# Patient Record
Sex: FEMALE | Race: WHITE | Employment: UNEMPLOYED | URBAN - METROPOLITAN AREA
[De-identification: names, ages, dates, MRNs, and addresses within clinical notes are randomized per-mention and may not be internally consistent; named-entity substitution may affect disease eponyms.]

---

## 2017-03-15 ENCOUNTER — HOSPITAL ENCOUNTER (OUTPATIENT)
Dept: SURGERY | Age: 52
Discharge: HOME | End: 2017-03-15
Attending: SPECIALIST | Admitting: SPECIALIST

## 2017-03-15 VITALS
OXYGEN SATURATION: 96 % | HEART RATE: 66 BPM | WEIGHT: 165 LBS | DIASTOLIC BLOOD PRESSURE: 87 MMHG | HEIGHT: 62 IN | SYSTOLIC BLOOD PRESSURE: 119 MMHG | RESPIRATION RATE: 16 BRPM | TEMPERATURE: 97.9 F | BODY MASS INDEX: 30.36 KG/M2

## 2017-03-15 RX ORDER — SCOLOPAMINE TRANSDERMAL SYSTEM 1 MG/1
1 PATCH, EXTENDED RELEASE TRANSDERMAL
Status: DISCONTINUED | OUTPATIENT
Start: 2017-03-15 | End: 2017-03-16 | Stop reason: HOSPADM

## 2017-03-15 RX ORDER — HYDROCORTISONE ACETATE 25 MG/1
25 SUPPOSITORY RECTAL EVERY 12 HOURS
Qty: 12 SUPPOSITORY | Refills: 0 | Status: SHIPPED | OUTPATIENT
Start: 2017-03-15 | End: 2021-02-19

## 2017-03-15 RX ORDER — SODIUM CHLORIDE, SODIUM LACTATE, POTASSIUM CHLORIDE, CALCIUM CHLORIDE 600; 310; 30; 20 MG/100ML; MG/100ML; MG/100ML; MG/100ML
INJECTION, SOLUTION INTRAVENOUS CONTINUOUS
Status: DISCONTINUED | OUTPATIENT
Start: 2017-03-15 | End: 2017-03-16 | Stop reason: HOSPADM

## 2017-03-15 RX ADMIN — SODIUM CHLORIDE, SODIUM LACTATE, POTASSIUM CHLORIDE, CALCIUM CHLORIDE: 600; 310; 30; 20 INJECTION, SOLUTION INTRAVENOUS at 10:44

## 2017-03-15 ASSESSMENT — PAIN - FUNCTIONAL ASSESSMENT: PAIN_FUNCTIONAL_ASSESSMENT: 0-10

## 2017-03-15 ASSESSMENT — PAIN SCALES - GENERAL
PAINLEVEL_OUTOF10: 0
PAINLEVEL_OUTOF10: 0

## 2017-07-12 ENCOUNTER — OFFICE VISIT (OUTPATIENT)
Dept: FAMILY MEDICINE CLINIC | Age: 52
End: 2017-07-12

## 2017-07-12 VITALS
BODY MASS INDEX: 30.84 KG/M2 | HEART RATE: 64 BPM | OXYGEN SATURATION: 93 % | WEIGHT: 167.6 LBS | SYSTOLIC BLOOD PRESSURE: 120 MMHG | RESPIRATION RATE: 17 BRPM | DIASTOLIC BLOOD PRESSURE: 76 MMHG | HEIGHT: 62 IN

## 2017-07-12 DIAGNOSIS — Z00.00 VISIT FOR PREVENTIVE HEALTH EXAMINATION: ICD-10-CM

## 2017-07-12 DIAGNOSIS — R10.13 EPIGASTRIC PAIN: ICD-10-CM

## 2017-07-12 DIAGNOSIS — R19.7 DIARRHEA, UNSPECIFIED TYPE: Primary | ICD-10-CM

## 2017-07-12 PROCEDURE — 99204 OFFICE O/P NEW MOD 45 MIN: CPT | Performed by: NURSE PRACTITIONER

## 2017-07-12 ASSESSMENT — ENCOUNTER SYMPTOMS
DIARRHEA: 1
SHORTNESS OF BREATH: 0
VOMITING: 0
BACK PAIN: 0
ABDOMINAL PAIN: 1
NAUSEA: 0

## 2018-10-10 DIAGNOSIS — Z12.39 SCREENING FOR BREAST CANCER: Primary | ICD-10-CM

## 2021-02-19 ENCOUNTER — OFFICE VISIT (OUTPATIENT)
Dept: ORTHOPEDIC SURGERY | Age: 56
End: 2021-02-19
Payer: COMMERCIAL

## 2021-02-19 ENCOUNTER — OFFICE VISIT (OUTPATIENT)
Dept: CARDIOLOGY CLINIC | Age: 56
End: 2021-02-19
Payer: COMMERCIAL

## 2021-02-19 VITALS
OXYGEN SATURATION: 98 % | RESPIRATION RATE: 15 BRPM | WEIGHT: 167 LBS | HEART RATE: 77 BPM | HEIGHT: 62 IN | BODY MASS INDEX: 30.73 KG/M2

## 2021-02-19 VITALS
HEIGHT: 62 IN | WEIGHT: 163 LBS | BODY MASS INDEX: 30 KG/M2 | HEART RATE: 88 BPM | SYSTOLIC BLOOD PRESSURE: 112 MMHG | DIASTOLIC BLOOD PRESSURE: 80 MMHG

## 2021-02-19 DIAGNOSIS — I21.4 NSTEMI (NON-ST ELEVATED MYOCARDIAL INFARCTION) (HCC): ICD-10-CM

## 2021-02-19 DIAGNOSIS — R00.2 PALPITATIONS: ICD-10-CM

## 2021-02-19 DIAGNOSIS — I25.110 CORONARY ARTERY DISEASE INVOLVING NATIVE CORONARY ARTERY OF NATIVE HEART WITH UNSTABLE ANGINA PECTORIS (HCC): Primary | ICD-10-CM

## 2021-02-19 DIAGNOSIS — I49.9 IRREGULAR HEART BEAT: ICD-10-CM

## 2021-02-19 DIAGNOSIS — R42 DIZZINESS: ICD-10-CM

## 2021-02-19 DIAGNOSIS — M87.052 AVASCULAR NECROSIS OF LEFT FEMUR (HCC): ICD-10-CM

## 2021-02-19 DIAGNOSIS — M79.605 PAIN IN BOTH LOWER EXTREMITIES: ICD-10-CM

## 2021-02-19 DIAGNOSIS — M16.12 PRIMARY OSTEOARTHRITIS OF LEFT HIP: Primary | ICD-10-CM

## 2021-02-19 DIAGNOSIS — M79.604 PAIN IN BOTH LOWER EXTREMITIES: ICD-10-CM

## 2021-02-19 PROCEDURE — 3017F COLORECTAL CA SCREEN DOC REV: CPT | Performed by: ORTHOPAEDIC SURGERY

## 2021-02-19 PROCEDURE — 3017F COLORECTAL CA SCREEN DOC REV: CPT | Performed by: INTERNAL MEDICINE

## 2021-02-19 PROCEDURE — G8428 CUR MEDS NOT DOCUMENT: HCPCS | Performed by: ORTHOPAEDIC SURGERY

## 2021-02-19 PROCEDURE — 93000 ELECTROCARDIOGRAM COMPLETE: CPT | Performed by: INTERNAL MEDICINE

## 2021-02-19 PROCEDURE — G8484 FLU IMMUNIZE NO ADMIN: HCPCS | Performed by: ORTHOPAEDIC SURGERY

## 2021-02-19 PROCEDURE — 4004F PT TOBACCO SCREEN RCVD TLK: CPT | Performed by: INTERNAL MEDICINE

## 2021-02-19 PROCEDURE — G8417 CALC BMI ABV UP PARAM F/U: HCPCS | Performed by: ORTHOPAEDIC SURGERY

## 2021-02-19 PROCEDURE — 99204 OFFICE O/P NEW MOD 45 MIN: CPT | Performed by: INTERNAL MEDICINE

## 2021-02-19 PROCEDURE — G8484 FLU IMMUNIZE NO ADMIN: HCPCS | Performed by: INTERNAL MEDICINE

## 2021-02-19 PROCEDURE — 99203 OFFICE O/P NEW LOW 30 MIN: CPT | Performed by: ORTHOPAEDIC SURGERY

## 2021-02-19 PROCEDURE — G8427 DOCREV CUR MEDS BY ELIG CLIN: HCPCS | Performed by: INTERNAL MEDICINE

## 2021-02-19 PROCEDURE — 4004F PT TOBACCO SCREEN RCVD TLK: CPT | Performed by: ORTHOPAEDIC SURGERY

## 2021-02-19 PROCEDURE — G8417 CALC BMI ABV UP PARAM F/U: HCPCS | Performed by: INTERNAL MEDICINE

## 2021-02-19 RX ORDER — BACLOFEN 10 MG/1
10 TABLET ORAL EVERY 8 HOURS PRN
COMMUNITY
Start: 2021-02-12 | End: 2021-03-18

## 2021-02-19 RX ORDER — ALBUTEROL SULFATE 2.5 MG/3ML
2.5 SOLUTION RESPIRATORY (INHALATION) EVERY 6 HOURS PRN
COMMUNITY
Start: 2021-02-12 | End: 2022-02-12

## 2021-02-19 RX ORDER — BACLOFEN 10 MG/1
TABLET ORAL
COMMUNITY
Start: 2021-02-12 | End: 2021-02-19

## 2021-02-19 RX ORDER — HYDROXYZINE HYDROCHLORIDE 25 MG/1
25 TABLET, FILM COATED ORAL EVERY 4 HOURS PRN
COMMUNITY
Start: 2021-02-12 | End: 2021-02-19

## 2021-02-19 RX ORDER — ALBUTEROL SULFATE 90 UG/1
2 AEROSOL, METERED RESPIRATORY (INHALATION) EVERY 6 HOURS PRN
Status: ON HOLD | COMMUNITY
End: 2021-03-11 | Stop reason: HOSPADM

## 2021-02-19 RX ORDER — VALACYCLOVIR HYDROCHLORIDE 500 MG/1
TABLET, FILM COATED ORAL
COMMUNITY
Start: 2020-10-29 | End: 2021-02-19

## 2021-02-19 RX ORDER — CHOLESTYRAMINE 4 G/9G
4 POWDER, FOR SUSPENSION ORAL
COMMUNITY
Start: 2021-02-12 | End: 2021-02-19

## 2021-02-19 RX ORDER — MULTIVITAMIN
1 CAPSULE ORAL DAILY
COMMUNITY
End: 2022-06-17

## 2021-02-19 RX ORDER — MAGNESIUM OXIDE 400 MG/1
400 TABLET ORAL DAILY
COMMUNITY
Start: 2021-02-12 | End: 2021-12-20

## 2021-02-19 RX ORDER — PANTOPRAZOLE SODIUM 40 MG/1
40 TABLET, DELAYED RELEASE ORAL DAILY
COMMUNITY
Start: 2020-09-03 | End: 2021-02-19

## 2021-02-19 RX ORDER — HYDROXYZINE HYDROCHLORIDE 25 MG/1
TABLET, FILM COATED ORAL DAILY PRN
COMMUNITY
Start: 2021-02-12 | End: 2022-06-17

## 2021-02-19 RX ORDER — IBUPROFEN 200 MG
800 TABLET ORAL NIGHTLY
Status: ON HOLD | COMMUNITY
End: 2021-03-11 | Stop reason: HOSPADM

## 2021-02-19 RX ORDER — NICOTINE 21 MG/24HR
1 PATCH, TRANSDERMAL 24 HOURS TRANSDERMAL
COMMUNITY
Start: 2021-02-12 | End: 2021-06-18

## 2021-02-19 RX ORDER — ROSUVASTATIN CALCIUM 5 MG/1
5 TABLET, COATED ORAL DAILY
COMMUNITY
Start: 2021-01-20 | End: 2021-02-19

## 2021-02-19 NOTE — LETTER
[address]  [address] [address]  Phone: [phone]  Fax: [phone]    José Manuel Floyd MD        February 19, 2021     Anastasia Ledesma MD  [facility] [address]    Patient: Tim Yo  MR Number: [id number]  YOB: 1965  Date of Visit: 2/19/2021    Dear Dr. Anastasia Ledesma: Thank you for the request for consultation for [facility] to me for the evaluation of Leg pains. Below are the relevant portions of my assessment and plan of care. If you have questions, please do not hesitate to call me. I look forward to following Renata along with you.     Sincerely,        José Manuel Floyd MD

## 2021-02-19 NOTE — PROGRESS NOTES
CARDIAC CONSULT NOTE       Renata  48 y.o.  female    Chief Complaint   Patient presents with   Francy Liao       Referring physician:  Manjit Woodruff MD     Primary care physician:  Manjit Woodruff MD    History of Present Illness:     Joshua Lopez is a 48 y.o. female referred for evaluation and management of LEG PAINS, PALPITATIONS & DIZZINESS. Patient has known H/O CAD S/P LAD stenting in the past last cath 12/2015, used to see . LEG PAIN: Abdominal CT suggests B/L Iliac disease. Patient has been having pains for about an year. PALPITATIONS: when she was anxious. Better since she is on anxiolytics. DIZZINESS:Feels sick, starts sweating but no syncope.     has a past medical history of Anal cancer ([facility] [address] Asthma, Broken ankle, CAD (coronary artery disease), H/O cardiovascular stress test, NSTEMI (non-ST elevated myocardial infarction) ([facility] [address] PONV (postoperative nausea and vomiting), and Skin cancer. has a past surgical history that includes Percutaneous Transluminal Coronary Angio (2009); Colon surgery (2009); Ankle surgery (Left, 10+ yrs ago); Tubal ligation (25 yrs ago); Tunneled venous port placement (2009); Tonsillectomy; other surgical history (04-); fracture surgery; Colonoscopy (3/4/15); and Colonoscopy (03/15/2017). reports that she has been smoking cigarettes. She has a 17.50 pack-year smoking history. She has never used smokeless tobacco. She reports that she does not drink alcohol or use drugs. family history includes Cancer in her brother, father, and mother; High Cholesterol in her father; No Known Problems in her daughter; Stroke in her father and sister. Review of Systems:   1. Cardiovascular: No chest pain, dyspnea on exertion, +palpitations no loss of consciousness  2. Respiratory: No cough or wheezing    3. Musculoskeletal:  + gait disturbance, weakness, muscle cramps, aches & pains or joint complaints  4.  Neurological: No TIA or stroke symptoms  5. Psychiatric: No anxiety or depression  6. Hematologic/Lymphatic: No bleeding problems, blood clots or swollen lymph nodes    Physical Examination:    /80   Pulse 88   Ht 5' 2\" (1.575 m)   Wt 163 lb (73.9 kg)   BMI 29.81 kg/m²    Patient not Orthostatic    Wt Readings from Last 3 Encounters:   02/19/21 163 lb (73.9 kg)   02/19/21 167 lb (75.8 kg)   10/09/17 167 lb (75.8 kg)     Body mass index is 29.81 kg/m². General Appearance:  Non-obese/Well Nourished  1. Skin: It is warm & dry. No rashes noted. 2. Eyes: No conjunctival Pallor seen. No jaundice noted. 3. Neck: is supple there is no elevation of JVD. No thyromegaly  4. Respiratory:  · Resp Assessment: No abnormal findings. · Resp Auscultation: Vesicular breath sounds without rales or wheezing. 5. Cardiovascular:  · Auscultation: Normal S1 & S2, No prominent murmurs  · Carotid Arteries: No bruits present  · Abdominal Aorta: Non-palpable  · Pedal Pulses:1+ and equal   6. Abdomen:  · No masses or tenderness  · Liver/Spleen: No Abnormalities Noted, no organomegaly. 7. Musculoskeletal: No joint deformities. No muscle wasting  8. Extremities:  ·  No Cyanosis or Clubbing. No significant edema   9. Rectal / genital:  ·  Deferred  10.  Neurological/Psychiatric:  · Oriented to time, place, and person  · Non-anxious    Lab Results   Component Value Date    TROPONINT 0.197 12/09/2015    TROPONINT 0.248 12/09/2015     BNP:  No results found for: BNP, PROBNP  PT/INR:  No results found for: PTINR  No results found for: LABA1C  No results found for: CHOL, TRIG, HDL, LDLCALC, LDLDIRECT, CHOLHDLRATIO  Lab Results   Component Value Date    ALT 15 12/09/2015    ALT 17 04/10/2012    AST 15 12/09/2015    AST 17 04/10/2012     BMP:    Lab Results   Component Value Date     12/09/2015     04/10/2012    K 4.0 12/09/2015    K 3.9 04/10/2012     12/09/2015     04/10/2012    CO2 23 12/09/2015    CO2 23 04/10/2012    BUN 9

## 2021-02-19 NOTE — PATIENT INSTRUCTIONS
Continue weight-bearing as tolerated. Continue range of motion exercises as instructed. Ice and elevate as needed. Tylenol or Motrin for pain. Follow up as needed (if you would like an injection)  Patient Education        Hip Arthritis: Exercises  Introduction  Here are some examples of exercises for you to try. The exercises may be suggested for a condition or for rehabilitation. Start each exercise slowly. Ease off the exercises if you start to have pain. You will be told when to start these exercises and which ones will work best for you. How to do the exercises  Straight-leg raises to the outside   1. Lie on your side, with your affected hip on top. 2. Tighten the front thigh muscles of your top leg to keep your knee straight. 3. Keep your hip and your leg straight in line with the rest of your body, and keep your knee pointing forward. Do not drop your hip back. 4. Lift your top leg straight up toward the ceiling, about 12 inches off the floor. Hold for about 6 seconds, then slowly lower your leg. 5. Repeat 8 to 12 times. 6. Switch legs and repeat steps 1 through 5, even if only one hip is sore. Straight-leg raises to the inside   1. Lie on your side with your affected hip on the floor. 2. You can either prop up your other leg on a chair, or you can bend that knee and put that foot in front of your other knee. Do not drop your hip back. 3. Tighten the muscles on the front thigh of your bottom leg to straighten that knee. 4. Keep your kneecap pointing forward and your leg straight, and lift your bottom leg up toward the ceiling about 6 inches. Hold for about 6 seconds, then lower slowly. 5. Repeat 8 to 12 times. 6. Switch legs and repeat steps 1 through 5, even if only one hip is sore. Hip hike   1. Stand sideways on the bottom step of a staircase, and hold on to the banister or wall. 2. Keeping both knees straight, lift your good leg off the step and let it hang down. Then hike your good hip up to the same level as your affected hip or a little higher. 3. Repeat 8 to 12 times. 4. Switch legs and repeat steps 1 through 3, even if only one hip is sore. Bridging   1. Lie on your back with both knees bent. Your knees should be bent about 90 degrees. 2. Then push your feet into the floor, squeeze your buttocks, and lift your hips off the floor until your shoulders, hips, and knees are all in a straight line. 3. Hold for about 6 seconds as you continue to breathe normally, and then slowly lower your hips back down to the floor and rest for up to 10 seconds. 4. Repeat 8 to 12 times. Hamstring stretch (lying down)   1. Lie flat on your back with your legs straight. If you feel discomfort in your back, place a small towel roll under your lower back. 2. Holding the back of your affected leg, lift your leg straight up and toward your body until you feel a stretch at the back of your thigh. 3. Hold the stretch for at least 30 seconds. 4. Repeat 2 to 4 times. 5. Switch legs and repeat steps 1 through 4, even if only one hip is sore. Standing quadriceps stretch   1. If you are not steady on your feet, hold on to a chair, counter, or wall. You can also lie on your stomach or your side to do this exercise. 2. Bend the knee of the leg you want to stretch, and reach behind you to grab the front of your foot or ankle with the hand on the same side. For example, if you are stretching your right leg, use your right hand. 3. Keeping your knees next to each other, pull your foot toward your buttock until you feel a gentle stretch across the front of your hip and down the front of your thigh. Your knee should be pointed directly to the ground, and not out to the side. 4. Hold the stretch for at least 15 to 30 seconds. 5. Repeat 2 to 4 times. 6. Switch legs and repeat steps 1 through 5, even if only one hip is sore. Hip rotator stretch   1. Lie on your back with both knees bent and your feet flat on the floor. 2. Put the ankle of your affected leg on your opposite thigh near your knee. 3. Use your hand to gently push your knee away from your body until you feel a gentle stretch around your hip. 4. Hold the stretch for 15 to 30 seconds. 5. Repeat 2 to 4 times. 6. Repeat steps 1 through 5, but this time use your hand to gently pull your knee toward your opposite shoulder. 7. Switch legs and repeat steps 1 through 6, even if only one hip is sore. Knee-to-chest   1. Lie on your back with your knees bent and your feet flat on the floor. 2. Bring your affected leg to your chest, keeping the other foot flat on the floor (or keeping the other leg straight, whichever feels better on your lower back). 3. Keep your lower back pressed to the floor. Hold for at least 15 to 30 seconds. 4. Relax, and lower the knee to the starting position. 5. Repeat 2 to 4 times. 6. Switch legs and repeat steps 1 through 5, even if only one hip is sore. 7. To get more stretch, put your other leg flat on the floor while pulling your knee to your chest.    Clamshell   1. Lie on your side, with your affected hip on top. Keep your feet and knees together and your knees bent. 2. Raise your top knee, but keep your feet together. Do not let your hips roll back. Your legs should open up like a clamshell. 3. Hold for 6 seconds. 4. Slowly lower your knee back down. Rest for 10 seconds. 5. Repeat 8 to 12 times. 6. Switch legs and repeat steps 1 through 5, even if only one hip is sore. Follow-up care is a key part of your treatment and safety. Be sure to make and go to all appointments, and call your doctor if you are having problems. It's also a good idea to know your test results and keep a list of the medicines you take. Where can you learn more? Go to [email or web address] and sign in to your [employer] account. Enter L559 in the [facility] box to learn more about \"Hip Arthritis: Exercises. \"     If you do not have an account, please click on the \"Sign Up Now\" link. Current as of: March 2, 2020               Content Version: 12.6  © 7896-1522 [employer]. Care instructions adapted under license by [facility] ([facility]). If you have questions about a medical condition or this instruction, always ask your healthcare professional. [address] any warranty or liability for your use of this information.

## 2021-02-19 NOTE — LETTER
Yesy Hickman  1965  [id number]    Have you had any Chest Pain that is not new? - No      ? DO EKG IF: Patient has a Heart Rate above 100 or below 40     CAD (Coronary Artery Disease) patient should have one on file every 6 months        Have you had any Shortness of Breath - No      Have you had any dizziness - No    Have you had any palpitations that are not new? - Yes  If Yes DO EKG - Do you feel your heart racing  How long does it last - .3  minutes     Is the patient on any of the following medications -     Do you have any edema - swelling in No    If Yes - CHECK TO SEE IF THE EDEMA IS PITTING  How long have they been having edema - Years  If Yes - Have they worn compression stockings No    Vein \"LEG PROBLEM Questionnaire\"  1. Do you have prominent leg veins? No   2. Do you have any skin discoloration? No  3. Do you have any healed or active sores? No  4. Do you have swelling of the legs? No  5. Do you have a family history of varicose veins? No  6. Does your profession involve pro-longed        standing or heavy lifting? Yes  7. Have you been fighting overweight problems? No  8. Do you have restless legs? No  9. Do you have any night time cramps? No  10.  Do you have any of the following in your legs:        Weakness     Do you have a surgery or procedure scheduled in the near future - No per pt

## 2021-02-19 NOTE — PATIENT INSTRUCTIONS
1. PAD: Will check arterial US of both Legs & if needed Angiography. 2. CAD: Clinically stable  3. Dizziness: Vaso Vagal abnormality. Office Visit for test results. Please be informed that if you contact our office outside of normal business hours the physician on call cannot help with any scheduling or rescheduling issues, procedure instruction questions or any type of medication issue. We advise you for any urgent/emergency that you go to the nearest emergency room!     PLEASE CALL OUR OFFICE DURING NORMAL BUSINESS HOURS    Monday - Friday   8 am to 5 pm    [facility] [address]: [phone]    [place]:  [phone]

## 2021-02-19 NOTE — PROGRESS NOTES
Patient presents to the office today for her first evaluation of the left hip. Pt states pain has been going on for about a year now. Pt states pain is in her groin area. Pt states that she is having constant pain in her groin area. She is having a difficult time raising her left leg or crossing her left leg over her right leg. Pt states that she does have pain in her right hip also. L>R pt states that pain in her right hip is along the lateral aspect of the hip.

## 2021-02-19 NOTE — LETTER
Patient Name: Radha Vivas  : 1965  MRN# [id number]    REASON FOR VISIT: Consult LEG PAIN    Current Outpatient Medications   Medication Sig Dispense Refill    baclofen (LIORESAL) 10 MG tablet       NICOTINE STEP 1 21 MG/24HR       nicotine (NICODERM CQ) 21 MG/24HR Place 1 patch onto the skin      Multiple Vitamin (MULTIVITAMIN) capsule Take 1 capsule by mouth daily      magnesium oxide (MAG-OX) 400 MG tablet Take 400 mg by mouth daily      ibuprofen (ADVIL;MOTRIN) 200 MG tablet Take 800 mg by mouth nightly      hydrOXYzine (ATARAX) 25 MG tablet       hydrOXYzine (ATARAX) 25 MG tablet Take 25 mg by mouth every 4 hours as needed      Cyanocobalamin 2500 MCG TABS Take 1 tablet by mouth daily      cyanocobalamin 500 MCG tablet Take 5,000 mcg by mouth daily      cholestyramine (QUESTRAN) 4 g packet Take 4 g by mouth      baclofen (LIORESAL) 10 MG tablet Take 10 mg by mouth every 8 hours as needed      albuterol (PROVENTIL) (2.5 MG/3ML) 0.083% nebulizer solution Inhale 2.5 mg into the lungs every 6 hours as needed      albuterol sulfate  (90 Base) MCG/ACT inhaler Inhale 2 puffs into the lungs every 6 hours as needed      pantoprazole (PROTONIX) 40 MG tablet Take 40 mg by mouth daily      rosuvastatin (CRESTOR) 5 MG tablet Take 5 mg by mouth daily      valACYclovir (VALTREX) 500 MG tablet Take two tablets twice daily for 10 days and then one tablet daily      dicyclomine (BENTYL) 10 MG capsule Take 1 capsule by mouth 3 times daily (before meals) 90 capsule 3    albuterol-ipratropium (COMBIVENT RESPIMAT)  MCG/ACT AERS inhaler Inhale into the lungs      hydrocortisone (ANUSOL-HC) 25 MG suppository Place 1 suppository rectally every 12 hours 12 suppository 0    metoprolol tartrate (LOPRESSOR) 25 MG tablet Take 1 tablet by mouth 2 times daily (Patient taking differently: Take 25 mg by mouth daily ) 60 tablet 6    aspirin 81 MG chewable tablet Take 1 tablet by mouth daily 30 tablet 6  albuterol-ipratropium (COMBIVENT)  MCG/ACT inhaler Inhale 1 puff into the lungs 4 times daily 1 Inhaler 1     No current facility-administered medications for this visit.             STRESS TEST:  1/16/2016    ECHO: 12/9/2015    CAROTID: NONE    MUGA: NONE    LAST PACER CHECK: NONE    CARDIAC CATH: 12/11/2015

## 2021-02-21 PROBLEM — M16.12 PRIMARY OSTEOARTHRITIS OF LEFT HIP: Status: ACTIVE | Noted: 2021-02-21

## 2021-02-21 PROBLEM — M87.052 AVASCULAR NECROSIS OF LEFT FEMUR (HCC): Status: ACTIVE | Noted: 2021-02-21

## 2021-02-21 ASSESSMENT — ENCOUNTER SYMPTOMS
WHEEZING: 0
SHORTNESS OF BREATH: 0
VOMITING: 0
EYE REDNESS: 0
CHEST TIGHTNESS: 0
COLOR CHANGE: 0
EYE PAIN: 0

## 2021-02-21 NOTE — PROGRESS NOTES
2/19/2021   Chief Complaint   Patient presents with    Hip Pain     left hip pain         History of Present Illness:                             Aleena Hickman is a 48 y.o. female presents today for evaluation of her left hip pain. She has had worsening pain and stiffness in the left hip and groin region over the last year. Symptoms have become more constant and severe over the last few months. She is having difficulty with movement and rotation of the hip. She has deep aching pain in the groin worse with weightbearing activities and at the extremes of motion during flexion and rotational movements of the hip. She has difficulty with getting on her shoes and socks and getting up from a seated position. 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 She has some mild issues with her right hip but no significant limitations in range of motion we will suggest tightness and pain with repetitive activities. Patient presents to the office today for her first evaluation of the left hip. Pt states pain has been going on for about a year now. Pt states pain is in her groin area. Pt states that she is having constant pain in her groin area. She is having a difficult time raising her left leg or crossing her left leg over her right leg. Pt states that she does have pain in her right hip also. L>R pt states that pain in her right hip is along the lateral aspect of the hip. Medical History  Patient's medications, allergies, past medical, surgical, social and family histories were reviewed and updated as appropriate.     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 ?    Cancer Father         prostate cancer- lung mets- bone mets    Stroke Father     High Cholesterol Father     Stroke Sister     Cancer Brother         neck cancer    No Known Problems Daughter      Social History     Socioeconomic History    Marital status:      Spouse name: None    Number of children: None    Years of education: 6    Highest education level: None   Occupational History    Occupation: unemployed   Social Needs    Financial resource strain: None    Food insecurity     Worry: None     Inability: None    Transportation needs     Medical: None     Non-medical: None   Tobacco Use    Smoking status: Current Every Day Smoker     Packs/day: 0.50     Years: 35.00     Pack years: 17.50     Types: Cigarettes    Smokeless tobacco: Never Used   Substance and Sexual Activity    Alcohol use: No     Comment: \"quit 3/2015\"use to drink a couple of times per week before\"    Drug use: No     Comment: 1-2 cups of coffee daily     Sexual activity: Yes   Lifestyle    Physical activity     Days per week: None     Minutes per session: None    Stress: None   Relationships    Social connections     Talks on phone: None     Gets together: None     Attends Mu-ism service: None     Active member of club or organization: None     Attends meetings of clubs or organizations: None     Relationship status: None    Intimate partner violence     Fear of current or ex partner: None     Emotionally abused: None     Physically abused: None     Forced sexual activity: None   Other Topics Concern    None   Social History Narrative    None     Current Outpatient Medications   Medication Sig Dispense Refill    nicotine (NICODERM CQ) 21 MG/24HR Place 1 patch onto the skin      magnesium oxide (MAG-OX) 400 MG tablet Take 400 mg by mouth daily      baclofen (LIORESAL) 10 MG tablet Take 10 mg by mouth every 8 hours as needed      albuterol (PROVENTIL) (2.5 MG/3ML) 0.083% nebulizer solution Inhale 2.5 mg into the lungs every 6 hours as needed      Multiple Vitamin (MULTIVITAMIN) capsule Take 1 capsule by mouth daily      ibuprofen (ADVIL;MOTRIN) 200 MG tablet Take 800 mg by mouth nightly      hydrOXYzine (ATARAX) 25 MG tablet       albuterol sulfate  (90 Base) MCG/ACT inhaler Inhale 2 puffs into the lungs every 6 hours as needed      albuterol-ipratropium (COMBIVENT RESPIMAT)  MCG/ACT AERS inhaler Inhale into the lungs      aspirin 81 MG chewable tablet Take 1 tablet by mouth daily 30 tablet 6    albuterol-ipratropium (COMBIVENT)  MCG/ACT inhaler Inhale 1 puff into the lungs 4 times daily 1 Inhaler 1     No current facility-administered medications for this visit. Allergies   Allergen Reactions    Tylenol With Codeine #3 [Acetaminophen-Codeine] Nausea And Vomiting       Review of Systems:   Review of Systems   Constitutional: Negative for chills and fever. HENT: Negative for congestion and sneezing. Eyes: Negative for pain and redness. Respiratory: Negative for chest tightness, shortness of breath and wheezing. Cardiovascular: Negative for chest pain and palpitations. Gastrointestinal: Negative for vomiting. Musculoskeletal: Positive for arthralgias and gait problem. Skin: Negative for color change and rash. Neurological: Negative for weakness and numbness. Psychiatric/Behavioral: Negative for agitation. The patient is not nervous/anxious. Examination:  General Exam:  Vitals: Pulse 77   Resp 15   Ht 5' 2\" (1.575 m)   Wt 167 lb (75.8 kg)   SpO2 98%   BMI 30.54 kg/m²    Physical Exam  Vitals signs and nursing note reviewed.    Constitutional:       Appearance: Normal appearance. HENT:      Head: Normocephalic and atraumatic. Eyes:      Conjunctiva/sclera: Conjunctivae normal.      Pupils: Pupils are equal, round, and reactive to light. Neck:      Musculoskeletal: Normal range of motion. Pulmonary:      Effort: Pulmonary effort is normal.   Musculoskeletal:      Right hip: She exhibits normal range of motion, normal strength, no tenderness, no bony tenderness, no swelling, no crepitus and no deformity. Right knee: Normal.      Left knee: She exhibits normal range of motion, no swelling, no effusion, no deformity, no erythema, normal alignment, no LCL laxity and no MCL laxity. No medial joint line and no lateral joint line tenderness noted. Lumbar back: She exhibits normal range of motion, no tenderness, no swelling and no deformity. Comments: Left Lower Extremity:  There is moderate tenderness to palpation diffusely throughout the hip both anteriorly and posteriorly. Range of motion is significantly limited and painful at the extremes of motion. Hip flexion present to 90 degrees, abduction 20 degrees, extension 5 degrees, internal rotation 10 degrees, external rotation 10 degrees. Strength is 5 out of 5 with hip flexion, extension, and abduction however this is somewhat limited due to pain with resistance testing. BRITNEY and FADIR test reproduces pain to the groin and hip joint. Sensation is intact to light touch in the left lower extremity. Pulses are intact. Skin is intact without erythema. No lower extremity edema. Skin:     General: Skin is warm and dry. Neurological:      Mental Status: She is alert and oriented to person, place, and time. Psychiatric:         Mood and Affect: Mood normal.         Behavior: Behavior normal.            Diagnostic testing:  X-ray images were reviewed by myself and discussed with the patient:      Office Procedures:  No orders of the defined types were placed in this encounter.       Assessment and Plan  1. Left hip primary osteoarthritis    2. 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 We discussed maximizing conservative treatments for the left hip. I explained to her that given the severity of her x-ray changes and symptoms that hip replacement surgery may be helpful for her in the future. She would like to hold off for as long as possible for surgery. I recommended weight loss and home exercise program.  I given her some stretches and strengthening exercises to perform for the left hip. I offered her a steroid injection. She is opted to defer. She feels that her symptoms were not severe enough to require an injection today. She will follow-up as needed in future if she would like to have a steroid injection performed. I recommended over-the-counter anti-inflammatory medications.   She will follow-up as needed    Electronically signed by Rebeca Cain MD on 2/21/2021 at 3:51 PM

## 2021-03-08 ENCOUNTER — TELEPHONE (OUTPATIENT)
Dept: CARDIOLOGY CLINIC | Age: 56
End: 2021-03-08

## 2021-03-08 ENCOUNTER — HOSPITAL ENCOUNTER (INPATIENT)
Age: 56
LOS: 1 days | Discharge: HOME | DRG: 247 | End: 2021-03-11
Attending: INTERNAL MEDICINE | Admitting: INTERNAL MEDICINE
Payer: COMMERCIAL

## 2021-03-08 ENCOUNTER — APPOINTMENT (OUTPATIENT)
Dept: GENERAL RADIOLOGY | Age: 56
DRG: 247 | End: 2021-03-08
Payer: COMMERCIAL

## 2021-03-08 DIAGNOSIS — R07.9 CHEST PAIN, UNSPECIFIED TYPE: Primary | ICD-10-CM

## 2021-03-08 LAB
ALBUMIN SERPL-MCNC: 3.8 GM/DL (ref 3.4–5)
ALP BLD-CCNC: 123 IU/L (ref 40–129)
ALT SERPL-CCNC: 30 U/L (ref 10–40)
AMPHETAMINES: NEGATIVE
ANION GAP SERPL CALCULATED.3IONS-SCNC: 5 MMOL/L (ref 4–16)
AST SERPL-CCNC: 24 IU/L (ref 15–37)
BARBITURATE SCREEN URINE: NEGATIVE
BASOPHILS ABSOLUTE: 0 K/CU MM
BASOPHILS RELATIVE PERCENT: 0.4 % (ref 0–1)
BENZODIAZEPINE SCREEN, URINE: NEGATIVE
BILIRUB SERPL-MCNC: 0.2 MG/DL (ref 0–1)
BUN BLDV-MCNC: 10 MG/DL (ref 6–23)
CALCIUM SERPL-MCNC: 8.9 MG/DL (ref 8.3–10.6)
CANNABINOID SCREEN URINE: NEGATIVE
CHLORIDE BLD-SCNC: 105 MMOL/L (ref 99–110)
CO2: 30 MMOL/L (ref 21–32)
COCAINE METABOLITE: NEGATIVE
CREAT SERPL-MCNC: 0.7 MG/DL (ref 0.6–1.1)
DIFFERENTIAL TYPE: ABNORMAL
EKG ATRIAL RATE: 84 BPM
EKG DIAGNOSIS: NORMAL
EKG P AXIS: 53 DEGREES
EKG P-R INTERVAL: 160 MS
EKG Q-T INTERVAL: 354 MS
EKG QRS DURATION: 86 MS
EKG QTC CALCULATION (BAZETT): 418 MS
EKG R AXIS: 35 DEGREES
EKG T AXIS: 61 DEGREES
EKG VENTRICULAR RATE: 84 BPM
EOSINOPHILS ABSOLUTE: 0.1 K/CU MM
EOSINOPHILS RELATIVE PERCENT: 1.4 % (ref 0–3)
GFR AFRICAN AMERICAN: >60 ML/MIN/1.73M2
GFR NON-AFRICAN AMERICAN: >60 ML/MIN/1.73M2
GLUCOSE BLD-MCNC: 123 MG/DL (ref 70–99)
HCT VFR BLD CALC: 39 % (ref 37–47)
HEMOGLOBIN: 12.5 GM/DL (ref 12.5–16)
IMMATURE NEUTROPHIL %: 0.3 % (ref 0–0.43)
LYMPHOCYTES ABSOLUTE: 2.6 K/CU MM
LYMPHOCYTES RELATIVE PERCENT: 35.7 % (ref 24–44)
MCH RBC QN AUTO: 29.8 PG (ref 27–31)
MCHC RBC AUTO-ENTMCNC: 32.1 % (ref 32–36)
MCV RBC AUTO: 93.1 FL (ref 78–100)
MONOCYTES ABSOLUTE: 0.5 K/CU MM
MONOCYTES RELATIVE PERCENT: 6.6 % (ref 0–4)
NUCLEATED RBC %: 0 %
OPIATES, URINE: NEGATIVE
OXYCODONE: NEGATIVE
PDW BLD-RTO: 15.4 % (ref 11.7–14.9)
PHENCYCLIDINE, URINE: NEGATIVE
PLATELET # BLD: 255 K/CU MM (ref 140–440)
PMV BLD AUTO: 9.8 FL (ref 7.5–11.1)
POTASSIUM SERPL-SCNC: 4.3 MMOL/L (ref 3.5–5.1)
RBC # BLD: 4.19 M/CU MM (ref 4.2–5.4)
SEGMENTED NEUTROPHILS ABSOLUTE COUNT: 4 K/CU MM
SEGMENTED NEUTROPHILS RELATIVE PERCENT: 55.6 % (ref 36–66)
SODIUM BLD-SCNC: 140 MMOL/L (ref 135–145)
TOTAL IMMATURE NEUTOROPHIL: 0.02 K/CU MM
TOTAL NUCLEATED RBC: 0 K/CU MM
TOTAL PROTEIN: 6.4 GM/DL (ref 6.4–8.2)
TROPONIN T: <0.01 NG/ML
WBC # BLD: 7.3 K/CU MM (ref 4–10.5)

## 2021-03-08 PROCEDURE — 84484 ASSAY OF TROPONIN QUANT: CPT

## 2021-03-08 PROCEDURE — 71046 X-RAY EXAM CHEST 2 VIEWS: CPT

## 2021-03-08 PROCEDURE — G0378 HOSPITAL OBSERVATION PER HR: HCPCS

## 2021-03-08 PROCEDURE — 80307 DRUG TEST PRSMV CHEM ANLYZR: CPT

## 2021-03-08 PROCEDURE — 6370000000 HC RX 637 (ALT 250 FOR IP): Performed by: NURSE PRACTITIONER

## 2021-03-08 PROCEDURE — 2500000003 HC RX 250 WO HCPCS: Performed by: NURSE PRACTITIONER

## 2021-03-08 PROCEDURE — 93010 ELECTROCARDIOGRAM REPORT: CPT | Performed by: INTERNAL MEDICINE

## 2021-03-08 PROCEDURE — 85025 COMPLETE CBC W/AUTO DIFF WBC: CPT

## 2021-03-08 PROCEDURE — 99285 EMERGENCY DEPT VISIT HI MDM: CPT

## 2021-03-08 PROCEDURE — 36415 COLL VENOUS BLD VENIPUNCTURE: CPT

## 2021-03-08 PROCEDURE — 96374 THER/PROPH/DIAG INJ IV PUSH: CPT

## 2021-03-08 PROCEDURE — 80053 COMPREHEN METABOLIC PANEL: CPT

## 2021-03-08 PROCEDURE — 2580000003 HC RX 258: Performed by: NURSE PRACTITIONER

## 2021-03-08 PROCEDURE — 93005 ELECTROCARDIOGRAM TRACING: CPT | Performed by: INTERNAL MEDICINE

## 2021-03-08 RX ORDER — LOPERAMIDE HYDROCHLORIDE 2 MG/1
2 CAPSULE ORAL PRN
COMMUNITY

## 2021-03-08 RX ORDER — ASPIRIN 81 MG/1
324 TABLET, CHEWABLE ORAL ONCE
Status: DISCONTINUED | OUTPATIENT
Start: 2021-03-08 | End: 2021-03-10 | Stop reason: SDUPTHER

## 2021-03-08 RX ORDER — SODIUM CHLORIDE 9 MG/ML
INJECTION, SOLUTION INTRAVENOUS CONTINUOUS
Status: DISCONTINUED | OUTPATIENT
Start: 2021-03-08 | End: 2021-03-10 | Stop reason: SDUPTHER

## 2021-03-08 RX ORDER — ACETAMINOPHEN 325 MG/1
650 TABLET ORAL EVERY 6 HOURS PRN
Status: DISCONTINUED | OUTPATIENT
Start: 2021-03-08 | End: 2021-03-10 | Stop reason: SDUPTHER

## 2021-03-08 RX ORDER — POLYETHYLENE GLYCOL 3350 17 G/17G
17 POWDER, FOR SOLUTION ORAL DAILY PRN
Status: DISCONTINUED | OUTPATIENT
Start: 2021-03-08 | End: 2021-03-11 | Stop reason: HOSPADM

## 2021-03-08 RX ORDER — M-VIT,TX,IRON,MINS/CALC/FOLIC 27MG-0.4MG
1 TABLET ORAL DAILY
Status: DISCONTINUED | OUTPATIENT
Start: 2021-03-09 | End: 2021-03-11 | Stop reason: HOSPADM

## 2021-03-08 RX ORDER — HYDROXYZINE HYDROCHLORIDE 25 MG/1
25 TABLET, FILM COATED ORAL 4 TIMES DAILY PRN
Status: DISCONTINUED | OUTPATIENT
Start: 2021-03-08 | End: 2021-03-11 | Stop reason: HOSPADM

## 2021-03-08 RX ORDER — ONDANSETRON 2 MG/ML
4 INJECTION INTRAMUSCULAR; INTRAVENOUS EVERY 6 HOURS PRN
Status: DISCONTINUED | OUTPATIENT
Start: 2021-03-08 | End: 2021-03-10 | Stop reason: SDUPTHER

## 2021-03-08 RX ORDER — HYDROCODONE BITARTRATE AND ACETAMINOPHEN 5; 325 MG/1; MG/1
1 TABLET ORAL EVERY 6 HOURS PRN
Status: DISCONTINUED | OUTPATIENT
Start: 2021-03-08 | End: 2021-03-11

## 2021-03-08 RX ORDER — NICOTINE 21 MG/24HR
1 PATCH, TRANSDERMAL 24 HOURS TRANSDERMAL DAILY
Status: DISCONTINUED | OUTPATIENT
Start: 2021-03-08 | End: 2021-03-11 | Stop reason: HOSPADM

## 2021-03-08 RX ORDER — SODIUM CHLORIDE 0.9 % (FLUSH) 0.9 %
10 SYRINGE (ML) INJECTION PRN
Status: DISCONTINUED | OUTPATIENT
Start: 2021-03-08 | End: 2021-03-11 | Stop reason: HOSPADM

## 2021-03-08 RX ORDER — ATORVASTATIN CALCIUM 20 MG/1
20 TABLET, FILM COATED ORAL NIGHTLY
Status: DISCONTINUED | OUTPATIENT
Start: 2021-03-08 | End: 2021-03-11 | Stop reason: HOSPADM

## 2021-03-08 RX ORDER — ACETAMINOPHEN 650 MG/1
650 SUPPOSITORY RECTAL EVERY 6 HOURS PRN
Status: DISCONTINUED | OUTPATIENT
Start: 2021-03-08 | End: 2021-03-11 | Stop reason: HOSPADM

## 2021-03-08 RX ORDER — PROMETHAZINE HYDROCHLORIDE 25 MG/1
12.5 TABLET ORAL EVERY 6 HOURS PRN
Status: DISCONTINUED | OUTPATIENT
Start: 2021-03-08 | End: 2021-03-11 | Stop reason: HOSPADM

## 2021-03-08 RX ORDER — SODIUM CHLORIDE 0.9 % (FLUSH) 0.9 %
10 SYRINGE (ML) INJECTION EVERY 12 HOURS SCHEDULED
Status: DISCONTINUED | OUTPATIENT
Start: 2021-03-08 | End: 2021-03-11 | Stop reason: HOSPADM

## 2021-03-08 RX ORDER — NITROGLYCERIN 0.4 MG/1
0.4 TABLET SUBLINGUAL EVERY 5 MIN PRN
Status: DISCONTINUED | OUTPATIENT
Start: 2021-03-08 | End: 2021-03-11 | Stop reason: HOSPADM

## 2021-03-08 RX ORDER — MAGNESIUM OXIDE 400 MG/1
400 TABLET ORAL DAILY
Status: DISCONTINUED | OUTPATIENT
Start: 2021-03-09 | End: 2021-03-11 | Stop reason: HOSPADM

## 2021-03-08 RX ORDER — ASPIRIN 81 MG/1
81 TABLET, CHEWABLE ORAL DAILY
Status: DISCONTINUED | OUTPATIENT
Start: 2021-03-09 | End: 2021-03-10 | Stop reason: SDUPTHER

## 2021-03-08 RX ADMIN — HYDROXYZINE HYDROCHLORIDE 25 MG: 25 TABLET, FILM COATED ORAL at 23:23

## 2021-03-08 RX ADMIN — SODIUM CHLORIDE, PRESERVATIVE FREE 10 ML: 5 INJECTION INTRAVENOUS at 23:29

## 2021-03-08 RX ADMIN — SODIUM CHLORIDE: 9 INJECTION, SOLUTION INTRAVENOUS at 23:24

## 2021-03-08 RX ADMIN — FAMOTIDINE 20 MG: 10 INJECTION, SOLUTION INTRAVENOUS at 23:24

## 2021-03-08 ASSESSMENT — PAIN DESCRIPTION - LOCATION: LOCATION: CHEST

## 2021-03-08 ASSESSMENT — PAIN SCALES - GENERAL
PAINLEVEL_OUTOF10: 0
PAINLEVEL_OUTOF10: 6

## 2021-03-08 ASSESSMENT — PAIN DESCRIPTION - FREQUENCY: FREQUENCY: CONTINUOUS

## 2021-03-08 ASSESSMENT — HEART SCORE: ECG: 0

## 2021-03-08 ASSESSMENT — PAIN DESCRIPTION - DIRECTION: RADIATING_TOWARDS: LEFT ARM

## 2021-03-08 NOTE — ED PROVIDER NOTES
EMERGENCY DEPARTMENT ENCOUNTER        PCP: Anastasia Ledesma MD    [facility]    Chief Complaint   Patient presents with    Chest Pain     radiating into left arm       This patient was not evaluated by the attending physician. I have independently evaluated this patient. My supervising physician was available for consultation. HPI      Tim Yo is a 55 y.o. female smoker with PMH of CAD, NSTEMI who presents with chest pain. Onset - 3 days ago  Location - mid sternal, left side of chest  Duration - intermittent, several minutes at a time  Character - pressure  Aggravating/Alleviating factors - Worse with exertion. Better with resting but been lasting longer today. Activity at onset - exertion  Associate symptoms - patient thinks maybe some shortness of breath  Radiation - left arm  Severity - 6/10    Patient reports cardiologist is Dr. Melo Portillo. Patient denies nausea, emesis, diaphoresis, palpitations. Patient denies lower extremity swelling, recent long travel, history of PE/DVT, exogenous hormone use, recent surgeries/hospitalizations, recent trauma, hemoptysis. REVIEW OF SYSTEMS    Constitutional:  Denies fever, chills. HENT:  Denies sore throat or ear pain   Cardiovascular:  +Chest Pain; Denies palpitations, syncope, extremity edema.   Respiratory:  + shortness of breath; Denies cough, hemoptysis    GI:  Denies abdominal pain, nausea, vomiting, or diarrhea  :  Denies any urinary symptoms  Musculoskeletal:  Denies back pain, no extremity swelling  Skin:  Denies rash  Neurologic:  Denies lightheadedness, dizziness, headache, focal weakness or sensory changes   Endocrine:  Denies polyuria or polydypsia   Lymphatic:  Denies swollen glands     All other review of systems are negative  See HPI and nursing notes for additional information         PAST MEDICAL & SURGICAL HISTORY    Past Medical History:   Diagnosis Date    Anal cancer ([facility] [address]     per old chart 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 and tx with chemo and radiation- followed with Dr Veronica Ruby Asthma     Broken ankle     CAD (coronary artery disease)     \"have 3 heart stents\" use to see Dr Maame Alonso H/O cardiovascular stress test 1/6/2016    treadmill    NSTEMI (non-ST elevated myocardial infarction) ([facility] [address]     PONV (postoperative nausea and vomiting)     hx motion sickness    Skin cancer      Past Surgical History:   Procedure Laterality Date    ANKLE SURGERY Left 10+ yrs ago    with hardware    COLON SURGERY  2009    \"after had chemo removed some part of the cancer from the rectal area, then 7 months later had bowel blockage had to do surgery  to remove part of the bowel\"    COLONOSCOPY  3/4/15    mild proctitis    COLONOSCOPY  03/15/2017    mild radia proc, hypertroph pailla    FRACTURE SURGERY      left ankle    OTHER SURGICAL HISTORY  04-    mediport removal    PTCA  2009 4/22/2009 had angioplasty with stent to LAD & another day in 2009 stent x 2 to RCA done    TONSILLECTOMY      as a kid age 7   [id number]  25 yrs ago    TUNNELED VENOUS PORT PLACEMENT  2009    port inserted        CURRENT MEDICATIONS    Current Outpatient Rx   Medication Sig Dispense Refill    nicotine (NICODERM CQ) 21 MG/24HR Place 1 patch onto the skin      Multiple Vitamin (MULTIVITAMIN) capsule Take 1 capsule by mouth daily      magnesium oxide (MAG-OX) 400 MG tablet Take 400 mg by mouth daily      ibuprofen (ADVIL;MOTRIN) 200 MG tablet Take 800 mg by mouth nightly      hydrOXYzine (ATARAX) 25 MG tablet       baclofen (LIORESAL) 10 MG tablet Take 10 mg by mouth every 8 hours as needed      albuterol (PROVENTIL) (2.5 MG/3ML) 0.083% nebulizer solution Inhale 2.5 mg into the lungs every 6 hours as needed      albuterol sulfate  (90 Base) MCG/ACT inhaler Inhale 2 puffs into the lungs every 6 hours as needed      albuterol-ipratropium (COMBIVENT RESPIMAT)  MCG/ACT AERS inhaler Inhale into the lungs      aspirin 81 MG chewable tablet Take 1 tablet by mouth daily 30 tablet 6    albuterol-ipratropium (COMBIVENT)  MCG/ACT inhaler Inhale 1 puff into the lungs 4 times daily 1 Inhaler 1       ALLERGIES    Allergies   Allergen Reactions    Tylenol With Codeine #3 [Acetaminophen-Codeine] Nausea And Vomiting       SOCIAL & FAMILY HISTORY    Social History     Socioeconomic History    Marital status:      Spouse name: Not on file    Number of children: Not on file    Years of education: 6    Highest education level: Not on file   Occupational History    Occupation: unemployed   Social Needs    Financial resource strain: Not on file    Food insecurity     Worry: Not on file     Inability: Not on file    Transportation needs     Medical: Not on file     Non-medical: Not on file   Tobacco Use    Smoking status: Current Every Day Smoker     Packs/day: 0.50     Years: 35.00     Pack years: 17.50     Types: Cigarettes    Smokeless tobacco: Never Used   Substance and Sexual Activity    Alcohol use: No     Comment: \"quit 3/2015\"use to drink a couple of times per week before\"    Drug use: No     Comment: 1-2 cups of coffee daily     Sexual activity: Yes   Lifestyle    Physical activity     Days per week: Not on file     Minutes per session: Not on file    Stress: Not on file   Relationships    Social connections     Talks on phone: Not on file     Gets together: Not on file     Attends Jehovah's witness service: Not on file     Active member of club or organization: Not on file     Attends meetings of clubs or organizations: Not on file     Relationship status: Not on file    Intimate partner violence     Fear of current or ex partner: Not on file     Emotionally abused: Not on file     Physically abused: Not on file     Forced sexual activity: Not on file   Other Topics Concern    Not on file   Social History Narrative    Not on file     Family History   Problem Relation Age of Onset    Cancer Mother         breast ca? ?    Cancer Father         prostate cancer- lung mets- bone mets    Stroke Father     High Cholesterol Father     Stroke Sister     Cancer Brother         neck cancer    No Known Problems Daughter        PHYSICAL EXAM    VITAL SIGNS: BP (!) 145/85   Pulse 88   Temp 98.4 °F (36.9 °C)   Resp 14   Ht 5' 2\" (1.575 m)   Wt 161 lb (73 kg)   SpO2 97%   BMI 29.45 kg/m²    Constitutional:  Well developed, well nourished, no acute distress   HENT:  Atraumatic, moist mucus membranes  Neck/Lymphatics: supple, no JVD, no swollen nodes  Respiratory:  Lungs Clear, no retractions, no wheezing, rhonchi, rales  Cardiovascular:  Rate regular, regular Rhythm,  no murmurs/rubs/gallops. No carotid bruits or murmurs heard in carotids. Vascular: Radial pulses and DP pulses 2+ equal bilaterally  GI:  Soft, nontender, normal bowel sounds  Musculoskeletal:  No edema, no deformities. No thigh/calf tenderness to palpation bilaterally. Compartments are soft in bilateral lower extremities.   Integument:  Skin warm and dry, no petechiae   Neurologic:  Alert & oriented, normal speech  Psych: Pleasant affect, no hallucinations        LABS:  Results for orders placed or performed during the hospital encounter of 03/08/21   CBC auto diff   Result Value Ref Range    WBC 7.3 4.0 - 10.5 K/CU MM    RBC 4.19 (L) 4.2 - 5.4 M/CU MM    Hemoglobin 12.5 12.5 - 16.0 GM/DL    Hematocrit 39.0 37 - 47 %    MCV 93.1 78 - 100 FL    MCH 29.8 27 - 31 PG    MCHC 32.1 32.0 - 36.0 %    RDW 15.4 (H) 11.7 - 14.9 %    Platelets 372 293 - 036 K/CU MM    MPV 9.8 7.5 - 11.1 FL    Differential Type AUTOMATED DIFFERENTIAL     Segs Relative 55.6 36 - 66 %    Lymphocytes % 35.7 24 - 44 %    Monocytes % 6.6 (H) 0 - 4 %    Eosinophils % 1.4 0 - 3 %    Basophils % 0.4 0 - 1 %    Segs Absolute 4.0 K/CU MM    Lymphocytes Absolute 2.6 K/CU MM    Monocytes Absolute 0.5 K/CU MM    Eosinophils Absolute 0.1 K/CU MM    Basophils Absolute 0.0 K/CU MM Nucleated RBC % 0.0 %    Total Nucleated RBC 0.0 K/CU MM    Total Immature Neutrophil 0.02 K/CU MM    Immature Neutrophil % 0.3 0 - 0.43 %   CMP   Result Value Ref Range    Sodium 140 135 - 145 MMOL/L    Potassium 4.3 3.5 - 5.1 MMOL/L    Chloride 105 99 - 110 mMol/L    CO2 30 21 - 32 MMOL/L    BUN 10 6 - 23 MG/DL    CREATININE 0.7 0.6 - 1.1 MG/DL    Glucose 123 (H) 70 - 99 MG/DL    Calcium 8.9 8.3 - 10.6 MG/DL    Albumin 3.8 3.4 - 5.0 GM/DL    Total Protein 6.4 6.4 - 8.2 GM/DL    Total Bilirubin 0.2 0.0 - 1.0 MG/DL    ALT 30 10 - 40 U/L    AST 24 15 - 37 IU/L    Alkaline Phosphatase 123 40 - 129 IU/L    GFR Non-African American >60 >60 mL/min/1.73m2    GFR African American >60 >60 mL/min/1.73m2    Anion Gap 5 4 - 16   Troponin   Result Value Ref Range    Troponin T <0.010 <0.01 NG/ML   EKG 12 Lead   Result Value Ref Range    Ventricular Rate 84 BPM    Atrial Rate 84 BPM    P-R Interval 160 ms    QRS Duration 86 ms    Q-T Interval 354 ms    QTc Calculation (Bazett) 418 ms    P Axis 53 degrees    R Axis 35 degrees    T Axis 61 degrees    Diagnosis       Normal sinus rhythm  Normal ECG  No previous ECGs available  Confirmed by Yaniv Orosco MD, Nia Neri ([id number]) on 3/8/2021 3:22:11 PM           EKG: EKG Interpretation  Please see ED physician's note for EKG interpretation- Dr. Lucia Hazard    RADIOLOGY/PROCEDURES    XR CHEST (2 VW)   Final Result   No radiographic evidence of acute pulmonary abnormality seen. ED COURSE & MEDICAL DECISION MAKING       Vital signs and nursing notes reviewed during ED course. I have independently evaluated this patient. Supervising physician present in the Emergency Department, available for consultation, throughout entirety of patient care. Patient presents as above. Patient placed on telemetry monitoring as well as continuous pulse oximetry monitoring. While in the ED today, labs, imaging, and EKG were obtained. For EKG interpretation- please see ED physician's note. Labs without clinically significant derangements. Troponin is negative. Chest xray obtained today and reveals no acute cardiopulmonary process. No pneumothorax, no consolidation concerning for pneumonia, no pleural effusion. Pulses are equal in all extremities, no ripping or tearing pain, no widened mediastinum-low clinical suspicion for AAA/thoracic dissection. No pleuritic chest pain, tachycardia, tachypnea, hypoxia-low clinical suspicion for PE. HEART score is 4- therefore I consulted with hospitalist and we discussed the patient's case. Hospitalist, Thomas Blue NP, agrees to admit the patient at this time for further evaluation and care for ACS rule out. All pertinent Lab data and radiographic results reviewed with patient at bedside. The patient and/or the family were informed of the results of any tests/labs/imaging, the treatment plan, and time was allotted to answer questions. Diagnosis, disposition, and treatment plan reviewed in detail with patient who understands and agrees to admission at this time. See chart for details of medications given during the ED stay. Clinical  IMPRESSION    1. Chest pain, unspecified type        PLAN:  Admission to the hospital    Comment: Please note this report has been produced using speech recognition software and may contain errors related to that system including errors in grammar, punctuation, and spelling, as well as words and phrases that may be inappropriate. If there are any questions or concerns please feel free to contact the dictating provider for clarification.         Zita Gitelman, PA-C  03/08/21 2007

## 2021-03-08 NOTE — TELEPHONE ENCOUNTER
Patient called state that she has been   Waking up her hand are numb, having  A feeling in her chest that she could not   Describe, she stated she had a heart attack a  Few years ago and she felt the same way

## 2021-03-09 ENCOUNTER — APPOINTMENT (OUTPATIENT)
Dept: NUCLEAR MEDICINE | Age: 56
DRG: 247 | End: 2021-03-09
Payer: COMMERCIAL

## 2021-03-09 ENCOUNTER — APPOINTMENT (OUTPATIENT)
Dept: ULTRASOUND IMAGING | Age: 56
DRG: 247 | End: 2021-03-09
Payer: COMMERCIAL

## 2021-03-09 LAB
ALBUMIN SERPL-MCNC: 3.5 GM/DL (ref 3.4–5)
ALP BLD-CCNC: 107 IU/L (ref 40–129)
ALT SERPL-CCNC: 26 U/L (ref 10–40)
ANION GAP SERPL CALCULATED.3IONS-SCNC: 5 MMOL/L (ref 4–16)
AST SERPL-CCNC: 21 IU/L (ref 15–37)
BILIRUB SERPL-MCNC: 0.2 MG/DL (ref 0–1)
BUN BLDV-MCNC: 13 MG/DL (ref 6–23)
CALCIUM SERPL-MCNC: 8.4 MG/DL (ref 8.3–10.6)
CHLORIDE BLD-SCNC: 106 MMOL/L (ref 99–110)
CHOLESTEROL: 182 MG/DL
CO2: 29 MMOL/L (ref 21–32)
CREAT SERPL-MCNC: 0.8 MG/DL (ref 0.6–1.1)
EKG ATRIAL RATE: 75 BPM
EKG DIAGNOSIS: NORMAL
EKG P AXIS: 56 DEGREES
EKG P-R INTERVAL: 168 MS
EKG Q-T INTERVAL: 368 MS
EKG QRS DURATION: 86 MS
EKG QTC CALCULATION (BAZETT): 410 MS
EKG R AXIS: 61 DEGREES
EKG T AXIS: 50 DEGREES
EKG VENTRICULAR RATE: 75 BPM
GFR AFRICAN AMERICAN: >60 ML/MIN/1.73M2
GFR NON-AFRICAN AMERICAN: >60 ML/MIN/1.73M2
GLUCOSE BLD-MCNC: 93 MG/DL (ref 70–99)
HCT VFR BLD CALC: 34.7 % (ref 37–47)
HDLC SERPL-MCNC: 68 MG/DL
HEMOGLOBIN: 11.3 GM/DL (ref 12.5–16)
LDL CHOLESTEROL DIRECT: 105 MG/DL
LV EF: 58 %
LV EF: 75 %
LVEF MODALITY: NORMAL
LVEF MODALITY: NORMAL
MCH RBC QN AUTO: 30 PG (ref 27–31)
MCHC RBC AUTO-ENTMCNC: 32.6 % (ref 32–36)
MCV RBC AUTO: 92 FL (ref 78–100)
PDW BLD-RTO: 15.4 % (ref 11.7–14.9)
PLATELET # BLD: 231 K/CU MM (ref 140–440)
PMV BLD AUTO: 9.6 FL (ref 7.5–11.1)
POTASSIUM SERPL-SCNC: 4.1 MMOL/L (ref 3.5–5.1)
RBC # BLD: 3.77 M/CU MM (ref 4.2–5.4)
SODIUM BLD-SCNC: 140 MMOL/L (ref 135–145)
TOTAL PROTEIN: 5.2 GM/DL (ref 6.4–8.2)
TRIGL SERPL-MCNC: 104 MG/DL
TROPONIN T: <0.01 NG/ML
TROPONIN T: <0.01 NG/ML
WBC # BLD: 6.8 K/CU MM (ref 4–10.5)

## 2021-03-09 PROCEDURE — G0378 HOSPITAL OBSERVATION PER HR: HCPCS

## 2021-03-09 PROCEDURE — 93017 CV STRESS TEST TRACING ONLY: CPT

## 2021-03-09 PROCEDURE — 2500000003 HC RX 250 WO HCPCS: Performed by: NURSE PRACTITIONER

## 2021-03-09 PROCEDURE — 6360000002 HC RX W HCPCS: Performed by: INTERNAL MEDICINE

## 2021-03-09 PROCEDURE — 80061 LIPID PANEL: CPT

## 2021-03-09 PROCEDURE — A9500 TC99M SESTAMIBI: HCPCS | Performed by: INTERNAL MEDICINE

## 2021-03-09 PROCEDURE — 80053 COMPREHEN METABOLIC PANEL: CPT

## 2021-03-09 PROCEDURE — 2580000003 HC RX 258: Performed by: NURSE PRACTITIONER

## 2021-03-09 PROCEDURE — 83721 ASSAY OF BLOOD LIPOPROTEIN: CPT

## 2021-03-09 PROCEDURE — 3430000000 HC RX DIAGNOSTIC RADIOPHARMACEUTICAL: Performed by: INTERNAL MEDICINE

## 2021-03-09 PROCEDURE — 6370000000 HC RX 637 (ALT 250 FOR IP): Performed by: NURSE PRACTITIONER

## 2021-03-09 PROCEDURE — 85027 COMPLETE CBC AUTOMATED: CPT

## 2021-03-09 PROCEDURE — 96376 TX/PRO/DX INJ SAME DRUG ADON: CPT

## 2021-03-09 PROCEDURE — 93010 ELECTROCARDIOGRAM REPORT: CPT | Performed by: INTERNAL MEDICINE

## 2021-03-09 PROCEDURE — 94761 N-INVAS EAR/PLS OXIMETRY MLT: CPT

## 2021-03-09 PROCEDURE — 93925 LOWER EXTREMITY STUDY: CPT

## 2021-03-09 PROCEDURE — 78452 HT MUSCLE IMAGE SPECT MULT: CPT

## 2021-03-09 PROCEDURE — 93005 ELECTROCARDIOGRAM TRACING: CPT | Performed by: NURSE PRACTITIONER

## 2021-03-09 PROCEDURE — 99222 1ST HOSP IP/OBS MODERATE 55: CPT | Performed by: INTERNAL MEDICINE

## 2021-03-09 PROCEDURE — 36415 COLL VENOUS BLD VENIPUNCTURE: CPT

## 2021-03-09 PROCEDURE — 93306 TTE W/DOPPLER COMPLETE: CPT

## 2021-03-09 PROCEDURE — 84484 ASSAY OF TROPONIN QUANT: CPT

## 2021-03-09 RX ADMIN — Medication 10 MILLICURIE: at 08:30

## 2021-03-09 RX ADMIN — Medication 30 MILLICURIE: at 10:55

## 2021-03-09 RX ADMIN — HYDROXYZINE HYDROCHLORIDE 25 MG: 25 TABLET, FILM COATED ORAL at 12:16

## 2021-03-09 RX ADMIN — SODIUM CHLORIDE, PRESERVATIVE FREE 10 ML: 5 INJECTION INTRAVENOUS at 20:34

## 2021-03-09 RX ADMIN — ASPIRIN 81 MG: 81 TABLET, CHEWABLE ORAL at 12:16

## 2021-03-09 RX ADMIN — REGADENOSON 0.4 MG: 0.08 INJECTION, SOLUTION INTRAVENOUS at 10:53

## 2021-03-09 RX ADMIN — MAGNESIUM OXIDE 400 MG (241.3 MG MAGNESIUM) TABLET 400 MG: TABLET at 12:16

## 2021-03-09 RX ADMIN — SODIUM CHLORIDE, PRESERVATIVE FREE 10 ML: 5 INJECTION INTRAVENOUS at 12:15

## 2021-03-09 RX ADMIN — FAMOTIDINE 20 MG: 10 INJECTION, SOLUTION INTRAVENOUS at 12:15

## 2021-03-09 RX ADMIN — MULTIPLE VITAMINS W/ MINERALS TAB 1 TABLET: TAB at 12:16

## 2021-03-09 RX ADMIN — HYDROXYZINE HYDROCHLORIDE 25 MG: 25 TABLET, FILM COATED ORAL at 20:34

## 2021-03-09 ASSESSMENT — PAIN SCALES - GENERAL
PAINLEVEL_OUTOF10: 0
PAINLEVEL_OUTOF10: 0

## 2021-03-09 NOTE — ED NOTES
Pt expressing frustration with waiting and being unable to eat all day. Explained to pt the available foods. Pt refused available options. Pt sending  to purchase her food and refusing to go to her room until her  returns with her food.       Gaetano Kanner, RN  03/08/21 [id number]

## 2021-03-09 NOTE — ED NOTES
Nurse to nurse report called to Shilpa Cook RN. All questions answered. Pt to be transported once room cleaned.      Gaetano Kanner, RN  03/08/21 [id number]

## 2021-03-09 NOTE — H&P
History and Physical      Name:  Óscar Kasper /Age/Sex: 1965  (55 y.o. female)   MRN & CSN:  [id number] & [id number] Admission Date/Time: 3/8/2021  7:11 PM   Location:  ED25/ED-25 PCP: Patricia Figueroa MD       Hospital Day: 1        Admitting Physician: Dr. Mckinley Rodriguez. [facility]      Assessment and Plan:   Óscar Kasper is a 55 y.o. female who presents with  Chest Pain (radiating into left arm)     Chest pain r/o ACS. Concern for anginal source given history. Initial troponin <0.010. EKG shows NSR. [facility]    -Admit to Med/Surg under OBSERVATION status.    -Telemetry monitoring    -Serial troponin level and repeat EKG in AM   -Cardiology consult. Follows with Dr. Airam Layton   -Pending labs for further risk stratification    -Antiplatelet/BB/Statin/sl Nitro on medication regimen.  PAD   -Arterial doppler pending     Tobacco Use   -Smoking cessation protocol   -Nicotine supplementation      Anxiety   -Hydroxyzine ordered    Patient case discussed with ED provider    Diet Cardiac   DVT Prophylaxis [x] Lovenox, []  Heparin, [] SCDs, [] Ambulation  [] Long term AC   GI Prophylaxis [] PPI,  [x] H2 Blocker,  [] Carafate,  [] Diet/Tube Feeds   Code Status Full   Disposition Admit to Med/surg. Patient plans to return home upon discharge. MDM [] Low, [x] Moderate,[]  High     -Patient assessment and plan discussed and reviewed with admitting physician: Charles Aceves MD.       History of Present Illness:     Chief Complaint: Chest Pain (radiating into left arm)    Óscar Kasper is a 55 y.o. female who presents with chest pain. Onset ~ 2 days ago. Describes pressure that radiates to left arm and sometime right side. Symptoms worse with exertion. Associated SOB      Stenting x 2 to RCA by Dr Walt Ireland (2008). Ten point ROS: reviewed negative, unless as noted in above HPI.     Objective:   No intake or output data in the 24 hours ending 21     Vitals:   Vitals:    21 1511 21 1922 21 1931 BP: (!) 145/85 (!) 140/83 138/89   Pulse: 88 85 83   Resp: 14 12 11   Temp: 98.4 °F (36.9 °C)     SpO2: 97% 96% 96%   Weight: 161 lb (73 kg) 161 lb (73 kg)    Height: 5' 2\" (1.575 m) 5' 2\" (1.575 m)        Physical Exam: 03/08/21     GEN -Awake non-toxic appearing female, sitting upright in bed , NAD. Normal body habitus. Appears given age. EYES -PERRLA. No scleral erythema, discharge, or conjunctivitis. HENT -MM are moist. Oral pharynx without exudates, no evidence of thrush. NECK -Supple, no apparent thyromegaly or masses. RESP -CTA, no wheezes, rales or rhonchi. Symmetric chest movement while on RA.   C/V -S1/S2 auscultated. RRR without appreciable M/R/G. No JVD or carotid bruits. Peripheral pulses equal bilaterally and palpable. Cap refill <3 sec. No peripheral edema. GI -Abdomen is soft non distended and without significant TTP. + BS. No masses or guarding. Rectal exam deferred. No HSM   -No CVA/ flank tenderness. Albarran catheter is not present. LYMPH-No palpable cervical lymphadenopathy and no hepatosplenomegaly. No petechiae or ecchymoses. MS -No gross joint deformities. SKIN -Normal coloration, warm, dry. NEURO-Cranial nerves appear grossly intact, normal speech, no lateralizing weakness. PSYC-Awake, alert, oriented x 4- person, place, time, situation,  Appropriate affect.     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 and tx with chemo and radiation- followed with Dr Jose Dalton Asthma     Broken ankle     CAD (coronary artery disease)     \"have 3 heart stents\" use to see Dr Sanjuana Cherry H/O cardiovascular stress test 1/6/2016    treadmill    NSTEMI (non-ST elevated myocardial infarction) ([facility] [address]     PONV (postoperative nausea and vomiting)     hx motion sickness    Skin cancer        Past Surgical  History:    has a past surgical history that includes Percutaneous Transluminal Coronary Angio (2009); Colon surgery (2009); Ankle surgery (Left, 10+ yrs ago); Tubal ligation (25 yrs ago); Tunneled venous port placement (2009); Tonsillectomy; other surgical history (04-); fracture surgery; Colonoscopy (3/4/15); and Colonoscopy (03/15/2017). Social History:    FAM HX: Reviewed  family history includes Cancer in her brother, father, and mother; High Cholesterol in her father; No Known Problems in her daughter; Stroke in her father and sister.     Soc HX:   Social History     Socioeconomic History    Marital status:      Spouse name: Not on file    Number of children: Not on file    Years of education: 6    Highest education level: Not on file   Occupational History    Occupation: unemployed   Social Needs    Financial resource strain: Not on file    Food insecurity     Worry: Not on file     Inability: Not on file   [employer] needs     Medical: Not on file     Non-medical: Not on file   Tobacco Use    Smoking status: Current Every Day Smoker     Packs/day: 0.50     Years: 35.00     Pack years: 17.50     Types: Cigarettes    Smokeless tobacco: Never Used   Substance and Sexual Activity    Alcohol use: No     Comment: \"quit 3/2015\"use to drink a couple of times per week before\"    Drug use: No     Comment: 1-2 cups of coffee daily     Sexual activity: Yes   Lifestyle    Physical activity     Days per week: Not on file     Minutes per session: Not on file    Stress: Not on file   Relationships    Social connections     Talks on phone: Not on file     Gets together: Not on file     Attends [employer] service: Not on file     Active member of club or organization: Not on file     Attends meetings of clubs or organizations: Not on file     Relationship status: Not on file    Intimate partner violence     Fear of current or ex partner: Not on file     Emotionally abused: Not on file     Physically abused: Not on file     Forced sexual activity: Not on Reviewed  Recent Labs     03/08/21  1650   WBC 7.3   HGB 12.5   HCT 39.0         Recent Labs     03/08/21  1650      K 4.3      CO2 30   BUN 10   CREATININE 0.7     Recent Labs     03/08/21  1650   AST 24   ALT 30   BILITOT 0.2   ALKPHOS 123     No results for input(s): INR in the last 72 hours. Radiology this visit:  Reviewed. Xr Chest (2 Vw)    Result Date: 3/8/2021  EXAMINATION: TWO XRAY VIEWS OF THE CHEST 3/8/2021 3:29 pm COMPARISON: 12/09/2015. HISTORY: ORDERING SYSTEM PROVIDED HISTORY: Chest pain TECHNOLOGIST PROVIDED HISTORY: Reason for exam:->Chest pain Reason for Exam: chest pain FINDINGS: The cardiac silhouette appears within normal limits. The aorta is mildly uncoiled and atherosclerotic. No confluent airspace opacity, pleural effusion or pneumothorax is seen. No radiographic evidence of acute pulmonary abnormality seen. Xr Hip 1 Vw W Pelvis Left    Result Date: 2/19/2021  AP pelvis and frog-leg lateral view of the left hip show evidence of bilateral avascular necrosis present at the femoral heads with evidence of mild degenerative changes of the right hip joint and moderate to severe degenerative changes at the left hip joint. The left hip has advanced joint space narrowing and areas of cortical irregularity and mild spurring. No evidence of fracture or acute abnormality. Normal alignment.  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

## 2021-03-09 NOTE — CONSULTS
CARDIOLOGY CONSULT NOTE    Reason for consultation: Chest pain     Referring physician: Sesar Glaser MD      Primary care physician: Carlene Chavis MD        Dear Sesar Glaser MD   Thanks for the consult.     History of present illness:Renata is a 55 y. o.year old who  presents with  chest pain for last few days, happening daily, intermittent for 15 to 20 mins and aggravated with activity substernal also,reproducible with palpation, radiated to shoulder, 6/10, tender to touch,associated with shortness of breath, + sweating, nausea, did not get NTG in ED. No fever, no chills, no nausea no vomiting. Blood pressure, cholesterol, blood glucose and weight are well controlled.     Chief Complaint   Patient presents with    Chest Pain     radiating into left arm       Past medical history:    has a past medical history of Anal cancer ([facility] [address] Asthma, Broken ankle, CAD (coronary artery disease), H/O cardiovascular stress test, NSTEMI (non-ST elevated myocardial infarction) ([facility] [address] PONV (postoperative nausea and vomiting), and Skin cancer. Past surgical history:   has a past surgical history that includes Percutaneous Transluminal Coronary Angio (2009); Colon surgery (2009); Ankle surgery (Left, 10+ yrs ago); Tubal ligation (25 yrs ago); Tunneled venous port placement (2009); Tonsillectomy; other surgical history (04-); fracture surgery; Colonoscopy (3/4/15); and Colonoscopy (03/15/2017). Social History:   reports that she has been smoking cigarettes. She has a 17.50 pack-year smoking history. She has never used smokeless tobacco. She reports that she does not drink alcohol or use drugs.   Family history:   no family history of CAD, STROKE of DM    aspirin chewable tablet 324 mg, Once  nicotine (NICODERM CQ) 21 MG/24HR 1 patch, Daily  therapeutic multivitamin-minerals 1 tablet, Daily  magnesium oxide (MAG-OX) tablet 400 mg, Daily  aspirin chewable tablet 81 mg, Daily  0.9 % sodium chloride infusion,  acetaminophen (TYLENOL) tablet 650 mg  650 mg Oral Q6H PRN Taylor Louin, APRN - CNP        Or    acetaminophen (TYLENOL) suppository 650 mg  650 mg Rectal Q6H PRN Taylor Louin, APRN - CNP        polyethylene glycol (GLYCOLAX) packet 17 g  17 g Oral Daily PRN Taylor Louin, APRN - CNP        enoxaparin (LOVENOX) injection 40 mg  40 mg Subcutaneous Daily Taylor Louin, APRN - CNP        atorvastatin (LIPITOR) tablet 20 mg  20 mg Oral Nightly Taylor Louin, APRN - CNP        nitroGLYCERIN (NITROSTAT) SL tablet 0.4 mg  0.4 mg Sublingual Q5 Min PRN Taylor Louin, APRN - CNP        hydrOXYzine (ATARAX) tablet 25 mg  25 mg Oral 4x Daily PRN Taylor Louin, APRN - CNP   25 mg at 03/08/21 2323    HYDROcodone-acetaminophen (NORCO) 5-325 MG per tablet 1 tablet  1 tablet Oral Q6H PRN Taylor Louin, APRN - CNP              Review of Systems:    · Constitutional: No Fever or Weight Loss    · Eyes: No Decreased Vision  · ENT: No Headaches, Hearing Loss or Vertigo  · Cardiovascular: + chest pain, dyspnea on exertion, palpitations or loss of consciousness  · Respiratory: No cough or wheezing    · Gastrointestinal: No abdominal pain, appetite loss, blood in stools, constipation, diarrhea or heartburn  · Genitourinary: No dysuria, trouble voiding, or hematuria  · Musculoskeletal: No gait disturbance, weakness or joint complaints  · Integumentary: No rash or pruritis  · Neurological: No TIA or stroke symptoms  · Psychiatric: No anxiety or depression  · Endocrine: No malaise, fatigue or temperature intolerance  · Hematologic/Lymphatic: No bleeding problems, blood clots or swollen lymph nodes  · Allergic/Immunologic: No nasal congestion or hives  All systems negative except as marked.      ·    ·    ·      Physical Examination:    Vitals:    03/09/21 0440   BP: 135/85   Pulse: 80   Resp: 18   Temp: 98.1 °F (36.7 °C)   SpO2:       Wt Readings from Last 3 Encounters:   03/09/21 161 lb (73 kg)   02/19/21 163 lb (73.9 kg)   02/19/21 167 lb (75.8 kg)     Body mass index is 29.45 kg/m².        General Appearance: No distress, conversant     Constitutional: Well developed, Well nourished, No acute distress, Non-toxic appearance.    HENT:  Normocephalic, Atraumatic, Bilateral external ears normal, Oropharynx moist, No oral exudates, Nose normal. Neck- Normal range of motion, No tenderness, Supple, No stridor,no apical-carotid delay, no carotid bruit  Eyes: PERRL, EOMI, Conjunctiva normal, No discharge.    Respiratory:  Normal breath sounds, No respiratory distress, + wheezing, No chest tenderness. ,no use of accessory muscles, diaphragm movement is normal  Cardiovascular: (PMI) apex non displaced,no lifts no thrills, no s3,no s4, Normal heart rate, Normal rhythm, No murmurs, No rubs, No gallops. Carotid arteries pulse and amplitude are normal no bruit, no abdominal bruit noted ( normal abdominal aorta ausculation), femoral arteries pulse and amplitude are normal no bruit, pedal pulses are normal.  GI: Bowel sounds normal, Soft, No tenderness, No masses, No pulsatile masses, no hepatosplenomegally, no bruits  : External genitalia appear normal, No masses or lesions. No discharge. No CVA tenderness.    Musculoskeletal: Intact distal pulses, No edema, No tenderness, No cyanosis, No clubbing. Good range of motion in all major joints. No tenderness to palpation or major deformities noted. Back- No tenderness. Integument: Warm, Dry, No erythema, No rash.    Skin: no rash, no ulcers  Lymphatic: No lymphadenopathy noted.    Neurologic: Alert & oriented x 3, Normal motor function, Normal sensory function, No focal deficits noted.    Psychiatric: Affect normal, Judgment normal, Mood normal.   Lab Review        Recent Labs       09/28/16  0010    WBC  12.3*    HGB  14.4    HCT  43.8    PLT  316            Recent Labs       09/28/16  0010    NA  136    K  3.9    CL  95*    CO2  23    BUN  10    CREATININE  0.8           Recent Labs       09/28/16  0010    AST  12*  ALT  10    BILITOT  0.4    ALKPHOS  124       No results for input(s): TROPONINI in the last 72 hours. No results found for: BNP  No results found for: INR, PROTIME        EKG:nsr     Chest Xray:nad     ECHO:pending  Labs, echo, meds reviewed  Assessment:      Recommendations:     1. Chest pain:  stress test and echo  2. CAD: H/O RCA and LAD stent, continue aspirin and statins  3. Dyslipidemia: continue statins  4. HTN: controlled  5. COPD: wheezing, recommend to quit smoking  6. H/o anal cancer and sx, had chemo and radiation in past  7.  Health maintenance: exerise and diet  All labs, medications and tests reviewed, continue all other medications of all above medical condition listed as is.          Rasheed Martinez MD,   Rasheed Martinez MD, 3/9/2021 7:30 AM

## 2021-03-09 NOTE — PROGRESS NOTES
[address]    Hospitalist Progress Note      Name:  Wayne Lesch /Age/Sex: 1965  (55 y.o. female)   MRN & CSN:  [id number] & [id number] Admission Date/Time: 3/8/2021  7:11 PM   Location:  [address] [place] [address] PCP: Grace Trevizo MD         Hospital Day: 2    Assessment and Plan:   Wayne Lesch is a 55 y.o.  female  who presents with chest pain     Abnormal stress test, unstable angina    Presented with chest pain  Troponin were negative, EKG with no appreciable ischemic changes  Stress test was reported as abnormal  Cardiology following  Continue with ASA, Nitro as needed, Lipitor    COPD with no exacerbation -continue with inhalers    Hyperlipidemia -, on Lipitor    Diet DIET GENERAL; No Caffeine   DVT Prophylaxis [] Lovenox, []  Heparin, [] SCDs, []No VTE prophylaxis, patient ambulating   GI Prophylaxis [] PPI, [] H2 Blocker, [] No GI prophylaxis, patient is receiving diet/Tube Feeds   Code Status Full Code   Disposition Patient requires continued admission due to abnormal stress test/angina   MDM [] Low, [x] Moderate,[]  High     History of Present Illness: Subjective     Patient Seen & Examined at the bedside      Patient is resting at the edge of her bed, just came out of her shower -denies any shortness of breath or chest pain at this time  Ten point ROS reviewed negative, unless as noted above    Objective: Intake/Output Summary (Last 24 hours) at 3/9/2021 1531  Last data filed at 3/9/2021 0700  Gross per 24 hour   Intake 690 ml   Output 800 ml   Net -110 ml      Vitals:   Vitals:    21 1432   BP: 131/88   Pulse: 85   Resp: 21   Temp: 98.3 °F (36.8 °C)   SpO2: 95%     Physical Exam:    GEN Awake female, resting in bed in no apparent distress. Appears given age. HENT Mucous membranes are moist.   RESP Clear to auscultation, no wheezes, rales or rhonchi. CARDIO/VASC -S1/S2 auscultated. Regular rate without appreciable murmurs, rubs, or gallops.  Peripheral pulses equal bilaterally and palpable. No peripheral edema. GI Abdomen is soft without significant tenderness, masses, or guarding. Bowel sounds are normoactive. Rectal exam deferred.  Albarran catheter is not present.     Medications:   Medications:    aspirin  324 mg Oral Once    nicotine  1 patch Transdermal Daily    therapeutic multivitamin-minerals  1 tablet Oral Daily    magnesium oxide  400 mg Oral Daily    aspirin  81 mg Oral Daily    sodium chloride flush  10 mL Intravenous 2 times per day    famotidine (PEPCID) injection  20 mg Intravenous BID    enoxaparin  40 mg Subcutaneous Daily    atorvastatin  20 mg Oral Nightly      Infusions:    sodium chloride 75 mL/hr at 03/08/21 2324     PRN Meds: sodium chloride flush, 10 mL, PRN  promethazine, 12.5 mg, Q6H PRN    Or  ondansetron, 4 mg, Q6H PRN  acetaminophen, 650 mg, Q6H PRN    Or  acetaminophen, 650 mg, Q6H PRN  polyethylene glycol, 17 g, Daily PRN  nitroGLYCERIN, 0.4 mg, Q5 Min PRN  hydrOXYzine, 25 mg, 4x Daily PRN  HYDROcodone 5 mg - acetaminophen, 1 tablet, Q6H PRN          Electronically signed by Gisselle Dunbar MD on 3/9/2021 at 3:31 PM

## 2021-03-09 NOTE — H&P
History and Physical      Name:  Sheldon Rowe /Age/Sex: 1965  (55 y.o. female)   MRN & CSN:  [id number] & [id number] Admission Date/Time: 3/8/2021  7:11 PM   Location:  ED25/ED-25 PCP: Juanito Stark MD       Hospital Day: 1        Admitting Physician: Dr. Finley Dakins. [facility]      Assessment and Plan:   Sheldon Rowe is a 55 y.o. female who presents with  Chest Pain (radiating into left arm)     Chest pain r/o ACS. Concern for anginal source given history. Initial troponin <0.010. EKG shows NSR. [address] [facility]    -Admit to Med/Surg under OBSERVATION status.    -Telemetry monitoring    -Serial troponin level and repeat EKG in AM   -Cardiology consult. Follows with Dr. Vini Martinez   -Pending labs for further risk stratification    -Antiplatelet/BB/Statin/sl Nitro on medication regimen.  PAD   -Arterial doppler pending     Tobacco Use   -Smoking cessation protocol   -Nicotine supplementation      Anxiety   -Hydroxyzine ordered    Patient case discussed with ED provider    Diet Cardiac   DVT Prophylaxis [x] Lovenox, []  Heparin, [] SCDs, [] Ambulation  [] Long term AC   GI Prophylaxis [] PPI,  [x] H2 Blocker,  [] Carafate,  [] Diet/Tube Feeds   Code Status Full   Disposition Admit to Med/surg. Patient plans to return home upon discharge. MDM [] Low, [x] Moderate,[]  High     -Patient assessment and plan discussed and reviewed with admitting physician: Ankit Hercules MD.       History of Present Illness:     Chief Complaint: Chest Pain (radiating into left arm)    Sheldon Rowe is a 55 y.o. female who presents with chest pain. Onset ~ 2 days ago. Describes pressure that radiates to left arm and sometime right side. Symptoms worse with exertion. Associated SOB reports recent outpatient evaluation by cardiology with pending arterial Dopplers to evaluate for PAD. Medical record reviewed     Stenting x 2 to RCA by Dr Riya Montero (). Ten point ROS: reviewed negative, unless as noted in above HPI.     Objective: No intake or output data in the 24 hours ending 03/08/21 2129     Vitals:   Vitals:    03/08/21 1922 03/08/21 1931 03/08/21 2031 03/08/21 2101   BP: (!) 140/83 138/89 (!) 153/86 (!) 145/92   Pulse: 85 83 81 79   Resp: 12 11 19 13   Temp:       SpO2: 96% 96% 97% 97%   Weight: 161 lb (73 kg)      Height: 5' 2\" (1.575 m)          Physical Exam: 03/08/21     GEN -Awake non-toxic appearing female, sitting upright in bed , NAD. Normal body habitus. Appears given age. EYES -PERRLA. No scleral erythema, discharge, or conjunctivitis. HENT -MM are moist. Oral pharynx without exudates, no evidence of thrush. NECK -Supple, no apparent thyromegaly or masses. RESP -CTA, no wheezes, rales or rhonchi. Symmetric chest movement while on RA.   C/V -S1/S2 auscultated. RRR without appreciable M/R/G. No JVD or carotid bruits. Peripheral pulses equal bilaterally and palpable. Cap refill <3 sec. No peripheral edema. GI -Abdomen is soft non distended and without significant TTP. + BS. No masses or guarding. Rectal exam deferred. No HSM   -No CVA/ flank tenderness. Albarran catheter is not present. LYMPH-No palpable cervical lymphadenopathy and no hepatosplenomegaly. No petechiae or ecchymoses. MS -No gross joint deformities. SKIN -Normal coloration, warm, dry. NEURO-Cranial nerves appear grossly intact, normal speech, no lateralizing weakness. PSYC-Awake, alert, oriented x 4- person, place, time, situation,  Appropriate affect.     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 and tx with chemo and radiation- followed with Dr Shaw Carter Asthma     Broken ankle     CAD (coronary artery disease)     \"have 3 heart stents\" use to see Dr Donna Ridley H/O cardiovascular stress test 1/6/2016    treadmill    NSTEMI (non-ST elevated myocardial infarction) ([facility] [address]     PONV (postoperative nausea and vomiting)     hx motion sickness    Skin cancer        Past Surgical  History:    has a past surgical history that includes Percutaneous Transluminal Coronary Angio (2009); Colon surgery (2009); Ankle surgery (Left, 10+ yrs ago); Tubal ligation (25 yrs ago); Tunneled venous port placement (2009); Tonsillectomy; other surgical history (04-); fracture surgery; Colonoscopy (3/4/15); and Colonoscopy (03/15/2017). Social History:    FAM HX: Reviewed  family history includes Cancer in her brother, father, and mother; High Cholesterol in her father; No Known Problems in her daughter; Stroke in her father and sister.     Soc HX:   Social History     Socioeconomic History    Marital status:      Spouse name: Not on file    Number of children: Not on file    Years of education: 6    Highest education level: Not on file   Occupational History    Occupation: unemployed   Social Needs    Financial resource strain: Not on file    Food insecurity     Worry: Not on file     Inability: Not on file    Transportation needs     Medical: Not on file     Non-medical: Not on file   Tobacco Use    Smoking status: Current Every Day Smoker     Packs/day: 0.50     Years: 35.00     Pack years: 17.50     Types: Cigarettes    Smokeless tobacco: Never Used   Substance and Sexual Activity    Alcohol use: No     Comment: \"quit 3/2015\"use to drink a couple of times per week before\"    Drug use: No     Comment: 1-2 cups of coffee daily     Sexual activity: Yes   Lifestyle    Physical activity     Days per week: Not on file     Minutes per session: Not on file    Stress: Not on file   Relationships    Social connections     Talks on phone: Not on file     Gets together: Not on file     Attends [employer] service: Not on file     Active member of club or organization: Not on file     Attends meetings of clubs or organizations: Not on file     Relationship status: Not on file    Intimate partner violence     Fear of current or ex partner: Not on file     Emotionally abused: Not on file     Physically abused: Not on file     Forced sexual activity: Not on file   Other Topics Concern    Not on file   Social History Narrative    Not on file     TOBACCO:   reports that she has been smoking cigarettes. She has a 17.50 pack-year smoking history. She has never used smokeless tobacco.  ETOH:   reports no history of alcohol use. Drugs:  reports no history of drug use. Allergies: Allergies   Allergen Reactions    Tylenol With Codeine #3 [Acetaminophen-Codeine] Nausea And Vomiting       Home Medications:     Prior to Admission medications    Medication Sig Start Date End Date Taking?  Authorizing Provider   nicotine (NICODERM CQ) 21 MG/24HR Place 1 patch onto the skin 2/12/21 2/12/22  Historical Provider, MD   Multiple Vitamin (MULTIVITAMIN) capsule Take 1 capsule by mouth daily    Historical Provider, MD   magnesium oxide (MAG-OX) 400 MG tablet Take 400 mg by mouth daily 2/12/21 2/12/22  Historical Provider, MD   ibuprofen (ADVIL;MOTRIN) 200 MG tablet Take 800 mg by mouth nightly    Historical Provider, MD   hydrOXYzine (ATARAX) 25 MG tablet  2/12/21   Historical Provider, MD   baclofen (LIORESAL) 10 MG tablet Take 10 mg by mouth every 8 hours as needed 2/12/21 4/13/21  Historical Provider, MD   albuterol (PROVENTIL) (2.5 MG/3ML) 0.083% nebulizer solution Inhale 2.5 mg into the lungs every 6 hours as needed 2/12/21 2/12/22  Historical Provider, MD   albuterol sulfate  (90 Base) MCG/ACT inhaler Inhale 2 puffs into the lungs every 6 hours as needed    Historical Provider, MD   albuterol-ipratropium (COMBIVENT RESPIMAT)  MCG/ACT AERS inhaler Inhale into the lungs 5/15/17   Historical Provider, MD   aspirin 81 MG chewable tablet Take 1 tablet by mouth daily 7/7/16   Rosita Schlatter, MD   albuterol-ipratropium (COMBIVENT)  MCG/ACT inhaler Inhale 1 puff into the lungs 4 times daily 12/11/15 3/15/17  Arlene Newton MD Medications:   Medications:    aspirin  324 mg Oral Once      Infusions:   PRN Meds:      Data:     Laboratory this visit:  Reviewed  Recent Labs     03/08/21  1650   WBC 7.3   HGB 12.5   HCT 39.0         Recent Labs     03/08/21  1650      K 4.3      CO2 30   BUN 10   CREATININE 0.7     Recent Labs     03/08/21  1650   AST 24   ALT 30   BILITOT 0.2   ALKPHOS 123     No results for input(s): INR in the last 72 hours. Radiology this visit:  Reviewed. Xr Chest (2 Vw)    Result Date: 3/8/2021  EXAMINATION: TWO XRAY VIEWS OF THE CHEST 3/8/2021 3:29 pm COMPARISON: 12/09/2015. HISTORY: ORDERING SYSTEM PROVIDED HISTORY: Chest pain TECHNOLOGIST PROVIDED HISTORY: Reason for exam:->Chest pain Reason for Exam: chest pain FINDINGS: The cardiac silhouette appears within normal limits. The aorta is mildly uncoiled and atherosclerotic. No confluent airspace opacity, pleural effusion or pneumothorax is seen. No radiographic evidence of acute pulmonary abnormality seen. Xr Hip 1 Vw W Pelvis Left    Result Date: 2/19/2021  AP pelvis and frog-leg lateral view of the left hip show evidence of bilateral avascular necrosis present at the femoral heads with evidence of mild degenerative changes of the right hip joint and moderate to severe degenerative changes at the left hip joint. The left hip has advanced joint space narrowing and areas of cortical irregularity and mild spurring. No evidence of fracture or acute abnormality. Normal alignment.  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

## 2021-03-10 PROBLEM — I20.0 UNSTABLE ANGINA (HCC): Status: ACTIVE | Noted: 2021-03-10

## 2021-03-10 LAB
ACTIVATED CLOTTING TIME, LOW RANGE: >400 SEC
SARS-COV-2, NAAT: NOT DETECTED

## 2021-03-10 PROCEDURE — C1874 STENT, COATED/COV W/DEL SYS: HCPCS

## 2021-03-10 PROCEDURE — 6370000000 HC RX 637 (ALT 250 FOR IP)

## 2021-03-10 PROCEDURE — 2500000003 HC RX 250 WO HCPCS: Performed by: INTERNAL MEDICINE

## 2021-03-10 PROCEDURE — C1769 GUIDE WIRE: HCPCS

## 2021-03-10 PROCEDURE — 93458 L HRT ARTERY/VENTRICLE ANGIO: CPT

## 2021-03-10 PROCEDURE — 027034Z DILATION OF CORONARY ARTERY, ONE ARTERY WITH DRUG-ELUTING INTRALUMINAL DEVICE, PERCUTANEOUS APPROACH: ICD-10-PCS | Performed by: INTERNAL MEDICINE

## 2021-03-10 PROCEDURE — 87635 SARS-COV-2 COVID-19 AMP PRB: CPT

## 2021-03-10 PROCEDURE — 6370000000 HC RX 637 (ALT 250 FOR IP): Performed by: INTERNAL MEDICINE

## 2021-03-10 PROCEDURE — G0378 HOSPITAL OBSERVATION PER HR: HCPCS

## 2021-03-10 PROCEDURE — 94761 N-INVAS EAR/PLS OXIMETRY MLT: CPT

## 2021-03-10 PROCEDURE — 99233 SBSQ HOSP IP/OBS HIGH 50: CPT | Performed by: INTERNAL MEDICINE

## 2021-03-10 PROCEDURE — B2111ZZ FLUOROSCOPY OF MULTIPLE CORONARY ARTERIES USING LOW OSMOLAR CONTRAST: ICD-10-PCS | Performed by: INTERNAL MEDICINE

## 2021-03-10 PROCEDURE — 6360000004 HC RX CONTRAST MEDICATION

## 2021-03-10 PROCEDURE — 2140000000 HC CCU INTERMEDIATE R&B

## 2021-03-10 PROCEDURE — C1894 INTRO/SHEATH, NON-LASER: HCPCS

## 2021-03-10 PROCEDURE — 6360000002 HC RX W HCPCS

## 2021-03-10 PROCEDURE — 2580000003 HC RX 258: Performed by: INTERNAL MEDICINE

## 2021-03-10 PROCEDURE — 93458 L HRT ARTERY/VENTRICLE ANGIO: CPT | Performed by: INTERNAL MEDICINE

## 2021-03-10 PROCEDURE — 1200000000 HC SEMI PRIVATE

## 2021-03-10 PROCEDURE — 4A023N7 MEASUREMENT OF CARDIAC SAMPLING AND PRESSURE, LEFT HEART, PERCUTANEOUS APPROACH: ICD-10-PCS | Performed by: INTERNAL MEDICINE

## 2021-03-10 PROCEDURE — 92928 PRQ TCAT PLMT NTRAC ST 1 LES: CPT | Performed by: INTERNAL MEDICINE

## 2021-03-10 PROCEDURE — 92928 PRQ TCAT PLMT NTRAC ST 1 LES: CPT

## 2021-03-10 PROCEDURE — C1887 CATHETER, GUIDING: HCPCS

## 2021-03-10 PROCEDURE — 2500000003 HC RX 250 WO HCPCS

## 2021-03-10 PROCEDURE — 85347 COAGULATION TIME ACTIVATED: CPT

## 2021-03-10 PROCEDURE — 2709999900 HC NON-CHARGEABLE SUPPLY

## 2021-03-10 RX ORDER — PRASUGREL 10 MG/1
10 TABLET, FILM COATED ORAL DAILY
Status: DISCONTINUED | OUTPATIENT
Start: 2021-03-11 | End: 2021-03-10 | Stop reason: SDUPTHER

## 2021-03-10 RX ORDER — SODIUM CHLORIDE 0.9 % (FLUSH) 0.9 %
10 SYRINGE (ML) INJECTION PRN
Status: DISCONTINUED | OUTPATIENT
Start: 2021-03-10 | End: 2021-03-11 | Stop reason: HOSPADM

## 2021-03-10 RX ORDER — ONDANSETRON 2 MG/ML
4 INJECTION INTRAMUSCULAR; INTRAVENOUS EVERY 6 HOURS PRN
Status: DISCONTINUED | OUTPATIENT
Start: 2021-03-10 | End: 2021-03-11 | Stop reason: HOSPADM

## 2021-03-10 RX ORDER — PRASUGREL 10 MG/1
10 TABLET, FILM COATED ORAL DAILY
Status: DISCONTINUED | OUTPATIENT
Start: 2021-03-11 | End: 2021-03-11 | Stop reason: HOSPADM

## 2021-03-10 RX ORDER — ASPIRIN 81 MG/1
81 TABLET, CHEWABLE ORAL DAILY
Status: DISCONTINUED | OUTPATIENT
Start: 2021-03-11 | End: 2021-03-11 | Stop reason: HOSPADM

## 2021-03-10 RX ORDER — SODIUM CHLORIDE 9 MG/ML
INJECTION, SOLUTION INTRAVENOUS CONTINUOUS
Status: DISCONTINUED | OUTPATIENT
Start: 2021-03-10 | End: 2021-03-11

## 2021-03-10 RX ORDER — SODIUM CHLORIDE 0.9 % (FLUSH) 0.9 %
10 SYRINGE (ML) INJECTION EVERY 12 HOURS SCHEDULED
Status: DISCONTINUED | OUTPATIENT
Start: 2021-03-10 | End: 2021-03-11 | Stop reason: HOSPADM

## 2021-03-10 RX ORDER — LOPERAMIDE HYDROCHLORIDE 2 MG/1
2 CAPSULE ORAL PRN
Status: DISCONTINUED | OUTPATIENT
Start: 2021-03-10 | End: 2021-03-11 | Stop reason: HOSPADM

## 2021-03-10 RX ORDER — ACETAMINOPHEN 325 MG/1
650 TABLET ORAL EVERY 4 HOURS PRN
Status: DISCONTINUED | OUTPATIENT
Start: 2021-03-10 | End: 2021-03-11 | Stop reason: HOSPADM

## 2021-03-10 RX ADMIN — HYDROXYZINE HYDROCHLORIDE 25 MG: 25 TABLET, FILM COATED ORAL at 14:55

## 2021-03-10 RX ADMIN — MAGNESIUM OXIDE 400 MG (241.3 MG MAGNESIUM) TABLET 400 MG: TABLET at 14:54

## 2021-03-10 RX ADMIN — FAMOTIDINE 20 MG: 10 INJECTION, SOLUTION INTRAVENOUS at 21:38

## 2021-03-10 RX ADMIN — SODIUM CHLORIDE, PRESERVATIVE FREE 10 ML: 5 INJECTION INTRAVENOUS at 21:40

## 2021-03-10 RX ADMIN — HYDROXYZINE HYDROCHLORIDE 25 MG: 25 TABLET, FILM COATED ORAL at 21:42

## 2021-03-10 RX ADMIN — MULTIPLE VITAMINS W/ MINERALS TAB 1 TABLET: TAB at 14:54

## 2021-03-10 RX ADMIN — SODIUM CHLORIDE: 9 INJECTION, SOLUTION INTRAVENOUS at 14:21

## 2021-03-10 ASSESSMENT — PAIN SCALES - GENERAL
PAINLEVEL_OUTOF10: 0
PAINLEVEL_OUTOF10: 0

## 2021-03-10 NOTE — PROGRESS NOTES
[address]    Hospitalist Progress Note      Name:  Yahir Johnson /Age/Sex: 1965  (55 y.o. female)   MRN & CSN:  [id number] & [id number] Admission Date/Time: 3/8/2021  7:11 PM   Location:  [address] [place] [address] PCP: Manjit Woodruff MD         Hospital Day: 3    Assessment and Plan:   Yahir Johnson is a 55 y.o.  female  who presents with chest pain     Abnormal stress test, unstable angina    Presented with chest pain - currently chest pain free   Troponin were negative, EKG with no appreciable ischemic changes  Stress test was reported as abnormal  Cardiology following and plan for [facility] this afternoon   Continue with ASA, Nitro as needed, Lipitor    COPD with no exacerbation -continue with inhalers    Hyperlipidemia -, on Lipitor    Diet DIET GENERAL; No Caffeine   DVT Prophylaxis [] Lovenox, []  Heparin, [] SCDs, []No VTE prophylaxis, patient ambulating   GI Prophylaxis [] PPI, [] H2 Blocker, [] No GI prophylaxis, patient is receiving diet/Tube Feeds   Code Status Full Code   Disposition Patient requires continued admission due to abnormal stress test/angina   MDM [] Low, [x] Moderate,[]  High     History of Present Illness: Subjective     Patient Seen & Examined at the bedside      Patient is resting at her chair with her  at her side   Currently chest pain free and no reported SOB    Ten point ROS reviewed negative, unless as noted above    Objective: Intake/Output Summary (Last 24 hours) at 3/10/2021 1009  Last data filed at 3/9/2021 2034  Gross per 24 hour   Intake 10 ml   Output --   Net 10 ml      Vitals:   Vitals:    03/10/21 0830   BP: 120/89   Pulse: 74   Resp: 13   Temp: 98.7 °F (37.1 °C)   SpO2: 97%     Physical Exam:    GEN Awake female, resting in bed in no apparent distress. Appears given age. HENT Mucous membranes are moist.   RESP Clear to auscultation, no wheezes, rales or rhonchi. CARDIO/VASC -S1/S2 auscultated. Regular rate without appreciable murmurs, rubs, or gallops. Peripheral pulses equal bilaterally and palpable. No peripheral edema. GI Abdomen is soft without significant tenderness, masses, or guarding. Bowel sounds are normoactive. Rectal exam deferred.  Albarran catheter is not present.     Medications:   Medications:    aspirin  324 mg Oral Once    nicotine  1 patch Transdermal Daily    therapeutic multivitamin-minerals  1 tablet Oral Daily    magnesium oxide  400 mg Oral Daily    aspirin  81 mg Oral Daily    sodium chloride flush  10 mL Intravenous 2 times per day    famotidine (PEPCID) injection  20 mg Intravenous BID    enoxaparin  40 mg Subcutaneous Daily    atorvastatin  20 mg Oral Nightly      Infusions:    sodium chloride Stopped (03/09/21 2151)     PRN Meds: sodium chloride flush, 10 mL, PRN  promethazine, 12.5 mg, Q6H PRN    Or  ondansetron, 4 mg, Q6H PRN  acetaminophen, 650 mg, Q6H PRN    Or  acetaminophen, 650 mg, Q6H PRN  polyethylene glycol, 17 g, Daily PRN  nitroGLYCERIN, 0.4 mg, Q5 Min PRN  hydrOXYzine, 25 mg, 4x Daily PRN  HYDROcodone 5 mg - acetaminophen, 1 tablet, Q6H PRN          Electronically signed by Siva Wells MD on 3/10/2021 at 10:09 AM

## 2021-03-10 NOTE — PROGRESS NOTES
Plan cath today for anterior wall ischemia   Alternates and risk of the procedure were dicussed in detail  Patient is in agreement to proceed  Mallampati is 2  ASA is  3

## 2021-03-10 NOTE — PROGRESS NOTES
Non-toxic appearance. HENT:  Normocephalic, Atraumatic, Bilateral external ears normal, Oropharynx moist, No oral exudates, Nose normal. Neck- Normal range of motion, No tenderness, Supple, No stridor. Eyes:  PERRL, EOMI, Conjunctiva normal, No discharge. Respiratory:  Normal breath sounds, No respiratory distress, No wheezing, No chest tenderness. ,no use of accessory muscles, diaphragm movement is normal  Cardiovascular: (PMI) apex non displaced,no lifts no thrills, no s3,no s4, Normal heart rate, Normal rhythm, No murmurs, No rubs, No gallops. Carotid arteries pulse and amplitude are normal no bruit, no abdominal bruit noted ( normal abdominal aorta ausculation), femoral arteries pulse and amplitude are normal no bruit, pedal pulses are normal  GI:  Bowel sounds normal, Soft, No tenderness, No masses, No pulsatile masses. : External genitalia appear normal, No masses or lesions. No discharge. No CVA tenderness. Musculoskeletal:  Intact distal pulses, No edema, No tenderness, No cyanosis, No clubbing. Good range of motion in all major joints. No tenderness to palpation or major deformities noted. Back- No tenderness. Integument:  Warm, Dry, No erythema, No rash. Lymphatic:  No lymphadenopathy noted. Neurologic:  Alert & oriented x 3, Normal motor function, Normal sensory function, No focal deficits noted.    Psychiatric:  Affect normal, Judgment normal, Mood normal.     Medications:    aspirin  324 mg Oral Once    nicotine  1 patch Transdermal Daily    therapeutic multivitamin-minerals  1 tablet Oral Daily    magnesium oxide  400 mg Oral Daily    aspirin  81 mg Oral Daily    sodium chloride flush  10 mL Intravenous 2 times per day    famotidine (PEPCID) injection  20 mg Intravenous BID    enoxaparin  40 mg Subcutaneous Daily    atorvastatin  20 mg Oral Nightly      sodium chloride Stopped (03/09/21 1215)     sodium chloride flush, promethazine **OR** ondansetron, acetaminophen **OR** acetaminophen, polyethylene glycol, nitroGLYCERIN, hydrOXYzine, HYDROcodone 5 mg - acetaminophen    Lab Data:  CBC:   Recent Labs     03/08/21  1650 03/09/21  0343   WBC 7.3 6.8   HGB 12.5 11.3*   HCT 39.0 34.7*   MCV 93.1 92.0    231     BMP:   Recent Labs     03/08/21  1650 03/09/21  0343    140   K 4.3 4.1    106   CO2 30 29   BUN 10 13   CREATININE 0.7 0.8     LIVER PROFILE:   Recent Labs     03/08/21  1650 03/09/21  0343   AST 24 21   ALT 30 26   BILITOT 0.2 0.2   ALKPHOS 123 107     PT/INR: No results for input(s): PROTIME, INR in the last 72 hours. APTT: No results for input(s): APTT in the last 72 hours. BNP:  No results for input(s): BNP in the last 72 hours. TROPONIN: @TROPONINI:3@      Assessment:  55 y. o.year old who is admitted for          Plan:  1. Chest pain:  abnormal stress test, will proceed with C  2. CAD: H/O RCA and LAD stent, continue aspirin and statins  3. Dyslipidemia: continue statins  4. HTN: controlled  5. COPD: wheezing, recommend to quit smoking  6. H/o anal cancer and sx, had chemo and radiation in past  7. Health maintenance: exerise and diet  All labs, medications and tests reviewed, continue all other medications of all above medical condition listed as is.       Xavier De Oliveira MD 3/10/2021 7:58 AM

## 2021-03-11 VITALS
WEIGHT: 161 LBS | BODY MASS INDEX: 29.63 KG/M2 | HEIGHT: 62 IN | HEART RATE: 69 BPM | SYSTOLIC BLOOD PRESSURE: 118 MMHG | TEMPERATURE: 98.3 F | DIASTOLIC BLOOD PRESSURE: 78 MMHG | OXYGEN SATURATION: 92 % | RESPIRATION RATE: 14 BRPM

## 2021-03-11 LAB
HCT VFR BLD CALC: 38.9 % (ref 37–47)
HEMOGLOBIN: 12.6 GM/DL (ref 12.5–16)
MCH RBC QN AUTO: 30.2 PG (ref 27–31)
MCHC RBC AUTO-ENTMCNC: 32.4 % (ref 32–36)
MCV RBC AUTO: 93.3 FL (ref 78–100)
PDW BLD-RTO: 15.2 % (ref 11.7–14.9)
PLATELET # BLD: 259 K/CU MM (ref 140–440)
PMV BLD AUTO: 9.8 FL (ref 7.5–11.1)
RBC # BLD: 4.17 M/CU MM (ref 4.2–5.4)
WBC # BLD: 6.8 K/CU MM (ref 4–10.5)

## 2021-03-11 PROCEDURE — 85027 COMPLETE CBC AUTOMATED: CPT

## 2021-03-11 PROCEDURE — 6370000000 HC RX 637 (ALT 250 FOR IP): Performed by: NURSE PRACTITIONER

## 2021-03-11 PROCEDURE — APPSS60 APP SPLIT SHARED TIME 46-60 MINUTES: Performed by: NURSE PRACTITIONER

## 2021-03-11 PROCEDURE — 6370000000 HC RX 637 (ALT 250 FOR IP): Performed by: INTERNAL MEDICINE

## 2021-03-11 PROCEDURE — 36415 COLL VENOUS BLD VENIPUNCTURE: CPT

## 2021-03-11 PROCEDURE — 99232 SBSQ HOSP IP/OBS MODERATE 35: CPT | Performed by: INTERNAL MEDICINE

## 2021-03-11 RX ORDER — PRASUGREL 10 MG/1
10 TABLET, FILM COATED ORAL DAILY
Qty: 30 TABLET | Refills: 1 | Status: SHIPPED | OUTPATIENT
Start: 2021-03-11 | End: 2021-12-20

## 2021-03-11 RX ORDER — NITROGLYCERIN 0.4 MG/1
TABLET SUBLINGUAL
Qty: 25 TABLET | Refills: 3 | Status: SHIPPED | OUTPATIENT
Start: 2021-03-11 | End: 2022-06-17 | Stop reason: SDUPTHER

## 2021-03-11 RX ORDER — ATORVASTATIN CALCIUM 20 MG/1
20 TABLET, FILM COATED ORAL NIGHTLY
Qty: 30 TABLET | Refills: 3 | Status: SHIPPED | OUTPATIENT
Start: 2021-03-11 | End: 2021-03-18

## 2021-03-11 RX ORDER — IBUPROFEN 400 MG/1
400 TABLET ORAL EVERY 6 HOURS PRN
Status: DISCONTINUED | OUTPATIENT
Start: 2021-03-11 | End: 2021-03-11 | Stop reason: HOSPADM

## 2021-03-11 RX ADMIN — MAGNESIUM OXIDE 400 MG (241.3 MG MAGNESIUM) TABLET 400 MG: TABLET at 09:23

## 2021-03-11 RX ADMIN — IBUPROFEN 400 MG: 400 TABLET, FILM COATED ORAL at 06:18

## 2021-03-11 RX ADMIN — ASPIRIN 81 MG CHEWABLE TABLET 81 MG: 81 TABLET CHEWABLE at 09:25

## 2021-03-11 RX ADMIN — MULTIPLE VITAMINS W/ MINERALS TAB 1 TABLET: TAB at 09:23

## 2021-03-11 RX ADMIN — HYDROXYZINE HYDROCHLORIDE 25 MG: 25 TABLET, FILM COATED ORAL at 09:32

## 2021-03-11 RX ADMIN — PRASUGREL 10 MG: 10 TABLET, FILM COATED ORAL at 09:24

## 2021-03-11 ASSESSMENT — PAIN DESCRIPTION - ONSET: ONSET: ON-GOING

## 2021-03-11 ASSESSMENT — PAIN SCALES - GENERAL: PAINLEVEL_OUTOF10: 7

## 2021-03-11 ASSESSMENT — PAIN DESCRIPTION - ORIENTATION: ORIENTATION: LOWER

## 2021-03-11 ASSESSMENT — PAIN DESCRIPTION - DESCRIPTORS: DESCRIPTORS: ACHING

## 2021-03-11 ASSESSMENT — PAIN DESCRIPTION - PAIN TYPE: TYPE: CHRONIC PAIN

## 2021-03-11 ASSESSMENT — PAIN - FUNCTIONAL ASSESSMENT: PAIN_FUNCTIONAL_ASSESSMENT: ACTIVITIES ARE NOT PREVENTED

## 2021-03-11 ASSESSMENT — PAIN DESCRIPTION - PROGRESSION: CLINICAL_PROGRESSION: NOT CHANGED

## 2021-03-11 NOTE — PLAN OF CARE
Problem: Falls - Risk of:  Goal: Will remain free from falls  Description: Will remain free from falls  3/11/2021 1041 by Carmen Rizzo RN  Outcome: Completed  3/11/2021 0306 by Celina Otero  Outcome: Ongoing  Goal: Absence of physical injury  Description: Absence of physical injury  3/11/2021 1041 by Carmen Rizzo RN  Outcome: Completed  3/11/2021 0306 by Celina Otero  Outcome: Ongoing

## 2021-03-11 NOTE — PROGRESS NOTES
Pt refused evening dose of atorvastatin. She stated that a family member had an adverse reaction. This RN and Lona Apley educated patient about having a discussion with her physician about possible alternatives.

## 2021-03-11 NOTE — PROGRESS NOTES
Pt c/o back pain and wanted to have ibuprofen instead of the ordered tylenol or norco. Physician notified.  Awaiting response    Ibuprofen ordered

## 2021-03-11 NOTE — PROGRESS NOTES
Removed TR band. No bleeding or hematoma at site. Transparent dressing applied.   Harsh Tong, LILIANA

## 2021-03-11 NOTE — DISCHARGE SUMMARY
[address] Hospitalist     Discharge Summary    Name:  Sheldon Rowe /Age/Sex: 1965  (55 y.o. female)   MRN & CSN:  [id number] & [id number] Admission Date/Time: 3/8/2021  7:11 PM   Attending:  Aidan Asher MD Discharging Physician: Aidan Asher MD     HPI:     Please, see admission HPI in [address] and patient's hospital course below    Hospital Course:   Sheldon Rowe is a 55 y.o.  female  who presents with  Chest pain and the following assessments below, reflect the patient's hospital course     InStent Stenosis of LAD S/p [facility] to LAD 03/10  Unstable Angina, Abnormal stress test Hx of CAD    Presented with chest pain - currently chest pain free   Troponin were negative, EKG with no appreciable ischemic changes  Stress test was reported as abnormal  Cardiology following and plan for [facility] this afternoon   Continue with ASA, Nitro as needed, Lipitor, prasugrel     COPD with no exacerbation -continue with inhalers     Hyperlipidemia -, on Lipitor      Patient is hemodynamically stable for DC to home    The patient/family expressed appropriate understanding of and agreement with the discharge recommendations, medications, and plan. Consults this admission:  IP CONSULT TO HOSPITALIST  IP CONSULT TO CARDIOLOGY  IP CONSULT TO CARDIAC REHAB    Discharge Instruction:   Follow up appointments: Cardiology   Primary care physician:  within 1 to 2 weeks    Diet:  cardiac diet   Activity: activity as tolerated  Disposition: Discharged to:   [x]Home, [][facility], []SNF, []Acute Rehab, []Hospice   Condition on discharge: Stable    Discharge Medications:       Doni Millan   Home Medication Instructions HEX:[id number]    Printed on:21 1880   Medication Information                      albuterol (PROVENTIL) (2.5 MG/3ML) 0.083% nebulizer solution  Inhale 2.5 mg into the lungs every 6 hours as needed             albuterol-ipratropium (COMBIVENT RESPIMAT)  MCG/ACT AERS inhaler  Inhale into the lungs aspirin 81 MG chewable tablet  Take 1 tablet by mouth daily             atorvastatin (LIPITOR) 20 MG tablet  Take 1 tablet by mouth nightly             baclofen (LIORESAL) 10 MG tablet  Take 10 mg by mouth every 8 hours as needed             hydrOXYzine (ATARAX) 25 MG tablet  daily as needed              loperamide (IMODIUM) 2 MG capsule  Take 2 mg by mouth as needed for Diarrhea             magnesium oxide (MAG-OX) 400 MG tablet  Take 400 mg by mouth daily             Multiple Vitamin (MULTIVITAMIN) capsule  Take 1 capsule by mouth daily             nicotine (NICODERM CQ) 21 MG/24HR  Place 1 patch onto the skin             nitroGLYCERIN (NITROSTAT) 0.4 MG SL tablet  up to max of 3 total doses. If no relief after 1 dose, call 911. prasugrel (EFFIENT) 10 MG TABS  Take 1 tablet by mouth daily                 Subjective _resting in her recliner with no distress and no chest pain-discussed about the need to be compliant with aspirin, prasugrel and Lipitor but patient was initially very resistant to take aspirin and any type of statin    Objective Findings at Discharge:   /78   Pulse 69   Temp 98.3 °F (36.8 °C) (Oral)   Resp 14   Ht 5' 2\" (1.575 m)   Wt 161 lb (73 kg)   SpO2 92%   BMI 29.45 kg/m²            PHYSICAL EXAM   GEN Awake female, resting in bed in no apparent distress. Appears given age. RESP Clear to auscultation, no wheezes, rales or rhonchi. CARDIO/VAS - S1/S2 auscultated. Regular rate without appreciable murmurs, rubs, or gallops. Peripheral pulses equal bilaterally and palpable. No peripheral edema. GI Abdomen is soft without significant tenderness, masses, or guarding. Bowel sounds are normoactive  MSK No gross joint deformities. Spontaneous movement of all extremities  SKIN Normal coloration, warm, dry. NEURO Cranial nerves appear grossly intact, normal speech, no lateralizing weakness.     BMP/CBC  Recent Labs     03/08/21  1650 03/09/21  0343 03/11/21  0604   NA 140 140  --    K 4.3 4.1  --     106  --    CO2 30 29  --    BUN 10 13  --    CREATININE 0.7 0.8  --    WBC 7.3 6.8 6.8   HCT 39.0 34.7* 38.9    231 259         Discharge Time of 34 minutes    Electronically signed by Wilner Rajan MD on 3/11/2021 at 11:06 AM

## 2021-03-11 NOTE — PROGRESS NOTES
Seen by cardiac rehab status post PTCA. Patient  awake, alert and sitting up in bed. I introduced myself as the cardiac rehab nurse as well as introducing her to the [address]   I explained to her that this program is a customized out patient program of exercise and education. I explained to the patient that Cardiac Rehab is designed to help improve your hearts future. Cardiac rehab is a medically supervised program designed to improve your cardiovascular health through educating about nutrition, exercise, and stress release. Teaching done on A&P of coronary arteries, location of lesions, formation of CAD, symptoms of heart disease, and on procedure performed. Stressed to her the importance of compliance with antiplatelet therapy. Discussed risk factor identification and modification. Teaching done on cardiac diet. Explained the benefits of regular exercise program and stressed the need for a long term commitment to heart healthy practices in controlling this chronic disease. Patient refusing to watch the [employer] program overview video at this time. She states that she is wanting to leave the facility now. Nurse notified of patients request.  Patient states that she is interested in the program but concerned with exercise due to needing hip surgery.

## 2021-03-11 NOTE — PROGRESS NOTES
Today's plan:  [facility] x 1 stent to LAD, patient is okay for discharge. Add lopressor 12.5mg bid        Admit Date:  3/8/2021    Subjective:ok      Chief complaints on admission  Chief Complaint   Patient presents with    Chest Pain     radiating into left arm         History of present illness:Renata is a 55 y. o.year old who  presents with had concerns including Chest Pain (radiating into left arm). Past medical history:    has a past medical history of Anal cancer ([facility] [address] Asthma, Broken ankle, CAD (coronary artery disease), H/O cardiovascular stress test, NSTEMI (non-ST elevated myocardial infarction) ([address] PONV (postoperative nausea and vomiting), and Skin cancer. Past surgical history:   has a past surgical history that includes Percutaneous Transluminal Coronary Angio (2009); Colon surgery (2009); Ankle surgery (Left, 10+ yrs ago); Tubal ligation (25 yrs ago); Tunneled venous port placement (2009); Tonsillectomy; other surgical history (04-); fracture surgery; Colonoscopy (3/4/15); and Colonoscopy (03/15/2017). Social History:   reports that she has been smoking cigarettes. She has a 17.50 pack-year smoking history. She has never used smokeless tobacco. She reports that she does not drink alcohol or use drugs. Family history:  family history includes Cancer in her brother, father, and mother; High Cholesterol in her father; No Known Problems in her daughter; Stroke in her father and sister. Allergies   Allergen Reactions    Tylenol With Codeine #3 [Acetaminophen-Codeine] Nausea And Vomiting         Objective:   /78   Pulse 69   Temp 98.3 °F (36.8 °C) (Oral)   Resp 14   Ht 5' 2\" (1.575 m)   Wt 161 lb (73 kg)   SpO2 92%   BMI 29.45 kg/m²     No intake or output data in the 24 hours ending 03/11/21 1312    TELEMETRY: Sinus     Physical Exam:  Constitutional:  Well developed, Well nourished, No acute distress, Non-toxic appearance.    HENT:  Normocephalic, Atraumatic, Bilateral 12.6   HCT 39.0 34.7* 38.9   MCV 93.1 92.0 93.3    231 259     BMP:   Recent Labs     03/08/21  1650 03/09/21  0343    140   K 4.3 4.1    106   CO2 30 29   BUN 10 13   CREATININE 0.7 0.8     LIVER PROFILE:   Recent Labs     03/08/21  1650 03/09/21  0343   AST 24 21   ALT 30 26   BILITOT 0.2 0.2   ALKPHOS 123 107     PT/INR: No results for input(s): PROTIME, INR in the last 72 hours. APTT: No results for input(s): APTT in the last 72 hours. BNP:  No results for input(s): BNP in the last 72 hours. TROPONIN: @TROPONINI:3@      Assessment:  55 y. o.year old who is admitted for          Plan:  1. Chest pain:  abnormal stress test, LHC=- x 1 stent LAD, start Effient, add lopressor 12.5mg bid  2. CAD: H/O RCA and LAD stent, continue aspirin and statins  3. Dyslipidemia: continue statins  4. HTN: controlled  5. COPD: wheezing, recommend to quit smoking  6. H/o anal cancer and sx, had chemo and radiation in past  7. Health maintenance: exerise and diet  All labs, medications and tests reviewed, continue all other medications of all above medical condition listed as is. Ariel R. Dierdre Severin, MD 3/11/2021 1:12 PM

## 2021-03-18 ENCOUNTER — OFFICE VISIT (OUTPATIENT)
Dept: CARDIOLOGY CLINIC | Age: 56
End: 2021-03-18
Payer: COMMERCIAL

## 2021-03-18 VITALS
HEART RATE: 93 BPM | SYSTOLIC BLOOD PRESSURE: 136 MMHG | DIASTOLIC BLOOD PRESSURE: 80 MMHG | WEIGHT: 146 LBS | BODY MASS INDEX: 26.87 KG/M2 | HEIGHT: 62 IN

## 2021-03-18 DIAGNOSIS — M79.604 PAIN IN BOTH LOWER EXTREMITIES: ICD-10-CM

## 2021-03-18 DIAGNOSIS — I21.4 NSTEMI (NON-ST ELEVATED MYOCARDIAL INFARCTION) (HCC): ICD-10-CM

## 2021-03-18 DIAGNOSIS — I25.110 CORONARY ARTERY DISEASE INVOLVING NATIVE CORONARY ARTERY OF NATIVE HEART WITH UNSTABLE ANGINA PECTORIS (HCC): Primary | ICD-10-CM

## 2021-03-18 DIAGNOSIS — M79.605 PAIN IN BOTH LOWER EXTREMITIES: ICD-10-CM

## 2021-03-18 PROCEDURE — 99214 OFFICE O/P EST MOD 30 MIN: CPT | Performed by: INTERNAL MEDICINE

## 2021-03-18 PROCEDURE — 93000 ELECTROCARDIOGRAM COMPLETE: CPT | Performed by: INTERNAL MEDICINE

## 2021-03-18 PROCEDURE — G8417 CALC BMI ABV UP PARAM F/U: HCPCS | Performed by: INTERNAL MEDICINE

## 2021-03-18 PROCEDURE — 1111F DSCHRG MED/CURRENT MED MERGE: CPT | Performed by: INTERNAL MEDICINE

## 2021-03-18 PROCEDURE — 3017F COLORECTAL CA SCREEN DOC REV: CPT | Performed by: INTERNAL MEDICINE

## 2021-03-18 PROCEDURE — 4004F PT TOBACCO SCREEN RCVD TLK: CPT | Performed by: INTERNAL MEDICINE

## 2021-03-18 PROCEDURE — G8484 FLU IMMUNIZE NO ADMIN: HCPCS | Performed by: INTERNAL MEDICINE

## 2021-03-18 PROCEDURE — G8427 DOCREV CUR MEDS BY ELIG CLIN: HCPCS | Performed by: INTERNAL MEDICINE

## 2021-03-18 RX ORDER — CARVEDILOL 6.25 MG/1
6.25 TABLET ORAL 2 TIMES DAILY WITH MEALS
Qty: 60 TABLET | Refills: 3 | Status: SHIPPED | OUTPATIENT
Start: 2021-03-18 | End: 2021-10-14

## 2021-03-18 RX ORDER — ATORVASTATIN CALCIUM 40 MG/1
40 TABLET, FILM COATED ORAL NIGHTLY
Qty: 30 TABLET | Refills: 5 | Status: SHIPPED | OUTPATIENT
Start: 2021-03-18 | End: 2021-09-13

## 2021-03-18 NOTE — PATIENT INSTRUCTIONS
Please be informed that if you contact our office outside of normal business hours the physician on call cannot help with any scheduling or rescheduling issues, procedure instruction questions or any type of medication issue. We advise you for any urgent/emergency that you go to the nearest emergency room! PLEASE CALL OUR OFFICE DURING NORMAL BUSINESS HOURS    Monday - Friday   8 am to 5 pm    [address]: [phone]    [place]:  [phone]  **It is YOUR responsibilty to bring medication bottles and/or updated medication list to [address] [place] [address]. This will allow us to better serve you and all your healthcare needs**      CAD:Yes  clinically stable. Patient is on optimal medical regimen ( see medication list above )  Patient is currently  asymptomatic from CAD. -changes in  treatment:   yes,   -Testing ordered:  yes,   [place] classification: 1  [place] RISK SCORE:  JONN RISK SCORE:  HTN:Yes  well controlled on current medical regimen, see list above. - changes in  treatment:   yes,    CARDIOMYOPATHY:No  CONGESTIVE HEART FAILURE:No   VHD:no   No significant VHD noted  DYSLIPIDEMIA: yes,   Patient's profile is at / near [place],   Tolerating current medical regimen wellyes,   See most recent Lab values in Labs section above. PAD:yes,   claudication symptoms. .yes,   Up to date on non invasive testing .yes,   OTHER RELEVANT DIAGNOSIS:as noted in patient's active problem list:  TESTS ORDERED:    Treadmill Stress Test   All previously ordered tests reviewed. MEDICATIONS: Increase Lipitor to 40 mg daily & add Coreg 6.25 BID   Office f/u in 3 months.

## 2021-03-18 NOTE — LETTER
Patient Name: Monse Marin  : 1965  MRN# [id number]    REASON FOR VISIT:   NSTEMI (non-ST elevated myocardial infarction) [facility])  Coronary artery disease involving native coronary artery of native heart with unstable angina pectoris (HCC)  Unstable angina (HCC)     Current Outpatient Medications   Medication Sig Dispense Refill    nitroGLYCERIN (NITROSTAT) 0.4 MG SL tablet up to max of 3 total doses. If no relief after 1 dose, call 911. 25 tablet 3    atorvastatin (LIPITOR) 20 MG tablet Take 1 tablet by mouth nightly 30 tablet 3    prasugrel (EFFIENT) 10 MG TABS Take 1 tablet by mouth daily 30 tablet 1    loperamide (IMODIUM) 2 MG capsule Take 2 mg by mouth as needed for Diarrhea      nicotine (NICODERM CQ) 21 MG/24HR Place 1 patch onto the skin      Multiple Vitamin (MULTIVITAMIN) capsule Take 1 capsule by mouth daily      magnesium oxide (MAG-OX) 400 MG tablet Take 400 mg by mouth daily      hydrOXYzine (ATARAX) 25 MG tablet daily as needed       baclofen (LIORESAL) 10 MG tablet Take 10 mg by mouth every 8 hours as needed      albuterol (PROVENTIL) (2.5 MG/3ML) 0.083% nebulizer solution Inhale 2.5 mg into the lungs every 6 hours as needed      albuterol-ipratropium (COMBIVENT RESPIMAT)  MCG/ACT AERS inhaler Inhale into the lungs      aspirin 81 MG chewable tablet Take 1 tablet by mouth daily 30 tablet 6     No current facility-administered medications for this visit.       Last Visit:[facility] 2021  Complaints:edema, dizzy and chest pain  Changes: arteries dup    Last EKG:3/8/2021    VL DUP LOWER EXTREMITY ARTERIES BILATERAL:3/9/2021  Intermittent arrhythmia.  Monophasic waveforms in the left deep femoral   artery, proximal to mid left superficial femoral artery, and proximal left   popliteal artery.  Triphasic waveforms elsewhere with dampened diastolic   components in multiple locations.  Peak systolic velocities as below:         STRESS TEST:  3/9/2021  abnormal stress test, normal LVEF, NORMAL EDV, Myocardial perfusion scan    shows small size, moderate intensity, reversible perfusion defect in    anterior wall     ECHO: 3/9/2021  Left ventricular systolic function is normal.   Ejection fraction is visually estimated at 55-60%. No significant valvular disease noted. No evidence of any pericardial effusion. CAROTID: NONE    MUGA: NONE    LAST PACER CHECK: NONE    CARDIAC CATH: 3/10/2021  Access Radial , Indication Abnormal stress test showing anterior   wall ischemia with angina class III   1. PCI to LAD for 99% stenosis ( in stent ) reduced to 0% using   3.0 X 23 Stent inflated to 14 andrew   2. Circ has mid 50-60 % stenosis , large OM which is patent   3. RCA stent are widely patent    Amio Protocol:None    CHADS: [id number] Score for Atrial Fibrillation Stroke Risk   Risk   Factors  Component Value   C CHF No 0   H HTN No 0   A2 Age >= 76 No,  (55 y.o.) 0   D DM No 0   S2 Prior Stroke/TIA No 0   V Vascular Disease Yes 1   A Age 74-69 No,  (55 y.o.) 0   Sc Sex female 1    [id number]  Score  2   Score last updated 3/18/21 05:81 AM EDT    Click here for a link to the UpToDate guideline \"Atrial Fibrillation: Anticoagulation therapy to prevent embolization    Disclaimer: Risk Score calculation is dependent on accuracy of patient problem list and past encounter diagnosis.

## 2021-03-18 NOTE — LETTER
Evens Robert  1965  [id number]    Have you had any Chest Pain that is not new? - No       DO EKG IF: Patient has a Heart Rate above 100 or below 40     CAD (Coronary Artery Disease) patient should have one on file every 6 months        Have you had any Shortness of Breath - No      Have you had any dizziness - No       Sitting wait 5 minutes do supine (laying down) wait 5 minutes then do standing - log each in \"vitals\" area in Epic   Be sure to ask what symptoms they are having if they get dizzy while completing ortho stats such as: room spinning, nausea, etc.    Have you had any palpitations that are not new?  - No      Is the patient on any of the following medications -     Do you have any edema - swelling in No        Do you have a surgery or procedure scheduled in the near future - No per pt

## 2021-03-18 NOTE — PROGRESS NOTES
OFFICE PROGRESS NOTES      Renata is a 55 y.o. female who has    CHIEF COMPLAINT AS FOLLOWS:  CHEST PAIN: Patient denies any C/O chest pains at this time. SOB: No C/O SOB at this time. LEG EDEMA: No leg edema   PALPITATIONS: Denies any C/O Palpitations   DIZZINESS: No C/O Dizziness   SYNCOPE: None   OTHER:                                     HPI: Patient is here for F/U on her CAD, HTN & Dyslipidemia problems. CAD: Patient has known Hx of  CAD. Had angioplasty / CABG in the past.  HTN: Patient has known Hx of essential HTN. Has been treated with guideline recommended medical / physical/ diet therapy as stated below. Dyslipidemia: Patient has known Hx of mixed dyslipidemia. Has been treated with guideline recommended medical / physical/ diet therapy as stated below. She does not have any new complaints at this time. Current Outpatient Medications   Medication Sig Dispense Refill    atorvastatin (LIPITOR) 20 MG tablet Take 1 tablet by mouth nightly 30 tablet 3    prasugrel (EFFIENT) 10 MG TABS Take 1 tablet by mouth daily 30 tablet 1    nicotine (NICODERM CQ) 21 MG/24HR Place 1 patch onto the skin      Multiple Vitamin (MULTIVITAMIN) capsule Take 1 capsule by mouth daily      magnesium oxide (MAG-OX) 400 MG tablet Take 400 mg by mouth daily      hydrOXYzine (ATARAX) 25 MG tablet daily as needed       aspirin 81 MG chewable tablet Take 1 tablet by mouth daily 30 tablet 6    nitroGLYCERIN (NITROSTAT) 0.4 MG SL tablet up to max of 3 total doses. If no relief after 1 dose, call 911.  (Patient not taking: Reported on 3/18/2021) 25 tablet 3    loperamide (IMODIUM) 2 MG capsule Take 2 mg by mouth as needed for Diarrhea      albuterol (PROVENTIL) (2.5 MG/3ML) 0.083% nebulizer solution Inhale 2.5 mg into the lungs every 6 hours as needed      albuterol-ipratropium (COMBIVENT RESPIMAT)  MCG/ACT AERS inhaler Inhale into the lungs       No current facility-administered medications for this visit. Allergies: Tylenol with codeine #3 [acetaminophen-codeine]  Review of Systems:    Constitutional: Negative for diaphoresis and fatigue  Respiratory: Negative for shortness of breath  Cardiovascular: Negative for chest pain, dyspnea on exertion, claudication, edema, irregular heartbeat, murmur, palpitations or shortness of breath  Musculoskeletal: Negative for muscle pain, muscular weakness, negative for pain in arm and leg or swelling in foot and leg    Objective:  /80   Pulse 93   Ht 5' 2\" (1.575 m)   Wt 146 lb (66.2 kg)   BMI 26.70 kg/m²   Wt Readings from Last 3 Encounters:   03/18/21 146 lb (66.2 kg)   03/09/21 161 lb (73 kg)   02/19/21 163 lb (73.9 kg)     Body mass index is 26.7 kg/m². GENERAL - Alert, oriented, pleasant, in no apparent distress. EYES: No jaundice, no conjunctival pallor. Neck - Supple. No jugular venous distention noted. No carotid bruits. Cardiovascular  Normal S1 and S2 without obvious murmur or gallop. Extremities - No cyanosis, clubbing, or significant edema. Pulmonary  No respiratory distress. No wheezes or rales.       Lab Review   Lab Results   Component Value Date    TROPONINT <0.010 03/09/2021    TROPONINT <0.010 03/09/2021     No results found for: BNP, PROBNP  Lab Results   Component Value Date    INR 0.92 04/23/2015     No results found for: LABA1C  Lab Results   Component Value Date    WBC 6.8 03/11/2021    WBC 6.8 03/09/2021    HCT 38.9 03/11/2021    HCT 34.7 (L) 03/09/2021    MCV 93.3 03/11/2021    MCV 92.0 03/09/2021     03/11/2021     03/09/2021     Lab Results   Component Value Date    CHOL 182 03/09/2021    TRIG 104 03/09/2021    HDL 68 03/09/2021    LDLDIRECT 105 (H) 03/09/2021     Lab Results   Component Value Date    ALT 26 03/09/2021    ALT 30 03/08/2021    AST 21 03/09/2021    AST 24 03/08/2021     BMP:    Lab Results   Component Value Date     03/09/2021     03/08/2021    K 4.1 03/09/2021    K 4.3 03/08/2021     03/09/2021     03/08/2021    CO2 29 03/09/2021    CO2 30 03/08/2021    BUN 13 03/09/2021    BUN 10 03/08/2021    CREATININE 0.8 03/09/2021    CREATININE 0.7 03/08/2021     CMP:   Lab Results   Component Value Date     03/09/2021     03/08/2021    K 4.1 03/09/2021    K 4.3 03/08/2021     03/09/2021     03/08/2021    CO2 29 03/09/2021    CO2 30 03/08/2021    BUN 13 03/09/2021    BUN 10 03/08/2021    CREATININE 0.8 03/09/2021    CREATININE 0.7 03/08/2021    PROT 5.2 03/09/2021    PROT 6.4 03/08/2021    PROT 7.6 04/10/2012    PROT 6.6 12/08/2011     Lab Results   Component Value Date    [facility] 2.718 02/14/2011   CATH 3/8/2021   1. PCI to LAD for 99% stenosis ( in stent ) reduced to 0% using   3.0 X 23 Stent inflated to 14 andrew   2. Circ has mid 50-60 % stenosis , large OM which is patent   3. RCA stent are widely patent    ECHO 3/2021   Left ventricular systolic function is normal.   Ejection fraction is visually estimated at 55-60%. No significant valvular disease noted. No evidence of any pericardial effusion. Arterial US: 3/2021  1. Three-vessel runoff into the distal bilateral legs. 2. 50-74% stenosis between the mid and distal segments of the right peroneal   artery by peak systolic velocity criteria. 3. Monophasic waveforms in portions of the left lower extremity as above   potentially due to low-grade stenoses. QUALITY MEASURES REVIEWED:  1.CAD:Patient is taking anti platelet agent:Yes  2. DYSLIPIDEMIA: Patient is on cholesterol lowering medication:Yes   3. Beta-Blocker therapy for CAD, if prior Myocardial Infarction:No   4. Counselled regarding smoking cessation. Yes   Patient does not Smoke. 5.Anticoagulation therapy (for A.Fib) No   Does Not have A.Fib.  6.Discussed weight management strategies.       Assessment & Plan:    Primary / Secondary prevention is the goal by aggressive risk modification, healthy and therapeutic life style changes for cardiovascular risk reduction. Various goals are discussed and multiple questions answered. CAD:Yes  clinically stable. Patient is on optimal medical regimen ( see medication list above )  Patient is currently  asymptomatic from CAD. -changes in  treatment:   yes,   -Testing ordered:  yes,   [place] classification: 1  [place] RISK SCORE:  JONN RISK SCORE:  HTN:Yes  well controlled on current medical regimen, see list above. - changes in  treatment:   yes,    CARDIOMYOPATHY:No  CONGESTIVE HEART FAILURE:No   VHD:no   No significant VHD noted  DYSLIPIDEMIA: yes,   Patient's profile is at / near [place],   Tolerating current medical regimen wellyes,   See most recent Lab values in Labs section above. PAD:yes,   claudication symptoms. .yes,   Up to date on non invasive testing .yes,   OTHER RELEVANT DIAGNOSIS:as noted in patient's active problem list:  TESTS ORDERED:    Treadmill Stress Test   All previously ordered tests reviewed. MEDICATIONS: Increase Lipitor to 40 mg daily & add Coreg 6.25 BID   Office f/u in 3 months.

## 2021-03-18 NOTE — LETTER
[address]  [address] [address]  Phone: [phone]  Fax: [phone]    Leopoldo Ditch, MD        March 18, 2021     Cornell Cheng MD  [facility] [address]    Patient: Sheila Gibbs  MR Number: [id number]  YOB: 1965  Date of Visit: 3/18/2021    Dear Dr. Cornell Cheng: Thank you for the request for consultation for Kirstie Talbert to me for the evaluation of cad. Below are the relevant portions of my assessment and plan of care. If you have questions, please do not hesitate to call me. I look forward to following Renata along with you.     Sincerely,        Leopoldo Ditch, MD

## 2021-04-06 ENCOUNTER — OFFICE VISIT (OUTPATIENT)
Dept: ORTHOPEDIC SURGERY | Age: 56
End: 2021-04-06
Payer: COMMERCIAL

## 2021-04-06 VITALS
WEIGHT: 146 LBS | HEIGHT: 62 IN | OXYGEN SATURATION: 99 % | BODY MASS INDEX: 26.87 KG/M2 | RESPIRATION RATE: 16 BRPM | HEART RATE: 83 BPM

## 2021-04-06 DIAGNOSIS — M16.12 PRIMARY OSTEOARTHRITIS OF LEFT HIP: Primary | ICD-10-CM

## 2021-04-06 PROCEDURE — 20610 DRAIN/INJ JOINT/BURSA W/O US: CPT | Performed by: PHYSICIAN ASSISTANT

## 2021-04-06 PROCEDURE — 99213 OFFICE O/P EST LOW 20 MIN: CPT | Performed by: PHYSICIAN ASSISTANT

## 2021-04-06 PROCEDURE — 3017F COLORECTAL CA SCREEN DOC REV: CPT | Performed by: PHYSICIAN ASSISTANT

## 2021-04-06 PROCEDURE — G8417 CALC BMI ABV UP PARAM F/U: HCPCS | Performed by: PHYSICIAN ASSISTANT

## 2021-04-06 PROCEDURE — 4004F PT TOBACCO SCREEN RCVD TLK: CPT | Performed by: PHYSICIAN ASSISTANT

## 2021-04-06 PROCEDURE — 1111F DSCHRG MED/CURRENT MED MERGE: CPT | Performed by: PHYSICIAN ASSISTANT

## 2021-04-06 PROCEDURE — G8427 DOCREV CUR MEDS BY ELIG CLIN: HCPCS | Performed by: PHYSICIAN ASSISTANT

## 2021-04-06 RX ORDER — DULOXETIN HYDROCHLORIDE 20 MG/1
30 CAPSULE, DELAYED RELEASE ORAL DAILY
COMMUNITY
Start: 2021-04-01 | End: 2022-06-17

## 2021-04-06 RX ORDER — CLINDAMYCIN HYDROCHLORIDE 300 MG/1
300 CAPSULE ORAL 3 TIMES DAILY
COMMUNITY
End: 2021-12-20

## 2021-04-06 RX ORDER — TIZANIDINE 4 MG/1
4 TABLET ORAL EVERY 6 HOURS PRN
COMMUNITY

## 2021-04-06 RX ORDER — VARENICLINE TARTRATE 1 MG/1
TABLET, FILM COATED ORAL
COMMUNITY
Start: 2021-03-29 | End: 2021-12-20

## 2021-04-06 ASSESSMENT — ENCOUNTER SYMPTOMS
EYES NEGATIVE: 1
GASTROINTESTINAL NEGATIVE: 1
ALLERGIC/IMMUNOLOGIC NEGATIVE: 1
RESPIRATORY NEGATIVE: 1

## 2021-04-06 NOTE — PROGRESS NOTES
[address] [place] [address] and Sports Medicine    HPI:  Mary Gregory is a 55 y.o. female who complains of left hip pain. Patient states she has had pain in her left hip for the last year. Patient states she notes pain with flexion of the left hip and when she is getting her socks on her feet. She states her pain is 7/10 at this time. She states she has taken Tylenol for pain which does help some. She states she notes her pain on the anterior lateral aspect of her left hip and notes some radiation down her leg. Patient states she has not completed any home exercises at this time. She denies any new injury to her left hip. She states she discussed possible steroid injection previously with Dr. Sophy Silva and would like a steroid injection today.     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 and tx with chemo and radiation- followed with Dr Pan Lopez Asthma     Broken ankle     CAD (coronary artery disease)     \"have 3 heart stents\" use to see Dr Levin Fails H/O cardiovascular stress test 1/6/2016    treadmill    NSTEMI (non-ST elevated myocardial infarction) ([facility] [address]     PONV (postoperative nausea and vomiting)     hx motion sickness    Skin cancer      Past Surgical History:   Procedure Laterality Date    ANKLE SURGERY Left 10+ yrs ago    with hardware    COLON SURGERY  2009    \"after had chemo removed some part of the cancer from the rectal area, then 7 months later had bowel blockage had to do surgery  to remove part of the bowel\"    COLONOSCOPY  3/4/15    mild proctitis    COLONOSCOPY  03/15/2017    mild radia proc, hypertroph pailla    FRACTURE SURGERY      left ankle    OTHER SURGICAL HISTORY  04-    mediport removal    PTCA  2009 4/22/2009 had angioplasty with stent to LAD & another day in 2009 stent x 2 to RCA done    TONSILLECTOMY      as a kid age 7   [address]  25 yrs ago   [place] TUNNELED VENOUS PORT PLACEMENT  2009    port inserted       Family History   Problem Relation Age of Onset    Cancer Mother         breast ca? ?    Cancer Father         prostate cancer- lung mets- bone mets    Stroke Father     High Cholesterol Father     Stroke Sister     Cancer Brother         neck cancer    No Known Problems Daughter      Social History     Socioeconomic History    Marital status:      Spouse name: None    Number of children: None    Years of education: 6    Highest education level: None   Occupational History    Occupation: unemployed   Social Needs    Financial resource strain: None    Food insecurity     Worry: None     Inability: None    Transportation needs     Medical: None     Non-medical: None   Tobacco Use    Smoking status: Current Every Day Smoker     Packs/day: 0.25     Years: 35.00     Pack years: 8.75     Types: Cigarettes    Smokeless tobacco: Never Used    Tobacco comment: 6-7 cigarettes    Substance and Sexual Activity    Alcohol use: No     Comment: \"quit 3/2015\"use to drink a couple of times per week before\"    Drug use: No     Comment: 1-2 cups of coffee daily     Sexual activity: Yes   Lifestyle    Physical activity     Days per week: None     Minutes per session: None    Stress: None   Relationships    Social connections     Talks on phone: None     Gets together: None     Attends Judaism service: None     Active member of club or organization: None     Attends meetings of clubs or organizations: None     Relationship status: None    Intimate partner violence     Fear of current or ex partner: None     Emotionally abused: None     Physically abused: None     Forced sexual activity: None   Other Topics Concern    None   Social History Narrative    None     Current Outpatient Medications   Medication Sig Dispense Refill    tiZANidine (ZANAFLEX) 4 MG tablet Take 4 mg by mouth every 6 hours as needed      DULoxetine (CYMBALTA) 20 MG extended release capsule Take 20 mg by mouth daily      clindamycin (CLEOCIN) 300 MG capsule Take 300 mg by mouth 3 times daily TOOTH INFECTION      atorvastatin (LIPITOR) 40 MG tablet Take 1 tablet by mouth nightly 30 tablet 5    carvedilol (COREG) 6.25 MG tablet Take 1 tablet by mouth 2 times daily (with meals) 60 tablet 3    nitroGLYCERIN (NITROSTAT) 0.4 MG SL tablet up to max of 3 total doses. If no relief after 1 dose, call 911. 25 tablet 3    prasugrel (EFFIENT) 10 MG TABS Take 1 tablet by mouth daily 30 tablet 1    loperamide (IMODIUM) 2 MG capsule Take 2 mg by mouth as needed for Diarrhea      nicotine (NICODERM CQ) 21 MG/24HR Place 1 patch onto the skin      Multiple Vitamin (MULTIVITAMIN) capsule Take 1 capsule by mouth daily      magnesium oxide (MAG-OX) 400 MG tablet Take 400 mg by mouth daily      hydrOXYzine (ATARAX) 25 MG tablet daily as needed       albuterol (PROVENTIL) (2.5 MG/3ML) 0.083% nebulizer solution Inhale 2.5 mg into the lungs every 6 hours as needed      albuterol-ipratropium (COMBIVENT RESPIMAT)  MCG/ACT AERS inhaler Inhale into the lungs      aspirin 81 MG chewable tablet Take 1 tablet by mouth daily 30 tablet 6    CHANTIX 1 MG tablet        No current facility-administered medications for this visit. Allergies   Allergen Reactions    Tylenol With Codeine #3 [Acetaminophen-Codeine] Nausea And Vomiting       Review of Systems:   Review of Systems   Constitutional: Negative. HENT: Negative. Eyes: Negative. Respiratory: Negative. Cardiovascular: Negative. Gastrointestinal: Negative. Endocrine: Negative. Genitourinary: Negative. Musculoskeletal: Positive for arthralgias. Skin: Negative. Allergic/Immunologic: Negative. Neurological: Negative. Hematological: Negative. Psychiatric/Behavioral: Negative.         Physical Exam:   Pulse 83   Resp 16   Ht 5' 2\" (1.575 m)   Wt 146 lb (66.2 kg)   SpO2 99%   BMI 26.70 kg/m² Gait is Normal. The patient can bear weight on the injured extremity. Gen/Psych: Examination reveals a pleasant individual in no acute distress. The patient is oriented to time, place and person. The patient's mood and affect are appropriate. Patient appears well nourished. HEENT: Head is atraumatic normocephalic,  ears are symmetric, eyes show equal pupils bilaterally, extraocular muscles intact. Hearing is intact. Lymph: No obvious lymphedema in left lower extremity. Skin: Intact in left lower extremity with no ulcerations, lesions, rash, erythema. Vascular: There are no obvious varicosities in left lower extremity, sensation present to light touch over left lower extremity. Capillary refill less than 3. Musculoskeletal:  left Hip exam:  Inspection: No erythema or ecchymosis seen. Leg lengths are approximately equal.   Tenderness: Mild tenderness to palpation of the anterior and lateral hip region. Soft compartments  Range of Motion:  Flexion: 45 degrees  Extension: 30 degrees  Abduction: 30 degreee  Adduction: 10 degrees  Internal rotation: 10 degrees  External rotation: 10 degrees  Strength:   Flexion: 5/5  Capillary refill less than 3. Sensation present to light touch over the left leg. Imaging: No new imaging obtained today. Patient was noted to have x-ray of the left hip completed on 2/19/2021 which was reviewed. Impression:    1. Primary osteoarthritis of the left hip    Plan:    The patient was seen in clinic today for her left hip pain. Patient states she was previously seen by Dr. Sanam Cardenas and steroid injection was discussed at the last visit. Patient states she continues to have left hip pain and would like an injection today. X-ray imaging of the patient's left hip from 02/19/21 was reviewed and degenerative changes over the left hip joint were noted. On physical exam, patient was noted to have mild tenderness over the anterior lateral hip region.   Patient noted increased pain with internal and external rotation of the left hip. Patient was noted to have decreased range of motion of the left hip. Sensation intact to light touch. Treatment options regarding the patient's left hip pain were discussed and patient elected to proceed with steroid injection. Please see procedure note below. Patient was encouraged to work on exercises of her left hip and was given home exercises. The patient will follow up with Dr. De Aguilar in 6 weeks. Continue to weight bear as tolerated  Continue range of motion as tolerated  Ice and elevate as needed  Tylenol or Motrin for pain as needed  Steroid injection given. Please rest for 24-48 hours as needed. Follow-up in 6 weeks with Dr. Ruby Manuel exercises given    Left hip joint injection procedure note  Pre-procedure diagnosis:  Left hip OA  Post-procedure diagnosis:  same    Procedure: The planned procedure/risks/benefits/alternatives were discussed with the patient. Risks include, but are not limited to, increased pain, drug reaction, infection, bleeding, lack of improvement, neurovascular injury, and increased blood sugar levels. The patient understood and all of their questions were answered. The procedure was completed by Dr. De Aguilar. The lateral hip was prepped with alcohol and then anesthetized with Ethyl Chloride spray. A 22 gauge needle was advanced into the left hip joint without difficulty. The bursa was then injected with 3 ml 1% lidocaine  and 2 ml of kenalog 40 mg/ml. The injection site was cleansed with isopropyl alcohol and a band-aid was placed. Complications:  None, the patient tolerated the procedure well. Instructions: The patient was advised to rest the hip and decrease activity for the next 24 to 48 hours. May use prescription or OTC pain relievers as needed for any post-injection pain as well as ice. Follow blood sugars for the next few days.      Please note this report has been partially produced using speech recognition Dragon software and may contain errors related to that system including errors in grammar, punctuation, and spelling, as well as words and phrases that may be inappropriate. If there are any questions or concerns please feel free to contact the dictating provider for clarification.

## 2021-04-06 NOTE — PROGRESS NOTES
Patient is in the office with left hip pain. Patient states that her left leg has been hurting for several years. Patient states that she has babied the left hip and wants to discuss surgery to steroid.

## 2021-04-06 NOTE — PATIENT INSTRUCTIONS
Continue to weight bear as tolerated  Continue range of motion as tolerated  Ice and elevate as needed  Tylenol or Motrin for pain as needed  Steroid injection given. Please rest for 24-48 hours as needed. Follow-up in 6 weeks    Patient Education        Hip Arthritis: Exercises  Introduction  Here are some examples of exercises for you to try. The exercises may be suggested for a condition or for rehabilitation. Start each exercise slowly. Ease off the exercises if you start to have pain. You will be told when to start these exercises and which ones will work best for you. How to do the exercises  Straight-leg raises to the outside   1. Lie on your side, with your affected hip on top. 2. Tighten the front thigh muscles of your top leg to keep your knee straight. 3. Keep your hip and your leg straight in line with the rest of your body, and keep your knee pointing forward. Do not drop your hip back. 4. Lift your top leg straight up toward the ceiling, about 12 inches off the floor. Hold for about 6 seconds, then slowly lower your leg. 5. Repeat 8 to 12 times. 6. Switch legs and repeat steps 1 through 5, even if only one hip is sore. Straight-leg raises to the inside   1. Lie on your side with your affected hip on the floor. 2. You can either prop up your other leg on a chair, or you can bend that knee and put that foot in front of your other knee. Do not drop your hip back. 3. Tighten the muscles on the front thigh of your bottom leg to straighten that knee. 4. Keep your kneecap pointing forward and your leg straight, and lift your bottom leg up toward the ceiling about 6 inches. Hold for about 6 seconds, then lower slowly. 5. Repeat 8 to 12 times. 6. Switch legs and repeat steps 1 through 5, even if only one hip is sore. Hip hike   1. Stand sideways on the bottom step of a staircase, and hold on to the banister or wall.   2. Keeping both knees straight, lift your good leg off the step and let it hang down. Then hike your good hip up to the same level as your affected hip or a little higher. 3. Repeat 8 to 12 times. 4. Switch legs and repeat steps 1 through 3, even if only one hip is sore. Bridging   1. Lie on your back with both knees bent. Your knees should be bent about 90 degrees. 2. Then push your feet into the floor, squeeze your buttocks, and lift your hips off the floor until your shoulders, hips, and knees are all in a straight line. 3. Hold for about 6 seconds as you continue to breathe normally, and then slowly lower your hips back down to the floor and rest for up to 10 seconds. 4. Repeat 8 to 12 times. Hamstring stretch (lying down)   1. Lie flat on your back with your legs straight. If you feel discomfort in your back, place a small towel roll under your lower back. 2. Holding the back of your affected leg, lift your leg straight up and toward your body until you feel a stretch at the back of your thigh. 3. Hold the stretch for at least 30 seconds. 4. Repeat 2 to 4 times. 5. Switch legs and repeat steps 1 through 4, even if only one hip is sore. Standing quadriceps stretch   1. If you are not steady on your feet, hold on to a chair, counter, or wall. You can also lie on your stomach or your side to do this exercise. 2. Bend the knee of the leg you want to stretch, and reach behind you to grab the front of your foot or ankle with the hand on the same side. For example, if you are stretching your right leg, use your right hand. 3. Keeping your knees next to each other, pull your foot toward your buttock until you feel a gentle stretch across the front of your hip and down the front of your thigh. Your knee should be pointed directly to the ground, and not out to the side. 4. Hold the stretch for at least 15 to 30 seconds. 5. Repeat 2 to 4 times. 6. Switch legs and repeat steps 1 through 5, even if only one hip is sore. Hip rotator stretch   1.  Lie on your back with both knees bent and your feet flat on the floor. 2. Put the ankle of your affected leg on your opposite thigh near your knee. 3. Use your hand to gently push your knee away from your body until you feel a gentle stretch around your hip. 4. Hold the stretch for 15 to 30 seconds. 5. Repeat 2 to 4 times. 6. Repeat steps 1 through 5, but this time use your hand to gently pull your knee toward your opposite shoulder. 7. Switch legs and repeat steps 1 through 6, even if only one hip is sore. Knee-to-chest   1. Lie on your back with your knees bent and your feet flat on the floor. 2. Bring your affected leg to your chest, keeping the other foot flat on the floor (or keeping the other leg straight, whichever feels better on your lower back). 3. Keep your lower back pressed to the floor. Hold for at least 15 to 30 seconds. 4. Relax, and lower the knee to the starting position. 5. Repeat 2 to 4 times. 6. Switch legs and repeat steps 1 through 5, even if only one hip is sore. 7. To get more stretch, put your other leg flat on the floor while pulling your knee to your chest.    Clamshell   1. Lie on your side, with your affected hip on top. Keep your feet and knees together and your knees bent. 2. Raise your top knee, but keep your feet together. Do not let your hips roll back. Your legs should open up like a clamshell. 3. Hold for 6 seconds. 4. Slowly lower your knee back down. Rest for 10 seconds. 5. Repeat 8 to 12 times. 6. Switch legs and repeat steps 1 through 5, even if only one hip is sore. Follow-up care is a key part of your treatment and safety. Be sure to make and go to all appointments, and call your doctor if you are having problems. It's also a good idea to know your test results and keep a list of the medicines you take. Where can you learn more? Go to https://zuri[employer] org and sign in to your [employer] account.  Enter D045 in the [employer] box to learn more about \"Hip Arthritis: Exercises. \"     If you do not have an account, please click on the \"Sign Up Now\" link. Current as of: November 16, 2020               Content Version: 12.8  © 8692-9721 [employer]. Care instructions adapted under license by [facility] ([facility]). If you have questions about a medical condition or this instruction, always ask your healthcare professional. [address] any warranty or liability for your use of this information.

## 2021-04-09 ENCOUNTER — PROCEDURE VISIT (OUTPATIENT)
Dept: CARDIOLOGY CLINIC | Age: 56
End: 2021-04-09
Payer: COMMERCIAL

## 2021-04-09 VITALS — HEIGHT: 62 IN | RESPIRATION RATE: 16 BRPM | WEIGHT: 146 LBS | BODY MASS INDEX: 26.87 KG/M2

## 2021-04-09 DIAGNOSIS — M79.604 PAIN IN BOTH LOWER EXTREMITIES: ICD-10-CM

## 2021-04-09 DIAGNOSIS — M79.605 PAIN IN BOTH LOWER EXTREMITIES: ICD-10-CM

## 2021-04-09 DIAGNOSIS — Z95.9 S/P ARTERIAL STENT: ICD-10-CM

## 2021-04-09 DIAGNOSIS — I25.110 CORONARY ARTERY DISEASE INVOLVING NATIVE CORONARY ARTERY OF NATIVE HEART WITH UNSTABLE ANGINA PECTORIS (HCC): ICD-10-CM

## 2021-04-09 DIAGNOSIS — I21.4 NSTEMI (NON-ST ELEVATED MYOCARDIAL INFARCTION) (HCC): ICD-10-CM

## 2021-04-09 PROCEDURE — 93015 CV STRESS TEST SUPVJ I&R: CPT | Performed by: INTERNAL MEDICINE

## 2021-04-15 ENCOUNTER — TELEPHONE (OUTPATIENT)
Dept: CARDIOLOGY CLINIC | Age: 56
End: 2021-04-15

## 2021-04-15 NOTE — TELEPHONE ENCOUNTER
Received request for clearance for dental extractions from Dr Carmine Jackson. Patient will be cleared after 9/11 due to 3/11 PCI.  Dr Harris Said notified

## 2021-04-15 NOTE — LETTER
[facility]  [address]  [place] [address]  Phone: [phone]    Fax [phone]                  Arminda Kayser, MD, Truman Zaidi MD, Marigene Opitz, MD, Luvenia Nyhan, MD Frederic Stalling, MD Evangeline Corwin, MD Fransisca Lobo, APRN-CNP  Darryle Comas, APRN-CNP     Shaq Montana, APRN-CNP    Ishan Matt, APRN-[facility]  Cardiac Risk Assessment   4/15/2021        Surgery Date: Pending  Surgery: Dental Extraction  Anesthesia Type: Local  Fax Number: [phone]    To: Dr. Harris Cr  From : Daisy Llanes MD, [facility] [place]    Patient Name: Niyah Oneil     YOB: 1965  MRN: [id number]      Niyah Oneil was evaluated from a cardiac standpoint for her surgery or procedure and based on her history, diagnosis, recent cardiac testing, she is considered a medium risk candidate for any fidelia-operative cardiac complications after 1/89/2651. Patients with known CAD with moderate or high risk should have surgical procedures done where they have access to invasive cardiology services if emergently needed. Antiplatelet/anticoagulant therapy can be held prior to the surgery or procedure at the discretion of the surgeon after 9/11/2021 and to be resumed as soon as possible if held. Please call with any further questions. Respectfully,     Daisy Llanes  Am/bl  This cardiac clearance is good for 6 months from 4/15/2021 unless new cardiac symptoms arise.

## 2021-06-11 ENCOUNTER — OFFICE VISIT (OUTPATIENT)
Dept: ORTHOPEDIC SURGERY | Age: 56
End: 2021-06-11
Payer: COMMERCIAL

## 2021-06-11 VITALS
HEART RATE: 78 BPM | BODY MASS INDEX: 26.87 KG/M2 | RESPIRATION RATE: 16 BRPM | HEIGHT: 62 IN | OXYGEN SATURATION: 98 % | WEIGHT: 146 LBS

## 2021-06-11 DIAGNOSIS — Z09 FOLLOW UP: ICD-10-CM

## 2021-06-11 DIAGNOSIS — M16.12 PRIMARY OSTEOARTHRITIS OF LEFT HIP: Primary | ICD-10-CM

## 2021-06-11 PROCEDURE — 99213 OFFICE O/P EST LOW 20 MIN: CPT | Performed by: ORTHOPAEDIC SURGERY

## 2021-06-11 PROCEDURE — 3017F COLORECTAL CA SCREEN DOC REV: CPT | Performed by: ORTHOPAEDIC SURGERY

## 2021-06-11 PROCEDURE — G8427 DOCREV CUR MEDS BY ELIG CLIN: HCPCS | Performed by: ORTHOPAEDIC SURGERY

## 2021-06-11 PROCEDURE — 4004F PT TOBACCO SCREEN RCVD TLK: CPT | Performed by: ORTHOPAEDIC SURGERY

## 2021-06-11 PROCEDURE — G8417 CALC BMI ABV UP PARAM F/U: HCPCS | Performed by: ORTHOPAEDIC SURGERY

## 2021-06-11 ASSESSMENT — ENCOUNTER SYMPTOMS
CHEST TIGHTNESS: 0
COLOR CHANGE: 0
SHORTNESS OF BREATH: 0

## 2021-06-11 NOTE — PROGRESS NOTES
Patient returns to the office for a 6 week follow up on her left hip pain following a recent steroid injection that was administered on 4/6/21. No pain is reported in the office today, but patient does continue to have sharp pain if she moves the left leg incorrectly with pain felt along the groin region radiating down the left leg. She continues to take Ibuprofen and muscle relaxer as needed. Overall, she is having improvement with her previously reported symptoms and no reports of new or worsening symptoms.

## 2021-06-11 NOTE — PROGRESS NOTES
Effort: Pulmonary effort is normal.   Musculoskeletal:      Cervical back: Normal range of motion. Lumbar back: No swelling, deformity or tenderness. Normal range of motion. Right hip: No deformity, tenderness, bony tenderness or crepitus. Normal range of motion. Normal strength. Right knee: Normal.      Left knee: No swelling, deformity, effusion or erythema. Normal range of motion. No medial joint line or lateral joint line tenderness. No LCL laxity or MCL laxity. Normal alignment. Comments: Left Lower Extremity:  There is improved minimal tenderness to palpation diffusely throughout the hip both anteriorly and posteriorly. Range of motion is mildly limited and painful at the extremes of motion. Hip flexion present to 120 degrees, abduction 40 degrees, extension 15 degrees, internal rotation 20 degrees, external rotation 30 degrees. Strength is 5 out of 5 with hip flexion, extension, and abduction. BRITNEY and FADIR test reproduces pain to the groin and hip joint. Sensation is intact to light touch in the left lower extremity. Pulses are intact. Skin is intact without erythema. No lower extremity edema. Skin:     General: Skin is warm and dry. Neurological:      Mental Status: She is alert and oriented to person, place, and time. Psychiatric:         Mood and Affect: Mood normal.         Behavior: Behavior normal.              Diagnostic testing:  X-rays reviewed in office, I independently reviewed the films in the office today:   Narrative   AP pelvis and frog-leg lateral view of the left hip show evidence of    bilateral avascular necrosis present at the femoral heads with evidence of    mild degenerative changes of the right hip joint and moderate to severe    degenerative changes at the left hip joint.  The left hip has advanced    joint space narrowing and areas of cortical irregularity and mild    spurring.  No evidence of fracture or acute abnormality.  Normal    alignment.     Impression: Left hip degenerative joint disease with underlying avascular    necrosis           Office Procedures:  No orders of the defined types were placed in this encounter. Assessment and Plan  1. Left hip primary osteoarthritis     2.  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  I have encouraged her to continue conservative measures and we discussed the role for repeat steroid injections in the future as needed. She will continue with her medication therapy as directed by her primary care physician. Follow-up here as needed if she would like to have a repeat steroid injection.         Electronically signed by Radha Mayfield MD on 6/11/2021 at 10:55 AM

## 2021-06-11 NOTE — PATIENT INSTRUCTIONS
Continue to take muscle relaxer and Ibuprofen as needed  Work on ROM and strengthening exercises   Rest, ice, and elevate as needed  Weightbearing and activities as tolerated  Follow up as needed

## 2021-06-18 ENCOUNTER — OFFICE VISIT (OUTPATIENT)
Dept: CARDIOLOGY CLINIC | Age: 56
End: 2021-06-18
Payer: COMMERCIAL

## 2021-06-18 VITALS
WEIGHT: 172.2 LBS | HEIGHT: 62 IN | DIASTOLIC BLOOD PRESSURE: 78 MMHG | BODY MASS INDEX: 31.69 KG/M2 | OXYGEN SATURATION: 95 % | HEART RATE: 82 BPM | SYSTOLIC BLOOD PRESSURE: 122 MMHG

## 2021-06-18 DIAGNOSIS — I25.110 CORONARY ARTERY DISEASE INVOLVING NATIVE CORONARY ARTERY OF NATIVE HEART WITH UNSTABLE ANGINA PECTORIS (HCC): Primary | ICD-10-CM

## 2021-06-18 DIAGNOSIS — E78.5 DYSLIPIDEMIA: ICD-10-CM

## 2021-06-18 DIAGNOSIS — I21.4 NSTEMI (NON-ST ELEVATED MYOCARDIAL INFARCTION) (HCC): ICD-10-CM

## 2021-06-18 PROCEDURE — 4004F PT TOBACCO SCREEN RCVD TLK: CPT | Performed by: INTERNAL MEDICINE

## 2021-06-18 PROCEDURE — G8427 DOCREV CUR MEDS BY ELIG CLIN: HCPCS | Performed by: INTERNAL MEDICINE

## 2021-06-18 PROCEDURE — 3017F COLORECTAL CA SCREEN DOC REV: CPT | Performed by: INTERNAL MEDICINE

## 2021-06-18 PROCEDURE — 99213 OFFICE O/P EST LOW 20 MIN: CPT | Performed by: INTERNAL MEDICINE

## 2021-06-18 PROCEDURE — G8417 CALC BMI ABV UP PARAM F/U: HCPCS | Performed by: INTERNAL MEDICINE

## 2021-06-18 NOTE — PROGRESS NOTES
daily      magnesium oxide (MAG-OX) 400 MG tablet Take 400 mg by mouth daily      hydrOXYzine (ATARAX) 25 MG tablet daily as needed       albuterol (PROVENTIL) (2.5 MG/3ML) 0.083% nebulizer solution Inhale 2.5 mg into the lungs every 6 hours as needed      albuterol-ipratropium (COMBIVENT RESPIMAT)  MCG/ACT AERS inhaler Inhale into the lungs      aspirin 81 MG chewable tablet Take 1 tablet by mouth daily 30 tablet 6     No current facility-administered medications for this visit. Allergies: Tylenol with codeine #3 [acetaminophen-codeine]  Review of Systems:    Constitutional: Negative for diaphoresis and fatigue  Respiratory: Negative for shortness of breath  Cardiovascular: Negative for chest pain, dyspnea on exertion, claudication, edema, irregular heartbeat, murmur, palpitations or shortness of breath  Musculoskeletal: Negative for muscle pain, muscular weakness, negative for pain in arm and leg or swelling in foot and leg    Objective:  /78   Pulse 82   Ht 5' 2\" (1.575 m)   Wt 172 lb 3.2 oz (78.1 kg)   SpO2 95%   BMI 31.50 kg/m²   Wt Readings from Last 3 Encounters:   06/18/21 172 lb 3.2 oz (78.1 kg)   06/11/21 146 lb (66.2 kg)   04/09/21 146 lb (66.2 kg)     Body mass index is 31.5 kg/m². GENERAL - Alert, oriented, pleasant, in no apparent distress. EYES: No jaundice, no conjunctival pallor. Neck - Supple. No jugular venous distention noted. No carotid bruits. Cardiovascular  Normal S1 and S2 without obvious murmur or gallop. Extremities - No cyanosis, clubbing, or significant edema. Pulmonary  No respiratory distress. No wheezes or rales.       Lab Review   Lab Results   Component Value Date    TROPONINT <0.010 03/09/2021    TROPONINT <0.010 03/09/2021     No results found for: BNP, PROBNP  Lab Results   Component Value Date    INR 0.92 04/23/2015     No results found for: LABA1C  Lab Results   Component Value Date    WBC 6.8 03/11/2021    WBC 6.8 03/09/2021    HCT 38.9 03/11/2021    HCT 34.7 (L) 03/09/2021    MCV 93.3 03/11/2021    MCV 92.0 03/09/2021     03/11/2021     03/09/2021     Lab Results   Component Value Date    CHOL 182 03/09/2021    TRIG 104 03/09/2021    HDL 68 03/09/2021    LDLDIRECT 105 (H) 03/09/2021     Lab Results   Component Value Date    ALT 26 03/09/2021    ALT 30 03/08/2021    AST 21 03/09/2021    AST 24 03/08/2021     BMP:    Lab Results   Component Value Date     03/09/2021     03/08/2021    K 4.1 03/09/2021    K 4.3 03/08/2021     03/09/2021     03/08/2021    CO2 29 03/09/2021    CO2 30 03/08/2021    BUN 13 03/09/2021    BUN 10 03/08/2021    CREATININE 0.8 03/09/2021    CREATININE 0.7 03/08/2021     CMP:   Lab Results   Component Value Date     03/09/2021     03/08/2021    K 4.1 03/09/2021    K 4.3 03/08/2021     03/09/2021     03/08/2021    CO2 29 03/09/2021    CO2 30 03/08/2021    BUN 13 03/09/2021    BUN 10 03/08/2021    CREATININE 0.8 03/09/2021    CREATININE 0.7 03/08/2021    PROT 5.2 03/09/2021    PROT 6.4 03/08/2021    PROT 7.6 04/10/2012    PROT 6.6 12/08/2011     Lab Results   Component Value Date    TSHHS 2.718 02/14/2011     ECHO 3/2021   Left ventricular systolic function is normal.   Ejection fraction is visually estimated at 55-60%. No significant valvular disease noted. No evidence of any pericardial effusion. ETT 4/2021   Limited exercise capacity, nearly six METs work load. Physiological BP response to exercise. The exercise test was stopped due to Fatigue. did C/O chest pressure. ETT non diagnostic as THR is not achieved. QUALITY MEASURES REVIEWED:  1.CAD:Patient is taking anti platelet agent:Yes  2. DYSLIPIDEMIA: Patient is on cholesterol lowering medication:Yes   3. Beta-Blocker therapy for CAD, if prior Myocardial Infarction:Yes   4. Counselled regarding smoking cessation. No   Patient does not Smoke.   5.Anticoagulation therapy (for A.Fib) No   Does Not have A.Fib. 6.Discussed weight management strategies. Assessment & Plan:    Primary / Secondary prevention is the goal by aggressive risk modification, healthy and therapeutic life style changes for cardiovascular risk reduction. Various goals are discussed and multiple questions answered. CAD:Yes s/p stenting. clinically stable. Patient is on optimal medical regimen ( see medication list above )  Patient is currently  asymptomatic from CAD. -changes in  treatment:   yes,   -Testing ordered:  yes,   [place] classification: 1  [place] RISK SCORE:  JONN RISK SCORE:  HTN:Yes  well controlled on current medical regimen, see list above. - changes in  treatment:   yes,    CARDIOMYOPATHY:No  CONGESTIVE HEART FAILURE:No   VHD:no              No significant VHD noted  DYSLIPIDEMIA: yes,   Patient's profile is at / near [place],   Tolerating current medical regimen wellyes,   See most recent Lab values in Labs section above. PAD:yes,   claudication symptoms. .yes,   Up to date on non invasive testing .yes,   OTHER RELEVANT DIAGNOSIS:as noted in patient's active problem list:  TESTS ORDERED:    none this visit   All previously ordered tests reviewed. MEDICATIONS: CPM, Patient had to reduce Coreg dose to 3.125 mg BID. Patient did not attend Rehab? . Did not take [employer] vaccine   Office f/u in six months.

## 2021-06-18 NOTE — PATIENT INSTRUCTIONS
CAD:Yes  clinically stable. Patient is on optimal medical regimen ( see medication list above )  Patient is currently  asymptomatic from CAD. -changes in  treatment:   yes,   -Testing ordered:  yes,   [place] classification: 1  [place] RISK SCORE:  JONN RISK SCORE:  HTN:Yes  well controlled on current medical regimen, see list above. - changes in  treatment:   yes,    CARDIOMYOPATHY:No  CONGESTIVE HEART FAILURE:No   VHD:no              No significant VHD noted  DYSLIPIDEMIA: yes,   Patient's profile is at / near [place],   Tolerating current medical regimen wellyes,   See most recent Lab values in Labs section above. PAD:yes,   claudication symptoms. .yes,   Up to date on non invasive testing .yes,   OTHER RELEVANT DIAGNOSIS:as noted in patient's active problem list:  TESTS ORDERED:    none this visit   All previously ordered tests reviewed. MEDICATIONS: CPM, Patient had to reduce Coreg dose to 3.125 mg BID. Patient did not attend Rehab? . Did not take [employer] vaccine   Office f/u in six months.

## 2021-06-18 NOTE — LETTER
[facility] [address]  [address] [address]  Phone: [phone]  Fax: [phone]    Tolu Avitia MD    June 18, 2021     Sue Leigh MD  [facility]  [place] [facility] [address]    Patient: Royce Santiago   MR Number: [id number]   YOB: 1965   Date of Visit: 6/18/2021       Dear Sue Leigh: Thank you for referring Isael Ya to me for evaluation/treatment. Below are the relevant portions of my assessment and plan of care. If you have questions, please do not hesitate to call me. I look forward to following Renata along with you.     Sincerely,        Tolu Avitia MD

## 2021-07-29 ENCOUNTER — TELEPHONE (OUTPATIENT)
Dept: CARDIOLOGY CLINIC | Age: 56
End: 2021-07-29

## 2021-07-29 NOTE — TELEPHONE ENCOUNTER
Patient called stating that she bruises easily and got a nose bleed today, she wants to know if she should take less blood thinners. Please call her back at ph# [phone].

## 2021-07-30 NOTE — TELEPHONE ENCOUNTER
Left message for pt to call back. Patient to discontine ASA. Also patient will be cleared for extractions after 9/11/21.  Need info for clearance unknown

## 2021-09-13 RX ORDER — ATORVASTATIN CALCIUM 40 MG/1
TABLET, FILM COATED ORAL
Qty: 30 TABLET | Refills: 5 | Status: SHIPPED | OUTPATIENT
Start: 2021-09-13 | End: 2021-12-20

## 2021-10-14 RX ORDER — CARVEDILOL 6.25 MG/1
TABLET ORAL
Qty: 60 TABLET | Refills: 3 | Status: ON HOLD
Start: 2021-10-14 | End: 2022-02-26 | Stop reason: HOSPADM

## 2021-11-16 ENCOUNTER — OFFICE VISIT (OUTPATIENT)
Dept: ORTHOPEDIC SURGERY | Age: 56
End: 2021-11-16
Payer: COMMERCIAL

## 2021-11-16 VITALS
HEART RATE: 75 BPM | RESPIRATION RATE: 16 BRPM | HEIGHT: 62 IN | WEIGHT: 172 LBS | BODY MASS INDEX: 31.65 KG/M2 | OXYGEN SATURATION: 97 %

## 2021-11-16 DIAGNOSIS — M16.12 PRIMARY OSTEOARTHRITIS OF LEFT HIP: Primary | ICD-10-CM

## 2021-11-16 PROCEDURE — 3017F COLORECTAL CA SCREEN DOC REV: CPT | Performed by: ORTHOPAEDIC SURGERY

## 2021-11-16 PROCEDURE — G8427 DOCREV CUR MEDS BY ELIG CLIN: HCPCS | Performed by: ORTHOPAEDIC SURGERY

## 2021-11-16 PROCEDURE — 20610 DRAIN/INJ JOINT/BURSA W/O US: CPT | Performed by: ORTHOPAEDIC SURGERY

## 2021-11-16 PROCEDURE — G8484 FLU IMMUNIZE NO ADMIN: HCPCS | Performed by: ORTHOPAEDIC SURGERY

## 2021-11-16 PROCEDURE — 99213 OFFICE O/P EST LOW 20 MIN: CPT | Performed by: ORTHOPAEDIC SURGERY

## 2021-11-16 PROCEDURE — G8417 CALC BMI ABV UP PARAM F/U: HCPCS | Performed by: ORTHOPAEDIC SURGERY

## 2021-11-16 PROCEDURE — 4004F PT TOBACCO SCREEN RCVD TLK: CPT | Performed by: ORTHOPAEDIC SURGERY

## 2021-11-16 NOTE — PATIENT INSTRUCTIONS
Was given a steroid injection in left hip  Weightbearing and activities as tolerated  May take Ibuprofen or Motrin as needed  Rest, ice, and elevate as needed  Work on ROM and strengthening exercises as discussed  Follow up as needed

## 2021-11-16 NOTE — PROGRESS NOTES
Pt, Carl Lopez is a 55 y.o. female returning to the office complaining of pain in the left hip. Pt states she received a steroid injection in April and would like to discuss another injection. She also states she would like to discuss eventual surgery. Pt states pain is sharp and extends down her leg when moved in the wrong way. Pt states she had worsening pain for about 2 years. Pt declines any other previous injuries or surgeries.

## 2021-11-20 ASSESSMENT — ENCOUNTER SYMPTOMS
COLOR CHANGE: 0
SHORTNESS OF BREATH: 0
CHEST TIGHTNESS: 0

## 2021-11-20 NOTE — PROGRESS NOTES
11/16/2021   Chief Complaint   Patient presents with    Hip Pain     left        History of Present Illness:                             Devorah Shaikh is a 55 y.o. female who returns today for follow-up of her left hip. She responded well to the injection performed  7 months ago. She continues to have pain in the hip and groin region that radiates down her leg. Worse with weightbearing activities and rotational movements of the hip. Pt, Sonja Medellin is a 55 y.o. female returning to the office complaining of pain in the left hip. Pt states she received a steroid injection in April and would like to discuss another injection. She also states she would like to discuss eventual surgery. Pt states pain is sharp and extends down her leg when moved in the wrong way. Pt states she had worsening pain for about 2 years. Pt declines any other previous injuries or surgeries. Medical History  Patient's medications, allergies, past medical, surgical, social and family histories were reviewed and updated as appropriate. Review of Systems   Constitutional: Negative for activity change and fever. Respiratory: Negative for chest tightness and shortness of breath. Cardiovascular: Negative for chest pain. Skin: Negative for color change. Neurological: Negative for dizziness. Psychiatric/Behavioral: The patient is not nervous/anxious. Examination:  General Exam:  Vitals: Pulse 75   Resp 16   Ht 5' 2\" (1.575 m)   Wt 172 lb (78 kg)   SpO2 97%   BMI 31.46 kg/m²    Physical Exam   Left Lower Extremity:  There is improved minimal tenderness to palpation diffusely throughout the hip both anteriorly and posteriorly. Range of motion is mildly limited and painful at the extremes of motion. Hip flexion present to 120 degrees, abduction 40 degrees, extension 15 degrees, internal rotation 20 degrees, external rotation 30 degrees.   Strength is 5 out of 5 with hip flexion, extension, and abduction. BRITNEY and FADIR test reproduces pain to the groin and hip joint. Sensation is intact to light touch in the left lower extremity. Pulses are intact. Skin is intact without erythema. No lower extremity edema. Diagnostic testing:  X-rays reviewed in office, I independently reviewed the films in the office today:     Narrative   AP pelvis and frog-leg lateral view of the left hip show evidence of    bilateral avascular necrosis present at the femoral heads with evidence of    mild degenerative changes of the right hip joint and moderate to severe    degenerative changes at the left hip joint.  The left hip has advanced    joint space narrowing and areas of cortical irregularity and mild    spurring.  No evidence of fracture or acute abnormality.  Normal    alignment.       Impression: Left hip degenerative joint disease with underlying avascular    necrosis         Office Procedures:  Orders Placed This Encounter   Procedures    FL ARTHROCENTESIS ASPIR&/INJ MAJOR JT/BURSA W/O US       Assessment and Plan  1. Left hip primary osteoarthritis    2. 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 Procedure Note, Left Hip Injection:    The left lateral hip was prepped with alcohol and the posterior hip joint capsule was injected with 80 mg of Kenalog and 3 mL of 1% lidocaine through a 22-gauge needle. Sterile Band-Aid was applied. The patient tolerated it well without complications. She will continue with her stretching exercises at home program.  Continue with medications from her primary care physician. Follow-up as needed for repeat injections or discussion of surgical intervention in the future if needed.           Electronically signed by Francisco Escobar MD on 11/20/2021 at 9:52 AM

## 2021-12-20 ENCOUNTER — OFFICE VISIT (OUTPATIENT)
Dept: CARDIOLOGY CLINIC | Age: 56
End: 2021-12-20
Payer: COMMERCIAL

## 2021-12-20 VITALS
BODY MASS INDEX: 33.71 KG/M2 | WEIGHT: 183.2 LBS | SYSTOLIC BLOOD PRESSURE: 136 MMHG | HEIGHT: 62 IN | HEART RATE: 68 BPM | DIASTOLIC BLOOD PRESSURE: 80 MMHG

## 2021-12-20 DIAGNOSIS — I10 PRIMARY HYPERTENSION: ICD-10-CM

## 2021-12-20 DIAGNOSIS — I21.4 NSTEMI (NON-ST ELEVATED MYOCARDIAL INFARCTION) (HCC): ICD-10-CM

## 2021-12-20 DIAGNOSIS — R00.2 PALPITATIONS: ICD-10-CM

## 2021-12-20 DIAGNOSIS — I25.110 CORONARY ARTERY DISEASE INVOLVING NATIVE CORONARY ARTERY OF NATIVE HEART WITH UNSTABLE ANGINA PECTORIS (HCC): Primary | ICD-10-CM

## 2021-12-20 DIAGNOSIS — E78.5 DYSLIPIDEMIA: ICD-10-CM

## 2021-12-20 PROCEDURE — G8417 CALC BMI ABV UP PARAM F/U: HCPCS | Performed by: INTERNAL MEDICINE

## 2021-12-20 PROCEDURE — 1036F TOBACCO NON-USER: CPT | Performed by: INTERNAL MEDICINE

## 2021-12-20 PROCEDURE — 99213 OFFICE O/P EST LOW 20 MIN: CPT | Performed by: INTERNAL MEDICINE

## 2021-12-20 PROCEDURE — G8427 DOCREV CUR MEDS BY ELIG CLIN: HCPCS | Performed by: INTERNAL MEDICINE

## 2021-12-20 PROCEDURE — G8484 FLU IMMUNIZE NO ADMIN: HCPCS | Performed by: INTERNAL MEDICINE

## 2021-12-20 PROCEDURE — 3017F COLORECTAL CA SCREEN DOC REV: CPT | Performed by: INTERNAL MEDICINE

## 2021-12-20 RX ORDER — ATORVASTATIN CALCIUM 20 MG/1
20 TABLET, FILM COATED ORAL DAILY
Qty: 30 TABLET | Refills: 5 | Status: ON HOLD | OUTPATIENT
Start: 2021-12-20 | End: 2022-02-22 | Stop reason: ALTCHOICE

## 2021-12-20 NOTE — LETTER
Patient Name: Bebe Montalvo  : 1965  MRN# [id number]    REASON FOR VISIT:       Current Outpatient Medications   Medication Sig Dispense Refill    carvedilol (COREG) 6.25 MG tablet TAKE ONE TABLET BY MOUTH TWICE A DAY WITH A MEAL 60 tablet 3    atorvastatin (LIPITOR) 40 MG tablet TAKE ONE TABLET BY MOUTH ONCE NIGHTLY 30 tablet 5    Cyanocobalamin 2500 MCG TABS Take 2 tablets by mouth daily      tiZANidine (ZANAFLEX) 4 MG tablet Take 4 mg by mouth every 6 hours as needed      DULoxetine (CYMBALTA) 20 MG extended release capsule Take 3 mg by mouth daily       clindamycin (CLEOCIN) 300 MG capsule Take 300 mg by mouth 3 times daily TOOTH INFECTION (Patient not taking: Reported on 2021)      CHANTIX 1 MG tablet  (Patient not taking: Reported on 2021)      nitroGLYCERIN (NITROSTAT) 0.4 MG SL tablet up to max of 3 total doses. If no relief after 1 dose, call 911. 25 tablet 3    prasugrel (EFFIENT) 10 MG TABS Take 1 tablet by mouth daily 30 tablet 1    loperamide (IMODIUM) 2 MG capsule Take 2 mg by mouth as needed for Diarrhea      Multiple Vitamin (MULTIVITAMIN) capsule Take 1 capsule by mouth daily      magnesium oxide (MAG-OX) 400 MG tablet Take 400 mg by mouth daily (Patient not taking: Reported on 2021)      hydrOXYzine (ATARAX) 25 MG tablet daily as needed       albuterol (PROVENTIL) (2.5 MG/3ML) 0.083% nebulizer solution Inhale 2.5 mg into the lungs every 6 hours as needed      albuterol-ipratropium (COMBIVENT RESPIMAT)  MCG/ACT AERS inhaler Inhale into the lungs      aspirin 81 MG chewable tablet Take 1 tablet by mouth daily 30 tablet 6     No current facility-administered medications for this visit. Smoke: What:                           How much:    Alcohol:                                      How Much:     Caffeine: Pop:         Tea:            Coffee:                Chocolate:    Exercise:    Labs: Lipids:             CBC:       BMP/CMP:          TSH: A1C:    Last Visit:  Complaints:  Changes:    Last EK    VL DUP LOWER EXTREMITY ARTERIES BILATERAL:3/2021  1. Three-vessel runoff into the distal bilateral legs. 2. 50-74% stenosis between the mid and distal segments of the right peroneal   artery by peak systolic velocity criteria. 3. Monophasic waveforms in portions of the left lower extremity as above   potentially due to low-grade stenoses           STRESS TEST:  2021  Overall Impression:   Limited exercise capacity, nearly six METs work load. Physiological BP response to exercise. The exercise test was stopped due to Fatigue. did C/O chest pressure. ETT non diagnostic as THR is not achieved. Clinical co relation recommended    ECHO: 3/2021  Left ventricular systolic function is normal.   Ejection fraction is visually estimated at 55-60%. No significant valvular disease noted. No evidence of any pericardial effusion. CAROTID: NONE    MUGA: NONE    LAST PACER CHECK: NONE    CARDIAC CATH: 3/2021   Access Radial , Indication Abnormal stress test showing anterior   wall ischemia with angina class III   1. PCI to LAD for 99% stenosis ( in stent ) reduced to 0% using   3.0 X 23 Stent inflated to 14 andrew   2. Circ has mid 50-60 % stenosis , large OM which is patent   3. RCA stent are widely patent       [facility] Protocol:    CHADS: [id number] Score for Atrial Fibrillation Stroke Risk   Risk   Factors  Component Value   C CHF No 0   H HTN No 0   A2 Age >= 76 No,  (55 y.o.) 0   D DM No 0   S2 Prior Stroke/TIA No 0   V Vascular Disease Yes 1   A Age 74-69 No,  (55 y.o.) 0   Sc Sex female 1    [id number]  Score  2   Score last updated 21 9:32 AM EST    Click here for a link to the UpToDate guideline \"Atrial Fibrillation: Anticoagulation therapy to prevent embolization    Disclaimer: Risk Score calculation is dependent on accuracy of patient problem list and past encounter diagnosis.

## 2021-12-20 NOTE — LETTER
[facility] [address]  [address] [address]  Phone: [phone]  Fax: [phone]    Sanjiv Velasco MD    December 20, 2021     David España MD  [facility] [address]    Patient: Alice Rolon   MR Number: [id number]   YOB: 1965   Date of Visit: 12/20/2021       Dear David España: Thank you for referring Rosalinda Weir to me for evaluation/treatment. Below are the relevant portions of my assessment and plan of care. If you have questions, please do not hesitate to call me. I look forward to following Renata along with you.     Sincerely,      Sanjiv Velasco MD

## 2021-12-20 NOTE — PATIENT INSTRUCTIONS
CORONARY ARTERY DISEASE:Yes s/p stenting. clinically stable. Patient is on optimal medical regimen ( see medication list above )  Patient is currently  asymptomatic from CAD. -changes in  treatment: No, on ASA & Coreg.   -Testing ordered:  yes,   [facility] classification: 1  [place] RISK SCORE:  JONN RISK SCORE:  HTN:Yes  well controlled on current medical regimen, see list above. - changes in  treatment: No, on Carvidalol    CARDIOMYOPATHY:No  [place] FAILURE:No   VHD:no              No significant VHD noted  DYSLIPIDEMIA: yes,   Patient's profile is at / near [employer] current medical regimen wellyes, takes Lipitor  See most recent Lab values in Labs section above. PAD:yes,   claudication symptoms. .yes,   Up to date on non invasive testing .yes,     TESTS ORDERED: None this visit     PREVIOUSLY ORDERED TESTS REVIEWED & DISCUSSED WITH THE PATIENT:     I personally reviewed & interpreted, all previously ordered tests as copied above. Latest Labs are pulled in to the note with dates. Labs, specially in Reference to Lipid profile, Cardiac testing in the form of Echo, stress tests & other relevant cardiac testing reviewed with patient & recommendations made based on assessment of the results. MEDICATIONS: List of medications patient is currently taking is reviewed in detail with the patient . Discussed any side effects or problems taking the medication. Recommend Continue medications as listed except reduce Lipitor to 20 mg daily + START BACK EFIENT ASAP. AFFIRMATION: I spent at least 20 minutes of time reviewing patient's history, previous & current medical problems & all Labs + testing. This includes chart prep even prior to the vosit. Various goals are discussed and multiple questions answered. Relevant concelling performed. Office follow up in six months.

## 2021-12-20 NOTE — PROGRESS NOTES
OFFICE PROGRESS NOTES      Renata is a 55 y.o. female who has    CHIEF COMPLAINT AS FOLLOWS:  CHEST PAIN: Patient denies any C/O chest pains at this time.      SOB: No C/O SOB at this time.                LEG EDEMA: No leg edema   PALPITATIONS: Denies any C/O Palpitations   DIZZINESS:  C/O positional dizziness now & than.   SYNCOPE: None   OTHER: c/o muscle cramps. HPI: Patient is here for F/U on her CAD,  HTN & Dyslipidemia problems. CAD: Patient has known CAD. Had angioplasty in the past.  HTN: Patient has known essential HTN. Has been treated with guideline recommended medical / physical/ diet therapy as stated below. Dyslipidemia: Patient has known mixed dyslipidemia. Has been treated with guideline recommended medical / physical/ diet therapy as stated below. Current Outpatient Medications   Medication Sig Dispense Refill    carvedilol (COREG) 6.25 MG tablet TAKE ONE TABLET BY MOUTH TWICE A DAY WITH A MEAL 60 tablet 3    atorvastatin (LIPITOR) 40 MG tablet TAKE ONE TABLET BY MOUTH ONCE NIGHTLY 30 tablet 5    tiZANidine (ZANAFLEX) 4 MG tablet Take 4 mg by mouth every 6 hours as needed      DULoxetine (CYMBALTA) 20 MG extended release capsule Take 3 mg by mouth daily       nitroGLYCERIN (NITROSTAT) 0.4 MG SL tablet up to max of 3 total doses.  If no relief after 1 dose, call 911. 25 tablet 3    Multiple Vitamin (MULTIVITAMIN) capsule Take 1 capsule by mouth daily      hydrOXYzine (ATARAX) 25 MG tablet daily as needed       albuterol (PROVENTIL) (2.5 MG/3ML) 0.083% nebulizer solution Inhale 2.5 mg into the lungs every 6 hours as needed      albuterol-ipratropium (COMBIVENT RESPIMAT)  MCG/ACT AERS inhaler Inhale into the lungs      aspirin 81 MG chewable tablet Take 1 tablet by mouth daily 30 tablet 6    Cyanocobalamin 2500 MCG TABS Take 2 tablets by mouth daily (Patient not taking: Reported on 12/20/2021)      loperamide (IMODIUM) 2 MG capsule Take 2 mg by mouth as needed for Diarrhea (Patient not taking: Reported on 12/20/2021)       No current facility-administered medications for this visit. Allergies: Tylenol with codeine #3 [acetaminophen-codeine]  Review of Systems:    Constitutional: Negative for diaphoresis and fatigue  Respiratory: Negative for shortness of breath  Cardiovascular: Negative for chest pain, dyspnea on exertion, claudication, edema, irregular heartbeat, murmur, palpitations or shortness of breath  Musculoskeletal: Negative for muscle pain, muscular weakness, negative for pain in arm and leg or swelling in foot and leg    Objective:  /80   Pulse 68   Ht 5' 2\" (1.575 m)   Wt 183 lb 3.2 oz (83.1 kg)   BMI 33.51 kg/m²   Wt Readings from Last 3 Encounters:   12/20/21 183 lb 3.2 oz (83.1 kg)   11/16/21 172 lb (78 kg)   06/18/21 172 lb 3.2 oz (78.1 kg)     Body mass index is 33.51 kg/m². GENERAL - Alert, oriented, pleasant, in no apparent distress. EYES: No jaundice, no conjunctival pallor. Neck - Supple. No jugular venous distention noted. No carotid bruits. Cardiovascular  Normal S1 and S2 without obvious murmur or gallop. Extremities - No cyanosis, clubbing, or significant edema. Pulmonary  No respiratory distress. No wheezes or rales.       MEDICAL DECISION MAKING & DATA REVIEW:    Lab Review   Lab Results   Component Value Date    TROPONINT <0.010 03/09/2021    TROPONINT <0.010 03/09/2021     No results found for: BNP, PROBNP  Lab Results   Component Value Date    INR 0.92 04/23/2015     No results found for: LABA1C  Lab Results   Component Value Date    WBC 6.8 03/11/2021    WBC 6.8 03/09/2021    HCT 38.9 03/11/2021    HCT 34.7 (L) 03/09/2021    MCV 93.3 03/11/2021    MCV 92.0 03/09/2021     03/11/2021     03/09/2021     Lab Results   Component Value Date    CHOL 182 03/09/2021    TRIG 104 03/09/2021    HDL 68 03/09/2021    LDLDIRECT 105 (H) 03/09/2021     Lab Results   Component Value Date ALT 26 03/09/2021    ALT 30 03/08/2021    AST 21 03/09/2021    AST 24 03/08/2021     BMP:    Lab Results   Component Value Date     03/09/2021     03/08/2021    K 4.1 03/09/2021    K 4.3 03/08/2021     03/09/2021     03/08/2021    CO2 29 03/09/2021    CO2 30 03/08/2021    BUN 13 03/09/2021    BUN 10 03/08/2021    CREATININE 0.8 03/09/2021    CREATININE 0.7 03/08/2021     CMP:   Lab Results   Component Value Date     03/09/2021     03/08/2021    K 4.1 03/09/2021    K 4.3 03/08/2021     03/09/2021     03/08/2021    CO2 29 03/09/2021    CO2 30 03/08/2021    BUN 13 03/09/2021    BUN 10 03/08/2021    CREATININE 0.8 03/09/2021    CREATININE 0.7 03/08/2021    PROT 5.2 03/09/2021    PROT 6.4 03/08/2021    PROT 7.6 04/10/2012    PROT 6.6 12/08/2011     Lab Results   Component Value Date    TSHHS 2.718 02/14/2011     ECHO 3/2021   Left ventricular systolic function is normal.   Ejection fraction is visually estimated at 55-60%.   No significant valvular disease noted.   No evidence of any pericardial effusion.     ETT 4/2021   Limited exercise capacity, nearly six METs work load.   Physiological BP response to exercise.   The exercise test was stopped due to Fatigue. did C/O chest pressure.   ETT non diagnostic as THR is not achieved. CATH [facility] 3/2021   Access Radial , Indication Abnormal stress test showing anterior   wall ischemia with angina class III   1. PCI to LAD for 99% stenosis ( in stent ) reduced to 0% using   3.0 X 23 Stent inflated to 14 andrew   2. Circ has mid 50-60 % stenosis , large OM which is patent   3. RCA stent are widely patent    QUALITY MEASURES REVIEWED:  1.CAD:Patient is taking anti platelet agent:Yes  2. DYSLIPIDEMIA: Patient is on cholesterol lowering medication:Yes   3. Beta-Blocker therapy for CAD, if prior Myocardial Infarction:Yes   4. Counselled regarding smoking cessation. No   Patient does not Smoke.   5.Anticoagulation therapy (for A.Fib) No   Does Not have A.Fib.  6.Discussed weight management strategies. Assessment & Plan:  Primary / Secondary prevention is the goal by aggressive risk modification, healthy and therapeutic life style changes for cardiovascular risk reduction. CORONARY ARTERY DISEASE:Yes s/p stenting. clinically stable. Patient is on optimal medical regimen ( see medication list above )  Patient is currently  asymptomatic from CAD. -changes in  treatment: No, on ASA & Coreg.   -Testing ordered:  yes,   [facility] classification: 1  [place] RISK SCORE:  [place] RISK SCORE:  HTN:Yes  well controlled on current medical regimen, see list above. - changes in  treatment: No, on Carvidalol    CARDIOMYOPATHY:No  [facility] FAILURE:No   VHD:no              No significant VHD noted  DYSLIPIDEMIA: yes,   Patient's profile is at / near [employer] current medical regimen wellyes, takes Lipitor  See most recent Lab values in Labs section above. PAD:yes,   claudication symptoms. .yes,   Up to date on non invasive testing .yes,     TESTS ORDERED: None this visit     PREVIOUSLY ORDERED TESTS REVIEWED & DISCUSSED WITH THE PATIENT:     I personally reviewed & interpreted, all previously ordered tests as copied above. Latest Labs are pulled in to the note with dates. Labs, specially in Reference to Lipid profile, Cardiac testing in the form of Echo, stress tests & other relevant cardiac testing reviewed with patient & recommendations made based on assessment of the results. MEDICATIONS: List of medications patient is currently taking is reviewed in detail with the patient . Discussed any side effects or problems taking the medication. Recommend Continue medications as listed except reduce Lipitor to 20 mg daily + START BACK EFIENT ASAP. AFFIRMATION: I spent at least 20 minutes of time reviewing patient's history, previous & current medical problems & all Labs + testing.  This includes chart prep even prior to the vosit. Various goals are discussed and multiple questions answered. Relevant concelling performed. Office follow up in six months.

## 2022-02-22 ENCOUNTER — HOSPITAL ENCOUNTER (INPATIENT)
Age: 57
LOS: 4 days | Discharge: HOME | DRG: 247 | End: 2022-02-26
Attending: INTERNAL MEDICINE | Admitting: HOSPITALIST
Payer: COMMERCIAL

## 2022-02-22 DIAGNOSIS — Z79.01 ANTICOAGULATED: Primary | ICD-10-CM

## 2022-02-22 PROBLEM — I21.3 STEMI (ST ELEVATION MYOCARDIAL INFARCTION) (HCC): Status: ACTIVE | Noted: 2022-02-22

## 2022-02-22 LAB
ABO/RH: NORMAL
ACTIVATED CLOTTING TIME, LOW RANGE: 248 SEC
ACTIVATED CLOTTING TIME, LOW RANGE: 278 SEC
ANION GAP SERPL CALCULATED.3IONS-SCNC: 16 MMOL/L (ref 4–16)
ANTIBODY SCREEN: NEGATIVE
BASOPHILS ABSOLUTE: 0 K/CU MM
BASOPHILS RELATIVE PERCENT: 0.2 % (ref 0–1)
BUN BLDV-MCNC: 17 MG/DL (ref 6–23)
CALCIUM SERPL-MCNC: 10 MG/DL (ref 8.3–10.6)
CHLORIDE BLD-SCNC: 97 MMOL/L (ref 99–110)
CO2: 22 MMOL/L (ref 21–32)
CREAT SERPL-MCNC: 0.9 MG/DL (ref 0.6–1.1)
DIFFERENTIAL TYPE: ABNORMAL
EOSINOPHILS ABSOLUTE: 0.1 K/CU MM
EOSINOPHILS RELATIVE PERCENT: 0.4 % (ref 0–3)
GFR AFRICAN AMERICAN: >60 ML/MIN/1.73M2
GFR NON-AFRICAN AMERICAN: >60 ML/MIN/1.73M2
GLUCOSE BLD-MCNC: 167 MG/DL (ref 70–99)
HCT VFR BLD CALC: 29.7 % (ref 37–47)
HCT VFR BLD CALC: 33 % (ref 37–47)
HCT VFR BLD CALC: 34.7 % (ref 37–47)
HEMOGLOBIN: 10.2 GM/DL (ref 12.5–16)
HEMOGLOBIN: 10.8 GM/DL (ref 12.5–16)
HEMOGLOBIN: 9 GM/DL (ref 12.5–16)
IMMATURE NEUTROPHIL %: 0.5 % (ref 0–0.43)
LYMPHOCYTES ABSOLUTE: 1.8 K/CU MM
LYMPHOCYTES RELATIVE PERCENT: 14.1 % (ref 24–44)
MCH RBC QN AUTO: 27.5 PG (ref 27–31)
MCHC RBC AUTO-ENTMCNC: 31.1 % (ref 32–36)
MCV RBC AUTO: 88.3 FL (ref 78–100)
MONOCYTES ABSOLUTE: 0.8 K/CU MM
MONOCYTES RELATIVE PERCENT: 6 % (ref 0–4)
NUCLEATED RBC %: 0 %
PDW BLD-RTO: 14.2 % (ref 11.7–14.9)
PLATELET # BLD: 398 K/CU MM (ref 140–440)
PMV BLD AUTO: 10.4 FL (ref 7.5–11.1)
POTASSIUM SERPL-SCNC: 4.5 MMOL/L (ref 3.5–5.1)
RBC # BLD: 3.93 M/CU MM (ref 4.2–5.4)
SARS-COV-2, NAAT: NOT DETECTED
SEGMENTED NEUTROPHILS ABSOLUTE COUNT: 9.9 K/CU MM
SEGMENTED NEUTROPHILS RELATIVE PERCENT: 78.8 % (ref 36–66)
SODIUM BLD-SCNC: 135 MMOL/L (ref 135–145)
SOURCE: NORMAL
TOTAL IMMATURE NEUTOROPHIL: 0.06 K/CU MM
TOTAL NUCLEATED RBC: 0 K/CU MM
WBC # BLD: 12.6 K/CU MM (ref 4–10.5)

## 2022-02-22 PROCEDURE — 6360000002 HC RX W HCPCS

## 2022-02-22 PROCEDURE — 93458 L HRT ARTERY/VENTRICLE ANGIO: CPT

## 2022-02-22 PROCEDURE — 2000000000 HC ICU R&B

## 2022-02-22 PROCEDURE — 6360000002 HC RX W HCPCS: Performed by: INTERNAL MEDICINE

## 2022-02-22 PROCEDURE — 87635 SARS-COV-2 COVID-19 AMP PRB: CPT

## 2022-02-22 PROCEDURE — 6360000004 HC RX CONTRAST MEDICATION

## 2022-02-22 PROCEDURE — C9113 INJ PANTOPRAZOLE SODIUM, VIA: HCPCS | Performed by: INTERNAL MEDICINE

## 2022-02-22 PROCEDURE — 2580000003 HC RX 258: Performed by: INTERNAL MEDICINE

## 2022-02-22 PROCEDURE — 6370000000 HC RX 637 (ALT 250 FOR IP)

## 2022-02-22 PROCEDURE — C9606 PERC D-E COR REVASC W AMI S: HCPCS

## 2022-02-22 PROCEDURE — 86850 RBC ANTIBODY SCREEN: CPT

## 2022-02-22 PROCEDURE — C9113 INJ PANTOPRAZOLE SODIUM, VIA: HCPCS | Performed by: SPECIALIST

## 2022-02-22 PROCEDURE — C1725 CATH, TRANSLUMIN NON-LASER: HCPCS

## 2022-02-22 PROCEDURE — 4500000002 HC ER NO CHARGE

## 2022-02-22 PROCEDURE — B2151ZZ FLUOROSCOPY OF LEFT HEART USING LOW OSMOLAR CONTRAST: ICD-10-PCS | Performed by: INTERNAL MEDICINE

## 2022-02-22 PROCEDURE — C1874 STENT, COATED/COV W/DEL SYS: HCPCS

## 2022-02-22 PROCEDURE — 85347 COAGULATION TIME ACTIVATED: CPT

## 2022-02-22 PROCEDURE — 6370000000 HC RX 637 (ALT 250 FOR IP): Performed by: INTERNAL MEDICINE

## 2022-02-22 PROCEDURE — C1887 CATHETER, GUIDING: HCPCS

## 2022-02-22 PROCEDURE — 6360000002 HC RX W HCPCS: Performed by: HOSPITALIST

## 2022-02-22 PROCEDURE — 85014 HEMATOCRIT: CPT

## 2022-02-22 PROCEDURE — 2709999900 HC NON-CHARGEABLE SUPPLY

## 2022-02-22 PROCEDURE — 99291 CRITICAL CARE FIRST HOUR: CPT | Performed by: INTERNAL MEDICINE

## 2022-02-22 PROCEDURE — 4A023N7 MEASUREMENT OF CARDIAC SAMPLING AND PRESSURE, LEFT HEART, PERCUTANEOUS APPROACH: ICD-10-PCS | Performed by: INTERNAL MEDICINE

## 2022-02-22 PROCEDURE — 86901 BLOOD TYPING SEROLOGIC RH(D): CPT

## 2022-02-22 PROCEDURE — 2580000003 HC RX 258: Performed by: SPECIALIST

## 2022-02-22 PROCEDURE — C1894 INTRO/SHEATH, NON-LASER: HCPCS

## 2022-02-22 PROCEDURE — 027034Z DILATION OF CORONARY ARTERY, ONE ARTERY WITH DRUG-ELUTING INTRALUMINAL DEVICE, PERCUTANEOUS APPROACH: ICD-10-PCS | Performed by: INTERNAL MEDICINE

## 2022-02-22 PROCEDURE — 85018 HEMOGLOBIN: CPT

## 2022-02-22 PROCEDURE — 76937 US GUIDE VASCULAR ACCESS: CPT

## 2022-02-22 PROCEDURE — 6370000000 HC RX 637 (ALT 250 FOR IP): Performed by: HOSPITALIST

## 2022-02-22 PROCEDURE — 6360000002 HC RX W HCPCS: Performed by: SPECIALIST

## 2022-02-22 PROCEDURE — 86900 BLOOD TYPING SEROLOGIC ABO: CPT

## 2022-02-22 PROCEDURE — B2111ZZ FLUOROSCOPY OF MULTIPLE CORONARY ARTERIES USING LOW OSMOLAR CONTRAST: ICD-10-PCS | Performed by: INTERNAL MEDICINE

## 2022-02-22 PROCEDURE — 85025 COMPLETE CBC W/AUTO DIFF WBC: CPT

## 2022-02-22 PROCEDURE — 36410 VNPNXR 3YR/> PHY/QHP DX/THER: CPT

## 2022-02-22 PROCEDURE — 93308 TTE F-UP OR LMTD: CPT

## 2022-02-22 PROCEDURE — C1769 GUIDE WIRE: HCPCS

## 2022-02-22 PROCEDURE — 93454 CORONARY ARTERY ANGIO S&I: CPT | Performed by: INTERNAL MEDICINE

## 2022-02-22 PROCEDURE — 93005 ELECTROCARDIOGRAM TRACING: CPT | Performed by: INTERNAL MEDICINE

## 2022-02-22 PROCEDURE — 80048 BASIC METABOLIC PNL TOTAL CA: CPT

## 2022-02-22 PROCEDURE — 92941 PRQ TRLML REVSC TOT OCCL AMI: CPT | Performed by: INTERNAL MEDICINE

## 2022-02-22 RX ORDER — SODIUM CHLORIDE 0.9 % (FLUSH) 0.9 %
5-40 SYRINGE (ML) INJECTION PRN
Status: DISCONTINUED | OUTPATIENT
Start: 2022-02-22 | End: 2022-02-26 | Stop reason: HOSPADM

## 2022-02-22 RX ORDER — PANTOPRAZOLE SODIUM 40 MG/1
40 TABLET, DELAYED RELEASE ORAL DAILY
Status: ON HOLD | COMMUNITY
End: 2022-02-26 | Stop reason: SDUPTHER

## 2022-02-22 RX ORDER — KETOTIFEN FUMARATE 0.35 MG/ML
1 SOLUTION/ DROPS OPHTHALMIC 2 TIMES DAILY
Status: DISCONTINUED | OUTPATIENT
Start: 2022-02-22 | End: 2022-02-26 | Stop reason: HOSPADM

## 2022-02-22 RX ORDER — ONDANSETRON 2 MG/ML
4 INJECTION INTRAMUSCULAR; INTRAVENOUS ONCE
Status: COMPLETED | OUTPATIENT
Start: 2022-02-22 | End: 2022-02-22

## 2022-02-22 RX ORDER — CARVEDILOL 3.12 MG/1
3.12 TABLET ORAL 2 TIMES DAILY WITH MEALS
Status: DISCONTINUED | OUTPATIENT
Start: 2022-02-22 | End: 2022-02-26 | Stop reason: HOSPADM

## 2022-02-22 RX ORDER — OLOPATADINE HYDROCHLORIDE 1 MG/ML
1 SOLUTION/ DROPS OPHTHALMIC 2 TIMES DAILY
Status: DISCONTINUED | OUTPATIENT
Start: 2022-02-22 | End: 2022-02-22 | Stop reason: CLARIF

## 2022-02-22 RX ORDER — FUROSEMIDE 40 MG/1
40 TABLET ORAL DAILY
Status: DISCONTINUED | OUTPATIENT
Start: 2022-02-23 | End: 2022-02-23

## 2022-02-22 RX ORDER — ACETAMINOPHEN 325 MG/1
650 TABLET ORAL EVERY 4 HOURS PRN
Status: DISCONTINUED | OUTPATIENT
Start: 2022-02-22 | End: 2022-02-26 | Stop reason: HOSPADM

## 2022-02-22 RX ORDER — EPTIFIBATIDE 2 MG/ML
180 INJECTION, SOLUTION INTRAVENOUS ONCE
Status: DISCONTINUED | OUTPATIENT
Start: 2022-02-22 | End: 2022-02-22 | Stop reason: ALTCHOICE

## 2022-02-22 RX ORDER — SODIUM CHLORIDE 0.9 % (FLUSH) 0.9 %
5-40 SYRINGE (ML) INJECTION EVERY 12 HOURS SCHEDULED
Status: DISCONTINUED | OUTPATIENT
Start: 2022-02-22 | End: 2022-02-26 | Stop reason: HOSPADM

## 2022-02-22 RX ORDER — POTASSIUM CHLORIDE 1.5 G/1.77G
20 POWDER, FOR SOLUTION ORAL 2 TIMES DAILY
COMMUNITY

## 2022-02-22 RX ORDER — PANTOPRAZOLE SODIUM 40 MG/10ML
80 INJECTION, POWDER, LYOPHILIZED, FOR SOLUTION INTRAVENOUS ONCE
Status: DISCONTINUED | OUTPATIENT
Start: 2022-02-22 | End: 2022-02-22 | Stop reason: CLARIF

## 2022-02-22 RX ORDER — FUROSEMIDE 40 MG/1
40 TABLET ORAL 2 TIMES DAILY
Status: ON HOLD | COMMUNITY
End: 2022-02-26 | Stop reason: HOSPADM

## 2022-02-22 RX ORDER — EPTIFIBATIDE 0.75 MG/ML
2 INJECTION, SOLUTION INTRAVENOUS CONTINUOUS
Status: ACTIVE | OUTPATIENT
Start: 2022-02-22 | End: 2022-02-22

## 2022-02-22 RX ORDER — POTASSIUM CHLORIDE 20 MEQ/1
20 TABLET, EXTENDED RELEASE ORAL DAILY
Status: DISCONTINUED | OUTPATIENT
Start: 2022-02-22 | End: 2022-02-26 | Stop reason: HOSPADM

## 2022-02-22 RX ORDER — ROSUVASTATIN CALCIUM 40 MG/1
40 TABLET, COATED ORAL NIGHTLY
Status: DISCONTINUED | OUTPATIENT
Start: 2022-02-22 | End: 2022-02-26 | Stop reason: HOSPADM

## 2022-02-22 RX ORDER — M-VIT,TX,IRON,MINS/CALC/FOLIC 27MG-0.4MG
1 TABLET ORAL DAILY
Status: DISCONTINUED | OUTPATIENT
Start: 2022-02-22 | End: 2022-02-26 | Stop reason: HOSPADM

## 2022-02-22 RX ORDER — NITROGLYCERIN 0.4 MG/1
TABLET SUBLINGUAL
Status: COMPLETED
Start: 2022-02-22 | End: 2022-02-22

## 2022-02-22 RX ORDER — OLOPATADINE HYDROCHLORIDE 1 MG/ML
1 SOLUTION/ DROPS OPHTHALMIC 2 TIMES DAILY
COMMUNITY

## 2022-02-22 RX ORDER — SODIUM CHLORIDE 9 MG/ML
25 INJECTION, SOLUTION INTRAVENOUS PRN
Status: DISCONTINUED | OUTPATIENT
Start: 2022-02-22 | End: 2022-02-26 | Stop reason: HOSPADM

## 2022-02-22 RX ORDER — ONDANSETRON 2 MG/ML
4 INJECTION INTRAMUSCULAR; INTRAVENOUS EVERY 6 HOURS PRN
Status: DISCONTINUED | OUTPATIENT
Start: 2022-02-22 | End: 2022-02-26 | Stop reason: HOSPADM

## 2022-02-22 RX ORDER — ASPIRIN 81 MG/1
81 TABLET ORAL DAILY
Status: DISCONTINUED | OUTPATIENT
Start: 2022-02-23 | End: 2022-02-26 | Stop reason: HOSPADM

## 2022-02-22 RX ORDER — EPTIFIBATIDE 2 MG/ML
13.3 INJECTION, SOLUTION INTRAVENOUS CONTINUOUS
Status: DISCONTINUED | OUTPATIENT
Start: 2022-02-22 | End: 2022-02-22

## 2022-02-22 RX ORDER — MORPHINE SULFATE 2 MG/ML
2 INJECTION, SOLUTION INTRAMUSCULAR; INTRAVENOUS EVERY 4 HOURS PRN
Status: DISCONTINUED | OUTPATIENT
Start: 2022-02-22 | End: 2022-02-23

## 2022-02-22 RX ORDER — NITROGLYCERIN 0.4 MG/1
0.4 TABLET SUBLINGUAL EVERY 5 MIN PRN
Status: DISCONTINUED | OUTPATIENT
Start: 2022-02-22 | End: 2022-02-26 | Stop reason: HOSPADM

## 2022-02-22 RX ORDER — ALBUTEROL SULFATE 90 UG/1
1 AEROSOL, METERED RESPIRATORY (INHALATION) EVERY 4 HOURS PRN
Status: DISCONTINUED | OUTPATIENT
Start: 2022-02-22 | End: 2022-02-26 | Stop reason: HOSPADM

## 2022-02-22 RX ORDER — FUROSEMIDE 40 MG/1
40 TABLET ORAL 2 TIMES DAILY
Status: DISCONTINUED | OUTPATIENT
Start: 2022-02-22 | End: 2022-02-22

## 2022-02-22 RX ADMIN — MORPHINE SULFATE 2 MG: 2 INJECTION, SOLUTION INTRAMUSCULAR; INTRAVENOUS at 16:20

## 2022-02-22 RX ADMIN — SODIUM CHLORIDE 8 MG/HR: 9 INJECTION, SOLUTION INTRAVENOUS at 12:25

## 2022-02-22 RX ADMIN — CARVEDILOL 3.12 MG: 3.12 TABLET, FILM COATED ORAL at 16:17

## 2022-02-22 RX ADMIN — EPTIFIBATIDE 2 MCG/KG/MIN: 0.75 INJECTION INTRAVENOUS at 13:48

## 2022-02-22 RX ADMIN — SODIUM CHLORIDE 80 MG: 9 INJECTION, SOLUTION INTRAVENOUS at 16:19

## 2022-02-22 RX ADMIN — TICAGRELOR 90 MG: 90 TABLET ORAL at 20:04

## 2022-02-22 RX ADMIN — ONDANSETRON 4 MG: 2 INJECTION INTRAMUSCULAR; INTRAVENOUS at 21:51

## 2022-02-22 RX ADMIN — NITROGLYCERIN 0.4 MG: 0.4 TABLET SUBLINGUAL at 10:55

## 2022-02-22 RX ADMIN — SODIUM CHLORIDE, PRESERVATIVE FREE 10 ML: 5 INJECTION INTRAVENOUS at 20:04

## 2022-02-22 RX ADMIN — ONDANSETRON 4 MG: 2 INJECTION INTRAMUSCULAR; INTRAVENOUS at 11:04

## 2022-02-22 RX ADMIN — SODIUM CHLORIDE 8 MG/HR: 9 INJECTION, SOLUTION INTRAVENOUS at 21:12

## 2022-02-22 RX ADMIN — ONDANSETRON 4 MG: 2 INJECTION INTRAMUSCULAR; INTRAVENOUS at 15:20

## 2022-02-22 ASSESSMENT — PAIN SCALES - GENERAL
PAINLEVEL_OUTOF10: 7
PAINLEVEL_OUTOF10: 6
PAINLEVEL_OUTOF10: 6
PAINLEVEL_OUTOF10: 0
PAINLEVEL_OUTOF10: 7

## 2022-02-22 ASSESSMENT — PAIN DESCRIPTION - ONSET
ONSET: ON-GOING
ONSET: ON-GOING

## 2022-02-22 ASSESSMENT — PAIN DESCRIPTION - PROGRESSION
CLINICAL_PROGRESSION: NOT CHANGED
CLINICAL_PROGRESSION: NOT CHANGED

## 2022-02-22 ASSESSMENT — PAIN DESCRIPTION - FREQUENCY
FREQUENCY: INTERMITTENT
FREQUENCY: INTERMITTENT

## 2022-02-22 ASSESSMENT — PAIN DESCRIPTION - DESCRIPTORS
DESCRIPTORS: ACHING;BURNING
DESCRIPTORS: ACHING

## 2022-02-22 ASSESSMENT — PAIN DESCRIPTION - LOCATION
LOCATION: ABDOMEN
LOCATION: NECK;ABDOMEN

## 2022-02-22 ASSESSMENT — PAIN DESCRIPTION - PAIN TYPE
TYPE: ACUTE PAIN
TYPE: ACUTE PAIN

## 2022-02-22 NOTE — ED NOTES
Bed: 02TR-02  Expected date:   Expected time:   Means of arrival:   Comments:  STEMI     Michelle Phillips RN  02/22/22 [id number]

## 2022-02-22 NOTE — CONSULTS
CARDIOLOGY CONSULT NOTE    Reason for consultation: Chest pain     Referring physician: Ajith Hopper MD     Primary care physician: Ajith Hopper MD        Dear Ajith Hopper MD   Thanks for the consult.     History of present illness:Renata is a 55 y. o.year old who  presents with  chest pain that started today at 4 am, she came 5 hrs after chest pain started, intermittent for 15 to 20 mins and aggravated with activity substernal also nonreproducible with palpation, radiated to shoulder, 6/10, non tender to touch,associated with shortness of breath, + sweating, nausea, did not get NTG in ED. No fever, no chills, no nausea no vomiting. Blood pressure, cholesterol, blood glucose and weight are well controlled. Patient had blood transfusion last week and does not know the source of bleeding, she had rectal bleeding 2 weeks ago, her aspirin and effient were stopped,    Past medical history:    has a past medical history of Anal cancer ([facility] [address] Asthma, Broken ankle, CAD (coronary artery disease), H/O cardiovascular stress test, NSTEMI (non-ST elevated myocardial infarction) ([facility] [address] PONV (postoperative nausea and vomiting), and Skin cancer. Past surgical history:   has a past surgical history that includes Percutaneous Transluminal Coronary Angio (2009); Colon surgery (2009); Ankle surgery (Left, 10+ yrs ago); Tubal ligation (25 yrs ago); Tunneled venous port placement (2009); Tonsillectomy; other surgical history (04-); fracture surgery; Colonoscopy (3/4/15); and Colonoscopy (03/15/2017). Social History:   reports that she has quit smoking. Her smoking use included cigarettes. She has a 8.75 pack-year smoking history. She has never used smokeless tobacco. She reports that she does not drink alcohol and does not use drugs. Family history:   no family history of CAD, STROKE of DM    No current facility-administered medications for this encounter.     No current facility-administered medications for this encounter. Current Outpatient Medications   Medication Sig Dispense Refill    atorvastatin (LIPITOR) 20 MG tablet Take 1 tablet by mouth daily 30 tablet 5    carvedilol (COREG) 6.25 MG tablet TAKE ONE TABLET BY MOUTH TWICE A DAY WITH A MEAL 60 tablet 3    Cyanocobalamin 2500 MCG TABS Take 2 tablets by mouth daily (Patient not taking: Reported on 12/20/2021)      tiZANidine (ZANAFLEX) 4 MG tablet Take 4 mg by mouth every 6 hours as needed      DULoxetine (CYMBALTA) 20 MG extended release capsule Take 3 mg by mouth daily       nitroGLYCERIN (NITROSTAT) 0.4 MG SL tablet up to max of 3 total doses.  If no relief after 1 dose, call 911. 25 tablet 3    loperamide (IMODIUM) 2 MG capsule Take 2 mg by mouth as needed for Diarrhea (Patient not taking: Reported on 12/20/2021)      Multiple Vitamin (MULTIVITAMIN) capsule Take 1 capsule by mouth daily      hydrOXYzine (ATARAX) 25 MG tablet daily as needed       albuterol-ipratropium (COMBIVENT RESPIMAT)  MCG/ACT AERS inhaler Inhale into the lungs      aspirin 81 MG chewable tablet Take 1 tablet by mouth daily 30 tablet 6          Review of Systems:    · Constitutional: No Fever or Weight Loss    · Eyes: No Decreased Vision  · ENT: No Headaches, Hearing Loss or Vertigo  · Cardiovascular: + chest pain, dyspnea on exertion, palpitations or loss of consciousness  · Respiratory: No cough or wheezing    · Gastrointestinal: + gi bleeding, No abdominal pain, appetite loss, blood in stools, constipation, diarrhea or heartburn  · Genitourinary: No dysuria, trouble voiding, or hematuria  · Musculoskeletal: No gait disturbance, weakness or joint complaints  · Integumentary: No rash or pruritis  · Neurological: No TIA or stroke symptoms  · Psychiatric: No anxiety or depression  · Endocrine: No malaise, fatigue or temperature intolerance  · Hematologic/Lymphatic: No bleeding problems, blood clots or swollen lymph nodes  · Allergic/Immunologic: No nasal congestion or hives  All systems negative except as marked.      ·    ·    ·      Physical Examination:    /92, PULSE 97, RR 15 AFEBRILE  Wt Readings from Last 3 Encounters:   12/20/21 183 lb 3.2 oz (83.1 kg)   11/16/21 172 lb (78 kg)   06/18/21 172 lb 3.2 oz (78.1 kg)     There is no height or weight on file to calculate BMI.        General Appearance: No distress, conversant     Constitutional: Well developed, Well nourished, No acute distress, Non-toxic appearance.    HENT:  Normocephalic, Atraumatic, Bilateral external ears normal, Oropharynx moist, No oral exudates, Nose normal. Neck- Normal range of motion, No tenderness, Supple, No stridor,no apical-carotid delay, no carotid bruit  Eyes: PERRL, EOMI, Conjunctiva normal, No discharge.    Respiratory:  Normal breath sounds, No respiratory distress, No wheezing, No chest tenderness. ,no use of accessory muscles, diaphragm movement is normal  Cardiovascular: (PMI) apex non displaced,no lifts no thrills, no s3,no s4, Normal heart rate, Normal rhythm, No murmurs, No rubs, No gallops. Carotid arteries pulse and amplitude are normal no bruit, no abdominal bruit noted ( normal abdominal aorta ausculation), femoral arteries pulse and amplitude are normal no bruit, pedal pulses are normal.  GI: Bowel sounds normal, Soft, No tenderness, No masses, No pulsatile masses, no hepatosplenomegally, no bruits  : External genitalia appear normal, No masses or lesions. No discharge. No CVA tenderness.    Musculoskeletal: Intact distal pulses, No edema, No tenderness, No cyanosis, No clubbing. Good range of motion in all major joints. No tenderness to palpation or major deformities noted. Back- No tenderness. Integument: Warm, Dry, No erythema, No rash.    Skin: no rash, no ulcers  Lymphatic: No lymphadenopathy noted.    Neurologic: Alert & oriented x 3, Normal motor function, Normal sensory function, No focal deficits noted.    Psychiatric: Affect normal, Judgment normal, Mood normal. Lab Review        Recent Labs       09/28/16  0010    WBC  12.3*    HGB  14.4    HCT  43.8    PLT  316            Recent Labs       09/28/16  0010    NA  136    K  3.9    CL  95*    CO2  23    BUN  10    CREATININE  0.8           Recent Labs       09/28/16  0010    AST  12*    ALT  10    BILITOT  0.4    ALKPHOS  124       No results for input(s): TROPONINI in the last 72 hours. No results found for: BNP  No results found for: INR, PROTIME        EKG:nsrm, st elevation inferior and lateral wall     Chest Xray:nad     ECHO:pending  Labs, echo, meds reviewed  Assessment:      Recommendations:     1. Chest pain, STEMI, CATH LAB ACTIVATED, patient is taken to cath lab emergently and found 100% OCCLUDED RCA with thrombus, she came 5 -6 hrs after her chest pain started, her RCA has long segment of stent in mid segment which has thombosed segment which required a lot of predilatation with baloon 2.5x 10 and there was urgent discussion with Dr. Arvil Simmonds who called GI Dr. Derick Manzanares, patient appears to be clotting the stent again within few mins of baloon angioplasty despite 7000 IA heparin ( ), therefore addition 3000 IA heparin was injected and decided to intervene with SOUMYA, loaded with aspirin, brilinta and integrelin drip due to avoid acute stent thormbosis, her LCX has moderate stenosis and LAD stent is patent, stat echo ordered, stat labs and type and screen and cross match ordered,she may need blood transfusion if she bleeds again  2. Admit to ICU, protonix drip started, she had anal cancer sx in past and had radiation therapy also, last time in [address] [place] [address] had colonoscopy and had diverticulosis which has stopped bleeding and her hemoglobin was 10.8/34.7 today, will check CBC q6 hrs and transfuse again in H/H drops below 8/24  3.  Critical care 45 mins  All labs, medications and tests reviewed, continue all other medications of all above medical condition listed as is.          Rebecca Cárdenas MD,   Scottie Salinas,

## 2022-02-22 NOTE — PROGRESS NOTES
Pt arrived from cath lab. R radial site checked with [id number] DEAN Llanos RN. Site is bruised but soft. Pt with arm board in place. Pt A&Ox4. All drips handoff completed with [id number] DEAN Llanos RN. See MAR. Pt  With no skin issues at this time. Skin assessment completed with CHANELLE Sabillon.

## 2022-02-22 NOTE — PROGRESS NOTES
Post STEMI education with this patient discussing the anatomy of the coronaries and procedure. Educated on Heart Failure Zones for home. Discussed new medications and the importance of taking them, especially the P2Y12 Inhibitors and not to stop taking them unless the cardiologist tells them to stop taking them. Discussed signs of a future heart attack and the importance of calling 911.  Patient verbalized understanding of all and had no further questions that were not answered   Put S/P EKG order in and made LILIANA Sinha aware of need

## 2022-02-22 NOTE — PROGRESS NOTES
Received from cath lab. Monitor and alarms on. Call Light in reach. Procedure site assessment completed per   EDER Lopez RN and  [facility] RN      No hematoma or bleeding noted.

## 2022-02-22 NOTE — PROGRESS NOTES
Attempted to void via bedpan; unable at this time.  Denies distress at this time; no distention noted

## 2022-02-22 NOTE — CONSULTS
Consult completed. Procedure/rationale explained to pt & consent obtained. #20ga Arrow Endurance Extended Dwell MidLine Catheter initiated to LUE Cephalic Vein using sterile, UltraSound-guided technique without difficulty/complications. Positioning verified via UltraSound visualization of catheter within vessel lumen; site returns blood briskly and flushes without resistance/abnormalities. Sterile dressing with SkinPrep, StatLock Securing Device, BioPatch, SwabCap, and Limb Precautions band applied. Pt tolerated well and RN notified.

## 2022-02-22 NOTE — ED NOTES
STEMI alert activated per EMS. Pt had taken 2 nitro prior to EMS arrival and 1 per EMS. EMS instructed to give 324mg asa per Dr. Nasir Yu. Pt was diaphoretic, hypertensive. Is scheduled for a stress test at [facility] on Monday. Pt does not have an IV established  Pt sees Dr. Brown Bosch.       Kamla Dominguez, RN  02/22/22 [id number]

## 2022-02-22 NOTE — H&P
Department of Internal Medicine  Hospitalist  Attending History and Physical      CHIEF COMPLAINT:  Chest pain    Reason for Admission:  STEMI    History Obtained From:  patient, electronic medical record    HISTORY OF PRESENT ILLNESS:      The patient is a 55 y.o. female with significant past medical history of anal cancer s/p radiation, CAD recently discharged from [facility] beds at the [facility] who presents with chest pain and underwent urgent cath for STEMI. She has hx of CAD s/p PCI and had cscope on 2/14 for anemia 6.9 and required PRBC and cscope showed no active bleed and only diverticulosis. Her effient was stopped. Today her LHC showed occluded RCA stent and uable to open with ballloon another PCI was placed and started on DAPT and consulted GI with her recent GI bleed.      Past Medical History:        Diagnosis Date    Anal cancer 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 and tx with chemo and radiation- followed with Dr Ryann Chaney ankle     CAD (coronary artery disease)     \"have 3 heart stents\" use to see Dr Jojo Awad H/O cardiovascular stress test 1/6/2016    treadmill    NSTEMI (non-ST elevated myocardial infarction) ([address]     PONV (postoperative nausea and vomiting)     hx motion sickness    Skin cancer      Past Surgical History:        Procedure Laterality Date    ANKLE SURGERY Left 10+ yrs ago    with hardware    COLON SURGERY  2009    \"after had chemo removed some part of the cancer from the rectal area, then 7 months later had bowel blockage had to do surgery  to remove part of the bowel\"    COLONOSCOPY  3/4/15    mild proctitis    COLONOSCOPY  03/15/2017    mild radia proc, hypertroph pailla    FRACTURE SURGERY      left ankle    OTHER SURGICAL HISTORY  04-    mediport removal    PTCA  2009 4/22/2009 had angioplasty with stent to LAD & another day in 2009 stent x 2 to RCA done    TONSILLECTOMY      as a kid age 7    TUBAL LIGATION  25 yrs ago    TUNNELED VENOUS PORT PLACEMENT  2009    port inserted      I  Medications Prior to Admission:    No current facility-administered medications on file prior to encounter. Current Outpatient Medications on File Prior to Encounter   Medication Sig Dispense Refill    pantoprazole (PROTONIX) 40 MG tablet Take 40 mg by mouth daily      olopatadine (PATANOL) 0.1 % ophthalmic solution Place 1 drop into both eyes 2 times daily      potassium chloride (KLOR-CON) 20 MEQ packet Take 20 mEq by mouth 2 times daily      furosemide (LASIX) 40 MG tablet Take 40 mg by mouth 2 times daily      carvedilol (COREG) 6.25 MG tablet TAKE ONE TABLET BY MOUTH TWICE A DAY WITH A MEAL (Patient taking differently: 3.125 mg 2 times daily ) 60 tablet 3    DULoxetine (CYMBALTA) 20 MG extended release capsule Take 3 mg by mouth daily       nitroGLYCERIN (NITROSTAT) 0.4 MG SL tablet up to max of 3 total doses.  If no relief after 1 dose, call 911. 25 tablet 3    Multiple Vitamin (MULTIVITAMIN) capsule Take 1 capsule by mouth daily      aspirin 81 MG chewable tablet Take 1 tablet by mouth daily 30 tablet 6    Cyanocobalamin 2500 MCG TABS Take 2 tablets by mouth daily (Patient not taking: Reported on 12/20/2021)      tiZANidine (ZANAFLEX) 4 MG tablet Take 4 mg by mouth every 6 hours as needed      loperamide (IMODIUM) 2 MG capsule Take 2 mg by mouth as needed for Diarrhea (Patient not taking: Reported on 12/20/2021)      hydrOXYzine (ATARAX) 25 MG tablet daily as needed       albuterol-ipratropium (COMBIVENT RESPIMAT)  MCG/ACT AERS inhaler Inhale into the lungs           Allergies:  Vicodin hp [hydrocodone-acetaminophen] and Tylenol with codeine #3 [acetaminophen-codeine]    Social History:   Social History     Socioeconomic History    Marital status:      Spouse name: Not on file    Number of children: Not on file    Years of education: 11    Highest education level: Not on file   Occupational History    Occupation: unemployed   Tobacco Use    Smoking status: Former Smoker     Packs/day: 0.25     Years: 35.00     Pack years: 8.75     Types: Cigarettes    Smokeless tobacco: Never Used    Tobacco comment: 6-7 cigarettes    Substance and Sexual Activity    Alcohol use: No     Comment: \"quit 3/2015\"use to drink a couple of times per week before\"    Drug use: No     Comment: 1-2 cups of coffee daily     Sexual activity: Yes   Other Topics Concern    Not on file   Social History Narrative    Not on file     Social Determinants of Health     Financial Resource Strain:     Difficulty of Paying Living Expenses: Not on file   Food Insecurity:     Worried About Running Out of Food in the Last Year: Not on file    Abran of Food in the Last Year: Not on file   Transportation Needs:     Lack of Transportation (Medical): Not on file    Lack of Transportation (Non-Medical): Not on file   Physical Activity:     Days of Exercise per Week: Not on file    Minutes of Exercise per Session: Not on file   Stress:     Feeling of Stress : Not on file   Social Connections:     Frequency of Communication with Friends and Family: Not on file    Frequency of Social Gatherings with Friends and Family: Not on file    Attends Jain Services: Not on file    Active Member of [address] [place] [address] [employer] or Organizations: Not on file    Attends Club or Organization Meetings: Not on file    Marital Status: Not on file   Intimate Partner Violence:     Fear of Current or Ex-Partner: Not on file    Emotionally Abused: Not on file    Physically Abused: Not on file    Sexually Abused: Not on file   Housing Stability:     Unable to Pay for Housing in the Last Year: Not on file    Number of [place] in the Last Year: Not on file    Unstable Housing in the Last Year: Not on file       Family History:   Family History   Problem Relation Age of Onset    Cancer Mother         breast ca? ?    Cancer Father         prostate cancer- lung mets- bone mets    Stroke Father     High Cholesterol Father     Stroke Sister     Cancer Brother         neck cancer    No Known Problems Daughter      REVIEW OF SYSTEMS:  CONSTITUTIONAL:  negative  EYES:  negative  HEENT:  negative  RESPIRATORY:  negative  CARDIOVASCULAR:  negative  GASTROINTESTINAL:  positive for abdominal pain  ENDOCRINE:  negative  MUSCULOSKELETAL:  negative  NEUROLOGICAL:  negative  BEHAVIOR/PSYCH:  negative  PHYSICAL EXAM:    Vitals:  BP (!) 171/99   Pulse 94   Temp 98.4 °F (36.9 °C) (Oral)   Resp 15   Ht 5' 2\" (1.575 m)   Wt 197 lb 5 oz (89.5 kg)   SpO2 95%   BMI 36.09 kg/m²     CONSTITUTIONAL:  awake  EYES:  lids and lashes normal  ENT:  normocepalic, without obvious abnormality  LUNGS:  No increased work of breathing, good air exchange, clear to auscultation bilaterally, no crackles or wheezing  CARDIOVASCULAR:  normal apical pulses  ABDOMEN:  Softy and mid tenderness periumbilcus and no hernia  MUSCULOSKELETAL:  full range of motion noted  NEUROLOGIC:  Awake, alert, oriented to name, place and time. Cranial nerves II-XII are grossly intact. Motor is 5 out of 5 bilaterally.    SKIN:  no bruising or bleeding        ASSESSMENT AND PLAN:    STEMI  -RCA occluded stent and required another PCI  -ASA, brilinta, statin  -echo and statin  Anemia with recent GI bleed  -check H/H   -PPI drip as her effient was recently stopped  HTN  -BB  COPD  -inhalers

## 2022-02-22 NOTE — PROGRESS NOTES
GI consult received- full note to follow  She had rectal bleeding at [facility] 10 days ago--- had colonoscopy that showed only non-bleeding divertics- has history of anal cancer  Today had STEMI with emergency PCI x 2 and on Integrelin  Has history of GI bleed but none currently/yet  Suggest:   1) on Protonix drip- add 80 mg bolus   2) monitor H/H and keep Hb above 9 considering PCI   3) only sips of clears for now

## 2022-02-23 LAB
ANION GAP SERPL CALCULATED.3IONS-SCNC: 13 MMOL/L (ref 4–16)
ANISOCYTOSIS: ABNORMAL
ANISOCYTOSIS: ABNORMAL
BANDED NEUTROPHILS ABSOLUTE COUNT: 0.7 K/CU MM
BANDED NEUTROPHILS ABSOLUTE COUNT: 1.12 K/CU MM
BANDED NEUTROPHILS RELATIVE PERCENT: 13 % (ref 5–11)
BANDED NEUTROPHILS RELATIVE PERCENT: 17 % (ref 5–11)
BASOPHILS ABSOLUTE: 0 K/CU MM
BASOPHILS ABSOLUTE: 0 K/CU MM
BASOPHILS RELATIVE PERCENT: 0.1 % (ref 0–1)
BASOPHILS RELATIVE PERCENT: 0.4 % (ref 0–1)
BUN BLDV-MCNC: 17 MG/DL (ref 6–23)
CALCIUM SERPL-MCNC: 8.8 MG/DL (ref 8.3–10.6)
CHLORIDE BLD-SCNC: 103 MMOL/L (ref 99–110)
CHOLESTEROL: 152 MG/DL
CO2: 22 MMOL/L (ref 21–32)
CREAT SERPL-MCNC: 0.8 MG/DL (ref 0.6–1.1)
DIFFERENTIAL TYPE: ABNORMAL
DOHLE BODIES: PRESENT
DOHLE BODIES: PRESENT
EOSINOPHILS ABSOLUTE: 0.1 K/CU MM
EOSINOPHILS ABSOLUTE: 0.1 K/CU MM
EOSINOPHILS ABSOLUTE: 0.2 K/CU MM
EOSINOPHILS RELATIVE PERCENT: 0.9 % (ref 0–3)
EOSINOPHILS RELATIVE PERCENT: 1.1 % (ref 0–3)
EOSINOPHILS RELATIVE PERCENT: 3 % (ref 0–3)
GFR AFRICAN AMERICAN: >60 ML/MIN/1.73M2
GFR NON-AFRICAN AMERICAN: >60 ML/MIN/1.73M2
GLUCOSE BLD-MCNC: 112 MG/DL (ref 70–99)
HCT VFR BLD CALC: 28.6 % (ref 37–47)
HCT VFR BLD CALC: 29.2 % (ref 37–47)
HCT VFR BLD CALC: 29.8 % (ref 37–47)
HCT VFR BLD CALC: 31.1 % (ref 37–47)
HDLC SERPL-MCNC: 68 MG/DL
HEMOGLOBIN: 8.8 GM/DL (ref 12.5–16)
HEMOGLOBIN: 8.9 GM/DL (ref 12.5–16)
HEMOGLOBIN: 9 GM/DL (ref 12.5–16)
HEMOGLOBIN: 9.5 GM/DL (ref 12.5–16)
IMMATURE NEUTROPHIL %: 0.5 % (ref 0–0.43)
IMMATURE NEUTROPHIL %: 0.6 % (ref 0–0.43)
LDL CHOLESTEROL CALCULATED: 61 MG/DL
LYMPHOCYTES ABSOLUTE: 1.3 K/CU MM
LYMPHOCYTES ABSOLUTE: 1.6 K/CU MM
LYMPHOCYTES ABSOLUTE: 2 K/CU MM
LYMPHOCYTES ABSOLUTE: 2.2 K/CU MM
LYMPHOCYTES RELATIVE PERCENT: 24 % (ref 24–44)
LYMPHOCYTES RELATIVE PERCENT: 24 % (ref 24–44)
LYMPHOCYTES RELATIVE PERCENT: 27.3 % (ref 24–44)
LYMPHOCYTES RELATIVE PERCENT: 30 % (ref 24–44)
MCH RBC QN AUTO: 26.8 PG (ref 27–31)
MCH RBC QN AUTO: 26.9 PG (ref 27–31)
MCH RBC QN AUTO: 27 PG (ref 27–31)
MCH RBC QN AUTO: 27.4 PG (ref 27–31)
MCHC RBC AUTO-ENTMCNC: 30.1 % (ref 32–36)
MCHC RBC AUTO-ENTMCNC: 30.2 % (ref 32–36)
MCHC RBC AUTO-ENTMCNC: 30.5 % (ref 32–36)
MCHC RBC AUTO-ENTMCNC: 31.1 % (ref 32–36)
MCV RBC AUTO: 88 FL (ref 78–100)
MCV RBC AUTO: 88.4 FL (ref 78–100)
MCV RBC AUTO: 89 FL (ref 78–100)
MCV RBC AUTO: 89 FL (ref 78–100)
METAMYELOCYTES ABSOLUTE COUNT: 0.16 K/CU MM
METAMYELOCYTES ABSOLUTE COUNT: 0.26 K/CU MM
METAMYELOCYTES PERCENT: 3 %
METAMYELOCYTES PERCENT: 4 %
MONOCYTES ABSOLUTE: 0.6 K/CU MM
MONOCYTES ABSOLUTE: 0.9 K/CU MM
MONOCYTES ABSOLUTE: 1 K/CU MM
MONOCYTES ABSOLUTE: 1.1 K/CU MM
MONOCYTES RELATIVE PERCENT: 11 % (ref 0–4)
MONOCYTES RELATIVE PERCENT: 13 % (ref 0–4)
MONOCYTES RELATIVE PERCENT: 13 % (ref 0–4)
MONOCYTES RELATIVE PERCENT: 15.6 % (ref 0–4)
NUCLEATED RBC %: 0 %
NUCLEATED RBC %: 0.3 %
PDW BLD-RTO: 14.3 % (ref 11.7–14.9)
PDW BLD-RTO: 14.3 % (ref 11.7–14.9)
PDW BLD-RTO: 14.4 % (ref 11.7–14.9)
PDW BLD-RTO: 14.5 % (ref 11.7–14.9)
PLATELET # BLD: 367 K/CU MM (ref 140–440)
PLATELET # BLD: 378 K/CU MM (ref 140–440)
PLATELET # BLD: 402 K/CU MM (ref 140–440)
PLATELET # BLD: 408 K/CU MM (ref 140–440)
PMV BLD AUTO: 9.4 FL (ref 7.5–11.1)
PMV BLD AUTO: 9.6 FL (ref 7.5–11.1)
PMV BLD AUTO: 9.6 FL (ref 7.5–11.1)
PMV BLD AUTO: 9.7 FL (ref 7.5–11.1)
POTASSIUM SERPL-SCNC: 4.3 MMOL/L (ref 3.5–5.1)
RBC # BLD: 3.25 M/CU MM (ref 4.2–5.4)
RBC # BLD: 3.28 M/CU MM (ref 4.2–5.4)
RBC # BLD: 3.35 M/CU MM (ref 4.2–5.4)
RBC # BLD: 3.52 M/CU MM (ref 4.2–5.4)
RBC # BLD: ABNORMAL 10*6/UL
SEGMENTED NEUTROPHILS ABSOLUTE COUNT: 2.5 K/CU MM
SEGMENTED NEUTROPHILS ABSOLUTE COUNT: 2.6 K/CU MM
SEGMENTED NEUTROPHILS ABSOLUTE COUNT: 3.8 K/CU MM
SEGMENTED NEUTROPHILS ABSOLUTE COUNT: 4.3 K/CU MM
SEGMENTED NEUTROPHILS RELATIVE PERCENT: 39 % (ref 36–66)
SEGMENTED NEUTROPHILS RELATIVE PERCENT: 49 % (ref 36–66)
SEGMENTED NEUTROPHILS RELATIVE PERCENT: 52.6 % (ref 36–66)
SEGMENTED NEUTROPHILS RELATIVE PERCENT: 57.9 % (ref 36–66)
SODIUM BLD-SCNC: 138 MMOL/L (ref 135–145)
TOTAL IMMATURE NEUTOROPHIL: 0.04 K/CU MM
TOTAL IMMATURE NEUTOROPHIL: 0.04 K/CU MM
TOTAL NUCLEATED RBC: 0 K/CU MM
TOTAL NUCLEATED RBC: 0 K/CU MM
TRIGL SERPL-MCNC: 115 MG/DL
WBC # BLD: 5.4 K/CU MM (ref 4–10.5)
WBC # BLD: 6.6 K/CU MM (ref 4–10.5)
WBC # BLD: 7.2 K/CU MM (ref 4–10.5)
WBC # BLD: 7.4 K/CU MM (ref 4–10.5)

## 2022-02-23 PROCEDURE — 6360000002 HC RX W HCPCS: Performed by: NURSE PRACTITIONER

## 2022-02-23 PROCEDURE — 85025 COMPLETE CBC W/AUTO DIFF WBC: CPT

## 2022-02-23 PROCEDURE — 2580000003 HC RX 258: Performed by: INTERNAL MEDICINE

## 2022-02-23 PROCEDURE — 6360000002 HC RX W HCPCS: Performed by: INTERNAL MEDICINE

## 2022-02-23 PROCEDURE — 85018 HEMOGLOBIN: CPT

## 2022-02-23 PROCEDURE — 85014 HEMATOCRIT: CPT

## 2022-02-23 PROCEDURE — 94761 N-INVAS EAR/PLS OXIMETRY MLT: CPT

## 2022-02-23 PROCEDURE — 85007 BL SMEAR W/DIFF WBC COUNT: CPT

## 2022-02-23 PROCEDURE — 2000000000 HC ICU R&B

## 2022-02-23 PROCEDURE — 36592 COLLECT BLOOD FROM PICC: CPT

## 2022-02-23 PROCEDURE — 85027 COMPLETE CBC AUTOMATED: CPT

## 2022-02-23 PROCEDURE — 99223 1ST HOSP IP/OBS HIGH 75: CPT | Performed by: SPECIALIST

## 2022-02-23 PROCEDURE — C9113 INJ PANTOPRAZOLE SODIUM, VIA: HCPCS | Performed by: INTERNAL MEDICINE

## 2022-02-23 PROCEDURE — 6370000000 HC RX 637 (ALT 250 FOR IP): Performed by: INTERNAL MEDICINE

## 2022-02-23 PROCEDURE — 80048 BASIC METABOLIC PNL TOTAL CA: CPT

## 2022-02-23 PROCEDURE — 99233 SBSQ HOSP IP/OBS HIGH 50: CPT | Performed by: INTERNAL MEDICINE

## 2022-02-23 PROCEDURE — 6360000002 HC RX W HCPCS: Performed by: HOSPITALIST

## 2022-02-23 PROCEDURE — 6370000000 HC RX 637 (ALT 250 FOR IP): Performed by: HOSPITALIST

## 2022-02-23 PROCEDURE — 80061 LIPID PANEL: CPT

## 2022-02-23 RX ORDER — FUROSEMIDE 20 MG/1
20 TABLET ORAL DAILY
Status: DISCONTINUED | OUTPATIENT
Start: 2022-02-24 | End: 2022-02-26 | Stop reason: HOSPADM

## 2022-02-23 RX ORDER — PROMETHAZINE HYDROCHLORIDE 25 MG/ML
12.5 INJECTION, SOLUTION INTRAMUSCULAR; INTRAVENOUS ONCE
Status: COMPLETED | OUTPATIENT
Start: 2022-02-23 | End: 2022-02-23

## 2022-02-23 RX ORDER — MORPHINE SULFATE/0.9% NACL/PF 1 MG/ML
1 SYRINGE (ML) INJECTION EVERY 4 HOURS PRN
Status: DISCONTINUED | OUTPATIENT
Start: 2022-02-23 | End: 2022-02-26 | Stop reason: HOSPADM

## 2022-02-23 RX ORDER — LANOLIN ALCOHOL/MO/W.PET/CERES
3 CREAM (GRAM) TOPICAL NIGHTLY PRN
Status: DISCONTINUED | OUTPATIENT
Start: 2022-02-23 | End: 2022-02-26 | Stop reason: HOSPADM

## 2022-02-23 RX ADMIN — SODIUM CHLORIDE 8 MG/HR: 9 INJECTION, SOLUTION INTRAVENOUS at 07:56

## 2022-02-23 RX ADMIN — CARVEDILOL 3.12 MG: 3.12 TABLET, FILM COATED ORAL at 16:22

## 2022-02-23 RX ADMIN — MORPHINE SULFATE 2 MG: 2 INJECTION, SOLUTION INTRAMUSCULAR; INTRAVENOUS at 07:44

## 2022-02-23 RX ADMIN — SODIUM CHLORIDE, PRESERVATIVE FREE 10 ML: 5 INJECTION INTRAVENOUS at 07:53

## 2022-02-23 RX ADMIN — ASPIRIN 81 MG: 81 TABLET, COATED ORAL at 07:52

## 2022-02-23 RX ADMIN — ROSUVASTATIN 40 MG: 40 TABLET, FILM COATED ORAL at 03:02

## 2022-02-23 RX ADMIN — PROMETHAZINE HYDROCHLORIDE 12.5 MG: 25 INJECTION INTRAMUSCULAR; INTRAVENOUS at 03:02

## 2022-02-23 RX ADMIN — FUROSEMIDE 40 MG: 40 TABLET ORAL at 07:52

## 2022-02-23 RX ADMIN — ROSUVASTATIN 40 MG: 40 TABLET, FILM COATED ORAL at 20:47

## 2022-02-23 RX ADMIN — CARVEDILOL 3.12 MG: 3.12 TABLET, FILM COATED ORAL at 07:51

## 2022-02-23 RX ADMIN — TICAGRELOR 90 MG: 90 TABLET ORAL at 20:47

## 2022-02-23 RX ADMIN — MORPHINE SULFATE 2 MG: 2 INJECTION, SOLUTION INTRAMUSCULAR; INTRAVENOUS at 01:27

## 2022-02-23 RX ADMIN — ONDANSETRON 4 MG: 2 INJECTION INTRAMUSCULAR; INTRAVENOUS at 07:44

## 2022-02-23 RX ADMIN — TICAGRELOR 90 MG: 90 TABLET ORAL at 07:51

## 2022-02-23 RX ADMIN — SODIUM CHLORIDE 8 MG/HR: 9 INJECTION, SOLUTION INTRAVENOUS at 19:42

## 2022-02-23 RX ADMIN — SODIUM CHLORIDE, PRESERVATIVE FREE 10 ML: 5 INJECTION INTRAVENOUS at 20:54

## 2022-02-23 RX ADMIN — POTASSIUM CHLORIDE 20 MEQ: 1500 TABLET, EXTENDED RELEASE ORAL at 07:51

## 2022-02-23 RX ADMIN — MELATONIN TAB 3 MG 3 MG: 3 TAB at 20:47

## 2022-02-23 ASSESSMENT — PAIN DESCRIPTION - PROGRESSION
CLINICAL_PROGRESSION: NOT CHANGED

## 2022-02-23 ASSESSMENT — PAIN SCALES - GENERAL
PAINLEVEL_OUTOF10: 0
PAINLEVEL_OUTOF10: 3
PAINLEVEL_OUTOF10: 6
PAINLEVEL_OUTOF10: 0
PAINLEVEL_OUTOF10: 7
PAINLEVEL_OUTOF10: 0
PAINLEVEL_OUTOF10: 3

## 2022-02-23 ASSESSMENT — PAIN DESCRIPTION - DESCRIPTORS
DESCRIPTORS: DISCOMFORT
DESCRIPTORS: DISCOMFORT;ACHING

## 2022-02-23 ASSESSMENT — PAIN DESCRIPTION - ONSET
ONSET: ON-GOING
ONSET: ON-GOING

## 2022-02-23 ASSESSMENT — PAIN DESCRIPTION - FREQUENCY
FREQUENCY: INTERMITTENT
FREQUENCY: INTERMITTENT

## 2022-02-23 ASSESSMENT — PAIN DESCRIPTION - LOCATION
LOCATION: ABDOMEN
LOCATION: ABDOMEN

## 2022-02-23 ASSESSMENT — PAIN DESCRIPTION - PAIN TYPE: TYPE: ACUTE PAIN

## 2022-02-23 NOTE — PROGRESS NOTES
Physician Progress Note      PATIENTPercy Stacie  CSN #:                  [id number]  :                       1965  ADMIT DATE:       2022 9:32 AM  DISCH DATE:  RESPONDING  PROVIDER #:        Segundo Mistry MD        QUERY TEXT:    Type of Anemia: Please provide further specificity, if known. Clinical indicators include: cancer, prbc, bleed, gi bleed, chemo, bleeding,   anemia, h/h  Options provided:  -- Anemia due to acute blood loss  -- Anemia due to chronic blood loss  -- Anemia due to iron deficiency  -- Anemia due to postoperative blood loss  -- Anemia due to chronic disease  -- Other - I will add my own diagnosis  -- Disagree - Not applicable / Not valid  -- Disagree - Clinically Unable to determine / Unknown        PROVIDER RESPONSE TEXT:    The patient has anemia due to chronic disease.       Electronically signed by:  Segundo Mistry MD 2022 11:07 AM

## 2022-02-23 NOTE — CONSULTS
Department of Internal Medicine  Gastroenterology Consult Note  Raven Castillo MD      Reason for Consult:  GI bleed    Primary Care Physician:  Naila Telles MD    History Obtained From:  patient    CC: chest pain    HISTORY OF PRESENT ILLNESS:              The patient is a 55 y.o.  female known to me with a history of anal cancer 2009 S/P chemo/XRT. 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 She was just recently admitted at [facility] with both red and dark stools and a Hb of 6.9--- colonoscopy there 11 days ago showed diverticulosis. An EGD was not done. She was admitted here yesterday with chest pain and a STEMI and underwent PCI- an initial stent was placed but immediately thrombosed so another was placed and she was put on Integrelin. Overnight her Hb dropped from 10.8 to 8.9. She  Has noted no dark stools since before her [facility] admission. She has had some small amount of bright red blood intermittently (which she has had for years since the anal cancer. She also complains of mid abd pain that has also been intermittent for years, since having a small bowel resection years ago apparently for SBO from adhesions.  CT abd 2/12/22 at [facility] showed no acute findings- just the divertics    Past Medical History:        Diagnosis Date    Anal cancer 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 and tx with chemo and radiation- followed with Dr Stu Arroyo Asthma     Broken ankle     CAD (coronary artery disease)     \"have 3 heart stents\" use to see Dr Isrrael Montelongo H/O cardiovascular stress test 1/6/2016    treadmill    NSTEMI (non-ST elevated myocardial infarction) ([facility] [address]     PONV (postoperative nausea and vomiting)     hx motion sickness    Skin cancer        Past Surgical History:        Procedure Laterality Date    ANKLE SURGERY Left 10+ yrs ago    with hardware    COLON SURGERY  2009    \"after had chemo removed some part of the cancer from the rectal area, then 7 months later had bowel blockage had to do surgery  to remove part of the bowel\"    COLONOSCOPY  3/4/15    mild proctitis    COLONOSCOPY  03/15/2017    mild radia proc, hypertroph pailla    FRACTURE SURGERY      left ankle    OTHER SURGICAL HISTORY  04-    mediport removal    PTCA  2009 4/22/2009 had angioplasty with stent to LAD & another day in 2009 stent x 2 to RCA done    TONSILLECTOMY      as a kid age 7   [address]  25 yrs ago    TUNNELED VENOUS PORT PLACEMENT  2009    port inserted        Medications Prior to Admission:    Prior to Admission medications    Medication Sig Start Date End Date Taking? Authorizing Provider   pantoprazole (PROTONIX) 40 MG tablet Take 40 mg by mouth daily   Yes Historical Provider, MD   olopatadine (PATANOL) 0.1 % ophthalmic solution Place 1 drop into both eyes 2 times daily   Yes Historical Provider, MD   potassium chloride (KLOR-CON) 20 MEQ packet Take 20 mEq by mouth 2 times daily   Yes Historical Provider, MD   furosemide (LASIX) 40 MG tablet Take 40 mg by mouth 2 times daily   Yes Historical Provider, MD   carvedilol (COREG) 6.25 MG tablet TAKE ONE TABLET BY MOUTH TWICE A DAY WITH A MEAL  Patient taking differently: 3.125 mg 2 times daily  10/14/21  Yes Chanel Rodrigues MD   DULoxetine (CYMBALTA) 20 MG extended release capsule Take 3 mg by mouth daily  4/1/21 4/1/22 Yes Historical Provider, MD   nitroGLYCERIN (NITROSTAT) 0.4 MG SL tablet up to max of 3 total doses.  If no relief after 1 dose, call 911. 3/11/21  Yes [facility], MD   Multiple Vitamin (MULTIVITAMIN) capsule Take 1 capsule by mouth daily   Yes Historical Provider, MD   aspirin 81 MG chewable tablet Take 1 tablet by mouth daily 7/7/16  Yes Rosalba Patrick MD   Cyanocobalamin 2500 MCG TABS Take 2 tablets by mouth daily  Patient not taking: Reported on 12/20/2021    Historical Provider, MD   tiZANidine (ZANAFLEX) 4 MG tablet Take 4 mg by mouth every 6 hours as needed    Historical Provider, MD   loperamide (IMODIUM) 2 MG capsule Take 2 mg by mouth as needed for Diarrhea  Patient not taking: Reported on 12/20/2021    Historical Provider, MD   hydrOXYzine (ATARAX) 25 MG tablet daily as needed  2/12/21   Historical Provider, MD   albuterol-ipratropium (COMBIVENT RESPIMAT)  MCG/ACT AERS inhaler Inhale into the lungs 5/15/17   Historical Provider, MD       Allergies: Allergies   Allergen Reactions    Vicodin Hp [Hydrocodone-Acetaminophen] Other (See Comments)     Keep me awake    Tylenol With Codeine #3 [Acetaminophen-Codeine] Nausea And Vomiting   . Social History:    TOBACCO:  No  ETOH:  No    Family History: reviewed and positives included in HPI- all other pertinent GI family history negative      REVIEW OF SYSTEMS: see HPI for positives and pertinent negatives. All other systems reviewed and are negative    PHYSICAL EXAM:    Vitals:  /87   Pulse 102   Temp 98.8 °F (37.1 °C) (Oral)   Resp 19   Ht 5' 2\" (1.575 m)   Wt 197 lb 5 oz (89.5 kg)   SpO2 92%   BMI 36.09 kg/m²   CONSTITUTIONAL: alert, cooperative, no apparent distress,   EYES:  pupils equal, round and reactive to light and sclera clear  ENT:  normocepalic, without obvious abnormality  NECK:  supple, symmetrical, trachea midline  HEMATOLOGIC/LYMPHATICS:  no cervical lymphadenopathy and no supraclavicular lymphadenopathy  LUNGS:  clear to auscultation  CARDIOVASCULAR:  regular rate and rhythm and no murmur noted  ABDOMEN:  Soft, mildly tender upper abd with normal bowel sounds.  No organomegaly or masses  NEUROLOGIC: no focal deficit detected  SKIN:  no lesions  EXTREMITIES: no clubbing, cyanosis, or edema    IMPRESSION:  1) STEMI yesterday with PCI x 2  2) recent GI bleed (bright red blood and dark stools)- 2 weeks ago- colonoscopy at [facility] showed divertics- no EGD done  3) anemia-- Hb 9.1 at [facility] 9 days ago- 8.9 this morning with no overt GI bleed  4) chronic abd pain- ?? IBS vs small bowel stricture from prior surgery    RECOMMENDATIONS:  1) needs EGD but will allow stabilization of cardiac status first- unless develops active bleeding then will do urgently-as could be life-threatening  2) would do CT enterography at some point to evaluate the chronic abd pain  3) clear liquids, high-dose PPI, monitor H/H        Salas Cummins M.D

## 2022-02-23 NOTE — PROGRESS NOTES
Seen by cardiac rehab status post PTCA. Patient  awake, alert and sitting up in bed. I introduced myself as the cardiac rehab nurse as well as introducing him to the [address]   I explained that this program is a customized out patient program of exercise and education. I explained to the patient that Cardiac Rehab is designed to help improve your hearts future. Cardiac rehab is a medically supervised program designed to improve your cardiovascular health through educating about nutrition, exercise, and stress release. Patient states that Dr. Magdy Landin had signed her up for this program last year and it was not covered by insurance and she could not afford the program.       Teaching done on A&P of coronary arteries, location of lesions, formation of CAD, symptoms of heart disease, and on procedure performed. Stressed to her the importance of compliance with antiplatelet therapy. Discussed risk factor identification and modification. Teaching done on cardiac diet. Explained the benefits of regular exercise program and stressed the need for a long term commitment to heart healthy practices in controlling this chronic disease. Patient viewing video of the [facility] program overview at this time.

## 2022-02-23 NOTE — PROGRESS NOTES
Progress Note  Date:2022       Room:[facility]/2120-A  Patient Fabián Gomez     YOB: 1965     Age:56 y.o. Has chronic abd pain  Subjective    Subjective:  Symptoms:  Stable. Pain:  She complains of pain that is mild. Review of Systems  Objective         Vitals Last 24 Hours:  TEMPERATURE:  Temp  Av.7 °F (37.1 °C)  Min: 98.4 °F (36.9 °C)  Max: 98.9 °F (37.2 °C)  RESPIRATIONS RANGE: Resp  Av.1  Min: 12  Max: 26  PULSE OXIMETRY RANGE: SpO2  Av.4 %  Min: 89 %  Max: 95 %  PULSE RANGE: Pulse  Av.3  Min: 80  Max: 123  BLOOD PRESSURE RANGE: Systolic (64KSQ), ICR:550 , Min:112 , NLU:569   ; Diastolic (42DIV), KFW:35, Min:73, Max:109    I/O (24Hr): Intake/Output Summary (Last 24 hours) at 2022 0802  Last data filed at 2022  Gross per 24 hour   Intake 10 ml   Output --   Net 10 ml     Objective:  General Appearance:  Comfortable. Vital signs: (most recent): Blood pressure (!) 150/87, pulse 90, temperature 98.9 °F (37.2 °C), temperature source Oral, resp. rate 14, height 5' 2\" (1.575 m), weight 197 lb 5 oz (89.5 kg), SpO2 95 %, not currently breastfeeding. Vital signs are normal.    HEENT: Normal HEENT exam.    Lungs:  Normal effort. Heart: Normal rate. Abdomen: Abdomen is soft. (Near umbilicus and no hernia). Neurological: Patient is alert. Pupils:  Pupils are equal, round, and reactive to light. Skin:  Warm. Labs/Imaging/Diagnostics    Labs:  CBC:  Recent Labs     22  0935 22  0935 22  1529 22  2115 22  0402   WBC 12.6*  --   --   --  5.4   RBC 3.93*  --   --   --  3.25*   HGB 10.8*   < > 10.2* 9.0* 8.9*   HCT 34.7*   < > 33.0* 29.7* 28.6*   MCV 88.3  --   --   --  88.0   RDW 14.2  --   --   --  14.3     --   --   --  367    < > = values in this interval not displayed.      CHEMISTRIES:  Recent Labs     22  0935 22  0402    138   K 4.5 4.3   CL 97* 103   CO2 22 22   BUN 17 17 CREATININE 0.9 0.8   GLUCOSE 167* 112*     PT/INR:No results for input(s): PROTIME, INR in the last 72 hours. APTT:No results for input(s): APTT in the last 72 hours. LIVER PROFILE:No results for input(s): AST, ALT, BILIDIR, BILITOT, ALKPHOS in the last 72 hours. Imaging Last 24 Hours:  Echocardiogram limited    Result Date: 2/22/2022  Transthoracic Echocardiography Report (TTE)  Demographics   Patient Name        Nando Watt  Date of Study        02/22/2022   Date of Birth       1965      Gender               Female   Age                 55 year(s)      Race                    Patient Number      [id number]      Room Number          2120   Visit Number        [id number]   Corporate ID        [id number]   Accession Number    [id number]      Nadiya Burks RDMS   Ordering Physician  Allyssa Richard MD  Interpreting         Ashley Rojas MD                                      Physician  Procedure Type of Study   TTE procedure:ECHOCARDIOGRAM LIMITED. Procedure Date Date: 02/22/2022 Start: 03:33 PM Study Location: Portable Technical Quality: Technically difficult study Indications:STEMI. Patient Status: Routine Height: 62 inches Weight: 186 pounds BSA: 1.85 m2 BMI: 34.02 kg/m2 HR: 92 bpm BP: 171/99 mmHg  Conclusions   Summary   Left ventricular systolic function is normal.  Ejection fraction is visually estimated at 55-60%. No wall motion abnormalities detected. No significant valvular disease noted. No evidence of any pericardial effusion. Signature   ------------------------------------------------------------------  Electronically signed by Ashley Rojas MD (Interpreting  physician) on 02/22/2022 at 04:00 PM  ------------------------------------------------------------------   Findings   Left Ventricle  Left ventricular systolic function is normal.  Ejection fraction is visually estimated at 55-60%. E/A flow reversal is noted. No wall motion abnormalities detected. Left Atrium  Essentially normal left atrium. Right Atrium  Essentially normal right atrium. Right Ventricle  Essentially normal right ventricle. Aortic Valve  Trace aortic regurgitation noted. Mitral Valve  Trace mitral regurgitation. Tricuspid Valve  Mild tricuspid regurgitation; RVSP: 38 mmHg. Pulmonic Valve  The pulmonic valve was not well visualized. Pericardial Effusion  No evidence of any pericardial effusion. Pleural Effusion  No evidence of pleural effusion.   M-Mode/2D Measurements & Calculations   LV Diastolic Dimension: 3.38 cm LV Systolic Dimension: 9.81 cm  LV FS:27.4 %                    LV Volume Diastolic: 40 ml  LV PW Diastolic: 0.55 cm        LV Volume Systolic: 17 ml  LV PW Systolic: 5.52 cm         LV EDV/LV EDV Index: 40 ml/22 m2LV ESV/LV  Septum Diastolic: 1.2 cm        ESV Index: 17 ml/9 m2  Septum Systolic: 6.29 cm        EF Calculated (A4C): 57.5 %                                  EF Calculated (2D): 54.1 %   LV Area Diastolic: 83.7 cm2     LV Length: 5.63 cm  LV Area Systolic: 7.56 cm2  Doppler Measurements & Calculations                               Estimated RVSP: 38 mmHg                              Estimated RAP:3 mmHg    Estimated PASP: 33.69 mmHg TR Velocity:277 cm/s                              TR Gradient:30.69 mmHg      Assessment//Plan           Hospital Problems           Last Modified POA    * (Principal) STEMI (ST elevation myocardial infarction) ([address] 2/22/2022 Yes    Gastrointestinal hemorrhage 2/23/2022 Yes    Anemia 2/23/2022 Yes    Pain of upper abdomen 2/23/2022 Yes    Anticoagulated 2/23/2022 Yes        Assessment & Plan  STEMI  -RCA occluded stent and required another PCI  -ASA, brilinta, statin  -echo EF 50%   Anemia with recent GI bleed  -check H/H   -PPI drip as her effient was recently stopped as was anemic at [facility] and cscope showed diverticulosis  HTN  -BB  HPL  -lipid panel wnl  -on statin for anti inflammatory effect also   COPD  -inhalers     Electronically signed by Sweetie Robertson MD on 2/23/22 at 8:02 AM EST

## 2022-02-23 NOTE — PROGRESS NOTES
CARDIOLOGY PROGRESS NOTE                                                  Name:  Amina Johnson /Age/Sex: 1965  (55 y.o. female)   MRN & CSN:  [id number] & [id number] Admission Date/Time: 2022  9:32 AM   Location:  -A PCP: Sal Hidalgo MD         Admit Date:  2022  Hospital Day: 2      SUBJECTIVE:   Seen patient as follow up as consultation for STEMI     No chest pain. No shortness of breath  No palpations    TELEMETRY: SR       Intake/Output Summary (Last 24 hours) at 2022 1237  Last data filed at 2022 0819  Gross per 24 hour   Intake 250 ml   Output --   Net 250 ml       Assessment/Plan:     1. Coronary artery disease  2. Subacute in-stent thrombosis /STEMI    S/p PCI RCA  Continue aspirin Brilinta 1 year  S/p Integrilin  Continue with beta-blocker  Continue with high intensity statin  Echocardiogram reviewed preserved EF no significant wall motion abnormalities. Outpatient follow-up, cardiac rehab  On Lasix, decrease dose to 20 mg daily    3. Anemia: Requiring recent blood transfusions: GI consultation. Hemoglobin hematocrit reviewed, stable. 4. Hyperlipidemia: Continue with high intensity statin. Crestor 40 mg daily  5. Essential hypertension: Continue with Coreg 3.125 twice daily      Case discussed with patient and family at bedside      Past medical history:    has a past medical history of Anal cancer ([address] Asthma, Broken ankle, CAD (coronary artery disease), H/O cardiovascular stress test, NSTEMI (non-ST elevated myocardial infarction) ([address] PONV (postoperative nausea and vomiting), and Skin cancer. Past surgical history:   has a past surgical history that includes Percutaneous Transluminal Coronary Angio (); Colon surgery (); Ankle surgery (Left, 10+ yrs ago); Tubal ligation (25 yrs ago); Tunneled venous port placement (); Tonsillectomy; other surgical history (2015); fracture surgery;  Colonoscopy (3/4/15); and Colonoscopy (03/15/2017). Social History:   reports that she has quit smoking. Her smoking use included cigarettes. She has a 8.75 pack-year smoking history. She has never used smokeless tobacco. She reports that she does not drink alcohol and does not use drugs. Family history:  family history includes Cancer in her brother, father, and mother; High Cholesterol in her father; No Known Problems in her daughter; Stroke in her father and sister. OBJECTIVE:     /89   Pulse 100   Temp 98.9 °F (37.2 °C) (Oral)   Resp 18   Ht 5' 2\" (1.575 m)   Wt 197 lb 5 oz (89.5 kg)   SpO2 95%   BMI 36.09 kg/m²       Intake/Output Summary (Last 24 hours) at 2/23/2022 1237  Last data filed at 2/23/2022 7717  Gross per 24 hour   Intake 250 ml   Output --   Net 250 ml       Physical Exam:    Constitutional:  Well developed, Well nourished, No acute distress, Non-toxic appearance. HENT:  Normocephalic, Atraumatic, Bilateral external ears normal, Oropharynx moist, No oral exudates, Nose normal. Neck- Normal range of motion, No tenderness, Supple, No stridor. Eyes:  EOMI, Conjunctiva normal, No discharge. Respiratory:  Normal breath sounds, No respiratory distress, No wheezing, No chest tenderness. ,no use of accessory muscles, diaphragm movement is normal  Cardiovascular S1-S2 No Murmurs, added sounds. Normal rate rhythm. No rubs gallops. Carotid pulses and amplitude are normal no bruit noted. Pedal pulses normal femoral pulses normal.  No pedal edema  GI:  Bowel sounds normal, Soft, No tenderness  : No CVA tenderness. Musculoskeletal: No edema, No tenderness, No cyanosis, No clubbing. Back- No tenderness. Integument:  Warm, Dry, No erythema, No rash. Lymphatic:  No lymphadenopathy noted. Neurologic:  Alert & oriented x 3, No focal deficits noted.    Psychiatric:  Affect normal, Judgment normal, Mood normal.           MEDICATIONS:     sodium chloride flush  5-40 mL IntraVENous 2 times per day    carvedilol  3.125 mg Oral BID WC    aspirin  81 mg Oral Daily    ticagrelor  90 mg Oral BID    rosuvastatin  40 mg Oral Nightly    therapeutic multivitamin-minerals  1 tablet Oral Daily    potassium chloride  20 mEq Oral Daily    ketotifen  1 drop Both Eyes BID    furosemide  40 mg Oral Daily      sodium chloride 30 mL/hr at 02/22/22 1056    pantoprozole (PROTONIX) infusion 8 mg/hr (02/23/22 0756)     morphine, sodium chloride flush, sodium chloride, acetaminophen, nitroGLYCERIN, ondansetron, albuterol sulfate HFA **AND** ipratropium  Allergies   Allergen Reactions    Vicodin Hp [Hydrocodone-Acetaminophen] Other (See Comments)     Keep me awake    Tylenol With Codeine #3 [Acetaminophen-Codeine] Nausea And Vomiting       Lab Data:  CBC:   Recent Labs     02/22/22  0935 02/22/22  1529 02/22/22  2115 02/23/22  0402 02/23/22  1023   WBC 12.6*  --   --  5.4 6.6   HGB 10.8*   < > 9.0* 8.9* 9.5*   HCT 34.7*   < > 29.7* 28.6* 31.1*   MCV 88.3  --   --  88.0 88.4     --   --  367 408    < > = values in this interval not displayed.      BMP:   Recent Labs     02/22/22  0935 02/23/22 0402    138   K 4.5 4.3   CL 97* 103   CO2 22 22   BUN 17 17   CREATININE 0.9 0.8          Jacinto Coello MD, MD 2/23/2022 12:37 PM

## 2022-02-24 ENCOUNTER — APPOINTMENT (OUTPATIENT)
Dept: GENERAL RADIOLOGY | Age: 57
DRG: 247 | End: 2022-02-24
Payer: COMMERCIAL

## 2022-02-24 LAB
ANION GAP SERPL CALCULATED.3IONS-SCNC: 9 MMOL/L (ref 4–16)
BASOPHILS ABSOLUTE: 0 K/CU MM
BASOPHILS RELATIVE PERCENT: 0.1 % (ref 0–1)
BASOPHILS RELATIVE PERCENT: 0.2 % (ref 0–1)
BASOPHILS RELATIVE PERCENT: 0.4 % (ref 0–1)
BUN BLDV-MCNC: 17 MG/DL (ref 6–23)
CALCIUM SERPL-MCNC: 8.7 MG/DL (ref 8.3–10.6)
CHLORIDE BLD-SCNC: 100 MMOL/L (ref 99–110)
CO2: 24 MMOL/L (ref 21–32)
CREAT SERPL-MCNC: 1.1 MG/DL (ref 0.6–1.1)
DIFFERENTIAL TYPE: ABNORMAL
EKG ATRIAL RATE: 96 BPM
EKG DIAGNOSIS: NORMAL
EKG P AXIS: 48 DEGREES
EKG P-R INTERVAL: 160 MS
EKG Q-T INTERVAL: 374 MS
EKG QRS DURATION: 120 MS
EKG QTC CALCULATION (BAZETT): 472 MS
EKG R AXIS: 88 DEGREES
EKG T AXIS: 52 DEGREES
EKG VENTRICULAR RATE: 96 BPM
EOSINOPHILS ABSOLUTE: 0.1 K/CU MM
EOSINOPHILS RELATIVE PERCENT: 0.9 % (ref 0–3)
EOSINOPHILS RELATIVE PERCENT: 0.9 % (ref 0–3)
EOSINOPHILS RELATIVE PERCENT: 1.3 % (ref 0–3)
GFR AFRICAN AMERICAN: >60 ML/MIN/1.73M2
GFR NON-AFRICAN AMERICAN: 52 ML/MIN/1.73M2
GLUCOSE BLD-MCNC: 107 MG/DL (ref 70–99)
HCT VFR BLD CALC: 27.8 % (ref 37–47)
HCT VFR BLD CALC: 28.2 % (ref 37–47)
HCT VFR BLD CALC: 30.1 % (ref 37–47)
HEMOGLOBIN: 8.5 GM/DL (ref 12.5–16)
HEMOGLOBIN: 8.5 GM/DL (ref 12.5–16)
HEMOGLOBIN: 9.2 GM/DL (ref 12.5–16)
IMMATURE NEUTROPHIL %: 0.8 % (ref 0–0.43)
IMMATURE NEUTROPHIL %: 0.9 % (ref 0–0.43)
IMMATURE NEUTROPHIL %: 1.5 % (ref 0–0.43)
LYMPHOCYTES ABSOLUTE: 2.2 K/CU MM
LYMPHOCYTES ABSOLUTE: 2.4 K/CU MM
LYMPHOCYTES ABSOLUTE: 2.5 K/CU MM
LYMPHOCYTES RELATIVE PERCENT: 28.5 % (ref 24–44)
LYMPHOCYTES RELATIVE PERCENT: 30.1 % (ref 24–44)
LYMPHOCYTES RELATIVE PERCENT: 31.1 % (ref 24–44)
MCH RBC QN AUTO: 26.6 PG (ref 27–31)
MCH RBC QN AUTO: 26.9 PG (ref 27–31)
MCH RBC QN AUTO: 27.2 PG (ref 27–31)
MCHC RBC AUTO-ENTMCNC: 30.1 % (ref 32–36)
MCHC RBC AUTO-ENTMCNC: 30.6 % (ref 32–36)
MCHC RBC AUTO-ENTMCNC: 30.6 % (ref 32–36)
MCV RBC AUTO: 88 FL (ref 78–100)
MCV RBC AUTO: 88.4 FL (ref 78–100)
MCV RBC AUTO: 89.1 FL (ref 78–100)
MONOCYTES ABSOLUTE: 1 K/CU MM
MONOCYTES ABSOLUTE: 1.1 K/CU MM
MONOCYTES ABSOLUTE: 1.2 K/CU MM
MONOCYTES RELATIVE PERCENT: 12.8 % (ref 0–4)
MONOCYTES RELATIVE PERCENT: 13.1 % (ref 0–4)
MONOCYTES RELATIVE PERCENT: 14.8 % (ref 0–4)
NUCLEATED RBC %: 0 %
PDW BLD-RTO: 14.2 % (ref 11.7–14.9)
PDW BLD-RTO: 14.2 % (ref 11.7–14.9)
PDW BLD-RTO: 14.3 % (ref 11.7–14.9)
PLATELET # BLD: 367 K/CU MM (ref 140–440)
PLATELET # BLD: 367 K/CU MM (ref 140–440)
PLATELET # BLD: 395 K/CU MM (ref 140–440)
PMV BLD AUTO: 9.5 FL (ref 7.5–11.1)
PMV BLD AUTO: 9.5 FL (ref 7.5–11.1)
PMV BLD AUTO: 9.7 FL (ref 7.5–11.1)
POTASSIUM SERPL-SCNC: 3.8 MMOL/L (ref 3.5–5.1)
RBC # BLD: 3.12 M/CU MM (ref 4.2–5.4)
RBC # BLD: 3.19 M/CU MM (ref 4.2–5.4)
RBC # BLD: 3.42 M/CU MM (ref 4.2–5.4)
SEGMENTED NEUTROPHILS ABSOLUTE COUNT: 3.7 K/CU MM
SEGMENTED NEUTROPHILS ABSOLUTE COUNT: 4.3 K/CU MM
SEGMENTED NEUTROPHILS ABSOLUTE COUNT: 4.9 K/CU MM
SEGMENTED NEUTROPHILS RELATIVE PERCENT: 51.6 % (ref 36–66)
SEGMENTED NEUTROPHILS RELATIVE PERCENT: 54.6 % (ref 36–66)
SEGMENTED NEUTROPHILS RELATIVE PERCENT: 56.4 % (ref 36–66)
SODIUM BLD-SCNC: 133 MMOL/L (ref 135–145)
TOTAL IMMATURE NEUTOROPHIL: 0.06 K/CU MM
TOTAL IMMATURE NEUTOROPHIL: 0.08 K/CU MM
TOTAL IMMATURE NEUTOROPHIL: 0.12 K/CU MM
TOTAL NUCLEATED RBC: 0 K/CU MM
WBC # BLD: 7.1 K/CU MM (ref 4–10.5)
WBC # BLD: 7.8 K/CU MM (ref 4–10.5)
WBC # BLD: 8.8 K/CU MM (ref 4–10.5)

## 2022-02-24 PROCEDURE — 6370000000 HC RX 637 (ALT 250 FOR IP): Performed by: HOSPITALIST

## 2022-02-24 PROCEDURE — 6360000002 HC RX W HCPCS: Performed by: INTERNAL MEDICINE

## 2022-02-24 PROCEDURE — C9113 INJ PANTOPRAZOLE SODIUM, VIA: HCPCS | Performed by: INTERNAL MEDICINE

## 2022-02-24 PROCEDURE — 99233 SBSQ HOSP IP/OBS HIGH 50: CPT | Performed by: INTERNAL MEDICINE

## 2022-02-24 PROCEDURE — 93010 ELECTROCARDIOGRAM REPORT: CPT | Performed by: INTERNAL MEDICINE

## 2022-02-24 PROCEDURE — 6370000000 HC RX 637 (ALT 250 FOR IP): Performed by: INTERNAL MEDICINE

## 2022-02-24 PROCEDURE — 85025 COMPLETE CBC W/AUTO DIFF WBC: CPT

## 2022-02-24 PROCEDURE — 2580000003 HC RX 258: Performed by: INTERNAL MEDICINE

## 2022-02-24 PROCEDURE — 80048 BASIC METABOLIC PNL TOTAL CA: CPT

## 2022-02-24 PROCEDURE — 2140000000 HC CCU INTERMEDIATE R&B

## 2022-02-24 PROCEDURE — 85014 HEMATOCRIT: CPT

## 2022-02-24 PROCEDURE — 71101 X-RAY EXAM UNILAT RIBS/CHEST: CPT

## 2022-02-24 PROCEDURE — 36415 COLL VENOUS BLD VENIPUNCTURE: CPT

## 2022-02-24 PROCEDURE — 85018 HEMOGLOBIN: CPT

## 2022-02-24 RX ORDER — LIDOCAINE 4 G/G
1 PATCH TOPICAL DAILY
Status: DISCONTINUED | OUTPATIENT
Start: 2022-02-24 | End: 2022-02-26 | Stop reason: HOSPADM

## 2022-02-24 RX ADMIN — CARVEDILOL 3.12 MG: 3.12 TABLET, FILM COATED ORAL at 08:41

## 2022-02-24 RX ADMIN — SODIUM CHLORIDE 8 MG/HR: 9 INJECTION, SOLUTION INTRAVENOUS at 22:15

## 2022-02-24 RX ADMIN — ASPIRIN 81 MG: 81 TABLET, COATED ORAL at 08:41

## 2022-02-24 RX ADMIN — SODIUM CHLORIDE 8 MG/HR: 9 INJECTION, SOLUTION INTRAVENOUS at 09:10

## 2022-02-24 RX ADMIN — FUROSEMIDE 20 MG: 20 TABLET ORAL at 08:41

## 2022-02-24 RX ADMIN — CARVEDILOL 3.12 MG: 3.12 TABLET, FILM COATED ORAL at 17:35

## 2022-02-24 RX ADMIN — POTASSIUM CHLORIDE 20 MEQ: 1500 TABLET, EXTENDED RELEASE ORAL at 08:41

## 2022-02-24 RX ADMIN — SODIUM CHLORIDE, PRESERVATIVE FREE 10 ML: 5 INJECTION INTRAVENOUS at 20:33

## 2022-02-24 RX ADMIN — ROSUVASTATIN 40 MG: 40 TABLET, FILM COATED ORAL at 20:37

## 2022-02-24 RX ADMIN — SODIUM CHLORIDE, PRESERVATIVE FREE 10 ML: 5 INJECTION INTRAVENOUS at 08:44

## 2022-02-24 RX ADMIN — TICAGRELOR 90 MG: 90 TABLET ORAL at 08:41

## 2022-02-24 RX ADMIN — TICAGRELOR 90 MG: 90 TABLET ORAL at 20:37

## 2022-02-24 ASSESSMENT — PAIN DESCRIPTION - DESCRIPTORS
DESCRIPTORS: SHARP;RADIATING
DESCRIPTORS: CONSTANT;ACHING

## 2022-02-24 ASSESSMENT — PAIN SCALES - GENERAL
PAINLEVEL_OUTOF10: 0
PAINLEVEL_OUTOF10: 10
PAINLEVEL_OUTOF10: 0
PAINLEVEL_OUTOF10: 8

## 2022-02-24 ASSESSMENT — PAIN DESCRIPTION - PROGRESSION: CLINICAL_PROGRESSION: NOT CHANGED

## 2022-02-24 ASSESSMENT — PAIN DESCRIPTION - ORIENTATION
ORIENTATION: RIGHT
ORIENTATION: OTHER (COMMENT)

## 2022-02-24 ASSESSMENT — PAIN DESCRIPTION - FREQUENCY
FREQUENCY: CONTINUOUS
FREQUENCY: CONTINUOUS

## 2022-02-24 ASSESSMENT — PAIN DESCRIPTION - ONSET
ONSET: ON-GOING
ONSET: ON-GOING

## 2022-02-24 ASSESSMENT — PAIN DESCRIPTION - LOCATION
LOCATION: ABDOMEN
LOCATION: BACK;CHEST;ABDOMEN

## 2022-02-24 ASSESSMENT — PAIN DESCRIPTION - PAIN TYPE
TYPE: ACUTE PAIN
TYPE: ACUTE PAIN

## 2022-02-24 ASSESSMENT — PAIN - FUNCTIONAL ASSESSMENT: PAIN_FUNCTIONAL_ASSESSMENT: ACTIVITIES ARE NOT PREVENTED

## 2022-02-24 NOTE — PROGRESS NOTES
CARDIOLOGY PROGRESS NOTE                                                  Name:  Pina Messina /Age/Sex: 1965  (55 y.o. female)   MRN & CSN:  [id number] & [id number] Admission Date/Time: 2022  9:32 AM   Location:  - PCP: Nelida Leventhal, MD         Admit Date:  2022  Hospital Day: 3      SUBJECTIVE:   Seen patient as follow up as consultation for STEMI     No chest pain. Mild shortness of breath  No palpations    TELEMETRY: SR       Intake/Output Summary (Last 24 hours) at 2022 1058  Last data filed at 2022 1703  Gross per 24 hour   Intake 240 ml   Output --   Net 240 ml       Assessment/Plan:     1. Coronary artery disease  2. Subacute in-stent thrombosis /STEMI    S/p PCI RCA  Continue aspirin Brilinta 1 year  S/p Integrilin  Continue with beta-blocker, Coreg 3.125 twice daily  Continue with high intensity statin. Crestor 40 mg daily  Echocardiogram reviewed preserved EF no significant wall motion abnormalities. Outpatient follow-up, cardiac rehab  On Lasix, decrease dose to 20 mg daily    3. Anemia: Requiring recent blood transfusions: GI consultation. Plans for EGD noted. Hemoglobin hematocrit reviewed, stable. 4. Hyperlipidemia: Continue with high intensity statin. Crestor 40 mg daily  5. Essential hypertension: Continue with Coreg 3.125 twice daily      Case discussed with patient and family at bedside      Past medical history:    has a past medical history of Anal cancer ([facility] [address] Asthma, Broken ankle, CAD (coronary artery disease), H/O cardiovascular stress test, NSTEMI (non-ST elevated myocardial infarction) ([address] PONV (postoperative nausea and vomiting), and Skin cancer. Past surgical history:   has a past surgical history that includes Percutaneous Transluminal Coronary Angio (); Colon surgery (); Ankle surgery (Left, 10+ yrs ago); Tubal ligation (25 yrs ago); Tunneled venous port placement ();  Tonsillectomy; other surgical history (04-); fracture surgery; Colonoscopy (3/4/15); and Colonoscopy (03/15/2017). Social History:   reports that she has quit smoking. Her smoking use included cigarettes. She has a 8.75 pack-year smoking history. She has never used smokeless tobacco. She reports that she does not drink alcohol and does not use drugs. Family history:  family history includes Cancer in her brother, father, and mother; High Cholesterol in her father; No Known Problems in her daughter; Stroke in her father and sister. OBJECTIVE:     /86   Pulse 103   Temp 98.6 °F (37 °C) (Oral)   Resp 19   Ht 5' 2\" (1.575 m)   Wt 197 lb 5 oz (89.5 kg)   SpO2 92%   BMI 36.09 kg/m²       Intake/Output Summary (Last 24 hours) at 2/24/2022 1058  Last data filed at 2/23/2022 1703  Gross per 24 hour   Intake 240 ml   Output --   Net 240 ml       Physical Exam:    Constitutional:  Well developed, Well nourished, No acute distress, Non-toxic appearance. HENT:  Normocephalic, Atraumatic, Bilateral external ears normal, Oropharynx moist, No oral exudates, Nose normal. Neck- Normal range of motion, No tenderness, Supple, No stridor. Eyes:  EOMI, Conjunctiva normal, No discharge. Respiratory:  Normal breath sounds, No respiratory distress, No wheezing, No chest tenderness. ,no use of accessory muscles, diaphragm movement is normal  Cardiovascular S1-S2 No Murmurs, added sounds. Normal rate rhythm. No rubs gallops. Carotid pulses and amplitude are normal no bruit noted. Pedal pulses normal femoral pulses normal.  No pedal edema  GI:  Bowel sounds normal, Soft, No tenderness  : No CVA tenderness. Musculoskeletal: No edema, No tenderness, No cyanosis, No clubbing. Back- No tenderness. Integument:  Warm, Dry, No erythema, No rash. Lymphatic:  No lymphadenopathy noted. Neurologic:  Alert & oriented x 3, No focal deficits noted.    Psychiatric:  Affect normal, Judgment normal, Mood normal.           MEDICATIONS:     lidocaine  1 patch TransDERmal Daily    furosemide  20 mg Oral Daily    sodium chloride flush  5-40 mL IntraVENous 2 times per day    carvedilol  3.125 mg Oral BID WC    aspirin  81 mg Oral Daily    ticagrelor  90 mg Oral BID    rosuvastatin  40 mg Oral Nightly    therapeutic multivitamin-minerals  1 tablet Oral Daily    potassium chloride  20 mEq Oral Daily    ketotifen  1 drop Both Eyes BID      sodium chloride 30 mL/hr at 02/22/22 1056    pantoprozole (PROTONIX) infusion 8 mg/hr (02/24/22 0910)     morphine, melatonin, sodium chloride flush, sodium chloride, acetaminophen, nitroGLYCERIN, ondansetron, albuterol sulfate HFA **AND** ipratropium  Allergies   Allergen Reactions    Vicodin Hp [Hydrocodone-Acetaminophen] Other (See Comments)     Keep me awake    Tylenol With Codeine #3 [Acetaminophen-Codeine] Nausea And Vomiting       Lab Data:  CBC:   Recent Labs     02/23/22  1622 02/23/22  2135 02/24/22  0450   WBC 7.4 7.2 7.1   HGB 9.0* 8.8* 8.5*   HCT 29.8* 29.2* 28.2*   MCV 89.0 89.0 88.4    378 367     BMP:   Recent Labs     02/22/22  0935 02/23/22  0402 02/24/22  0450    138 133*   K 4.5 4.3 3.8   CL 97* 103 100   CO2 22 22 24   BUN 17 17 17   CREATININE 0.9 0.8 1.1          Prasanna Banks MD, MD 2/24/2022 10:58 AM

## 2022-02-24 NOTE — PROGRESS NOTES
Progress Note  Date:2022       Room:[facility]-A  Patient Jayden Tristan     YOB: 1965     Age:56 y.o. Has rib on right from her fall  Subjective    Subjective:  Symptoms:  Stable. Pain:  She complains of pain that is mild. Review of Systems  Objective         Vitals Last 24 Hours:  TEMPERATURE:  Temp  Av.6 °F (37 °C)  Min: 98.3 °F (36.8 °C)  Max: 98.8 °F (37.1 °C)  RESPIRATIONS RANGE: Resp  Av.8  Min: 11  Max: 28  PULSE OXIMETRY RANGE: SpO2  Av.7 %  Min: 89 %  Max: 96 %  PULSE RANGE: Pulse  Av.7  Min: 84  Max: 120  BLOOD PRESSURE RANGE: Systolic (87ACK), DSR:934 , Min:94 , WQF:291   ; Diastolic (54NKK), DOY:90, Min:60, Max:125    I/O (24Hr): Intake/Output Summary (Last 24 hours) at 2022 0733  Last data filed at 2022 1703  Gross per 24 hour   Intake 480 ml   Output --   Net 480 ml     Objective:  General Appearance:  Comfortable. Vital signs: (most recent): Blood pressure 113/73, pulse 89, temperature 98.6 °F (37 °C), temperature source Oral, resp. rate 14, height 5' 2\" (1.575 m), weight 197 lb 5 oz (89.5 kg), SpO2 92 %, not currently breastfeeding. Vital signs are normal.    HEENT: Normal HEENT exam.    Lungs:  Normal effort. Heart: Normal rate. Abdomen: Abdomen is soft. (Near umbilicus and no hernia). Neurological: Patient is alert. Pupils:  Pupils are equal, round, and reactive to light. Skin:  Warm.       Labs/Imaging/Diagnostics    Labs:  CBC:  Recent Labs     22  1622 22  2135 22  0450   WBC 7.4 7.2 7.1   RBC 3.35* 3.28* 3.19*   HGB 9.0* 8.8* 8.5*   HCT 29.8* 29.2* 28.2*   MCV 89.0 89.0 88.4   RDW 14.4 14.3 14.3    378 367     CHEMISTRIES:  Recent Labs     22  0935 22  0402 22  0450    138 133*   K 4.5 4.3 3.8   CL 97* 103 100   CO2    BUN 17 17 17   CREATININE 0.9 0.8 1.1   GLUCOSE 167* 112* 107*     PT/INR:No results for input(s): PROTIME, INR in the last 72 hours.  APTT:No results for input(s): APTT in the last 72 hours. LIVER PROFILE:No results for input(s): AST, ALT, BILIDIR, BILITOT, ALKPHOS in the last 72 hours. Imaging Last 24 Hours:  Echocardiogram limited    Result Date: 2/22/2022  Transthoracic Echocardiography Report (TTE)  Demographics   Patient Name        Hoda Thao  Date of Study        02/22/2022   Date of Birth       1965      Gender               Female   Age                 55 year(s)      Race                    Patient Number      [id number]      Room Number          2120   Visit Number        [id number]   Corporate ID        [id number]   Accession Number    [id number]      Marcio Vasquez RDMS   Ordering Physician  Kelli Stewart MD  Interpreting         Tamar Ryder MD                                      Physician  Procedure Type of Study   TTE procedure:ECHOCARDIOGRAM LIMITED. Procedure Date Date: 02/22/2022 Start: 03:33 PM Study Location: Portable Technical Quality: Technically difficult study Indications:STEMI. Patient Status: Routine Height: 62 inches Weight: 186 pounds BSA: 1.85 m2 BMI: 34.02 kg/m2 HR: 92 bpm BP: 171/99 mmHg  Conclusions   Summary   Left ventricular systolic function is normal.  Ejection fraction is visually estimated at 55-60%. No wall motion abnormalities detected. No significant valvular disease noted. No evidence of any pericardial effusion. Signature   ------------------------------------------------------------------  Electronically signed by Tamar Ryder MD (Interpreting  physician) on 02/22/2022 at 04:00 PM  ------------------------------------------------------------------   Findings   Left Ventricle  Left ventricular systolic function is normal.  Ejection fraction is visually estimated at 55-60%. E/A flow reversal is noted. No wall motion abnormalities detected.    Left Atrium  Essentially normal left atrium. Right Atrium  Essentially normal right atrium. Right Ventricle  Essentially normal right ventricle. Aortic Valve  Trace aortic regurgitation noted. Mitral Valve  Trace mitral regurgitation. Tricuspid Valve  Mild tricuspid regurgitation; RVSP: 38 mmHg. Pulmonic Valve  The pulmonic valve was not well visualized. Pericardial Effusion  No evidence of any pericardial effusion. Pleural Effusion  No evidence of pleural effusion.   M-Mode/2D Measurements & Calculations   LV Diastolic Dimension: 1.44 cm LV Systolic Dimension: 4.36 cm  LV FS:27.4 %                    LV Volume Diastolic: 40 ml  LV PW Diastolic: 5.75 cm        LV Volume Systolic: 17 ml  LV PW Systolic: 8.87 cm         LV EDV/LV EDV Index: 40 ml/22 m2LV ESV/LV  Septum Diastolic: 1.2 cm        ESV Index: 17 ml/9 m2  Septum Systolic: 0.58 cm        EF Calculated (A4C): 57.5 %                                  EF Calculated (2D): 54.1 %   LV Area Diastolic: 96.6 cm2     LV Length: 5.63 cm  LV Area Systolic: 4.83 cm2  Doppler Measurements & Calculations                               Estimated RVSP: 38 mmHg                              Estimated RAP:3 mmHg    Estimated PASP: 33.69 mmHg TR Velocity:277 cm/s                              TR Gradient:30.69 mmHg      Assessment//Plan           Hospital Problems           Last Modified POA    * (Principal) STEMI (ST elevation myocardial infarction) ([address] 2/22/2022 Yes    Gastrointestinal hemorrhage 2/23/2022 Yes    Anemia 2/23/2022 Yes    Pain of upper abdomen 2/23/2022 Yes    Anticoagulated 2/23/2022 Yes        Assessment & Plan  STEMI  -RCA occluded stent and required another PCI  -ASA, brilinta, statin  -echo EF 50%   Anemia with recent GI bleed  -check H/H   -PPI drip as her effient was recently stopped as was anemic at soin and cscope showed diverticulosis  HTN  -BB  Right rib pain  -lidoderm patch  -CXR right ribs  HPL  -lipid panel wnl  -on statin for anti inflammatory effect also COPD  -inhalers     Transfer out of ICU  Electronically signed by Rizwan Castrejon MD on 2/23/22 at 8:02 AM EST

## 2022-02-24 NOTE — PROGRESS NOTES
Jesica Tony BSN RN clinical  for [facility] SURGICAL [facility] RN students 07-19:00 this date.

## 2022-02-25 ENCOUNTER — ANESTHESIA EVENT (OUTPATIENT)
Dept: ENDOSCOPY | Age: 57
DRG: 247 | End: 2022-02-25
Payer: COMMERCIAL

## 2022-02-25 ENCOUNTER — APPOINTMENT (OUTPATIENT)
Dept: CT IMAGING | Age: 57
DRG: 247 | End: 2022-02-25
Payer: COMMERCIAL

## 2022-02-25 LAB
ANION GAP SERPL CALCULATED.3IONS-SCNC: 14 MMOL/L (ref 4–16)
BASOPHILS ABSOLUTE: 0 K/CU MM
BASOPHILS RELATIVE PERCENT: 0.3 % (ref 0–1)
BUN BLDV-MCNC: 17 MG/DL (ref 6–23)
CALCIUM SERPL-MCNC: 8.6 MG/DL (ref 8.3–10.6)
CHLORIDE BLD-SCNC: 102 MMOL/L (ref 99–110)
CO2: 21 MMOL/L (ref 21–32)
CREAT SERPL-MCNC: 1 MG/DL (ref 0.6–1.1)
DIFFERENTIAL TYPE: ABNORMAL
EOSINOPHILS ABSOLUTE: 0.1 K/CU MM
EOSINOPHILS RELATIVE PERCENT: 1.2 % (ref 0–3)
GFR AFRICAN AMERICAN: >60 ML/MIN/1.73M2
GFR NON-AFRICAN AMERICAN: 58 ML/MIN/1.73M2
GLUCOSE BLD-MCNC: 99 MG/DL (ref 70–99)
HCT VFR BLD CALC: 27 % (ref 37–47)
HCT VFR BLD CALC: 27.9 % (ref 37–47)
HEMOGLOBIN: 8.3 GM/DL (ref 12.5–16)
HEMOGLOBIN: 8.6 GM/DL (ref 12.5–16)
IMMATURE NEUTROPHIL %: 1.5 % (ref 0–0.43)
LYMPHOCYTES ABSOLUTE: 2.3 K/CU MM
LYMPHOCYTES RELATIVE PERCENT: 31.7 % (ref 24–44)
MCH RBC QN AUTO: 27.8 PG (ref 27–31)
MCHC RBC AUTO-ENTMCNC: 30.7 % (ref 32–36)
MCV RBC AUTO: 90.3 FL (ref 78–100)
MONOCYTES ABSOLUTE: 0.8 K/CU MM
MONOCYTES RELATIVE PERCENT: 10.6 % (ref 0–4)
NUCLEATED RBC %: 0 %
PDW BLD-RTO: 14.3 % (ref 11.7–14.9)
PLATELET # BLD: 344 K/CU MM (ref 140–440)
PMV BLD AUTO: 9.4 FL (ref 7.5–11.1)
POTASSIUM SERPL-SCNC: 3.8 MMOL/L (ref 3.5–5.1)
RBC # BLD: 2.99 M/CU MM (ref 4.2–5.4)
SEGMENTED NEUTROPHILS ABSOLUTE COUNT: 4 K/CU MM
SEGMENTED NEUTROPHILS RELATIVE PERCENT: 54.7 % (ref 36–66)
SODIUM BLD-SCNC: 137 MMOL/L (ref 135–145)
TOTAL IMMATURE NEUTOROPHIL: 0.11 K/CU MM
TOTAL NUCLEATED RBC: 0 K/CU MM
WBC # BLD: 7.3 K/CU MM (ref 4–10.5)

## 2022-02-25 PROCEDURE — 6360000004 HC RX CONTRAST MEDICATION: Performed by: SPECIALIST

## 2022-02-25 PROCEDURE — 99233 SBSQ HOSP IP/OBS HIGH 50: CPT | Performed by: INTERNAL MEDICINE

## 2022-02-25 PROCEDURE — 85025 COMPLETE CBC W/AUTO DIFF WBC: CPT

## 2022-02-25 PROCEDURE — APPSS60 APP SPLIT SHARED TIME 46-60 MINUTES: Performed by: NURSE PRACTITIONER

## 2022-02-25 PROCEDURE — 6370000000 HC RX 637 (ALT 250 FOR IP): Performed by: INTERNAL MEDICINE

## 2022-02-25 PROCEDURE — 6370000000 HC RX 637 (ALT 250 FOR IP): Performed by: SPECIALIST

## 2022-02-25 PROCEDURE — 80048 BASIC METABOLIC PNL TOTAL CA: CPT

## 2022-02-25 PROCEDURE — 85014 HEMATOCRIT: CPT

## 2022-02-25 PROCEDURE — 6360000002 HC RX W HCPCS: Performed by: NURSE PRACTITIONER

## 2022-02-25 PROCEDURE — 6370000000 HC RX 637 (ALT 250 FOR IP): Performed by: HOSPITALIST

## 2022-02-25 PROCEDURE — 2140000000 HC CCU INTERMEDIATE R&B

## 2022-02-25 PROCEDURE — 99232 SBSQ HOSP IP/OBS MODERATE 35: CPT | Performed by: SPECIALIST

## 2022-02-25 PROCEDURE — 94761 N-INVAS EAR/PLS OXIMETRY MLT: CPT

## 2022-02-25 PROCEDURE — 85018 HEMOGLOBIN: CPT

## 2022-02-25 PROCEDURE — 36415 COLL VENOUS BLD VENIPUNCTURE: CPT

## 2022-02-25 PROCEDURE — 74177 CT ABD & PELVIS W/CONTRAST: CPT

## 2022-02-25 PROCEDURE — 2580000003 HC RX 258: Performed by: INTERNAL MEDICINE

## 2022-02-25 RX ORDER — ONDANSETRON 2 MG/ML
4 INJECTION INTRAMUSCULAR; INTRAVENOUS ONCE
Status: COMPLETED | OUTPATIENT
Start: 2022-02-25 | End: 2022-02-25

## 2022-02-25 RX ORDER — PANTOPRAZOLE SODIUM 40 MG/1
40 TABLET, DELAYED RELEASE ORAL
Status: DISCONTINUED | OUTPATIENT
Start: 2022-02-25 | End: 2022-02-26

## 2022-02-25 RX ORDER — MORPHINE SULFATE 2 MG/ML
1 INJECTION, SOLUTION INTRAMUSCULAR; INTRAVENOUS ONCE
Status: COMPLETED | OUTPATIENT
Start: 2022-02-25 | End: 2022-02-25

## 2022-02-25 RX ORDER — DULOXETIN HYDROCHLORIDE 30 MG/1
30 CAPSULE, DELAYED RELEASE ORAL DAILY
Status: DISCONTINUED | OUTPATIENT
Start: 2022-02-25 | End: 2022-02-26 | Stop reason: HOSPADM

## 2022-02-25 RX ADMIN — CARVEDILOL 3.12 MG: 3.12 TABLET, FILM COATED ORAL at 16:46

## 2022-02-25 RX ADMIN — POTASSIUM CHLORIDE 20 MEQ: 1500 TABLET, EXTENDED RELEASE ORAL at 11:29

## 2022-02-25 RX ADMIN — ONDANSETRON 4 MG: 2 INJECTION INTRAMUSCULAR; INTRAVENOUS at 20:40

## 2022-02-25 RX ADMIN — TICAGRELOR 90 MG: 90 TABLET ORAL at 11:28

## 2022-02-25 RX ADMIN — PANTOPRAZOLE SODIUM 40 MG: 40 TABLET, DELAYED RELEASE ORAL at 16:46

## 2022-02-25 RX ADMIN — DULOXETINE HYDROCHLORIDE 30 MG: 30 CAPSULE, DELAYED RELEASE ORAL at 11:28

## 2022-02-25 RX ADMIN — ASPIRIN 81 MG: 81 TABLET, COATED ORAL at 11:29

## 2022-02-25 RX ADMIN — ROSUVASTATIN 40 MG: 40 TABLET, FILM COATED ORAL at 21:43

## 2022-02-25 RX ADMIN — IOPAMIDOL 80 ML: 755 INJECTION, SOLUTION INTRAVENOUS at 10:14

## 2022-02-25 RX ADMIN — MORPHINE SULFATE 1 MG: 2 INJECTION, SOLUTION INTRAMUSCULAR; INTRAVENOUS at 20:40

## 2022-02-25 RX ADMIN — SODIUM CHLORIDE, PRESERVATIVE FREE 10 ML: 5 INJECTION INTRAVENOUS at 20:40

## 2022-02-25 RX ADMIN — FUROSEMIDE 20 MG: 20 TABLET ORAL at 11:28

## 2022-02-25 RX ADMIN — TICAGRELOR 90 MG: 90 TABLET ORAL at 20:40

## 2022-02-25 ASSESSMENT — PAIN DESCRIPTION - PROGRESSION
CLINICAL_PROGRESSION: GRADUALLY WORSENING
CLINICAL_PROGRESSION: NOT CHANGED
CLINICAL_PROGRESSION: NOT CHANGED
CLINICAL_PROGRESSION: GRADUALLY WORSENING
CLINICAL_PROGRESSION: GRADUALLY WORSENING

## 2022-02-25 ASSESSMENT — PAIN DESCRIPTION - PAIN TYPE
TYPE: ACUTE PAIN
TYPE: CHRONIC PAIN

## 2022-02-25 ASSESSMENT — PAIN SCALES - GENERAL
PAINLEVEL_OUTOF10: 0
PAINLEVEL_OUTOF10: 8
PAINLEVEL_OUTOF10: 3

## 2022-02-25 ASSESSMENT — PAIN DESCRIPTION - FREQUENCY: FREQUENCY: INTERMITTENT

## 2022-02-25 ASSESSMENT — PAIN DESCRIPTION - LOCATION
LOCATION: BACK;ABDOMEN
LOCATION: BUTTOCKS

## 2022-02-25 ASSESSMENT — PAIN - FUNCTIONAL ASSESSMENT: PAIN_FUNCTIONAL_ASSESSMENT: ACTIVITIES ARE NOT PREVENTED

## 2022-02-25 ASSESSMENT — PAIN DESCRIPTION - DESCRIPTORS: DESCRIPTORS: ACHING;BURNING

## 2022-02-25 ASSESSMENT — PAIN DESCRIPTION - ONSET: ONSET: GRADUAL

## 2022-02-25 NOTE — ANESTHESIA PRE PROCEDURE
Department of Anesthesiology  Preprocedure Note       Name:  Dennis Pozo   Age:  55 y.o.  :  1965                                          MRN:  [id number]         Date:  2022      Surgeon: Jaden Sullivan):  Amaya Gandara MD    Procedure: Procedure(s):  EGD ESOPHAGOGASTRODUODENOSCOPY    Medications prior to admission:   Prior to Admission medications    Medication Sig Start Date End Date Taking? Authorizing Provider   pantoprazole (PROTONIX) 40 MG tablet Take 40 mg by mouth daily   Yes Historical Provider, MD   olopatadine (PATANOL) 0.1 % ophthalmic solution Place 1 drop into both eyes 2 times daily   Yes Historical Provider, MD   potassium chloride (KLOR-CON) 20 MEQ packet Take 20 mEq by mouth 2 times daily   Yes Historical Provider, MD   furosemide (LASIX) 40 MG tablet Take 40 mg by mouth 2 times daily   Yes Historical Provider, MD   carvedilol (COREG) 6.25 MG tablet TAKE ONE TABLET BY MOUTH TWICE A DAY WITH A MEAL  Patient taking differently: 3.125 mg 2 times daily  10/14/21  Yes Leon Pulido MD   DULoxetine (CYMBALTA) 20 MG extended release capsule Take 3 mg by mouth daily  21 Yes Historical Provider, MD   nitroGLYCERIN (NITROSTAT) 0.4 MG SL tablet up to max of 3 total doses.  If no relief after 1 dose, call 911. 3/11/21  Yes Jeremi Luque MD   Multiple Vitamin (MULTIVITAMIN) capsule Take 1 capsule by mouth daily   Yes Historical Provider, MD   aspirin 81 MG chewable tablet Take 1 tablet by mouth daily 16  Yes Tia Shukla MD   Cyanocobalamin 2500 MCG TABS Take 2 tablets by mouth daily  Patient not taking: Reported on 2021    Historical Provider, MD   tiZANidine (ZANAFLEX) 4 MG tablet Take 4 mg by mouth every 6 hours as needed    Historical Provider, MD   loperamide (IMODIUM) 2 MG capsule Take 2 mg by mouth as needed for Diarrhea  Patient not taking: Reported on 2021    Historical Provider, MD   hydrOXYzine (ATARAX) 25 MG tablet daily as needed  21   Historical Provider, MD   albuterol-ipratropium (COMBIVENT RESPIMAT)  MCG/ACT AERS inhaler Inhale into the lungs 5/15/17   Historical Provider, MD       Current medications:    Current Facility-Administered Medications   Medication Dose Route Frequency Provider Last Rate Last Admin    DULoxetine (CYMBALTA) extended release capsule 30 mg  30 mg Oral Daily Shree Maria Dolores Gracía MD   30 mg at 02/25/22 1128    pantoprazole (PROTONIX) tablet 40 mg  40 mg Oral BID DIMPLE Boswell MD        lidocaine 4 % external patch 1 patch  1 patch TransDERmal Daily Drake Carpenter MD   1 patch at 02/25/22 0835    morphine injection 1 mg  1 mg IntraVENous Q4H PRN Drake Carpenter MD        furosemide (LASIX) tablet 20 mg  20 mg Oral Daily Enid Nails MD   20 mg at 02/25/22 1128    melatonin tablet 3 mg  3 mg Oral Nightly PRN Drake Carpenter MD   3 mg at 02/23/22 2047    sodium chloride flush 0.9 % injection 5-40 mL  5-40 mL IntraVENous 2 times per day Mirtha Vasquez MD   10 mL at 02/24/22 2033    sodium chloride flush 0.9 % injection 5-40 mL  5-40 mL IntraVENous PRN Mirtha Vasquez MD        0.9 % sodium chloride infusion  25 mL IntraVENous PRN Mirtha Vasquez MD 30 mL/hr at 02/22/22 1056 Rate Verify at 02/22/22 1056    acetaminophen (TYLENOL) tablet 650 mg  650 mg Oral Q4H PRN Mirtha Vasquez MD        carvedilol (COREG) tablet 3.125 mg  3.125 mg Oral BID [facility] Maria Dolores García MD   3.125 mg at 02/24/22 1735    aspirin EC tablet 81 mg  81 mg Oral Daily Mirtha Vasquez MD   81 mg at 02/25/22 1129    ticagrelor (BRILINTA) tablet 90 mg  90 mg Oral BID Mirtha Vasquez MD   90 mg at 02/25/22 1128    rosuvastatin (CRESTOR) tablet 40 mg  40 mg Oral Nightly Mirtha Vasquez MD   40 mg at 02/24/22 2037    nitroGLYCERIN (NITROSTAT) SL tablet 0.4 mg  0.4 mg SubLINGual Q5 Min PRN Mirtha Vasquez MD   0.4 mg at 02/22/22 1055    ondansetron (ZOFRAN) injection 4 mg  4 mg IntraVENous Q6H PRN Drake Carpenter MD 4 mg at 02/23/22 0744    therapeutic multivitamin-minerals 1 tablet  1 tablet Oral Daily Sweetie Robertson MD        potassium chloride (KLOR-CON M) extended release tablet 20 mEq  20 mEq Oral Daily Sweetie Robertson MD   20 mEq at 02/25/22 1129    ketotifen (ZADITOR) 0.025 % ophthalmic solution 1 drop  1 drop Both Eyes BID Sweetie Robertson MD        albuterol sulfate  (90 Base) MCG/ACT inhaler 1 puff  1 puff Inhalation Q4H PRN Sweetie Robertson MD        And    ipratropium (ATROVENT HFA) 17 MCG/ACT inhaler 1 puff  1 puff Inhalation Q4H PRN Sweetie Robertson MD           Allergies:     Allergies   Allergen Reactions    Vicodin Hp [Hydrocodone-Acetaminophen] Other (See Comments)     Keep me awake    Tylenol With Codeine #3 [Acetaminophen-Codeine] Nausea And Vomiting       Problem List:    Patient Active Problem List   Diagnosis Code    Chest pain R07.9    NSTEMI (non-ST elevated myocardial infarction) ([facility] [address] I21.4    Coronary artery disease involving native coronary artery of native heart with unstable angina pectoris ([facility] [address] I25.110    Mild intermittent asthma without complication D28.20    Pain in both lower extremities M79.604, M79.605    Dizziness R42    Palpitations R00.2    Primary osteoarthritis of left hip M16.12    Avascular necrosis of left femur ([facility] [address] M87.052    Unstable angina (HCC) I20.0    Dyslipidemia E78.5    Primary hypertension I10    STEMI (ST elevation myocardial infarction) ([facility] [address] I21.3    Gastrointestinal hemorrhage K92.2    Anemia D64.9    Pain of upper abdomen R10.10    Anticoagulated Z79.01       Past Medical History:        Diagnosis Date    Anal cancer ([facility] [address]     per old chart 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 and tx with chemo and radiation- followed with Dr Marla Paredes Asthma     Broken ankle     CAD (coronary artery disease)     \"have 3 heart stents\" use to see Dr Estrella Patient H/O cardiovascular stress test 1/6/2016    treadmill    NSTEMI (non-ST elevated myocardial infarction) [facility] [address]     PONV (postoperative nausea and vomiting)     hx motion sickness    Skin cancer        Past Surgical History:        Procedure Laterality Date    ANKLE SURGERY Left 10+ yrs ago    with hardware    COLON SURGERY  2009    \"after had chemo removed some part of the cancer from the rectal area, then 7 months later had bowel blockage had to do surgery  to remove part of the bowel\"    COLONOSCOPY  3/4/15    mild proctitis    COLONOSCOPY  03/15/2017    mild radia proc, hypertroph pailla    FRACTURE SURGERY      left ankle    OTHER SURGICAL HISTORY  04-    mediport removal    PTCA  2009 4/22/2009 had angioplasty with stent to LAD & another day in 2009 stent x 2 to RCA done    TONSILLECTOMY      as a kid age 7   [address]  25 yrs ago    TUNNELED VENOUS PORT PLACEMENT  2009    port inserted        Social History:    Social History     Tobacco Use    Smoking status: Former Smoker     Packs/day: 0.25     Years: 35.00     Pack years: 8.75     Types: Cigarettes    Smokeless tobacco: Never Used    Tobacco comment: 6-7 cigarettes    Substance Use Topics    Alcohol use: No     Comment: \"quit 3/2015\"use to drink a couple of times per week before\"                                Counseling given: Not Answered  Comment: 6-7 cigarettes       Vital Signs (Current):   Vitals:    02/25/22 0335 02/25/22 1115 02/25/22 1124 02/25/22 1243   BP: 85/65 115/78  126/75   Pulse: 87 100 78 106   Resp: 17 15  19   Temp: 36.9 °C (98.4 °F) 37 °C (98.6 °F)  36.7 °C (98 °F)   TempSrc: Oral Oral  Oral   SpO2: 93% 96%     Weight:       Height:                                                  BP Readings from Last 3 Encounters:   02/25/22 126/75   12/20/21 136/80   06/18/21 122/78       NPO Status:                                                                                 BMI:   Wt Readings from Last 3 Encounters:   02/22/22 197 lb 5 oz (89.5 kg)   12/20/21 183 lb 3.2 oz (83.1 kg) 11/16/21 172 lb (78 kg)     Body mass index is 36.09 kg/m². CBC:   Lab Results   Component Value Date    WBC 7.3 02/25/2022    RBC 2.99 02/25/2022    HGB 8.3 02/25/2022    HCT 27.0 02/25/2022    MCV 90.3 02/25/2022    RDW 14.3 02/25/2022     02/25/2022       CMP:   Lab Results   Component Value Date     02/25/2022    K 3.8 02/25/2022     02/25/2022    CO2 21 02/25/2022    BUN 17 02/25/2022    CREATININE 1.0 02/25/2022    GFRAA >60 02/25/2022    AGRATIO 1.5 02/11/2022    AGRATIO 1.5 02/11/2022    LABGLOM 58 02/25/2022    GLUCOSE 99 02/25/2022    PROT 6.6 02/11/2022    PROT 6.6 02/11/2022    PROT 7.6 04/10/2012    CALCIUM 8.6 02/25/2022    BILITOT 0.3 02/11/2022    BILITOT 0.3 02/11/2022    ALKPHOS 176 02/11/2022    ALKPHOS 176 02/11/2022    AST 32 02/11/2022    AST 32 02/11/2022    ALT 23 02/11/2022    ALT 23 02/11/2022       POC Tests: No results for input(s): POCGLU, POCNA, POCK, POCCL, POCBUN, POCHEMO, POCHCT in the last 72 hours. Coags:   Lab Results   Component Value Date    PROTIME 10.5 04/23/2015    INR 0.92 04/23/2015    APTT 27.5 04/23/2015       HCG (If Applicable):   Lab Results   Component Value Date    PREGTESTUR NEGATIVE 10/03/2015        ABGs: No results found for: PHART, PO2ART, IEQ4ZIO, KME1SAA, BEART, S1XEGHUH     Type & Screen (If Applicable):  No results found for: LABABO, LABRH    Drug/Infectious Status (If Applicable):  No results found for: HIV, HEPCAB    COVID-19 Screening (If Applicable):   Lab Results   Component Value Date    COVID19 NOT DETECTED 02/22/2022           Anesthesia Evaluation  Patient summary reviewed   history of anesthetic complications: PONV.   Airway:         Dental:          Pulmonary:   (+) asthma:                           ROS comment: Former Smoker - 8.75 pack years   Cardiovascular:    (+) hypertension:, past MI: < 1 month, CAD:, CABG/stent:, hyperlipidemia               ROS comment: Echo 2/22/202:  Left ventricular systolic function is normal.   Ejection fraction is visually estimated at 55-60%. No wall motion abnormalities detected. No significant valvular disease noted. No evidence of any pericardial effusion. Echo 2/22/2022:  Diagnostic procedure:Coronary Angiogram without LHC, Right   Coronary Angiography, Left Coronary Angiography      PCI procedure:DE Stent, RCA: DE Stent Plcmt Initl Vsl, Balloon   Angioplasty, RCA: Balloon Angioplasty Initl Vsl, Pt is Referred   to Cardiac Rehab Phase 2 as OP      Conclusions      Procedure Summary   STEMI presenting with 100% occlusion of RCA stent with thrombus,   s/p PTCA and then PCI with SOUMYA with excellent results      Recommendations   load brilinta, aspirin, integrillin, echo     Neuro/Psych:   Negative Neuro/Psych ROS              GI/Hepatic/Renal:   (+) GERD:, morbid obesity         ROS comment: GIB    per old chart 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 Endo/Other:    (+) blood dyscrasia: anemia:., malignancy/cancer. Abdominal:             Vascular: negative vascular ROS. Other Findings:             Anesthesia Plan      MAC     ASA 4       Induction: intravenous. FLAVIO Catherine - CRNA   2/25/2022         Pre Anesthesia Assessment complete.  Chart reviewed on 2/25/2022

## 2022-02-25 NOTE — CONSULTS
Nutrition Education    heart healthy diet education, pt does not have access to internet would like different recipes    · Verbally reviewed information with Patient  · Educated on Heart Healthy Nutrition Therapy, Heart Healthy Label Reading Tips (Nutrition Care Manual) and heart healthy recipes (AHA)  · Written educational materials provided. · Contact name and number provided. · Refer to Patient Education activity for more details.     Electronically signed by Flavio Cheema RD, JED on 2/25/22 at 2:26 PM EST    Contact: [phone]

## 2022-02-25 NOTE — PROGRESS NOTES
[facility] HOSPITALIST PROGRESS NOTE      PCP: Sarah Patel MD    Date of Admission: 2/22/2022    Subjective: wants to walk and go home tomorrow     [facility] is a 59-year-old female with history of anal cancer status post radiation, coronary artery disease who was admitted with chest pain, STEMI alert called patient underwent heart catheterization at [address] [place] [address] recently. Was anemic at the time. Presented to our hospital with chest pain.   Underwent emergent heart catheterization which showed occluded RCA stent and was unable to balloon, another PCI placed, aspirin and Brilinta restarted, statin continued recent GI bleed, GI consulted  PPI infusion,    Vitals signs:  Afebrile, heart rate 80s to 101 range, blood pressure 85/65, on room air    Medications:  *Aspirin, Coreg, Lasix, potassium chloride, Crestor, ticagrelor, pantoprazole infusion    Antibiotics: None    Fluid status: Not documented    Labs:   Sodium 137, potassium 3.8, chloride 102, bicarb 21, creatinine 1  WBC 7.3, hemoglobin 8.3, platelets 433    Imaging:   Chest x-ray did not show any acute fractures    Assessment/Plan:     NSTEMI: Admitted with chest pain, history of coronary artery disease, subacute in-stent thrombosis  Underwent emergent heart catheterization  Status post Integrilin  Continue aspirin and Brilinta, statin per cardiology  Echocardiogram showed no wall motion abnormalities  Outpatient cardiac rehab Lasix decreased to 20 mg        Acute blood loss anemia: recent GI bleed, Protonix infusion started, GI consulted  EGD planned for today  Continue DAPT since recent stent placement  Monitor H&H every 12 hours, hemoglobin remained stable    Hyperlipidemia: Statin    Essential hypertension: currently hypotensive, hold Coreg, as needed hydralazine    Chronic COPD: As needed duo nebs    Mood disorder resume Cymbalta:    DVT prophlaxis:   SCDs    Last BM:   None since admission    Ambulation:   As tolerated    Disposition:   Home    Diet: Currently n.p.o. Physical Exam Performed:       BP 85/65   Pulse 87   Temp 98.4 °F (36.9 °C) (Oral)   Resp 17   Ht 5' 2\" (1.575 m)   Wt 197 lb 5 oz (89.5 kg)   SpO2 93%   BMI 36.09 kg/m²     Physical Exam  Constitutional:       General: She is not in acute distress. Appearance: Normal appearance. HENT:      Head: Normocephalic and atraumatic. Right Ear: External ear normal.      Left Ear: External ear normal.   Eyes:      Extraocular Movements: Extraocular movements intact. Pupils: Pupils are equal, round, and reactive to light. Cardiovascular:      Rate and Rhythm: Normal rate and regular rhythm. Heart sounds: No murmur heard. Pulmonary:      Effort: Pulmonary effort is normal. No respiratory distress. Breath sounds: Normal breath sounds. No wheezing. Abdominal:      General: Bowel sounds are normal. There is no distension. Palpations: Abdomen is soft. Tenderness: There is no abdominal tenderness. Musculoskeletal:         General: No swelling. Cervical back: Normal range of motion. Comments: RUE bruising    Skin:     General: Skin is warm. Neurological:      General: No focal deficit present. Mental Status: She is alert and oriented to person, place, and time. Cranial Nerves: No cranial nerve deficit. Psychiatric:         Mood and Affect: Mood normal.         Labs:   Recent Labs     02/24/22  1220 02/24/22  2150 02/25/22  0225   WBC 8.8 7.8 7.3   HGB 9.2* 8.5* 8.3*   HCT 30.1* 27.8* 27.0*    367 344     Recent Labs     02/23/22  0402 02/24/22  0450 02/25/22  0225    133* 137   K 4.3 3.8 3.8    100 102   CO2 22 24 21   BUN 17 17 17   CREATININE 0.8 1.1 1.0   CALCIUM 8.8 8.7 8.6     No results for input(s): AST, ALT, BILIDIR, BILITOT, ALKPHOS in the last 72 hours. No results for input(s): INR in the last 72 hours.   No results for input(s): Altman Valentin in the last 72 hours.    Urinalysis:      Lab Results   Component Value Date    NITRU NEGATIVE 10/03/2015    WBCUA 2 10/03/2015    BACTERIA NEGATIVE 10/03/2015    RBCUA 1 10/03/2015    BLOODU NEGATIVE 10/03/2015    SPECGRAV 1.028 10/03/2015       Radiology:  XR RIBS RIGHT INCLUDE CHEST (MIN 3 VIEWS)   Final Result   No acute abnormality detected. Specifically, no rib fractures are found.          CT ABDOMEN PELVIS WO CONTRAST Additional Contrast? None    (Results Pending)   CT ENTEROGRAPHY W CONTRAST    (Results Pending)           Quinn Maldonado MD  2/25/2022 7:33 AM

## 2022-02-25 NOTE — PROGRESS NOTES
Still complaining of central abd pain  EXAM:  Vital signs: BP 85/65   Pulse 87   Temp 98.4 °F (36.9 °C) (Oral)   Resp 17   Ht 5' 2\" (1.575 m)   Wt 197 lb 5 oz (89.5 kg)   SpO2 93%   BMI 36.09 kg/m²   Alert, NAD  Lungs clear to auscultation  Cor: regular rhythm w/o murmer  Abd: soft, non-tender, non distended  Extrem: no edema  Hb stable    IMPRESSION:  1) STEMI  with PCI x 2  2) recent GI bleed (bright red blood and dark stools)- 2 weeks ago- colonoscopy at [facility] showed divertics- no EGD done  3) anemia-- Hb 9.1 at [facility] 9 days ago- stable at 8.3 this morning with no overt GI bleed  4) chronic abd pain- ?? IBS vs small bowel stricture from prior surgery     RECOMMENDATIONS:  1) CT enterography today   2) EGD in am

## 2022-02-25 NOTE — PROGRESS NOTES
CARDIOLOGY PROGRESS NOTE                                                  Name:  Rajwinder Rivero /Age/Sex: 1965  (55 y.o. female)   MRN & CSN:  [id number] & [id number] Admission Date/Time: 2022  9:32 AM   Location:  3125/3125 PCP: Ryan Pradhan MD         Admit Date:  2022  Hospital Day: 4      SUBJECTIVE:   Seen patient as follow up as consultation for STEMI     No chest pain. Mild shortness of breath  No palpations    TELEMETRY: SR       Intake/Output Summary (Last 24 hours) at 2022 1130  Last data filed at 2022 0546  Gross per 24 hour   Intake 368 ml   Output --   Net 368 ml             Past medical history:    has a past medical history of Anal cancer ([facility] [address] Asthma, Broken ankle, CAD (coronary artery disease), H/O cardiovascular stress test, NSTEMI (non-ST elevated myocardial infarction) ([facility] [address] PONV (postoperative nausea and vomiting), and Skin cancer. Past surgical history:   has a past surgical history that includes Percutaneous Transluminal Coronary Angio (); Colon surgery (); Ankle surgery (Left, 10+ yrs ago); Tubal ligation (25 yrs ago); Tunneled venous port placement (); Tonsillectomy; other surgical history (2015); fracture surgery; Colonoscopy (3/4/15); and Colonoscopy (03/15/2017). Social History:   reports that she has quit smoking. Her smoking use included cigarettes. She has a 8.75 pack-year smoking history. She has never used smokeless tobacco. She reports that she does not drink alcohol and does not use drugs. Family history:  family history includes Cancer in her brother, father, and mother; High Cholesterol in her father; No Known Problems in her daughter; Stroke in her father and sister.     OBJECTIVE:     /78   Pulse 78   Temp 98.6 °F (37 °C) (Oral)   Resp 15   Ht 5' 2\" (1.575 m)   Wt 197 lb 5 oz (89.5 kg)   SpO2 96%   BMI 36.09 kg/m²       Intake/Output Summary (Last 24 hours) at 2022 1130  Last data filed at 2/25/2022 0546  Gross per 24 hour   Intake 368 ml   Output --   Net 368 ml       Physical Exam:    Constitutional:  Well developed, Well nourished, No acute distress, Non-toxic appearance. HENT:  Normocephalic, Atraumatic, Bilateral external ears normal, Oropharynx moist, No oral exudates, Nose normal. Neck- Normal range of motion, No tenderness, Supple, No stridor. Eyes:  EOMI, Conjunctiva normal, No discharge. Respiratory:  Normal breath sounds, No respiratory distress, No wheezing, No chest tenderness. ,no use of accessory muscles, diaphragm movement is normal  Cardiovascular S1-S2 No Murmurs, added sounds. Normal rate rhythm. No rubs gallops. Carotid pulses and amplitude are normal no bruit noted. Pedal pulses normal femoral pulses normal.  No pedal edema  GI:  Bowel sounds normal, Soft, No tenderness  : No CVA tenderness. Musculoskeletal: No edema, No tenderness, No cyanosis, No clubbing. Back- No tenderness. Integument:  Warm, Dry, No erythema, No rash. Lymphatic:  No lymphadenopathy noted. Neurologic:  Alert & oriented x 3, No focal deficits noted.    Psychiatric:  Affect normal, Judgment normal, Mood normal.           MEDICATIONS:     DULoxetine  30 mg Oral Daily    pantoprazole  40 mg Oral BID AC    lidocaine  1 patch TransDERmal Daily    furosemide  20 mg Oral Daily    sodium chloride flush  5-40 mL IntraVENous 2 times per day    carvedilol  3.125 mg Oral BID WC    aspirin  81 mg Oral Daily    ticagrelor  90 mg Oral BID    rosuvastatin  40 mg Oral Nightly    therapeutic multivitamin-minerals  1 tablet Oral Daily    potassium chloride  20 mEq Oral Daily    ketotifen  1 drop Both Eyes BID      sodium chloride 30 mL/hr at 02/22/22 1056     morphine, melatonin, sodium chloride flush, sodium chloride, acetaminophen, nitroGLYCERIN, ondansetron, albuterol sulfate HFA **AND** ipratropium  Allergies   Allergen Reactions    Vicodin Hp [Hydrocodone-Acetaminophen] Other (See Comments) Keep me awake    Tylenol With Codeine #3 [Acetaminophen-Codeine] Nausea And Vomiting       Lab Data:  CBC:   Recent Labs     02/24/22  1220 02/24/22  2150 02/25/22  0225   WBC 8.8 7.8 7.3   HGB 9.2* 8.5* 8.3*   HCT 30.1* 27.8* 27.0*   MCV 88.0 89.1 90.3    367 344     BMP:   Recent Labs     02/23/22  0402 02/24/22  0450 02/25/22  0225    133* 137   K 4.3 3.8 3.8    100 102   CO2 22 24 21   BUN 17 17 17   CREATININE 0.8 1.1 1.0          FLAVIO Tate - CNP, MD 2/25/2022 11:30 AM           CARDIOLOGY ATTENDING ADDENDUM    I have seen, spoken to and examined this patient personally, independent of the NP/PAC. I have reviewed the hospital care given to date and reviewed all pertinent labs and imaging. I have spoken with patient, nursing staff and provided written and verbal instructions . The above note has been reviewed. I have spent substantive amount of time in formulating patient care. Physical Exam:    General:   Awake, alert  Head:normal  Eye:normal  Chest:   Clear to auscultation  0 Basilar crackles   Cardiovascular:  S1S2   Abdomen: soft   Extremities:   0 edema  Pulses; palpable      MEDICAL DECISION MAKING :          1. Coronary artery disease  2. Subacute in-stent thrombosis /STEMI    S/p PCI RCA per Dr. Orin Xiong (patient requesting F/U with Dr. Orin Xiong, as outpatient)  Continue aspirin Brilinta 1 year - DO NOT attempt to stop without discussing with cardiology as patient is at high risk for in-stent thrombosis    S/p Integrilin  Continue with beta-blocker, Coreg 3.125 twice daily  Continue with high intensity statin. Crestor 40 mg daily  Echocardiogram reviewed preserved EF no significant wall motion abnormalities. Outpatient follow-up, cardiac rehab  Tolerating PO Lasix, patient appear euvolemic at this time. 3. Anemia: Requiring recent blood transfusions: GI consultation. EGD tomorrow. Hemoglobin hematocrit reviewed, stable.     4. Hyperlipidemia: Continue with high intensity statin. Crestor 40 mg daily    5. Essential hypertension: stable, continue with Coreg 3.125 twice daily      Patient doing fairly well from cardiovascular standpoint.   Please call us with any further questions    Dr. Solomon Pereira MD

## 2022-02-26 ENCOUNTER — ANESTHESIA (OUTPATIENT)
Dept: ENDOSCOPY | Age: 57
DRG: 247 | End: 2022-02-26
Payer: COMMERCIAL

## 2022-02-26 VITALS — SYSTOLIC BLOOD PRESSURE: 94 MMHG | DIASTOLIC BLOOD PRESSURE: 62 MMHG | OXYGEN SATURATION: 96 %

## 2022-02-26 VITALS
TEMPERATURE: 98.2 F | BODY MASS INDEX: 36.31 KG/M2 | HEART RATE: 76 BPM | RESPIRATION RATE: 18 BRPM | HEIGHT: 62 IN | DIASTOLIC BLOOD PRESSURE: 75 MMHG | OXYGEN SATURATION: 96 % | SYSTOLIC BLOOD PRESSURE: 126 MMHG | WEIGHT: 197.31 LBS

## 2022-02-26 LAB
ANION GAP SERPL CALCULATED.3IONS-SCNC: 14 MMOL/L (ref 4–16)
BUN BLDV-MCNC: 11 MG/DL (ref 6–23)
CALCIUM SERPL-MCNC: 8.8 MG/DL (ref 8.3–10.6)
CHLORIDE BLD-SCNC: 100 MMOL/L (ref 99–110)
CO2: 23 MMOL/L (ref 21–32)
CREAT SERPL-MCNC: 0.9 MG/DL (ref 0.6–1.1)
GFR AFRICAN AMERICAN: >60 ML/MIN/1.73M2
GFR NON-AFRICAN AMERICAN: >60 ML/MIN/1.73M2
GLUCOSE BLD-MCNC: 95 MG/DL (ref 70–99)
HCT VFR BLD CALC: 25.9 % (ref 37–47)
HCT VFR BLD CALC: 29 % (ref 37–47)
HEMOGLOBIN: 7.8 GM/DL (ref 12.5–16)
HEMOGLOBIN: 9 GM/DL (ref 12.5–16)
MCH RBC QN AUTO: 26.4 PG (ref 27–31)
MCH RBC QN AUTO: 27.2 PG (ref 27–31)
MCHC RBC AUTO-ENTMCNC: 30.1 % (ref 32–36)
MCHC RBC AUTO-ENTMCNC: 31 % (ref 32–36)
MCV RBC AUTO: 87.5 FL (ref 78–100)
MCV RBC AUTO: 87.6 FL (ref 78–100)
PDW BLD-RTO: 14 % (ref 11.7–14.9)
PDW BLD-RTO: 14.1 % (ref 11.7–14.9)
PLATELET # BLD: 370 K/CU MM (ref 140–440)
PLATELET # BLD: 419 K/CU MM (ref 140–440)
PMV BLD AUTO: 9.3 FL (ref 7.5–11.1)
PMV BLD AUTO: 9.6 FL (ref 7.5–11.1)
POTASSIUM SERPL-SCNC: 3.7 MMOL/L (ref 3.5–5.1)
RBC # BLD: 2.96 M/CU MM (ref 4.2–5.4)
RBC # BLD: 3.31 M/CU MM (ref 4.2–5.4)
SODIUM BLD-SCNC: 137 MMOL/L (ref 135–145)
WBC # BLD: 7 K/CU MM (ref 4–10.5)
WBC # BLD: 7.1 K/CU MM (ref 4–10.5)

## 2022-02-26 PROCEDURE — 2500000003 HC RX 250 WO HCPCS: Performed by: NURSE ANESTHETIST, CERTIFIED REGISTERED

## 2022-02-26 PROCEDURE — 80048 BASIC METABOLIC PNL TOTAL CA: CPT

## 2022-02-26 PROCEDURE — 94761 N-INVAS EAR/PLS OXIMETRY MLT: CPT

## 2022-02-26 PROCEDURE — 6370000000 HC RX 637 (ALT 250 FOR IP): Performed by: SPECIALIST

## 2022-02-26 PROCEDURE — 6370000000 HC RX 637 (ALT 250 FOR IP): Performed by: HOSPITALIST

## 2022-02-26 PROCEDURE — 3609017100 HC EGD: Performed by: SPECIALIST

## 2022-02-26 PROCEDURE — 2580000003 HC RX 258: Performed by: NURSE ANESTHETIST, CERTIFIED REGISTERED

## 2022-02-26 PROCEDURE — 36415 COLL VENOUS BLD VENIPUNCTURE: CPT

## 2022-02-26 PROCEDURE — 3700000001 HC ADD 15 MINUTES (ANESTHESIA): Performed by: SPECIALIST

## 2022-02-26 PROCEDURE — 6360000002 HC RX W HCPCS: Performed by: NURSE ANESTHETIST, CERTIFIED REGISTERED

## 2022-02-26 PROCEDURE — 43235 EGD DIAGNOSTIC BRUSH WASH: CPT | Performed by: SPECIALIST

## 2022-02-26 PROCEDURE — 2580000003 HC RX 258: Performed by: SPECIALIST

## 2022-02-26 PROCEDURE — 2709999900 HC NON-CHARGEABLE SUPPLY: Performed by: SPECIALIST

## 2022-02-26 PROCEDURE — 3700000000 HC ANESTHESIA ATTENDED CARE: Performed by: SPECIALIST

## 2022-02-26 PROCEDURE — 85027 COMPLETE CBC AUTOMATED: CPT

## 2022-02-26 RX ORDER — LIDOCAINE HYDROCHLORIDE 20 MG/ML
INJECTION, SOLUTION EPIDURAL; INFILTRATION; INTRACAUDAL; PERINEURAL PRN
Status: DISCONTINUED | OUTPATIENT
Start: 2022-02-26 | End: 2022-02-26 | Stop reason: SDUPTHER

## 2022-02-26 RX ORDER — CARVEDILOL 3.12 MG/1
3.12 TABLET ORAL 2 TIMES DAILY WITH MEALS
Qty: 60 TABLET | Refills: 3 | Status: SHIPPED | OUTPATIENT
Start: 2022-02-26 | End: 2022-06-17

## 2022-02-26 RX ORDER — FUROSEMIDE 20 MG/1
20 TABLET ORAL DAILY
Qty: 60 TABLET | Refills: 3 | Status: SHIPPED | OUTPATIENT
Start: 2022-02-27

## 2022-02-26 RX ORDER — KETAMINE HYDROCHLORIDE 10 MG/ML
INJECTION, SOLUTION INTRAMUSCULAR; INTRAVENOUS PRN
Status: DISCONTINUED | OUTPATIENT
Start: 2022-02-26 | End: 2022-02-26 | Stop reason: SDUPTHER

## 2022-02-26 RX ORDER — PANTOPRAZOLE SODIUM 40 MG/1
40 TABLET, DELAYED RELEASE ORAL
Status: DISCONTINUED | OUTPATIENT
Start: 2022-02-27 | End: 2022-02-26 | Stop reason: HOSPADM

## 2022-02-26 RX ORDER — PANTOPRAZOLE SODIUM 40 MG/1
40 TABLET, DELAYED RELEASE ORAL DAILY
Qty: 30 TABLET | Refills: 3 | Status: ON HOLD | OUTPATIENT
Start: 2022-02-26 | End: 2022-03-05 | Stop reason: SDUPTHER

## 2022-02-26 RX ORDER — ROSUVASTATIN CALCIUM 40 MG/1
40 TABLET, COATED ORAL NIGHTLY
Qty: 30 TABLET | Refills: 3 | Status: SHIPPED | OUTPATIENT
Start: 2022-02-26 | End: 2022-03-18

## 2022-02-26 RX ORDER — PROPOFOL 10 MG/ML
INJECTION, EMULSION INTRAVENOUS PRN
Status: DISCONTINUED | OUTPATIENT
Start: 2022-02-26 | End: 2022-02-26 | Stop reason: SDUPTHER

## 2022-02-26 RX ORDER — SODIUM CHLORIDE, SODIUM LACTATE, POTASSIUM CHLORIDE, CALCIUM CHLORIDE 600; 310; 30; 20 MG/100ML; MG/100ML; MG/100ML; MG/100ML
INJECTION, SOLUTION INTRAVENOUS CONTINUOUS PRN
Status: DISCONTINUED | OUTPATIENT
Start: 2022-02-26 | End: 2022-02-26 | Stop reason: SDUPTHER

## 2022-02-26 RX ADMIN — LIDOCAINE HYDROCHLORIDE 200 MG: 20 INJECTION, SOLUTION EPIDURAL; INFILTRATION; INTRACAUDAL; PERINEURAL at 07:38

## 2022-02-26 RX ADMIN — CARVEDILOL 3.12 MG: 3.12 TABLET, FILM COATED ORAL at 08:32

## 2022-02-26 RX ADMIN — SODIUM CHLORIDE, POTASSIUM CHLORIDE, SODIUM LACTATE AND CALCIUM CHLORIDE: 600; 310; 30; 20 INJECTION, SOLUTION INTRAVENOUS at 07:28

## 2022-02-26 RX ADMIN — MELATONIN TAB 3 MG 3 MG: 3 TAB at 00:02

## 2022-02-26 RX ADMIN — DULOXETINE HYDROCHLORIDE 30 MG: 30 CAPSULE, DELAYED RELEASE ORAL at 08:32

## 2022-02-26 RX ADMIN — TICAGRELOR 90 MG: 90 TABLET ORAL at 08:32

## 2022-02-26 RX ADMIN — FUROSEMIDE 20 MG: 20 TABLET ORAL at 08:32

## 2022-02-26 RX ADMIN — PROPOFOL 30 MG: 10 INJECTION, EMULSION INTRAVENOUS at 07:41

## 2022-02-26 RX ADMIN — PROPOFOL 30 MG: 10 INJECTION, EMULSION INTRAVENOUS at 07:38

## 2022-02-26 RX ADMIN — SODIUM CHLORIDE, PRESERVATIVE FREE 10 ML: 5 INJECTION INTRAVENOUS at 08:32

## 2022-02-26 RX ADMIN — ASPIRIN 81 MG: 81 TABLET, COATED ORAL at 08:32

## 2022-02-26 RX ADMIN — POTASSIUM CHLORIDE 20 MEQ: 1500 TABLET, EXTENDED RELEASE ORAL at 08:32

## 2022-02-26 RX ADMIN — KETAMINE HYDROCHLORIDE 20 MG: 10 INJECTION INTRAMUSCULAR; INTRAVENOUS at 07:38

## 2022-02-26 ASSESSMENT — PAIN DESCRIPTION - PROGRESSION
CLINICAL_PROGRESSION: GRADUALLY WORSENING

## 2022-02-26 ASSESSMENT — PAIN SCALES - GENERAL
PAINLEVEL_OUTOF10: 0
PAINLEVEL_OUTOF10: 0

## 2022-02-26 NOTE — DISCHARGE SUMMARY
[facility]    Patient Demographics    Patient. [facility]  Date of Birth. 1965  MRN. [id number]     Primary care provider. Nelida Leventhal, MD  (Tel: [phone])    Admit date: 2/22/2022    Discharge date (blank if same as Note Date): Note Date: 2/26/2022     Reason for Hospitalization. No chief complaint on file. Diagnosis. Principal Problem:    STEMI (ST elevation myocardial infarction) ([address]  Active Problems:    Gastrointestinal hemorrhage    Anemia    Pain of upper abdomen    Anticoagulated  Resolved Problems:    * No resolved hospital problems. [facility] T[facility] Course:  patient is a 61-year-old female with history of anal cancer status post radiation, coronary artery disease who was admitted with chest pain, STEMI alert called patient underwent heart catheterization at [address] [place] [address]. Was anemic at the time. Presented to our hospital with chest pain. Underwent emergent heart catheterization which showed occluded RCA stent and was unable to balloon, another PCI placed, aspirin and Brilinta restarted, statin continued recent GI bleed, GI consulted  EGD showed normal   PPI daily  Was discharged home  CBC in 1 week     Invasive procedures and treatments. 1. None     Consults. IP CONSULT TO IV TEAM  IP CONSULT TO HOSPITALIST  IP CONSULT TO GI  IP CONSULT TO DIETITIAN    Physical examination on discharge day. /75   Pulse 76   Temp 98.2 °F (36.8 °C) (Oral)   Resp 18   Ht 5' 2\" (1.575 m)   Wt 197 lb 5 oz (89.5 kg)   SpO2 96%   BMI 36.09 kg/m²   General appearance. Alert. Looks comfortable. HEENT. Sclera clear. Moist mucus membranes. Cardiovascular. Regular rate and rhythm, normal S1, S2. No murmur. Respiratory. Not using accessory muscles. Clear to auscultation bilaterally, no wheeze.   Gastrointestinal. Abdomen soft, non-tender, not distended, normal bowel sounds  Neurology. Facial symmetry. No speech deficits. Moving all extremities equally. Extremities. No edema in lower extremities. Skin. Warm, dry, normal turgor    Condition at time of discharge stable    Medication instructions provided to patient at discharge. Medication List      START taking these medications    rosuvastatin 40 MG tablet  Commonly known as: CRESTOR  Take 1 tablet by mouth nightly     ticagrelor 90 MG Tabs tablet  Commonly known as: BRILINTA  Take 1 tablet by mouth 2 times daily        CHANGE how you take these medications    carvedilol 3.125 MG tablet  Commonly known as: COREG  Take 1 tablet by mouth 2 times daily (with meals)  What changed:   · medication strength  · See the new instructions. furosemide 20 MG tablet  Commonly known as: LASIX  Take 1 tablet by mouth daily  Start taking on: February 27, 2022  What changed:   · medication strength  · how much to take  · when to take this        CONTINUE taking these medications    albuterol-ipratropium  MCG/ACT Aers inhaler  Commonly known as: COMBIVENT RESPIMAT     aspirin 81 MG chewable tablet  Take 1 tablet by mouth daily     Cyanocobalamin 2500 MCG Tabs     DULoxetine 20 MG extended release capsule  Commonly known as: CYMBALTA     hydrOXYzine 25 MG tablet  Commonly known as: ATARAX     loperamide 2 MG capsule  Commonly known as: IMODIUM     multivitamin capsule     nitroGLYCERIN 0.4 MG SL tablet  Commonly known as: NITROSTAT  up to max of 3 total doses. If no relief after 1 dose, call 911.      olopatadine 0.1 % ophthalmic solution  Commonly known as: PATANOL     pantoprazole 40 MG tablet  Commonly known as: PROTONIX  Take 1 tablet by mouth daily     potassium chloride 20 MEQ packet  Commonly known as: KLOR-CON     tiZANidine 4 MG tablet  Commonly known as: Kermit Mu        STOP taking these medications    atorvastatin 20 MG tablet  Commonly known as: LIPITOR           Where to Get Your Medications      These medications were sent to [facility] [address], [address] [facility] [phone] - [phone]  [facility] [place] [address]    Phone: [phone]   · carvedilol 3.125 MG tablet  · furosemide 20 MG tablet  · pantoprazole 40 MG tablet  · rosuvastatin 40 MG tablet  · ticagrelor 90 MG Tabs tablet         Discharge recommendations given to patient. Follow Up. PCP in 1 week   Disposition. home  Activity. As tolerated  Diet: ADULT DIET; Regular; Low Fat/Low Chol/High Fiber/RAHAT      Spent 24 minutes in discharge process.     Signed:  Pelon Bocanegra MD     2/26/2022 10:41 AM

## 2022-02-26 NOTE — ANESTHESIA PRE PROCEDURE
Department of Anesthesiology  Preprocedure Note       Name:  Jose Newman   Age:  55 y.o.  :  1965                                          MRN:  [id number]         Date:  2022      Surgeon: Aristeo Agustin):  Angelica Gao MD    Procedure: Procedure(s):  EGD ESOPHAGOGASTRODUODENOSCOPY    Medications prior to admission:   Prior to Admission medications    Medication Sig Start Date End Date Taking? Authorizing Provider   pantoprazole (PROTONIX) 40 MG tablet Take 40 mg by mouth daily   Yes Historical Provider, MD   olopatadine (PATANOL) 0.1 % ophthalmic solution Place 1 drop into both eyes 2 times daily   Yes Historical Provider, MD   potassium chloride (KLOR-CON) 20 MEQ packet Take 20 mEq by mouth 2 times daily   Yes Historical Provider, MD   furosemide (LASIX) 40 MG tablet Take 40 mg by mouth 2 times daily   Yes Historical Provider, MD   carvedilol (COREG) 6.25 MG tablet TAKE ONE TABLET BY MOUTH TWICE A DAY WITH A MEAL  Patient taking differently: 3.125 mg 2 times daily  10/14/21  Yes Sarmad Voss MD   DULoxetine (CYMBALTA) 20 MG extended release capsule Take 3 mg by mouth daily  21 Yes Historical Provider, MD   nitroGLYCERIN (NITROSTAT) 0.4 MG SL tablet up to max of 3 total doses.  If no relief after 1 dose, call 911. 3/11/21  Yes Adelaida Juarez MD   Multiple Vitamin (MULTIVITAMIN) capsule Take 1 capsule by mouth daily   Yes Historical Provider, MD   aspirin 81 MG chewable tablet Take 1 tablet by mouth daily 16  Yes Alysha Hernandez MD   Cyanocobalamin 2500 MCG TABS Take 2 tablets by mouth daily  Patient not taking: Reported on 2021    Historical Provider, MD   tiZANidine (ZANAFLEX) 4 MG tablet Take 4 mg by mouth every 6 hours as needed    Historical Provider, MD   loperamide (IMODIUM) 2 MG capsule Take 2 mg by mouth as needed for Diarrhea  Patient not taking: Reported on 2021    Historical Provider, MD   hydrOXYzine (ATARAX) 25 MG tablet daily as needed  21   Historical Provider, MD   albuterol-ipratropium (COMBIVENT RESPIMAT)  MCG/ACT AERS inhaler Inhale into the lungs 5/15/17   Historical Provider, MD       Current medications:    Current Facility-Administered Medications   Medication Dose Route Frequency Provider Last Rate Last Admin    DULoxetine (CYMBALTA) extended release capsule 30 mg  30 mg Oral Daily Shree Thurman MD   30 mg at 02/25/22 1128    pantoprazole (PROTONIX) tablet 40 mg  40 mg Oral BID  Felix Barillas MD   40 mg at 02/25/22 1646    lidocaine 4 % external patch 1 patch  1 patch TransDERmal Daily Jie Vega MD   1 patch at 02/25/22 0835    morphine injection 1 mg  1 mg IntraVENous Q4H PRN Jie Vega MD        furosemide (LASIX) tablet 20 mg  20 mg Oral Daily Mart Velasco MD   20 mg at 02/25/22 1128    melatonin tablet 3 mg  3 mg Oral Nightly PRN Jie Vega MD   3 mg at 02/26/22 0002    sodium chloride flush 0.9 % injection 5-40 mL  5-40 mL IntraVENous 2 times per day Riya Zamarripa MD   10 mL at 02/25/22 2040    sodium chloride flush 0.9 % injection 5-40 mL  5-40 mL IntraVENous PRN Riya Zamarripa MD        0.9 % sodium chloride infusion  25 mL IntraVENous PRN Riya Zamarripa MD 30 mL/hr at 02/22/22 1056 Rate Verify at 02/22/22 1056    acetaminophen (TYLENOL) tablet 650 mg  650 mg Oral Q4H PRN Riya Zamarripa MD        carvedilol (COREG) tablet 3.125 mg  3.125 mg Oral BID  Shree Thurman MD   3.125 mg at 02/25/22 1646    aspirin EC tablet 81 mg  81 mg Oral Daily Riya Zamarripa MD   81 mg at 02/25/22 1129    ticagrelor (BRILINTA) tablet 90 mg  90 mg Oral BID Riya Zamarripa MD   90 mg at 02/25/22 2040    rosuvastatin (CRESTOR) tablet 40 mg  40 mg Oral Nightly Riya Zamarripa MD   40 mg at 02/25/22 2143    nitroGLYCERIN (NITROSTAT) SL tablet 0.4 mg  0.4 mg SubLINGual Q5 Min PRN Riya Zamarripa MD   0.4 mg at 02/22/22 1055    ondansetron (ZOFRAN) injection 4 mg  4 mg IntraVENous Q6H PRN Fredy Kohli MD   4 mg at 02/23/22 0744    therapeutic multivitamin-minerals 1 tablet  1 tablet Oral Daily Fredy Kohli MD        potassium chloride (KLOR-CON M) extended release tablet 20 mEq  20 mEq Oral Daily Fredy Kohli MD   20 mEq at 02/25/22 1129    ketotifen (ZADITOR) 0.025 % ophthalmic solution 1 drop  1 drop Both Eyes BID Fredy Kohli MD        albuterol sulfate  (90 Base) MCG/ACT inhaler 1 puff  1 puff Inhalation Q4H PRN Fredy Kohli MD        And    ipratropium (ATROVENT HFA) 17 MCG/ACT inhaler 1 puff  1 puff Inhalation Q4H PRN Fredy Kohli MD           Allergies:     Allergies   Allergen Reactions    Vicodin Hp [Hydrocodone-Acetaminophen] Other (See Comments)     Keep me awake    Tylenol With Codeine #3 [Acetaminophen-Codeine] Nausea And Vomiting       Problem List:    Patient Active Problem List   Diagnosis Code    Chest pain R07.9    NSTEMI (non-ST elevated myocardial infarction) [address] I21.4    Coronary artery disease involving native coronary artery of native heart with unstable angina pectoris ([facility] I25.110    Mild intermittent asthma without complication J27.39    Pain in both lower extremities M79.604, M79.605    Dizziness R42    Palpitations R00.2    Primary osteoarthritis of left hip M16.12    Avascular necrosis of left femur ([facility] M87.052    Unstable angina (HCC) I20.0    Dyslipidemia E78.5    Primary hypertension I10    STEMI (ST elevation myocardial infarction) ([facility] I21.3    Gastrointestinal hemorrhage K92.2    Anemia D64.9    Pain of upper abdomen R10.10    Anticoagulated Z79.01       Past Medical History:        Diagnosis Date    Anal cancer ([facility]     per old chart 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 and tx with chemo and radiation- followed with Dr Michael Vargas Asthma     Broken ankle     CAD (coronary artery disease)     \"have 3 heart stents\" use to see Dr Daniel Ingram H/O cardiovascular stress test 1/6/2016    treadmill    NSTEMI (non-ST elevated myocardial infarction) ([facility]     PONV (postoperative nausea and vomiting)     hx motion sickness    Skin cancer        Past Surgical History:        Procedure Laterality Date    ANKLE SURGERY Left 10+ yrs ago    with hardware    COLON SURGERY  2009    \"after had chemo removed some part of the cancer from the rectal area, then 7 months later had bowel blockage had to do surgery  to remove part of the bowel\"    COLONOSCOPY  3/4/15    mild proctitis    COLONOSCOPY  03/15/2017    mild radia proc, hypertroph pailla    FRACTURE SURGERY      left ankle    OTHER SURGICAL HISTORY  04-    mediport removal    PTCA  2009 4/22/2009 had angioplasty with stent to LAD & another day in 2009 stent x 2 to RCA done    TONSILLECTOMY      as a kid age 7   [address]  25 yrs ago    TUNNELED VENOUS PORT PLACEMENT  2009    port inserted        Social History:    Social History     Tobacco Use    Smoking status: Former Smoker     Packs/day: 0.25     Years: 35.00     Pack years: 8.75     Types: Cigarettes    Smokeless tobacco: Never Used    Tobacco comment: 6-7 cigarettes    Substance Use Topics    Alcohol use: No     Comment: \"quit 3/2015\"use to drink a couple of times per week before\"                                Counseling given: Not Answered  Comment: 6-7 cigarettes       Vital Signs (Current):   Vitals:    02/25/22 1243 02/25/22 1646 02/25/22 2038 02/26/22 0628   BP: 126/75 116/73 114/74 120/73   Pulse: 106 100 93 85   Resp: 19 23 18   Temp: 36.7 °C (98 °F)  36.8 °C (98.2 °F) 36.8 °C (98.3 °F)   TempSrc: Oral  Oral Oral   SpO2:   99% 98%   Weight:       Height:                                                  BP Readings from Last 3 Encounters:   02/26/22 120/73   12/20/21 136/80   06/18/21 122/78       NPO Status:                                                                                 BMI:   Wt Readings from Last 3 Encounters:   02/22/22 197 lb 5 oz (89.5 kg)   12/20/21 183 lb 3.2 oz (83.1 kg)   11/16/21 172 lb (78 kg)     Body mass index is 36.09 kg/m². CBC:   Lab Results   Component Value Date    WBC 7.1 02/26/2022    RBC 2.96 02/26/2022    HGB 7.8 02/26/2022    HCT 25.9 02/26/2022    MCV 87.5 02/26/2022    RDW 14.1 02/26/2022     02/26/2022       CMP:   Lab Results   Component Value Date     02/26/2022    K 3.7 02/26/2022     02/26/2022    CO2 23 02/26/2022    BUN 11 02/26/2022    CREATININE 0.9 02/26/2022    GFRAA >60 02/26/2022    AGRATIO 1.5 02/11/2022    AGRATIO 1.5 02/11/2022    LABGLOM >60 02/26/2022    GLUCOSE 95 02/26/2022    PROT 6.6 02/11/2022    PROT 6.6 02/11/2022    PROT 7.6 04/10/2012    CALCIUM 8.8 02/26/2022    BILITOT 0.3 02/11/2022    BILITOT 0.3 02/11/2022    ALKPHOS 176 02/11/2022    ALKPHOS 176 02/11/2022    AST 32 02/11/2022    AST 32 02/11/2022    ALT 23 02/11/2022    ALT 23 02/11/2022       POC Tests: No results for input(s): POCGLU, POCNA, POCK, POCCL, POCBUN, POCHEMO, POCHCT in the last 72 hours. Coags:   Lab Results   Component Value Date    PROTIME 10.5 04/23/2015    INR 0.92 04/23/2015    APTT 27.5 04/23/2015       HCG (If Applicable):   Lab Results   Component Value Date    PREGTESTUR NEGATIVE 10/03/2015        ABGs: No results found for: PHART, PO2ART, RSN0RBI, FGI4NWD, BEART, F8OQGOJP     Type & Screen (If Applicable):  No results found for: LABABO, LABRH    Drug/Infectious Status (If Applicable):  No results found for: HIV, HEPCAB    COVID-19 Screening (If Applicable):   Lab Results   Component Value Date    COVID19 NOT DETECTED 02/22/2022           Anesthesia Evaluation  Patient summary reviewed   history of anesthetic complications: PONV.   Airway: Mallampati: II  TM distance: <3 FB   Neck ROM: full  Mouth opening: > = 3 FB Dental:          Pulmonary:   (+) asthma:                           ROS comment: Former Smoker - 8.75 pack years   Cardiovascular:    (+) hypertension:, past MI: < 1 month, CAD:, CABG/stent:, hyperlipidemia               ROS comment: Echo 2/22/202:  Left ventricular systolic function is normal.   Ejection fraction is visually estimated at 55-60%. No wall motion abnormalities detected. No significant valvular disease noted. No evidence of any pericardial effusion. Echo 2/22/2022:  Diagnostic procedure:Coronary Angiogram without LHC, Right   Coronary Angiography, Left Coronary Angiography      PCI procedure:DE Stent, RCA: DE Stent Plcmt Initl Vsl, Balloon   Angioplasty, RCA: Balloon Angioplasty Initl Vsl, Pt is Referred   to Cardiac Rehab Phase 2 as OP      Conclusions      Procedure Summary   STEMI presenting with 100% occlusion of RCA stent with thrombus,   s/p PTCA and then PCI with SOUMYA with excellent results      Recommendations   load brilinta, aspirin, integrillin, echo     Neuro/Psych:   Negative Neuro/Psych ROS              GI/Hepatic/Renal:   (+) GERD:, morbid obesity         ROS comment: GIB    per old chart 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 Endo/Other:    (+) blood dyscrasia: anemia:., malignancy/cancer. Abdominal:   (+) obese,           Vascular: negative vascular ROS. Other Findings:             Anesthesia Plan      MAC     ASA 4       Induction: intravenous. Anesthetic plan and risks discussed with patient. Plan discussed with CRNA. Attending anesthesiologist reviewed and agrees with Preprocedure content              FLAVIO Zaidi - CRNA   2/26/2022         Pre Anesthesia Assessment complete.  Chart reviewed on 2/26/2022

## 2022-02-26 NOTE — BRIEF OP NOTE
BRIEF EGD REPORT:     Photos and full EGD report available by going to \"chart review\" then \"procedures\" then  \"EGD\" then \"View Endoscopy Report\"     Impression:    1) normal exam           Suggest:   1) change PPI to once daily- use prn after discharge          '

## 2022-02-26 NOTE — PROGRESS NOTES
CT enterography reviewed--- there is thickening and adjacent inflammation in the small bowel just above prior small bowel anastamosis from resection for SBO year ago- this is likely the source of her intermittent RLQ abd pain--- and if nothing found at EGD could be the source of her GI blood loss

## 2022-02-26 NOTE — ANESTHESIA POSTPROCEDURE EVALUATION
Department of Anesthesiology  Postprocedure Note    Patient: Darolyn Merlin  MRN: [id number]  YOB: 1965  Date of evaluation: 2/26/2022  Time:  7:56 AM     Procedure Summary     Date: 02/26/22 Room / Location: [address]    Anesthesia Start: 1141 Anesthesia Stop: 4017    Procedure: EGD ESOPHAGOGASTRODUODENOSCOPY (N/A ) Diagnosis:       Anemia, unspecified type      Abdominal pain, unspecified abdominal location      (Anemia, unspecified type [D64.9] Abdominal pain, unspecified abdominal location [R10.9])    Surgeons: Neida Palencia MD Responsible Provider: Tavon Stanley MD    Anesthesia Type: MAC ASA Status: 4          Anesthesia Type: MAC    Dionisio Phase I:      Dionisio Phase II:      Last vitals: Reviewed and per EMR flowsheets.        Anesthesia Post Evaluation    Patient location during evaluation: bedside  Patient participation: complete - patient participated  Level of consciousness: awake and alert  Pain score: 0  Airway patency: patent  Nausea & Vomiting: no vomiting and no nausea  Complications: no  Cardiovascular status: blood pressure returned to baseline and hemodynamically stable  Respiratory status: nonlabored ventilation, spontaneous ventilation and room air  Hydration status: stable

## 2022-02-28 ENCOUNTER — TELEPHONE (OUTPATIENT)
Dept: CARDIOLOGY CLINIC | Age: 57
End: 2022-02-28

## 2022-03-02 ENCOUNTER — APPOINTMENT (OUTPATIENT)
Dept: GENERAL RADIOLOGY | Age: 57
DRG: 377 | End: 2022-03-02
Payer: COMMERCIAL

## 2022-03-02 ENCOUNTER — HOSPITAL ENCOUNTER (INPATIENT)
Age: 57
LOS: 2 days | Discharge: HOME | DRG: 377 | End: 2022-03-05
Attending: STUDENT IN AN ORGANIZED HEALTH CARE EDUCATION/TRAINING PROGRAM | Admitting: STUDENT IN AN ORGANIZED HEALTH CARE EDUCATION/TRAINING PROGRAM
Payer: COMMERCIAL

## 2022-03-02 DIAGNOSIS — K62.5 RECTAL BLEEDING: Primary | ICD-10-CM

## 2022-03-02 DIAGNOSIS — D50.0 ANEMIA, BLOOD LOSS: ICD-10-CM

## 2022-03-02 DIAGNOSIS — I25.2 HISTORY OF ST ELEVATION MYOCARDIAL INFARCTION (STEMI): ICD-10-CM

## 2022-03-02 DIAGNOSIS — R77.8 ELEVATED TROPONIN: ICD-10-CM

## 2022-03-02 LAB
APTT: 24 SECONDS (ref 25.1–37.1)
BASOPHILS ABSOLUTE: 0.1 K/CU MM
BASOPHILS RELATIVE PERCENT: 0.4 % (ref 0–1)
DIFFERENTIAL TYPE: ABNORMAL
EOSINOPHILS ABSOLUTE: 0.1 K/CU MM
EOSINOPHILS RELATIVE PERCENT: 1.2 % (ref 0–3)
HCT VFR BLD CALC: 25 % (ref 37–47)
HEMOGLOBIN: 7.6 GM/DL (ref 12.5–16)
IMMATURE NEUTROPHIL %: 2.4 % (ref 0–0.43)
INR BLD: 0.84 INDEX
LYMPHOCYTES ABSOLUTE: 3.4 K/CU MM
LYMPHOCYTES RELATIVE PERCENT: 29.8 % (ref 24–44)
MCH RBC QN AUTO: 26.6 PG (ref 27–31)
MCHC RBC AUTO-ENTMCNC: 30.4 % (ref 32–36)
MCV RBC AUTO: 87.4 FL (ref 78–100)
MONOCYTES ABSOLUTE: 0.8 K/CU MM
MONOCYTES RELATIVE PERCENT: 6.9 % (ref 0–4)
NUCLEATED RBC %: 0 %
PDW BLD-RTO: 13.7 % (ref 11.7–14.9)
PLATELET # BLD: 483 K/CU MM (ref 140–440)
PMV BLD AUTO: 9.5 FL (ref 7.5–11.1)
PROTHROMBIN TIME: 10.8 SECONDS (ref 11.7–14.5)
RBC # BLD: 2.86 M/CU MM (ref 4.2–5.4)
SEGMENTED NEUTROPHILS ABSOLUTE COUNT: 6.7 K/CU MM
SEGMENTED NEUTROPHILS RELATIVE PERCENT: 59.3 % (ref 36–66)
TOTAL IMMATURE NEUTOROPHIL: 0.27 K/CU MM
TOTAL NUCLEATED RBC: 0 K/CU MM
WBC # BLD: 11.2 K/CU MM (ref 4–10.5)

## 2022-03-02 PROCEDURE — 85730 THROMBOPLASTIN TIME PARTIAL: CPT

## 2022-03-02 PROCEDURE — 81001 URINALYSIS AUTO W/SCOPE: CPT

## 2022-03-02 PROCEDURE — 86900 BLOOD TYPING SEROLOGIC ABO: CPT

## 2022-03-02 PROCEDURE — 85610 PROTHROMBIN TIME: CPT

## 2022-03-02 PROCEDURE — 84484 ASSAY OF TROPONIN QUANT: CPT

## 2022-03-02 PROCEDURE — 85025 COMPLETE CBC W/AUTO DIFF WBC: CPT

## 2022-03-02 PROCEDURE — 86922 COMPATIBILITY TEST ANTIGLOB: CPT

## 2022-03-02 PROCEDURE — 83605 ASSAY OF LACTIC ACID: CPT

## 2022-03-02 PROCEDURE — 93005 ELECTROCARDIOGRAM TRACING: CPT | Performed by: STUDENT IN AN ORGANIZED HEALTH CARE EDUCATION/TRAINING PROGRAM

## 2022-03-02 PROCEDURE — 86901 BLOOD TYPING SEROLOGIC RH(D): CPT

## 2022-03-02 PROCEDURE — 86850 RBC ANTIBODY SCREEN: CPT

## 2022-03-02 PROCEDURE — 71045 X-RAY EXAM CHEST 1 VIEW: CPT

## 2022-03-02 PROCEDURE — 80053 COMPREHEN METABOLIC PANEL: CPT

## 2022-03-02 PROCEDURE — 99285 EMERGENCY DEPT VISIT HI MDM: CPT

## 2022-03-03 ENCOUNTER — ANESTHESIA (OUTPATIENT)
Dept: ENDOSCOPY | Age: 57
DRG: 377 | End: 2022-03-03
Payer: COMMERCIAL

## 2022-03-03 ENCOUNTER — ANESTHESIA EVENT (OUTPATIENT)
Dept: ENDOSCOPY | Age: 57
DRG: 377 | End: 2022-03-03
Payer: COMMERCIAL

## 2022-03-03 VITALS — OXYGEN SATURATION: 100 % | SYSTOLIC BLOOD PRESSURE: 130 MMHG | DIASTOLIC BLOOD PRESSURE: 87 MMHG

## 2022-03-03 PROBLEM — K92.2 UGIB (UPPER GASTROINTESTINAL BLEED): Status: ACTIVE | Noted: 2022-03-03

## 2022-03-03 LAB
ALBUMIN SERPL-MCNC: 3.6 GM/DL (ref 3.4–5)
ALP BLD-CCNC: 148 IU/L (ref 40–129)
ALT SERPL-CCNC: 19 U/L (ref 10–40)
ANION GAP SERPL CALCULATED.3IONS-SCNC: 15 MMOL/L (ref 4–16)
AST SERPL-CCNC: 32 IU/L (ref 15–37)
BACTERIA: ABNORMAL /HPF
BILIRUB SERPL-MCNC: 0.2 MG/DL (ref 0–1)
BILIRUBIN URINE: NEGATIVE MG/DL
BLOOD, URINE: NEGATIVE
BUN BLDV-MCNC: 24 MG/DL (ref 6–23)
CALCIUM SERPL-MCNC: 8.7 MG/DL (ref 8.3–10.6)
CHLORIDE BLD-SCNC: 97 MMOL/L (ref 99–110)
CLARITY: CLEAR
CO2: 25 MMOL/L (ref 21–32)
COLOR: YELLOW
CREAT SERPL-MCNC: 1.2 MG/DL (ref 0.6–1.1)
EKG ATRIAL RATE: 91 BPM
EKG DIAGNOSIS: NORMAL
EKG P AXIS: 42 DEGREES
EKG P-R INTERVAL: 154 MS
EKG Q-T INTERVAL: 372 MS
EKG QRS DURATION: 126 MS
EKG QTC CALCULATION (BAZETT): 457 MS
EKG R AXIS: -83 DEGREES
EKG T AXIS: 26 DEGREES
EKG VENTRICULAR RATE: 91 BPM
GFR AFRICAN AMERICAN: 56 ML/MIN/1.73M2
GFR NON-AFRICAN AMERICAN: 47 ML/MIN/1.73M2
GLUCOSE BLD-MCNC: 128 MG/DL (ref 70–99)
GLUCOSE, URINE: NEGATIVE MG/DL
HCT VFR BLD CALC: 21.4 % (ref 37–47)
HCT VFR BLD CALC: 23.4 % (ref 37–47)
HCT VFR BLD CALC: 26.3 % (ref 37–47)
HEMOGLOBIN: 6.6 GM/DL (ref 12.5–16)
HEMOGLOBIN: 7.5 GM/DL (ref 12.5–16)
HEMOGLOBIN: 7.9 GM/DL (ref 12.5–16)
KETONES, URINE: ABNORMAL MG/DL
LACTATE: 1.5 MMOL/L (ref 0.4–2)
LEUKOCYTE ESTERASE, URINE: ABNORMAL
MCH RBC QN AUTO: 27 PG (ref 27–31)
MCHC RBC AUTO-ENTMCNC: 30.8 % (ref 32–36)
MCV RBC AUTO: 87.7 FL (ref 78–100)
MUCUS: ABNORMAL HPF
NITRITE URINE, QUANTITATIVE: NEGATIVE
PDW BLD-RTO: 13.7 % (ref 11.7–14.9)
PH, URINE: 5.5 (ref 5–8)
PLATELET # BLD: 404 K/CU MM (ref 140–440)
PMV BLD AUTO: 9.6 FL (ref 7.5–11.1)
POTASSIUM SERPL-SCNC: 3.9 MMOL/L (ref 3.5–5.1)
PROTEIN UA: NEGATIVE MG/DL
RBC # BLD: 2.44 M/CU MM (ref 4.2–5.4)
RBC URINE: ABNORMAL /HPF (ref 0–6)
SODIUM BLD-SCNC: 137 MMOL/L (ref 135–145)
SPECIFIC GRAVITY UA: 1.02 (ref 1–1.03)
SQUAMOUS EPITHELIAL: 3 /HPF
TOTAL PROTEIN: 6.6 GM/DL (ref 6.4–8.2)
TROPONIN T: 0.06 NG/ML
TROPONIN T: 0.1 NG/ML
UNCLASSIFIED CAST: 1 /LPF
UROBILINOGEN, URINE: 0.2 MG/DL (ref 0.2–1)
WBC # BLD: 10.9 K/CU MM (ref 4–10.5)
WBC UA: 2 /HPF (ref 0–5)

## 2022-03-03 PROCEDURE — 99221 1ST HOSP IP/OBS SF/LOW 40: CPT | Performed by: INTERNAL MEDICINE

## 2022-03-03 PROCEDURE — 6360000002 HC RX W HCPCS: Performed by: SPECIALIST

## 2022-03-03 PROCEDURE — 94761 N-INVAS EAR/PLS OXIMETRY MLT: CPT

## 2022-03-03 PROCEDURE — P9016 RBC LEUKOCYTES REDUCED: HCPCS

## 2022-03-03 PROCEDURE — 86850 RBC ANTIBODY SCREEN: CPT

## 2022-03-03 PROCEDURE — C9113 INJ PANTOPRAZOLE SODIUM, VIA: HCPCS | Performed by: SPECIALIST

## 2022-03-03 PROCEDURE — 0W3P8ZZ CONTROL BLEEDING IN GASTROINTESTINAL TRACT, VIA NATURAL OR ARTIFICIAL OPENING ENDOSCOPIC: ICD-10-PCS | Performed by: SPECIALIST

## 2022-03-03 PROCEDURE — 3700000000 HC ANESTHESIA ATTENDED CARE: Performed by: SPECIALIST

## 2022-03-03 PROCEDURE — 2580000003 HC RX 258: Performed by: NURSE ANESTHETIST, CERTIFIED REGISTERED

## 2022-03-03 PROCEDURE — 36430 TRANSFUSION BLD/BLD COMPNT: CPT

## 2022-03-03 PROCEDURE — 1200000000 HC SEMI PRIVATE

## 2022-03-03 PROCEDURE — 2500000003 HC RX 250 WO HCPCS: Performed by: NURSE ANESTHETIST, CERTIFIED REGISTERED

## 2022-03-03 PROCEDURE — 6370000000 HC RX 637 (ALT 250 FOR IP): Performed by: SPECIALIST

## 2022-03-03 PROCEDURE — 0DJ08ZZ INSPECTION OF UPPER INTESTINAL TRACT, VIA NATURAL OR ARTIFICIAL OPENING ENDOSCOPIC: ICD-10-PCS | Performed by: SPECIALIST

## 2022-03-03 PROCEDURE — 44360 SMALL BOWEL ENDOSCOPY: CPT | Performed by: SPECIALIST

## 2022-03-03 PROCEDURE — 93010 ELECTROCARDIOGRAM REPORT: CPT | Performed by: INTERNAL MEDICINE

## 2022-03-03 PROCEDURE — 45388 COLONOSCOPY W/ABLATION: CPT | Performed by: SPECIALIST

## 2022-03-03 PROCEDURE — C9113 INJ PANTOPRAZOLE SODIUM, VIA: HCPCS | Performed by: STUDENT IN AN ORGANIZED HEALTH CARE EDUCATION/TRAINING PROGRAM

## 2022-03-03 PROCEDURE — 3700000001 HC ADD 15 MINUTES (ANESTHESIA): Performed by: SPECIALIST

## 2022-03-03 PROCEDURE — 6370000000 HC RX 637 (ALT 250 FOR IP): Performed by: STUDENT IN AN ORGANIZED HEALTH CARE EDUCATION/TRAINING PROGRAM

## 2022-03-03 PROCEDURE — 3609013900 HC ENTEROSCOPY: Performed by: SPECIALIST

## 2022-03-03 PROCEDURE — 2580000003 HC RX 258: Performed by: SPECIALIST

## 2022-03-03 PROCEDURE — 3609009900 HC COLONOSCOPY W/CONTROL BLEEDING ANY METHOD: Performed by: SPECIALIST

## 2022-03-03 PROCEDURE — 0DJD8ZZ INSPECTION OF LOWER INTESTINAL TRACT, VIA NATURAL OR ARTIFICIAL OPENING ENDOSCOPIC: ICD-10-PCS | Performed by: SPECIALIST

## 2022-03-03 PROCEDURE — 86901 BLOOD TYPING SEROLOGIC RH(D): CPT

## 2022-03-03 PROCEDURE — 2709999900 HC NON-CHARGEABLE SUPPLY: Performed by: SPECIALIST

## 2022-03-03 PROCEDURE — 2580000003 HC RX 258: Performed by: STUDENT IN AN ORGANIZED HEALTH CARE EDUCATION/TRAINING PROGRAM

## 2022-03-03 PROCEDURE — 86900 BLOOD TYPING SEROLOGIC ABO: CPT

## 2022-03-03 PROCEDURE — 85014 HEMATOCRIT: CPT

## 2022-03-03 PROCEDURE — 36415 COLL VENOUS BLD VENIPUNCTURE: CPT

## 2022-03-03 PROCEDURE — 84484 ASSAY OF TROPONIN QUANT: CPT

## 2022-03-03 PROCEDURE — 2720000010 HC SURG SUPPLY STERILE: Performed by: SPECIALIST

## 2022-03-03 PROCEDURE — 85018 HEMOGLOBIN: CPT

## 2022-03-03 PROCEDURE — 6360000002 HC RX W HCPCS: Performed by: NURSE ANESTHETIST, CERTIFIED REGISTERED

## 2022-03-03 PROCEDURE — 6360000002 HC RX W HCPCS: Performed by: STUDENT IN AN ORGANIZED HEALTH CARE EDUCATION/TRAINING PROGRAM

## 2022-03-03 PROCEDURE — 85027 COMPLETE CBC AUTOMATED: CPT

## 2022-03-03 RX ORDER — ACETAMINOPHEN 325 MG/1
650 TABLET ORAL EVERY 6 HOURS PRN
Status: DISCONTINUED | OUTPATIENT
Start: 2022-03-03 | End: 2022-03-05 | Stop reason: HOSPADM

## 2022-03-03 RX ORDER — SODIUM CHLORIDE, SODIUM LACTATE, POTASSIUM CHLORIDE, CALCIUM CHLORIDE 600; 310; 30; 20 MG/100ML; MG/100ML; MG/100ML; MG/100ML
INJECTION, SOLUTION INTRAVENOUS CONTINUOUS PRN
Status: DISCONTINUED | OUTPATIENT
Start: 2022-03-03 | End: 2022-03-03 | Stop reason: SDUPTHER

## 2022-03-03 RX ORDER — CARVEDILOL 3.12 MG/1
3.12 TABLET ORAL 2 TIMES DAILY WITH MEALS
Status: DISCONTINUED | OUTPATIENT
Start: 2022-03-03 | End: 2022-03-05 | Stop reason: HOSPADM

## 2022-03-03 RX ORDER — PANTOPRAZOLE SODIUM 40 MG/10ML
40 INJECTION, POWDER, LYOPHILIZED, FOR SOLUTION INTRAVENOUS EVERY 12 HOURS
Status: DISCONTINUED | OUTPATIENT
Start: 2022-03-03 | End: 2022-03-05 | Stop reason: HOSPADM

## 2022-03-03 RX ORDER — SODIUM CHLORIDE 9 MG/ML
INJECTION, SOLUTION INTRAVENOUS PRN
Status: DISCONTINUED | OUTPATIENT
Start: 2022-03-03 | End: 2022-03-05 | Stop reason: HOSPADM

## 2022-03-03 RX ORDER — ACETAMINOPHEN 650 MG/1
650 SUPPOSITORY RECTAL EVERY 6 HOURS PRN
Status: DISCONTINUED | OUTPATIENT
Start: 2022-03-03 | End: 2022-03-05 | Stop reason: HOSPADM

## 2022-03-03 RX ORDER — POTASSIUM CHLORIDE 20 MEQ/1
20 TABLET, EXTENDED RELEASE ORAL 2 TIMES DAILY
Status: DISCONTINUED | OUTPATIENT
Start: 2022-03-03 | End: 2022-03-05 | Stop reason: HOSPADM

## 2022-03-03 RX ORDER — SODIUM CHLORIDE 9 MG/ML
10 INJECTION INTRAVENOUS EVERY 12 HOURS
Status: DISCONTINUED | OUTPATIENT
Start: 2022-03-03 | End: 2022-03-05 | Stop reason: HOSPADM

## 2022-03-03 RX ORDER — LIDOCAINE HYDROCHLORIDE 20 MG/ML
INJECTION, SOLUTION EPIDURAL; INFILTRATION; INTRACAUDAL; PERINEURAL PRN
Status: DISCONTINUED | OUTPATIENT
Start: 2022-03-03 | End: 2022-03-03 | Stop reason: SDUPTHER

## 2022-03-03 RX ORDER — DULOXETIN HYDROCHLORIDE 30 MG/1
30 CAPSULE, DELAYED RELEASE ORAL DAILY
Status: DISCONTINUED | OUTPATIENT
Start: 2022-03-03 | End: 2022-03-05 | Stop reason: HOSPADM

## 2022-03-03 RX ORDER — SODIUM CHLORIDE 9 MG/ML
INJECTION, SOLUTION INTRAVENOUS CONTINUOUS
Status: ACTIVE | OUTPATIENT
Start: 2022-03-03 | End: 2022-03-03

## 2022-03-03 RX ORDER — 0.9 % SODIUM CHLORIDE 0.9 %
500 INTRAVENOUS SOLUTION INTRAVENOUS ONCE
Status: COMPLETED | OUTPATIENT
Start: 2022-03-03 | End: 2022-03-03

## 2022-03-03 RX ORDER — ALBUTEROL SULFATE 90 UG/1
1 AEROSOL, METERED RESPIRATORY (INHALATION) EVERY 6 HOURS PRN
Status: DISCONTINUED | OUTPATIENT
Start: 2022-03-03 | End: 2022-03-05 | Stop reason: HOSPADM

## 2022-03-03 RX ORDER — SODIUM CHLORIDE 0.9 % (FLUSH) 0.9 %
5-40 SYRINGE (ML) INJECTION PRN
Status: DISCONTINUED | OUTPATIENT
Start: 2022-03-03 | End: 2022-03-05 | Stop reason: HOSPADM

## 2022-03-03 RX ORDER — PROPOFOL 10 MG/ML
INJECTION, EMULSION INTRAVENOUS PRN
Status: DISCONTINUED | OUTPATIENT
Start: 2022-03-03 | End: 2022-03-03 | Stop reason: SDUPTHER

## 2022-03-03 RX ORDER — PROCHLORPERAZINE EDISYLATE 5 MG/ML
10 INJECTION INTRAMUSCULAR; INTRAVENOUS EVERY 6 HOURS PRN
Status: DISCONTINUED | OUTPATIENT
Start: 2022-03-03 | End: 2022-03-05 | Stop reason: HOSPADM

## 2022-03-03 RX ORDER — KETAMINE HYDROCHLORIDE 10 MG/ML
INJECTION, SOLUTION INTRAMUSCULAR; INTRAVENOUS PRN
Status: DISCONTINUED | OUTPATIENT
Start: 2022-03-03 | End: 2022-03-03 | Stop reason: SDUPTHER

## 2022-03-03 RX ORDER — ROSUVASTATIN CALCIUM 20 MG/1
40 TABLET, COATED ORAL NIGHTLY
Status: DISCONTINUED | OUTPATIENT
Start: 2022-03-03 | End: 2022-03-05 | Stop reason: HOSPADM

## 2022-03-03 RX ORDER — SODIUM CHLORIDE 9 MG/ML
25 INJECTION, SOLUTION INTRAVENOUS PRN
Status: DISCONTINUED | OUTPATIENT
Start: 2022-03-03 | End: 2022-03-05 | Stop reason: HOSPADM

## 2022-03-03 RX ORDER — FUROSEMIDE 20 MG/1
20 TABLET ORAL DAILY
Status: DISCONTINUED | OUTPATIENT
Start: 2022-03-03 | End: 2022-03-05 | Stop reason: HOSPADM

## 2022-03-03 RX ORDER — TIZANIDINE 4 MG/1
4 TABLET ORAL EVERY 6 HOURS PRN
Status: DISCONTINUED | OUTPATIENT
Start: 2022-03-03 | End: 2022-03-05 | Stop reason: HOSPADM

## 2022-03-03 RX ORDER — KETOTIFEN FUMARATE 0.35 MG/ML
1 SOLUTION/ DROPS OPHTHALMIC 2 TIMES DAILY
Status: DISCONTINUED | OUTPATIENT
Start: 2022-03-03 | End: 2022-03-05 | Stop reason: HOSPADM

## 2022-03-03 RX ORDER — ASPIRIN 81 MG/1
81 TABLET, CHEWABLE ORAL DAILY
Status: DISCONTINUED | OUTPATIENT
Start: 2022-03-03 | End: 2022-03-04

## 2022-03-03 RX ORDER — SODIUM CHLORIDE 0.9 % (FLUSH) 0.9 %
5-40 SYRINGE (ML) INJECTION EVERY 12 HOURS SCHEDULED
Status: DISCONTINUED | OUTPATIENT
Start: 2022-03-03 | End: 2022-03-05 | Stop reason: HOSPADM

## 2022-03-03 RX ADMIN — PROPOFOL 100 MG: 10 INJECTION, EMULSION INTRAVENOUS at 15:14

## 2022-03-03 RX ADMIN — SODIUM CHLORIDE: 9 INJECTION, SOLUTION INTRAVENOUS at 04:54

## 2022-03-03 RX ADMIN — PROPOFOL 150 MG: 10 INJECTION, EMULSION INTRAVENOUS at 15:04

## 2022-03-03 RX ADMIN — IRON SUCROSE 300 MG: 20 INJECTION, SOLUTION INTRAVENOUS at 17:16

## 2022-03-03 RX ADMIN — KETAMINE HYDROCHLORIDE 10 MG: 10 INJECTION INTRAMUSCULAR; INTRAVENOUS at 15:09

## 2022-03-03 RX ADMIN — POLYETHYLENE GLYCOL 3350, SODIUM SULFATE ANHYDROUS, SODIUM BICARBONATE, SODIUM CHLORIDE, POTASSIUM CHLORIDE 4000 ML: 236; 22.74; 6.74; 5.86; 2.97 POWDER, FOR SOLUTION ORAL at 08:10

## 2022-03-03 RX ADMIN — KETAMINE HYDROCHLORIDE 10 MG: 10 INJECTION INTRAMUSCULAR; INTRAVENOUS at 15:15

## 2022-03-03 RX ADMIN — LIDOCAINE HYDROCHLORIDE 40 MG: 20 INJECTION, SOLUTION EPIDURAL; INFILTRATION; INTRACAUDAL; PERINEURAL at 14:43

## 2022-03-03 RX ADMIN — POTASSIUM CHLORIDE 20 MEQ: 20 TABLET, EXTENDED RELEASE ORAL at 04:53

## 2022-03-03 RX ADMIN — SODIUM CHLORIDE 500 ML: 9 INJECTION, SOLUTION INTRAVENOUS at 00:55

## 2022-03-03 RX ADMIN — KETAMINE HYDROCHLORIDE 20 MG: 10 INJECTION INTRAMUSCULAR; INTRAVENOUS at 14:43

## 2022-03-03 RX ADMIN — PANTOPRAZOLE SODIUM 40 MG: 40 INJECTION, POWDER, FOR SOLUTION INTRAVENOUS at 04:59

## 2022-03-03 RX ADMIN — PROPOFOL 40 MG: 10 INJECTION, EMULSION INTRAVENOUS at 14:43

## 2022-03-03 RX ADMIN — PANTOPRAZOLE SODIUM 40 MG: 40 INJECTION, POWDER, FOR SOLUTION INTRAVENOUS at 17:19

## 2022-03-03 RX ADMIN — POTASSIUM CHLORIDE 20 MEQ: 20 TABLET, EXTENDED RELEASE ORAL at 21:34

## 2022-03-03 RX ADMIN — PROCHLORPERAZINE EDISYLATE 10 MG: 5 INJECTION INTRAMUSCULAR; INTRAVENOUS at 04:58

## 2022-03-03 RX ADMIN — CARVEDILOL 3.12 MG: 3.12 TABLET, FILM COATED ORAL at 16:17

## 2022-03-03 RX ADMIN — SODIUM CHLORIDE, PRESERVATIVE FREE 10 ML: 5 INJECTION INTRAVENOUS at 16:17

## 2022-03-03 RX ADMIN — SODIUM CHLORIDE, PRESERVATIVE FREE 10 ML: 5 INJECTION INTRAVENOUS at 22:14

## 2022-03-03 RX ADMIN — SODIUM CHLORIDE, POTASSIUM CHLORIDE, SODIUM LACTATE AND CALCIUM CHLORIDE: 600; 310; 30; 20 INJECTION, SOLUTION INTRAVENOUS at 14:33

## 2022-03-03 RX ADMIN — TICAGRELOR 90 MG: 90 TABLET ORAL at 21:34

## 2022-03-03 RX ADMIN — PROPOFOL 20 MG: 10 INJECTION, EMULSION INTRAVENOUS at 14:46

## 2022-03-03 RX ADMIN — PROPOFOL 140 MG: 10 INJECTION, EMULSION INTRAVENOUS at 14:48

## 2022-03-03 ASSESSMENT — PAIN SCALES - GENERAL
PAINLEVEL_OUTOF10: 0
PAINLEVEL_OUTOF10: 0

## 2022-03-03 ASSESSMENT — ENCOUNTER SYMPTOMS
BACK PAIN: 0
BLOOD IN STOOL: 1
ABDOMINAL PAIN: 0
COLOR CHANGE: 0
RHINORRHEA: 0
SORE THROAT: 0
SHORTNESS OF BREATH: 1
WHEEZING: 0
COUGH: 0
NAUSEA: 1
VOMITING: 0
CHEST TIGHTNESS: 0
DIARRHEA: 0

## 2022-03-03 ASSESSMENT — PAIN - FUNCTIONAL ASSESSMENT: PAIN_FUNCTIONAL_ASSESSMENT: 0-10

## 2022-03-03 NOTE — ED PROVIDER NOTES
Emergency Department Encounter    Patient: Tawanna Lennon  MRN: [id number]  : 1965  Date of Evaluation: 3/2/2022  ED Provider:  Camille Mei MD    Triage Chief Complaint:   Rectal Bleeding (Chronic, started again today) and Shortness of Breath (started yesterday)    [place]:  Tawanna Lennon is a 55 y.o. female with history seen below presenting with rectal bleeding. Patient was recently admitted and discharged today for STEMI from [facility]. Patient underwent catheterization which showed an occluded RCA stent was unable to balloon another PCI was placed. Aspirin and Brilinta restarted. Patient presenting today stating she has had rectal bleeding since discharge. Patient states she has had 3 episodes of maroon-colored stools. Patient states she has had rectal bleeding in the past has received blood transfusions. Does have a history of anal cancer in  which was treated with radiation chemotherapy and has had no recurrences. Patient recently did have a colonoscopy and upper scope which showed diverticulosis. Patient follows with Dr. Yobani Cordova. Denies any current chest pain or shortness of breath. States she has some mild shortness of breath with exertion. Denies fevers, cough, sputum production. Denies nausea vomiting. Denies recent falls or trauma. Denies neck or back pain. Denies abdominal pain. Denies headache, blurred vision, focal neuro deficits, motor or sensory changes.     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 in  and tx with chemo and radiation- followed with Dr Ana West Ast[facility]     Broken ankle     CAD (coronary artery disease)     \"have 3 heart stents\" use to see Dr Chetan Cordoba H/O cardiovascular stress test 2016    treadmill    NSTEMI (non-ST elevated myocardial infarction) ([facility] [address]     PONV (postoperative nausea and vomiting)     hx motion sickness    Skin cancer      Past Surgical History:   Procedure Laterality Date    ANKLE SURGERY Left 10+ yrs ago    with hardware    COLON SURGERY  2009    \"after had chemo removed some part of the cancer from the rectal area, then 7 months later had bowel blockage had to do surgery  to remove part of the bowel\"    COLONOSCOPY  3/4/15    mild proctitis    COLONOSCOPY  03/15/2017    mild radia proc, hypertroph pailla    FRACTURE SURGERY      left ankle    OTHER SURGICAL HISTORY  04-    mediport removal    PTCA  2009 4/22/2009 had angioplasty with stent to LAD & another day in 2009 stent x 2 to RCA done    TONSILLECTOMY      as a kid age 7   [address]  25 yrs ago    TUNNELED VENOUS PORT PLACEMENT  2009    port inserted     UPPER GASTROINTESTINAL ENDOSCOPY N/A 2/26/2022    EGD DIAGNOSTIC ONLY performed by Gigi Sapp MD at [facility] ENDOSCOPY     Family History   Problem Relation Age of Onset    Cancer Mother         breast ca? ?    Cancer Father         prostate cancer- lung mets- bone mets    Stroke Father     High Cholesterol Father     Stroke Sister     Cancer Brother         neck cancer    No Known Problems Daughter      Social History     Socioeconomic History    Marital status:      Spouse name: Not on file    Number of children: Not on file    Years of education: 6    Highest education level: Not on file   Occupational History    Occupation: unemployed   Tobacco Use    Smoking status: Former Smoker     Packs/day: 0.25     Years: 35.00     Pack years: 8.75     Types: Cigarettes    Smokeless tobacco: Never Used    Tobacco comment: 6-7 cigarettes    Substance and Sexual Activity    Alcohol use: No     Comment: \"quit 3/2015\"use to drink a couple of times per week before\"    Drug use: No     Comment: 1-2 cups of coffee daily     Sexual activity: Yes   Other Topics Concern    Not on file   Social History Narrative    Not on file     Social Determinants of Health     Financial Resource Strain:     Difficulty of Paying Living Expenses: Not on file   Food Insecurity:     Worried About Running Out of Food in the Last Year: Not on file    Abran of Food in the Last Year: Not on file   Transportation Needs:     Lack of Transportation (Medical): Not on file    Lack of Transportation (Non-Medical): Not on file   Physical Activity:     Days of Exercise per Week: Not on file    Minutes of Exercise per Session: Not on file   Stress:     Feeling of Stress : Not on file   Social Connections:     Frequency of Communication with Friends and Family: Not on file    Frequency of Social Gatherings with Friends and Family: Not on file    Attends Baptism Services: Not on file    Active Member of [address] [place] [address] [employer] or Organizations: Not on file    Attends Club or Organization Meetings: Not on file    Marital Status: Not on file   Intimate Partner Violence:     Fear of Current or Ex-Partner: Not on file    Emotionally Abused: Not on file    Physically Abused: Not on file    Sexually Abused: Not on file   Housing Stability:     Unable to Pay for Housing in the Last Year: Not on file    Number of [place] in the Last Year: Not on file    Unstable Housing in the Last Year: Not on file     No current facility-administered medications for this encounter.      Current Outpatient Medications   Medication Sig Dispense Refill    carvedilol (COREG) 3.125 MG tablet Take 1 tablet by mouth 2 times daily (with meals) 60 tablet 3    furosemide (LASIX) 20 MG tablet Take 1 tablet by mouth daily 60 tablet 3    rosuvastatin (CRESTOR) 40 MG tablet Take 1 tablet by mouth nightly 30 tablet 3    ticagrelor (BRILINTA) 90 MG TABS tablet Take 1 tablet by mouth 2 times daily 60 tablet 0    pantoprazole (PROTONIX) 40 MG tablet Take 1 tablet by mouth daily 30 tablet 3    olopatadine (PATANOL) 0.1 % ophthalmic solution Place 1 drop into both eyes 2 times daily      potassium chloride (KLOR-CON) 20 MEQ packet Take 20 mEq by mouth 2 times daily      Cyanocobalamin 2500 MCG TABS Take 2 tablets by mouth daily (Patient not taking: Reported on 12/20/2021)      tiZANidine (ZANAFLEX) 4 MG tablet Take 4 mg by mouth every 6 hours as needed      DULoxetine (CYMBALTA) 20 MG extended release capsule Take 3 mg by mouth daily       nitroGLYCERIN (NITROSTAT) 0.4 MG SL tablet up to max of 3 total doses. If no relief after 1 dose, call 911. 25 tablet 3    loperamide (IMODIUM) 2 MG capsule Take 2 mg by mouth as needed for Diarrhea (Patient not taking: Reported on 12/20/2021)      Multiple Vitamin (MULTIVITAMIN) capsule Take 1 capsule by mouth daily      hydrOXYzine (ATARAX) 25 MG tablet daily as needed       albuterol-ipratropium (COMBIVENT RESPIMAT)  MCG/ACT AERS inhaler Inhale into the lungs      aspirin 81 MG chewable tablet Take 1 tablet by mouth daily 30 tablet 6     Allergies   Allergen Reactions    Vicodin Hp [Hydrocodone-Acetaminophen] Other (See Comments)     Keep me awake    Tylenol With Codeine #3 [Acetaminophen-Codeine] Nausea And Vomiting       Nursing Notes Reviewed    Physical Exam:  Triage VS:    ED Triage Vitals   Enc Vitals Group      BP       Pulse       Resp       Temp       Temp src       SpO2       Weight       Height       Head Circumference       Peak Flow       Pain Score       Pain Loc       Pain Edu? Excl. in [address]? My pulse ox interpretation is - normal    General appearance:  No acute distress. Skin:  Warm. Dry. Eye:  Extraocular movements intact. Ears, nose, mouth and throat:  Oral mucosa moist   Neck:  Trachea midline. Extremity:  No swelling. Normal ROM     Heart:  Regular rate and rhythm, normal S1 & S2, no extra heart sounds. Perfusion:  intact  Respiratory:  Lungs clear to auscultation bilaterally. Respirations nonlabored. Abdominal:  Normal bowel sounds. Soft. Nontender. Non distended. Back:  No CVA tenderness to palpation     Neurological:  Alert and oriented times 3. No focal neuro deficits. Psychiatric:  Appropriate    I have reviewed and interpreted all of the currently available lab results from this visit (if applicable):  No results found for this visit on 03/02/22. Radiographs (if obtained):  Radiologist's Report Reviewed:  No results found. EKG (if obtained): (All EKG's are interpreted by myself in the absence of a cardiologist)  Normal sinus rhythm, ventricular rate 91, IL interval 154, QRS duration 126, QTc 457, right bundle branch block, T wave inversions in V1. T wave flattening/inversion in lead III and aVF, previous EKG revealed ST elevation in the inferior leads. There may be some very slight elevation but no significant elevation in his significant peripheral prior exam.    MDM:    52-year-old female presenting with rectal bleeding. History and be seen above. Vitals presentation reassuring and patient afebrile satting on room air. On physical exam lungs are clear to auscultation, cardiac exam is reassuring, abdomen soft and nontender. Overall patient is well-appearing. Patient was recently admitted for STEMI and stent placement will occur not currently on Brilinta and aspirin. Patient states bleeding started today and has had 3 episodes of maroon-colored stools. CBC, CMP, coags, type and screen, lactate as well as cardiac work-up obtained. CBC reveals mild leukocytosis of 11,000. Hemoglobin is 7.6 down from 9.0 on 2/26/2022. Chest x-ray is nonacute. EKG is improved from prior. Patient did recently have an inferior STEMI. ST segments very slight elevation significantly improved from prior exam.  Initial troponin is 0.103. Previous troponin on 2/14 was 0.4. Discussed with Dr. Tim Nolen states that repeat troponin 2-3 hours.   Due to downtrending hemoglobin and patient is on Brilinta and aspirin for recent stent placement will admit patient for cardiac monitoring as well as hemoglobin trending and likely to see cardiology and GI in the morning. Hospitalist called and patient admitted to their service. Clinical Impression:  1. Rectal bleeding    2. Anemia, blood loss    3. History of ST elevation myocardial infarction (STEMI)    4. Elevated troponin      Comment: Please note this report has been produced using speech recognition software and may contain errors related to that system including errors in grammar, punctuation, and spelling, as well as words and phrases that may be inappropriate. Efforts were made to edit the dictations.         Phil Milan MD  03/03/22 0682

## 2022-03-03 NOTE — PROGRESS NOTES
Bowel prep completed and bm are clear. Patient tolerated blood and prep.  Will evaluate and redraw blood hgb

## 2022-03-03 NOTE — ANESTHESIA PRE PROCEDURE
Department of Anesthesiology  Preprocedure Note       Name:  Laci Webb   Age:  55 y.o.  :  1965                                          MRN:  [id number]         Date:  3/3/2022      Surgeon: Mitchell Burgess):  Mary Kay Torres MD    Procedure: Procedure(s):  COLONOSCOPY DIAGNOSTIC  ENTEROSCOPY PUSH DIAGNOSTIC    Medications prior to admission:   Prior to Admission medications    Medication Sig Start Date End Date Taking? Authorizing Provider   carvedilol (COREG) 3.125 MG tablet Take 1 tablet by mouth 2 times daily (with meals) 22   Shree Perez MD   furosemide (LASIX) 20 MG tablet Take 1 tablet by mouth daily 22   Shree Perez MD   rosuvastatin (CRESTOR) 40 MG tablet Take 1 tablet by mouth nightly 22   Shree Perez MD   ticagrelor (BRILINTA) 90 MG TABS tablet Take 1 tablet by mouth 2 times daily 22   Shree Perez MD   pantoprazole (PROTONIX) 40 MG tablet Take 1 tablet by mouth daily 22   Shree Perez MD   olopatadine (PATANOL) 0.1 % ophthalmic solution Place 1 drop into both eyes 2 times daily    Historical Provider, MD   potassium chloride (KLOR-CON) 20 MEQ packet Take 20 mEq by mouth 2 times daily    Historical Provider, MD   Cyanocobalamin 2500 MCG TABS Take 2 tablets by mouth daily  Patient not taking: Reported on 2021    Historical Provider, MD   tiZANidine (ZANAFLEX) 4 MG tablet Take 4 mg by mouth every 6 hours as needed    Historical Provider, MD   DULoxetine (CYMBALTA) 20 MG extended release capsule Take 3 mg by mouth daily  21  Historical Provider, MD   nitroGLYCERIN (NITROSTAT) 0.4 MG SL tablet up to max of 3 total doses.  If no relief after 1 dose, call 911. 3/11/21   Mary Montiel MD   loperamide (IMODIUM) 2 MG capsule Take 2 mg by mouth as needed for Diarrhea  Patient not taking: Reported on 2021    Historical Provider, MD   Multiple Vitamin (MULTIVITAMIN) capsule Take 1 capsule by mouth daily    Historical Provider, MD hydrOXYzine (ATARAX) 25 MG tablet daily as needed  2/12/21   Historical Provider, MD   albuterol-ipratropium (COMBIVENT RESPIMAT)  MCG/ACT AERS inhaler Inhale into the lungs 5/15/17   Historical Provider, MD   aspirin 81 MG chewable tablet Take 1 tablet by mouth daily 7/7/16   Becky Kulkarni MD       Current medications:    No current facility-administered medications for this visit. No current outpatient medications on file.      Facility-Administered Medications Ordered in Other Visits   Medication Dose Route Frequency Provider Last Rate Last Admin    [Held by provider] aspirin chewable tablet 81 mg  81 mg Oral Daily [facility], DO        carvedilol (COREG) tablet 3.125 mg  3.125 mg Oral BID [facility], DO        DULoxetine (CYMBALTA) extended release capsule 30 mg  30 mg Oral Daily [facility], DO        furosemide (LASIX) tablet 20 mg  20 mg Oral Daily Isiah Snyder, DO        ketotifen (ZADITOR) 0.025 % ophthalmic solution 1 drop  1 drop Both Eyes BID [facility], DO        potassium chloride (KLOR-CON M) extended release tablet 20 mEq  20 mEq Oral BID [facility], DO   20 mEq at 03/03/22 0453    rosuvastatin (CRESTOR) tablet 40 mg  40 mg Oral Nightly [facility], DO        [Held by provider] ticagrelor (BRILINTA) tablet 90 mg  90 mg Oral BID [facility], DO        tiZANidine (ZANAFLEX) tablet 4 mg  4 mg Oral Q6H PRN [facility], DO        sodium chloride flush 0.9 % injection 5-40 mL  5-40 mL IntraVENous 2 times per day [facility], DO        sodium chloride flush 0.9 % injection 5-40 mL  5-40 mL IntraVENous PRN [facility], DO        0.9 % sodium chloride infusion  25 mL IntraVENous PRN [facility], DO        acetaminophen (TYLENOL) tablet 650 mg  650 mg Oral Q6H PRN [facility], DO        Or    acetaminophen (TYLENOL) suppository 650 mg  650 mg Rectal Q6H PRN [facility], DO        prochlorperazine (COMPAZINE) injection 10 mg  10 mg disease)     \"have 3 heart stents\" use to see Dr Daniel Ingram H/O cardiovascular stress test 1/6/2016    treadmill    NSTEMI (non-ST elevated myocardial infarction) ([address]     PONV (postoperative nausea and vomiting)     hx motion sickness    Skin cancer        Past Surgical History:        Procedure Laterality Date    ANKLE SURGERY Left 10+ yrs ago    with hardware    COLON SURGERY  2009    \"after had chemo removed some part of the cancer from the rectal area, then 7 months later had bowel blockage had to do surgery  to remove part of the bowel\"    COLONOSCOPY  3/4/15    mild proctitis    COLONOSCOPY  03/15/2017    mild radia proc, hypertroph pailla    FRACTURE SURGERY      left ankle    OTHER SURGICAL HISTORY  04-    mediport removal    PTCA  2009 4/22/2009 had angioplasty with stent to LAD & another day in 2009 stent x 2 to RCA done    TONSILLECTOMY      as a kid age 7   [address]  25 yrs ago    TUNNELED VENOUS PORT PLACEMENT  2009    port inserted     UPPER GASTROINTESTINAL ENDOSCOPY N/A 2/26/2022    EGD DIAGNOSTIC ONLY performed by Dheeraj Branch MD at [facility] ENDOSCOPY       Social History:    Social History     Tobacco Use    Smoking status: Former Smoker     Packs/day: 0.25     Years: 35.00     Pack years: 8.75     Types: Cigarettes    Smokeless tobacco: Never Used    Tobacco comment: 6-7 cigarettes    Substance Use Topics    Alcohol use: No     Comment: \"quit 3/2015\"use to drink a couple of times per week before\"                                Counseling given: Not Answered  Comment: 6-7 cigarettes       Vital Signs (Current): There were no vitals filed for this visit.                                            BP Readings from Last 3 Encounters:   03/03/22 (!) 145/68   02/26/22 94/62   02/26/22 126/75       NPO Status:                                                                                 BMI:   Wt Readings from Last 3 Encounters:   03/03/22 (!) 421 lb 8.3 oz (191.2 kg)   02/22/22 197 lb 5 oz (89.5 kg)   12/20/21 183 lb 3.2 oz (83.1 kg)     There is no height or weight on file to calculate BMI.    CBC:   Lab Results   Component Value Date    WBC 10.9 03/03/2022    RBC 2.44 03/03/2022    HGB 7.9 03/03/2022    HCT 26.3 03/03/2022    MCV 87.7 03/03/2022    RDW 13.7 03/03/2022     03/03/2022       CMP:   Lab Results   Component Value Date     03/02/2022    K 3.9 03/02/2022    CL 97 03/02/2022    CO2 25 03/02/2022    BUN 24 03/02/2022    CREATININE 1.2 03/02/2022    GFRAA 56 03/02/2022    AGRATIO 1.5 02/11/2022    AGRATIO 1.5 02/11/2022    LABGLOM 47 03/02/2022    GLUCOSE 128 03/02/2022    PROT 6.6 03/02/2022    PROT 7.6 04/10/2012    CALCIUM 8.7 03/02/2022    BILITOT 0.2 03/02/2022    ALKPHOS 148 03/02/2022    AST 32 03/02/2022    ALT 19 03/02/2022       POC Tests: No results for input(s): POCGLU, POCNA, POCK, POCCL, POCBUN, POCHEMO, POCHCT in the last 72 hours. Coags:   Lab Results   Component Value Date    PROTIME 10.8 03/02/2022    INR 0.84 03/02/2022    APTT 24.0 03/02/2022       HCG (If Applicable):   Lab Results   Component Value Date    PREGTESTUR NEGATIVE 10/03/2015        ABGs: No results found for: PHART, PO2ART, AUE1NKP, AAN5PKG, BEART, Q7HGUGFW     Type & Screen (If Applicable):  No results found for: LABABO, LABRH    Drug/Infectious Status (If Applicable):  No results found for: HIV, HEPCAB    COVID-19 Screening (If Applicable):   Lab Results   Component Value Date    COVID19 NOT DETECTED 02/22/2022           Anesthesia Evaluation  Patient summary reviewed   history of anesthetic complications: PONV.   Airway: Mallampati: II  TM distance: <3 FB   Neck ROM: full  Mouth opening: > = 3 FB Dental:          Pulmonary:   (+) asthma:                           ROS comment: Former Smoker - 8.75 pack years   Cardiovascular:    (+) hypertension:, past MI: < 1 month, CAD:, CABG/stent:, hyperlipidemia               ROS comment: Echo 2/22/202:  Left ventricular systolic function is normal.   Ejection fraction is visually estimated at 55-60%. No wall motion abnormalities detected. No significant valvular disease noted. No evidence of any pericardial effusion. Echo 2/22/2022:  Diagnostic procedure:Coronary Angiogram without LHC, Right   Coronary Angiography, Left Coronary Angiography      PCI procedure:DE Stent, RCA: DE Stent Plcmt Initl Vsl, Balloon   Angioplasty, RCA: Balloon Angioplasty Initl Vsl, Pt is Referred   to Cardiac Rehab Phase 2 as OP      Conclusions      Procedure Summary   STEMI presenting with 100% occlusion of RCA stent with thrombus,   s/p PTCA and then PCI with SOUMYA with excellent results      Recommendations   load brilinta, aspirin, integrillin, echo     Neuro/Psych:   Negative Neuro/Psych ROS              GI/Hepatic/Renal:   (+) GERD:, bowel prep, morbid obesity         ROS comment: GIB    per old chart 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 and tx with chemo and radiation- followed with Dr Chai Currie. Endo/Other:    (+) blood dyscrasia: anemia:., malignancy/cancer. Abdominal:   (+) obese,           Vascular: negative vascular ROS. Other Findings:               Anesthesia Plan      MAC     ASA 4       Induction: intravenous. Anesthetic plan and risks discussed with patient. Plan discussed with CRNA. Attending anesthesiologist reviewed and agrees with Preprocedure content              FLAVIO Ye - CRNA   3/3/2022         Pre Anesthesia Assessment complete.  Chart reviewed on 3/3/2022

## 2022-03-03 NOTE — ED NOTES
Pt updated on POC. Pt given a warm blanket. Blood specimen sent to lab per order. Pt denies further needs.       Diane Calvert RN  03/03/22 2784

## 2022-03-03 NOTE — CARE COORDINATION
CM reviewed notes. Pt is to Colonoscopy with Dr Lázaro Guzman today. Per review of chart. Pt does not have a PCP. Cm placed a list of PCP on discharge instructions. Pt does have insurance and should be able to obtain her meds. 1010 CM into see and confirmed the above. Pt lives with her .  will provide transport for pt. Pt lives in a two story but is able to stay on the first level. Pt has no needs when discharge   Discharge plan is home with . Pt has a PCP but she is unsure if she will keep PCP. List on chart. Pt has no needs.  [address]

## 2022-03-03 NOTE — ED NOTES
Report called to Fabian Godinez RN who denies further questions.       Ligia Sexton RN  03/03/22 0347

## 2022-03-03 NOTE — ANESTHESIA POSTPROCEDURE EVALUATION
Department of Anesthesiology  Postprocedure Note    Patient: Eric Montesinos  MRN: [id number]  YOB: 1965  Date of evaluation: 3/3/2022  Time:  3:42 PM     Procedure Summary     Date: 03/03/22 Room / Location: [address]    Anesthesia Start: 2973 Anesthesia Stop: 9653    Procedures:       COLONOSCOPY CONTROL HEMORRHAGE WITH STRAIGHT FIRE APC WITH CLIP PLACEMENT X 2 (N/A )      ENTEROSCOPY PUSH DIAGNOSTIC (N/A ) Diagnosis: (GI Bleed)    Surgeons: Concha Ayala MD Responsible Provider: Alonso Cullen MD    Anesthesia Type: MAC ASA Status: 4          Anesthesia Type: MAC    Dionisio Phase I:      Dionisio Phase II:      Last vitals: Reviewed and per EMR flowsheets.        Anesthesia Post Evaluation    Patient location during evaluation: bedside  Patient participation: complete - patient participated  Level of consciousness: awake and alert  Pain score: 0  Airway patency: patent  Nausea & Vomiting: no vomiting and no nausea  Complications: no  Cardiovascular status: blood pressure returned to baseline and hemodynamically stable  Respiratory status: nonlabored ventilation, spontaneous ventilation and room air  Hydration status: stable

## 2022-03-03 NOTE — BRIEF OP NOTE
BRIEF COLONOSCOPY REPORT:  Photos and full colonoscopy report available by going to \"chart review\" then \"procedures\" then  \"colonoscopy\" then \"View  Report\"     IMPRESSION :   1) no fresh or old blood seen at any point  2) large AVM cecum- coagulated with APC and 2 clips applied  3) mild radiation proctitis  4) internal hemorrhoids  5) otherwise normal colon           BRIEF ENTEROSCOPY REPORT:  Photos and full EGD report available by going to [facility] review\" then \"procedures\" then  \"EGD\" then \"View Report\"     IMPRESSION :  1) small hiatal hernia  2) otherwise normal exam to a point felt to be 1-2 feet beyond the Ligament of [place]    SUGGEST: :  1) replace iron parenterally  2) monitor H/H     AVM CECUM        AFTER COAG + 2 CLIPS

## 2022-03-03 NOTE — CONSULTS
part of the bowel\"    COLONOSCOPY  3/4/15    mild proctitis    COLONOSCOPY  03/15/2017    mild radia proc, hypertroph pailla    FRACTURE SURGERY      left ankle    OTHER SURGICAL HISTORY  04-    mediport removal    PTCA  2009 4/22/2009 had angioplasty with stent to LAD & another day in 2009 stent x 2 to RCA done    TONSILLECTOMY      as a kid age 7   [address]  25 yrs ago    TUNNELED VENOUS PORT PLACEMENT  2009    port inserted     UPPER GASTROINTESTINAL ENDOSCOPY N/A 2/26/2022    EGD DIAGNOSTIC ONLY performed by Gigi Sapp MD at [address] [facility] ENDOSCOPY       Medications Prior to Admission:    Prior to Admission medications    Medication Sig Start Date End Date Taking? Authorizing Provider   carvedilol (COREG) 3.125 MG tablet Take 1 tablet by mouth 2 times daily (with meals) 2/26/22   Shree Bajwa MD   furosemide (LASIX) 20 MG tablet Take 1 tablet by mouth daily 2/27/22   Shree Bajwa MD   rosuvastatin (CRESTOR) 40 MG tablet Take 1 tablet by mouth nightly 2/26/22   Shree Bajwa MD   ticagrelor (BRILINTA) 90 MG TABS tablet Take 1 tablet by mouth 2 times daily 2/26/22   Shree Bajwa MD   pantoprazole (PROTONIX) 40 MG tablet Take 1 tablet by mouth daily 2/26/22   Shree Bajwa MD   olopatadine (PATANOL) 0.1 % ophthalmic solution Place 1 drop into both eyes 2 times daily    Historical Provider, MD   potassium chloride (KLOR-CON) 20 MEQ packet Take 20 mEq by mouth 2 times daily    Historical Provider, MD   Cyanocobalamin 2500 MCG TABS Take 2 tablets by mouth daily  Patient not taking: Reported on 12/20/2021    Historical Provider, MD   tiZANidine (ZANAFLEX) 4 MG tablet Take 4 mg by mouth every 6 hours as needed    Historical Provider, MD   DULoxetine (CYMBALTA) 20 MG extended release capsule Take 3 mg by mouth daily  4/1/21 4/1/22  Historical Provider, MD   nitroGLYCERIN (NITROSTAT) 0.4 MG SL tablet up to max of 3 total doses.  If no relief after 1 dose, call 911. 3/11/21   Rayshawn Angelina Huntley MD   loperamide (IMODIUM) 2 MG capsule Take 2 mg by mouth as needed for Diarrhea  Patient not taking: Reported on 12/20/2021    Historical Provider, MD   Multiple Vitamin (MULTIVITAMIN) capsule Take 1 capsule by mouth daily    Historical Provider, MD   hydrOXYzine (ATARAX) 25 MG tablet daily as needed  2/12/21   Historical Provider, MD   albuterol-ipratropium (COMBIVENT RESPIMAT)  MCG/ACT AERS inhaler Inhale into the lungs 5/15/17   Historical Provider, MD   aspirin 81 MG chewable tablet Take 1 tablet by mouth daily 7/7/16   Cosme Berg MD       Allergies: Allergies   Allergen Reactions    Vicodin Hp [Hydrocodone-Acetaminophen] Other (See Comments)     Keep me awake    Tylenol With Codeine #3 [Acetaminophen-Codeine] Nausea And Vomiting   . Social History:    TOBACCO:  No  ETOH:  No    Family History: reviewed and positives included in HPI- all other pertinent GI family history negative      REVIEW OF SYSTEMS: see HPI for positives and pertinent negatives. All other systems reviewed and are negative    PHYSICAL EXAM:    Vitals:  /68   Pulse 86   Temp 98.4 °F (36.9 °C)   Resp 15   Wt (!) 421 lb 8.3 oz (191.2 kg)   SpO2 96%   BMI 77.10 kg/m²   CONSTITUTIONAL: alert, cooperative, no apparent distress,   EYES:  pupils equal, round and reactive to light and sclera clear  ENT:  normocepalic, without obvious abnormality  NECK:  supple, symmetrical, trachea midline  HEMATOLOGIC/LYMPHATICS:  no cervical lymphadenopathy and no supraclavicular lymphadenopathy  LUNGS:  clear to auscultation  CARDIOVASCULAR:  regular rate and rhythm and no murmur noted  ABDOMEN:  Soft, non-tender with normal bowel sounds.  No organomegaly or masses  NEUROLOGIC: no focal deficit detected  SKIN:  no lesions  EXTREMITIES: no clubbing, cyanosis, or edema  RECTAL: dark red blood    IMPRESSION:  1) recurrent GI bleed on anticoag with unremarkable prior workup--- main possibilities are radiation proctitis, diverticular bleed, bleeding from the inflammatory segment of small bowel seen at recent CT enterography, small bowel angiodysplasia or UGI lesion not seen at recent EGD  2)recent PCI    RECOMMENDATIONS:  1) transfuse to keep Hb > 8.0 due to her CAD  2) repeat colonoscopy today- if negative proceed directly to push enteroscopy  3) endoscopy high risk due to recent PCI but no better alternative          Salas Chavez M.D

## 2022-03-03 NOTE — CONSULTS
CARDIOLOGY CONSULT NOTE   Reason for consultation:  GI bleeding /STEMI     Referring physician:  Kareen Wick DO     Primary care physician: No primary care provider on file. Dear  Dr. Kareen Wick DO   Thanks for the consult. Chief Complaints :  Chief Complaint   Patient presents with    Rectal Bleeding     Chronic, started again today    Shortness of Breath     started yesterday        History of present illness:Renata is a 55 y. o.year old who presents with bright red blood per rectum. Unfortunately she had a STEMI 8 days ago due to acute thrombus and occlusion of the RCA stent  She did have a 1year-old by 12 stent placed  She has previous history of LAD stent in March 2021. Her esophagogastroduodenostomy 1 week ago did not show any obvious ulcers or bleeding source  On presentation now her hemoglobin is down to 6.6 from 9 about 1 week ago    Past medical history:    has a past medical history of Anal cancer ([facility] [address] Asthma, Broken ankle, CAD (coronary artery disease), H/O cardiovascular stress test, NSTEMI (non-ST elevated myocardial infarction) ([facility] [address] PONV (postoperative nausea and vomiting), and Skin cancer. Past surgical history:   has a past surgical history that includes Percutaneous Transluminal Coronary Angio (2009); Colon surgery (2009); Ankle surgery (Left, 10+ yrs ago); Tubal ligation (25 yrs ago); Tunneled venous port placement (2009); Tonsillectomy; other surgical history (04-); fracture surgery; Colonoscopy (3/4/15); Colonoscopy (03/15/2017); and Upper gastrointestinal endoscopy (N/A, 2/26/2022). Social History:   reports that she has quit smoking. Her smoking use included cigarettes. She has a 8.75 pack-year smoking history. She has never used smokeless tobacco. She reports that she does not drink alcohol and does not use drugs.   Family history:   no family history of CAD, STROKE of DM at early age    Allergies   Allergen Reactions    Vicodin Hp [Hydrocodone-Acetaminophen] Other (See Comments)     Keep me awake    Tylenol With Codeine #3 [Acetaminophen-Codeine] Nausea And Vomiting       [Held by provider] aspirin chewable tablet 81 mg, Daily  carvedilol (COREG) tablet 3.125 mg, BID   DULoxetine (CYMBALTA) extended release capsule 30 mg, Daily  furosemide (LASIX) tablet 20 mg, Daily  ketotifen (ZADITOR) 0.025 % ophthalmic solution 1 drop, BID  potassium chloride (KLOR-CON M) extended release tablet 20 mEq, BID  rosuvastatin (CRESTOR) tablet 40 mg, Nightly  [Held by provider] ticagrelor (BRILINTA) tablet 90 mg, BID  tiZANidine (ZANAFLEX) tablet 4 mg, Q6H PRN  sodium chloride flush 0.9 % injection 5-40 mL, 2 times per day  sodium chloride flush 0.9 % injection 5-40 mL, PRN  0.9 % sodium chloride infusion, PRN  acetaminophen (TYLENOL) tablet 650 mg, Q6H PRN   Or  acetaminophen (TYLENOL) suppository 650 mg, Q6H PRN  prochlorperazine (COMPAZINE) injection 10 mg, Q6H PRN  pantoprazole (PROTONIX) injection 40 mg, Q12H   And  sodium chloride (PF) 0.9 % injection 10 mL, Q12H  albuterol sulfate  (90 Base) MCG/ACT inhaler 1 puff, Q6H PRN   And  ipratropium (ATROVENT HFA) 17 MCG/ACT inhaler 1 puff, Q6H PRN  0.9 % sodium chloride infusion, PRN  0.9 % sodium chloride infusion, PRN      Current Facility-Administered Medications   Medication Dose Route Frequency Provider Last Rate Last Admin    [Held by provider] aspirin chewable tablet 81 mg  81 mg Oral Daily Isiah Snyder DO        carvedilol (COREG) tablet 3.125 mg  3.125 mg Oral BID  Isiah Snyder DO        DULoxetine (CYMBALTA) extended release capsule 30 mg  30 mg Oral Daily Casa, DO        furosemide (LASIX) tablet 20 mg  20 mg Oral Daily Isiah Snyder,         ketotifen (ZADITOR) 0.025 % ophthalmic solution 1 drop  1 drop Both Eyes BID Casa, DO        potassium chloride (KLOR-CON M) extended release tablet 20 mEq  20 mEq Oral BID Casa, DO   20 mEq at 03/03/22 0453    rosuvastatin (CRESTOR) tablet 40 mg  40 mg Oral Nightly Corita Fiddler, DO        [Held by provider] ticagrelor (BRILINTA) tablet 90 mg  90 mg Oral BID Corita Fiddler, DO        tiZANidine (ZANAFLEX) tablet 4 mg  4 mg Oral Q6H PRN Corita Fiddler, DO        sodium chloride flush 0.9 % injection 5-40 mL  5-40 mL IntraVENous 2 times per day Corita Fiddler, DO        sodium chloride flush 0.9 % injection 5-40 mL  5-40 mL IntraVENous PRN Corita Fiddler, DO        0.9 % sodium chloride infusion  25 mL IntraVENous PRN Corita Fiddler, DO        acetaminophen (TYLENOL) tablet 650 mg  650 mg Oral Q6H PRN Corita Fiddler, DO        Or    acetaminophen (TYLENOL) suppository 650 mg  650 mg Rectal Q6H PRN Corita Fiddler, DO        prochlorperazine (COMPAZINE) injection 10 mg  10 mg IntraVENous Q6H PRN Corita Fiddler, DO   10 mg at 03/03/22 0458    pantoprazole (PROTONIX) injection 40 mg  40 mg IntraVENous Q12H Corita Fiddler, DO   40 mg at 03/03/22 0459    And    sodium chloride (PF) 0.9 % injection 10 mL  10 mL IntraVENous Q12H Isiah Manuele, DO        albuterol sulfate  (90 Base) MCG/ACT inhaler 1 puff  1 puff Inhalation Q6H PRN Corita Fiddler, DO        And    ipratropium (ATROVENT HFA) 17 MCG/ACT inhaler 1 puff  1 puff Inhalation Q6H PRN Isiah Manuele, DO        0.9 % sodium chloride infusion   IntraVENous PRN FLAVIO Orellana - NP        0.9 % sodium chloride infusion   IntraVENous PRN Gail Moran MD         Review of Systems:   · Constitutional: No Fever or Weight Loss   · Eyes: No Decreased Vision  · ENT: No Headaches, Hearing Loss or Vertigo  · Cardiovascular: As per HPI  · Respiratory: As per HPI  · Gastrointestinal: No abdominal pain, appetite loss, blood in stools, constipation, diarrhea or heartburn  · Genitourinary: No dysuria, trouble voiding, or hematuria  · Musculoskeletal:  No gait disturbance, weakness or joint complaints  · Integumentary: No rash or pruritis  · Neurological: No TIA or stroke symptoms  · Psychiatric: No anxiety or depression  · Endocrine: No malaise, fatigue or temperature intolerance  · Hematologic/Lymphatic: No bleeding problems, blood clots or swollen lymph nodes  · Allergic/Immunologic: No nasal congestion or hives  All systems negative except as marked. Physical Examination:    Vitals:    03/03/22 0600 03/03/22 0607 03/03/22 0745 03/03/22 0800   BP: 125/68 125/68 134/88 (!) 139/93   Pulse: 86 86 95 108   Resp: 16 16 16 17   Temp: 98.4 °F (36.9 °C) 98.4 °F (36.9 °C) 97.7 °F (36.5 °C) 97.8 °F (36.6 °C)   TempSrc: Oral Oral Oral Oral   SpO2: 96% 96% 93% 93%   Weight:  (!) 421 lb 8.3 oz (191.2 kg)         General Appearance:  No distress, conversant    Constitutional:  Well developed, Well nourished, No acute distress, Non-toxic appearance. HENT:  Normocephalic, Atraumatic, Bilateral external ears normal, Oropharynx moist, No oral exudates, Nose normal. Neck- Normal range of motion, No tenderness, Supple, No stridor,no apical-carotid delay  Lymphatics : no palpable lymph nodes  Eyes:  PERRL, EOMI, Conjunctiva normal, No discharge. Respiratory:  Normal breath sounds, No respiratory distress, No wheezing, No chest tenderness. ,no use of accessory muscles, crackles Absent   Cardiovascular: (PMI) apex non displaced,no lifts no thrills, ankle swelling Absent  , 1+, s1 and s2 audible,Murmur. Absent , JVD not noted    Abdomen /GI:  Bowel sounds normal, Soft, No tenderness, No masses, No gross visceromegaly   :  No costovertebral angle tenderness   Musculoskeletal:  No edema, no tenderness, no deformities.  Back- no tenderness  Integument:  Well hydrated, no rash   Lymphatic:  No lymphadenopathy noted   Neurologic:  Alert & oriented x 3, CN 2-12 normal, normal motor function, normal sensory function, no focal deficits noted           Medical decision making and Data review:    Lab Review   Recent Labs     03/03/22  0502 03/03/22  0502 03/03/22  1036   WBC 10.9*  --   --    HGB 6.6* < > 7.9*   HCT 21.4*   < > 26.3*     --   --     < > = values in this interval not displayed. Recent Labs     03/02/22 2227      K 3.9   CL 97*   CO2 25   BUN 24*   CREATININE 1.2*     Recent Labs     03/02/22 2227   AST 32   ALT 19   BILITOT 0.2   ALKPHOS 148*     Recent Labs     03/02/22 2227 03/03/22  0235   TROPONINT 0.103* 0.063*       No results for input(s): PROBNP in the last 72 hours. Lab Results   Component Value Date    INR 0.84 03/02/2022    PROTIME 10.8 (L) 03/02/2022       EKG: (reviewed by myself)    ECHO:(reviewed by myself)    Chest Xray:(reviewed by myself)  Echocardiogram limited    Result Date: 2/22/2022  Transthoracic Echocardiography Report (TTE)  Demographics   Patient Name        Obie Mcneal  Date of Study        02/22/2022   Date of Birth       1965      Gender               Female   Age                 55 year(s)      Race                    Patient Number      [id number]      Room Number          2120   Visit Number        [id number]   Corporate ID        [id number]   Accession Number    [id number]      Ayanna Banuelos RDMS   Ordering Physician  Alejandro Fonseca MD  Interpreting         Yazmin Sawyer MD                                      Physician  Procedure Type of Study   TTE procedure:ECHOCARDIOGRAM LIMITED. Procedure Date Date: 02/22/2022 Start: 03:33 PM Study Location: Portable Technical Quality: Technically difficult study Indications:STEMI. Patient Status: Routine Height: 62 inches Weight: 186 pounds BSA: 1.85 m2 BMI: 34.02 kg/m2 HR: 92 bpm BP: 171/99 mmHg  Conclusions   Summary   Left ventricular systolic function is normal.  Ejection fraction is visually estimated at 55-60%. No wall motion abnormalities detected. No significant valvular disease noted. No evidence of any pericardial effusion.    Signature ------------------------------------------------------------------  Electronically signed by Matias Bauman MD (Interpreting  physician) on 02/22/2022 at 04:00 PM  ------------------------------------------------------------------   Findings   Left Ventricle  Left ventricular systolic function is normal.  Ejection fraction is visually estimated at 55-60%. E/A flow reversal is noted. No wall motion abnormalities detected. Left Atrium  Essentially normal left atrium. Right Atrium  Essentially normal right atrium. Right Ventricle  Essentially normal right ventricle. Aortic Valve  Trace aortic regurgitation noted. Mitral Valve  Trace mitral regurgitation. Tricuspid Valve  Mild tricuspid regurgitation; RVSP: 38 mmHg. Pulmonic Valve  The pulmonic valve was not well visualized. Pericardial Effusion  No evidence of any pericardial effusion. Pleural Effusion  No evidence of pleural effusion.   M-Mode/2D Measurements & Calculations   LV Diastolic Dimension: 7.47 cm LV Systolic Dimension: 6.55 cm  LV FS:27.4 %                    LV Volume Diastolic: 40 ml  LV PW Diastolic: 8.60 cm        LV Volume Systolic: 17 ml  LV PW Systolic: 4.01 cm         LV EDV/LV EDV Index: 40 ml/22 m2LV ESV/LV  Septum Diastolic: 1.2 cm        ESV Index: 17 ml/9 m2  Septum Systolic: 3.73 cm        EF Calculated (A4C): 57.5 %                                  EF Calculated (2D): 54.1 %   LV Area Diastolic: 98.8 cm2     LV Length: 5.63 cm  LV Area Systolic: 5.33 cm2  Doppler Measurements & Calculations                               Estimated RVSP: 38 mmHg                              Estimated RAP:3 mmHg    Estimated PASP: 33.69 mmHg TR Velocity:277 cm/s                              TR Gradient:30.69 mmHg      XR RIBS RIGHT INCLUDE CHEST (MIN 3 VIEWS)    Result Date: 2/24/2022  EXAMINATION: 6 XRAY VIEWS OF THE RIGHT RIBS WITH FRONTAL XRAY VIEW OF THE CHEST 2/24/2022 1:05 pm COMPARISON: 03/08/2021 HISTORY: ORDERING SYSTEM PROVIDED HISTORY: rib pain. fall check for any fx TECHNOLOGIST PROVIDED HISTORY: Reason for exam:->rib pain. fall check for any fx Reason for Exam: right rib pain Additional signs and symptoms: fall Relevant Medical/Surgical History: na FINDINGS: The cardial-pericardial silhouette is normal in appearance. The lungs are clear. No pneumothorax is found. No free air. No acute bony abnormalities are identified. Specifically, no right-sided rib fractures are found. No acute abnormality detected. Specifically, no rib fractures are found. CT ABDOMEN PELVIS W IV CONTRAST    Result Date: 2/12/2022  CT ABDOMEN AND PELVIS WITH CONTRAST, 2/12/2022 3:30 PM HISTORY: GI bleed    History:  GI bleed, GI bleeding, history of rectal cancer. Number of Series/Images:  4. TECHNIQUE: Multidetector CT was performed from the lung bases to the proximal femurs after intravenous contrast     CT radiation dose optimization techniques (automated exposure control, use of iterative reconstruction techniques, or adjustment of the mA and/or kV according to patient size) were used to limit patient radiation dose. Contrast Medication(s): IOHEXOL 350 MG/ML IV SOLN Amount Administered = 75 mL COMPARISON: 2015 FINDINGS:  Topogram: No additional finding    LUNG BASES: Linear bands of atelectasis or scar in the lingula HEART: Normal size. Diffuse coronary calcifications. No pericardial effusion SPLEEN: Normal PANCREAS: Normal LIVER: Hepatic steatosis. No lesion GALLBLADDER: Normal COMMON BILE DUCT: No biliary dilatation. ADRENALS: Normal KIDNEYS: Normal. No hydronephrosis. No urolithiasis URINARY BLADDER: Not distended PELVIC CAVITY: Uterus is present. No evidence for adnexal mass LARGE BOWEL: Mild to moderate amount of stool throughout the colon. A few scattered diverticula. Postsurgical findings APPENDIX: Normal SMALL BOWEL: No bowel obstruction. STOMACH: No hiatal hernia PERITONEAL CAVITY: No free air. No free fluid.  ABDOMINAL AORTA: Normal caliber. Atherosclerotic calcification. LYMPH NODES: There is no retroperitoneal, mesenteric, or pelvic adenopathy. ABDOMINAL WALL: No ventral or inguinal hernia BONES: Bilateral femoral head osteonecrosis pattern. Mild bilateral hip joint osteoarthritis. [IMPRESSION] 1. Hepatic steatosis 2. Scattered colonic diverticulosis 3. Postsurgical findings 4. No bowel obstruction, free air, free fluid, or acute inflammatory process 5. Atherosclerotic calcification 6. Femoral head osteonecrosis See above for complete description Electronically Signed by: Nkechi Silverio MD, 2/12/2022 3:36 PM 6 - Marginal     XR CHEST PORTABLE    Result Date: 3/2/2022  EXAMINATION: ONE XRAY VIEW OF THE CHEST 3/2/2022 10:25 pm COMPARISON: None. HISTORY: ORDERING SYSTEM PROVIDED HISTORY: sob TECHNOLOGIST PROVIDED HISTORY: Reason for exam:->sob Reason for Exam: sob FINDINGS: No infiltrate or consolidation or effusion is identified. The heart size is normal.  Vascular calcifications are noted. A coronary artery stent is seen. Radiographically clear lungs. XR CHEST PORTABLE    Result Date: 2/12/2022  XR-CHEST PORTABLE STAT [id number] 2/12/2022 1:49 PM History: dyspnea  History:  dyspnea. Number of Series/Images:  1. Comparison: 2017 Findings: Single mobile view of the chest demonstrates normal cardiomediastinal silhouette with midline trachea and clear lung fields bilaterally. Some pulmonary hyperinflation is once again suggested.  [IMPRESSION] BONY HYPERINFLATION WITH NO ACUTE CHANGE Electronically Signed by: Neptali Bhakta M.D., 2/12/2022 1:50 PM     ECHO Compl W Dop Color Flow    Result Date: 2/13/2022                         [facility]*                      Department of Cardiology                       [address] [place] [address]                  Ph [phone] Fax [phone] ------------------------------------------------------------------- Transthoracic Echocardiography Patient:     Franky Falcon  :    1965 Height: Maryelizabeth Spatz MR #:        [id number]       Gender: F          Weight: 185.6lb [facility] #:       [id number]      Age:    55         BP:     114 / 74                                                        mmHg Study        2022    BSA:    1.85m^2    HR: Date/Time:   7:31AM Ordering Physician:   Eula Mercado Sonographer:          Amadou Escalante  ------------------------------------------------------------------- Procedure:2D COMPLETE ECHO (COLORFLOW/DOPPLER, STRAIN) Accession [id number] ------------------------------------------------------------------- Indications:      Myocardial Infarction (acute). ------------------------------------------------------------------- Study data:  Transthoracic echocardiography. Location:  Bedside. Patient status:  Inpatient. Procedure:  Transthoracic echocardiography was performed. Image quality was adequate. Scanning was performed from the parasternal, apical, and subcostal acoustic windows. M-mode, complete 2D, complete spectral Doppler, and color Doppler. ------------------------------------------------------------------- CONCLUSIONS SUMMARY: 1. Left ventricle: The cavity size was normal. Wall thickness was    normal. Systolic function was at the lower limits of normal. The    estimated ejection fraction was in the range of 50% to 55%. Wall    motion was normal; there were no regional wall motion    abnormalities. Left ventricular diastolic function parameters    were normal. 2. Right ventricle: The cavity size was normal. Wall thickness was    normal. Systolic function was normal. 3. Pericardium, extracardiac: There was no pericardial effusion. ------------------------------------------------------------------- Cardiac Anatomy Left ventricle: - The cavity size was normal. Wall thickness was normal. Systolic   function was at the lower limits of normal. The estimated   ejection fraction was in the range of 50% to 55%.  Aron Shell motion was   normal; there were no regional wall motion abnormalities. - Left ventricular diastolic function parameters were normal. Aortic valve: - The valve was structurally normal. The valve was trileaflet. Cusp   separation was normal. Doppler: - Transvalvular velocity was within the normal range. There was no   stenosis. There was no significant regurgitation. - Peak velocity ratio of LVOT to aortic valve: 0.96. Aorta: Aortic root: - The aortic root was normal in size. Mitral valve: - The valve was structurally normal. Leaflet separation was normal. Doppler: - Transvalvular velocity was within the normal range. There was no   evidence for stenosis. There was no significant regurgitation. - Valve area by pressure half-time: 3.6cm^2. Indexed valve area by   pressure half-time: 1.95cm^2/m^2. - Peak gradient (D): 3mm Hg. Left atrium: - The atrium was normal in size. Atrial septum: - The septum was normal. No defect or patent foramen ovale was   identified. Right ventricle: - The cavity size was normal. Wall thickness was normal. Systolic   function was normal. Ventricular septum: - Thickness was normal. The ventricular septum showed normal   function. The contour showed a normal configuration. There was no   evidence of a ventricular septal defect. Pulmonic valve: - The valve was structurally normal. Cusp separation was normal. Doppler: - Transvalvular velocity was within the normal range. There was no   significant regurgitation. Tricuspid valve: - The valve was structurally normal. The leaflets were normal   thickness. Doppler: - Transvalvular velocity was within the normal range. There was no   evidence for stenosis. There was no significant regurgitation. Right atrium: - The atrium was normal in size. Pericardium: - There was no pericardial effusion. Systemic veins: Inferior vena cava: - The vessel was normal in size. The respirophasic diameter changes   were in the normal range (= 50%). ------------------------------------------------------------------- Measurements  Left ventricle                           Value          Reference  LV ID, ED, PLAX chordal                  5.1   cm       ---------  LV ID, ES, PLAX chordal                  3.6   cm       ---------  LV fx shortening, PLAX chordal           29    %        27 - 45  LV PW thickness, ED                      0.9   cm       0.6 - 0.9  IVS/LV PW ratio, ED                      0.89           ---------  LV ejection fraction, 1-p A4C    (L)     53    %        &AL;=46  LV end-diastolic volume, 2-p             73    ml       56 - 013  LV end-systolic volume, 2-p              34    ml       19 - 49  LV ejection fraction, 2-p        (L)     54    %        &gt;=55  Stroke volume, 2-p                       39    ml       ---------  LV end-diastolic volume/bsa, 2-p         39    ml/m^2   35 - 75  LV end-systolic volume/bsa, 2-p          18    ml/m^2   12 - 30  Stroke volume/bsa, 2-p                   21.1  ml/m^2   ---------  LV EF Teichholz                          56    %        &gt;=55  LV E/e', lateral                         8              ---------  LV E/e', medial                          9              ---------   Ventricular septum                       Value          Reference  IVS thickness, ED                        0.8   cm       0.6 - 0.9   LVOT                                     Value          Reference  LVOT peak velocity, S                    1.35  m/sec    ---------  LVOT peak gradient, S                    7     mm Hg    ---------   Aortic valve                             Value          Reference  Aortic leaflet separation, MM            2.1   cm       ---------  Aortic valve peak velocity, S            1.41  m/sec    ---------  Velocity ratio, peak, LVOT/AV            0.96           ---------   Aorta                                    Value          Reference  Aortic root ID, ED, MM                   3.3   cm       --------- Left atrium                              Value          Reference  LA ID, A-P, ES                           2.9   cm       2.7 - 3.8  LA ID/bsa, A-P                           1.6   cm/m^2   1. 5 - 2.3  LA volume, ES, 1-p A4C                   30    ml       22 - 52  LA volume/bsa, ES, 1-p A4C               16    ml/m^2   ---------  LA volume, ES, 1-p A2C                   38    ml       22 - 52  LA volume/bsa, ES, 1-p A2C               21    ml/m^2   ---------  LA volume, ES, 2-p                       35    ml       ---------  LA volume/bsa, ES, 2-p                   19    ml/m^2   ---------   Mitral valve                             Value          Reference  Mitral E-wave peak velocity              0.86  m/sec    ---------  Mitral A-wave peak velocity              0.72  m/sec    ---------  Mitral deceleration time                 209   ms       ---------  Mitral pressure half-time                61    ms       ---------  Mitral peak gradient, D                  3     mm Hg    ---------  Mitral E/A ratio, peak                   1.2            ---------  Mitral valve area, PHT, DP               3.6   cm^2     ---------  Mitral valve area/bsa, PHT, DP           1.95  cm^2/m^2 ---------   Systemic veins                           Value          Reference  Estimated CVP                            5     mm Hg    ---------   Right ventricle                          Value          Reference  RV ID, ED, PLAX                          2.7   cm       ---------  TAPSE                                    2.0   cm       ---------  RV s', lateral, S                        0.11  m/sec    --------- Legend: (L)  and  (H)  katharina values outside specified reference range. ------------------------------------------------------------------- Reviewed and confirmed by Omer DE LA [id number]     CT ENTEROGRAPHY W CONTRAST    Result Date: 2/27/2022  EXAMINATION: CT ENTEROGRAPHY 2/25/2022 10:13 am TECHNIQUE: CT of the abdomen and pelvis was performed after the administration of intravenous contrast and negative oral contrast. Dose modulation, iterative reconstruction, and/or weight based adjustment of the mA/kV was utilized to reduce the radiation dose to as low as reasonably achievable. COMPARISON: CT abdomen/pelvis performed March 1, 2011. HISTORY: ORDERING SYSTEM PROVIDED HISTORY: chronic abd pain- history of remote small bowel resection for SBO TECHNOLOGIST PROVIDED HISTORY: Reason for exam:->chronic abd pain- history of remote small bowel resection for SBO Reason for Exam: chronic abd pain- history of remote small bowel resection for SBO Relevant Medical/Surgical History: 80 ML ISOVUE [id number] FINDINGS: Lower Chest: Linear atelectasis versus scarring in the lingula and right lower lobe. No acute abnormality of the visualized lower chest. Organs: There is diffuse decreased attenuation of the liver parenchyma compatible with steatosis. The gallbladder, spleen, pancreas, adrenal glands, and kidneys are grossly unremarkable. No hydronephrosis. GI/Bowel: There is few loops of small bowel in the right mid and lower abdomen which demonstrate circumferential wall thickening and subtle adjacent fat stranding. These loops of bowel appear immediately upstream from a small bowel anastomosis noted in the mid lower abdomen. Findings are concerning for enteritis. There are few loops of prominent fluid-filled small bowel also noted. Few sigmoid diverticula without evidence of acute diverticulitis. The appendix is normal. Pelvis: Uterus is grossly unremarkable. The urinary bladder is within normal limits. Small volume pelvic free fluid. Peritoneum/Retroperitoneum: No significant lymphadenopathy. The abdominal aorta is normal in course and caliber with scattered atherosclerotic disease. Bones/Soft Tissues: No acute soft tissue abnormality.   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 No significant subchondral collapse of the right femoral head. 1. Few loops of small bowel in the right mid and lower abdomen demonstrating circumferential wall thickening and subtle adjacent fat stranding concerning for enteritis. These loops are noted to be just upstream from a small bowel anastomosis in the lower abdomen. 2. There are few loops of prominent fluid-filled small bowel which could indicate ileus. Developing obstruction cannot be entirely excluded. 3. Hepatic steatosis. 4. 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 There is contour deformity of the left femoral head and subchondral collapse demonstrated. [facility] JESSICA DIGITAL SCREEN BILATERAL    Result Date: 2/11/2022  This is a summary report. The complete report is available in the patient's medical record. If you cannot access the medical record, please contact the sending organization for a detailed fax or copy. MA-MAMMO SCREENING 3D JESSICA BILATERAL INDICATION FOR EXAMINATION: Z12.31: Encounter for screening mammogram for malignant neoplasm of breast HISTORY: This is a 55year-old Female with a family history of breast cancer in 2 maternal aunts. COMPARISON: 5/16/2002 TECHNIQUE: Bilateral  2-D and 3-D tomosynthesis images of the breast(s) were obtained in the CC and MLO projections. Computer aided detection (CAD) was used to assist in the interpretation of the 2D mammogram. BREAST COMPOSITION: The breast density is almost entirely fatty. FINDINGS: There is no dominant mass, architectural distortion, focal asymmetry, or suspicious microcalcifications to suggest malignancy. There is no concerning interval change. ASSESSMENT: BI-RADS CATEGORY (1) Negative. RECOMMENDATIONS: Bilateral: Routine screening 3D mammogram (Tomosynthesis) in 1 year. .           .           .    Cancer Risk Assessment: Patient Declined A result letter will be sent to the patient. Electronically Signed by: Cornelia Lucero MD, 2/11/2022 2:25 PM       All labs, medications and tests reviewed by myself including data  from outside source , patient and available family . Continue all other medications of all above medical condition listed as is. Impression:  Principal Problem:    UGIB (upper gastrointestinal bleed)  Resolved Problems:    * No resolved hospital problems. *      Assessment: 55 y. o.year old with PMH of  has a past medical history of Anal cancer ([facility] [address] Asthma, Broken ankle, CAD (coronary artery disease), H/O cardiovascular stress test, NSTEMI (non-ST elevated myocardial infarction) ([facility] [address] PONV (postoperative nausea and vomiting), and Skin cancer. Plan and Recommendations:  Difficult and complicated situation  Recent STEMI with RCA stent active GI bleeding now and anemia with negative endoscopy. Continue aspirin Brilinta but can switch to Brilinta alone due to high risk of bleeding  Anemia  with GI bleeding: Transfuse for hemoglobin less than 8  GI work-up for active bleeding red blood cell tag? Hold Lasix for now due to concerns for volume loss  DVT prophylaxis if no contraindication  6. Dyslipidemia: continue statins           Thank you  much for consult and giving us the opportunity in contributing in the care of this patient. Please feel free to call me for any questions.        Barbara Ansari MD, 3/3/2022 11:40 AM

## 2022-03-03 NOTE — ED NOTES
The patient presents to the emergency department alert and oriented with a complaint of weakness, dark red stools starting today. The patient states a history of rectal bleeding and anal cancer. The patient placed on the monitor with vital signs taken. Assessment as follows; Skin is pink, warm and dry.       Katelynn Garner RN  03/02/22 6727

## 2022-03-03 NOTE — PROGRESS NOTES
REPORT CALLED TO NUSRAT RN. PT AWAKE AND RESPONSIVE. VSS. PT MAY HAVE REGULAR DIET PER DR ROWLEY ORDERS.

## 2022-03-03 NOTE — H&P
History and Physical      Name:  Nkechi Telles /Age/Sex: 1965  (55 y.o. female)   MRN & CSN:  [id number] & [id number] Encounter Date/Time: 3/3/2022 5:01 AM EST   Location:  ED17/ED-17 PCP: No primary care provider on file. Hospital Day: 2    Assessment and Plan:   Nkechi Telles is a 55 y.o. female with a pmh of CAD s/p STEMI s/p PTCA, history of GI bleeds, anal cancer s/p chemotherapy and radiation, anxiety, depression, hypertension, asthma, SBO s/p bowel resection, obesity, GERD, and muscle spasms, who presents with UGIB (upper gastrointestinal bleed)    Hospital Problems           Last Modified POA    * (Principal) UGIB (upper gastrointestinal bleed) 3/3/2022 Yes        1. Acute upper gastrointestinal bleed with associated blood loss anemia  2. Recurrent GI bleeds  Admit to inpatient services with telemetry  Last admission for GI bleed was on 2022 at [facility]. Patient was on Effient prior to admission. Patient's hemoglobin at that admission was 6.9. Received a transfusion. Patient's Effient was discontinued at discharge. Patient then presented to our facility on 2022 for a STEMI after being off of the Effient. Hgb 7.6 on presentation  0 units PRBC given in ED  H&H q6 for now  Type & screen and check PT/INR  Transfuse as necessary (hgb <7)  Patient received 500 cc bolus of NS and we will continue with 150 mL an hour for 5 hours. NPO  Protonix bolus  Hold chemical DVT prophylaxis  At this point we will order but hold aspirin and Brilinta. Multidisciplinary team between attending hospitalist, gastroenterologist, and cardiologist needs to be undertaken to determine best course for patient given history. Consult gastroenterology, appreciate recommendations    3. CAD s/p PCI with recent STEMI  4. Elevated troponin  1. Troponin I 0.103, without chest pain, cycle troponins x2  2.  No baseline troponin at last hospitalization with STEMI  3. ED consulted cardiology who recommended cycling troponins. 4. Please see above for multidisciplinary team actions. 5. Continue home carvedilol, Lasix, and rosuvastatin. 5. JESSICA, secondary to above  1. Creatinine 0.2 with baseline being 0.9  2. Likely due to prerenal azotemia from blood loss  3. IVF as above  4. If not improved with IVF can consider bilateral renal ultrasound and nephrology consult    Other chronic medical conditions:   Anal cancer s/p chemotherapy and radiation  Anxiety  Depression  Hypertension  Asthma  SBO s/p bowel resection  Obesity  GERD  Muscle spasms    Diet Diet NPO Exceptions are: Ice Chips   DVT Prophylaxis [] Lovenox, []  Heparin, [x] SCDs, [] Ambulation,  [] Eliquis, [] Xarelto   Code Status Full Code     History from:     patient    History of Present Illness:     Chief Complaint: UGIB (upper gastrointestinal bleed)  Tawanna Lennon is a 55 y.o. female with pmh of CAD s/p STEMI s/p PTCA, history of GI bleeds, anal cancer s/p chemotherapy and radiation, anxiety, depression, hypertension, asthma, SBO s/p bowel resection, obesity, GERD, and muscle spasms, who presents with complaints of melena. Symptoms have been present for approximately 1 day. The symptoms are gradually worsening. This has been associated with heartburn, nausea and shortness of breath. She denies nonsteroidal anti-inflammatory drugs on a regular bases; she is antiaggregated with aspirin and Brilinta. The patient currently denies significant abdominal pain or discomfort. There is a past history of gastrointestinal bleeding recently on 2/12/2022 and discharged on 2/14/2022 at [facility]. Patient received 1 unit PRBCs. Effient was stopped at discharge. Patient presented to our facility on 2/20/2022 for STEMI. At discharge patient was placed on Brilinta and aspirin. Otherwise patient denies fever, chills, headache, chest pain, cough, emesis, hematemesis, dysuria, frequency, or urgency.     Discussed case with ED provider      Review of Systems:   Review of Systems Constitutional: Negative for appetite change, chills, fatigue and fever. HENT: Negative for congestion, rhinorrhea and sore throat. Eyes: Negative for visual disturbance. Respiratory: Positive for shortness of breath (HYLTON). Negative for cough, chest tightness and wheezing. Cardiovascular: Negative for chest pain and palpitations. Gastrointestinal: Positive for blood in stool and nausea. Negative for abdominal pain, diarrhea and vomiting. Genitourinary: Negative for dysuria, frequency, hematuria and urgency. Musculoskeletal: Negative for arthralgias and back pain. Skin: Negative for color change, pallor and rash. Neurological: Negative for dizziness, seizures, syncope, speech difficulty, weakness, numbness and headaches. Hematological: Bruises/bleeds easily. Psychiatric/Behavioral: Negative for confusion. Objective: Intake/Output Summary (Last 24 hours) at 3/3/2022 0501  Last data filed at 3/3/2022 0239  Gross per 24 hour   Intake 500 ml   Output --   Net 500 ml      Vitals:   Vitals:    03/02/22 2210 03/03/22 0054 03/03/22 0202 03/03/22 0302   BP: 122/85 120/70 128/84 139/70   Pulse: 96 92 89 94   Resp: 20 20 17 15   Temp: 98.4 °F (36.9 °C)      TempSrc: Oral      SpO2: 100% 98% 98%        Medications Prior to Admission     Prior to Admission medications    Medication Sig Start Date End Date Taking?  Authorizing Provider   carvedilol (COREG) 3.125 MG tablet Take 1 tablet by mouth 2 times daily (with meals) 2/26/22   Shree Bajwa MD   furosemide (LASIX) 20 MG tablet Take 1 tablet by mouth daily 2/27/22   Shree Bajwa MD   rosuvastatin (CRESTOR) 40 MG tablet Take 1 tablet by mouth nightly 2/26/22   Shree Bajwa MD   [facility]) 90 MG TABS tablet Take 1 tablet by mouth 2 times daily 2/26/22   Shree Bajwa MD   pantoprazole (PROTONIX) 40 MG tablet Take 1 tablet by mouth daily 2/26/22   Shree Bajwa MD   olopatadine (PATANOL) 0.1 % ophthalmic solution Place 1 drop into both eyes 2 times daily    Historical Provider, MD   potassium chloride (KLOR-CON) 20 MEQ packet Take 20 mEq by mouth 2 times daily    Historical Provider, MD   Cyanocobalamin 2500 MCG TABS Take 2 tablets by mouth daily  Patient not taking: Reported on 12/20/2021    Historical Provider, MD   tiZANidine (ZANAFLEX) 4 MG tablet Take 4 mg by mouth every 6 hours as needed    Historical Provider, MD   DULoxetine (CYMBALTA) 20 MG extended release capsule Take 3 mg by mouth daily  4/1/21 4/1/22  Historical Provider, MD   nitroGLYCERIN (NITROSTAT) 0.4 MG SL tablet up to max of 3 total doses. If no relief after 1 dose, call 911. 3/11/21   Luisa Goldstein MD   loperamide (IMODIUM) 2 MG capsule Take 2 mg by mouth as needed for Diarrhea  Patient not taking: Reported on 12/20/2021    Historical Provider, MD   Multiple Vitamin (MULTIVITAMIN) capsule Take 1 capsule by mouth daily    Historical Provider, MD   hydrOXYzine (ATARAX) 25 MG tablet daily as needed  2/12/21   Historical Provider, MD   albuterol-ipratropium (COMBIVENT RESPIMAT)  MCG/ACT AERS inhaler Inhale into the lungs 5/15/17   Historical Provider, MD   aspirin 81 MG chewable tablet Take 1 tablet by mouth daily 7/7/16   Antonio Blandon MD       Physical Exam:    Physical Exam  Vitals and nursing note reviewed. Constitutional:       General: She is awake. She is not in acute distress. Appearance: Normal appearance. She is obese. She is not ill-appearing, toxic-appearing or diaphoretic. Interventions: She is not intubated. HENT:      Head: Atraumatic. Right Ear: External ear normal.      Left Ear: External ear normal.      Nose: Nose normal. No rhinorrhea. Mouth/Throat:      Mouth: Mucous membranes are dry. Eyes:      Conjunctiva/sclera: Conjunctivae normal.   Cardiovascular:      Rate and Rhythm: Normal rate and regular rhythm. Pulses: Normal pulses.    Pulmonary:      Effort: Pulmonary effort is normal. No tachypnea or respiratory distress. She is not intubated. Breath sounds: Normal breath sounds. No wheezing, rhonchi or rales. Abdominal:      General: Bowel sounds are increased. There is no distension. Palpations: Abdomen is soft. Tenderness: There is no abdominal tenderness. There is no guarding or rebound. Musculoskeletal:         General: Normal range of motion. Cervical back: Neck supple. Right lower leg: No edema. Left lower leg: No edema. Skin:     General: Skin is cool and dry. Capillary Refill: Capillary refill takes less than 2 seconds. Coloration: Skin is pale. Neurological:      Mental Status: She is alert and oriented to person, place, and time. Mental status is at baseline. Cranial Nerves: No dysarthria or facial asymmetry. Motor: No tremor or seizure activity. Psychiatric:         Attention and Perception: She is attentive. Mood and Affect: Mood is not anxious. Speech: She is communicative. Speech is not slurred. Behavior: Behavior is cooperative. 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 and tx with chemo and radiation- followed with Dr Cynthia Ansari Asthma     Broken ankle     CAD (coronary artery disease)     \"have 3 heart stents\" use to see Dr Orly Connor H/O cardiovascular stress test 1/6/2016    treadmill    NSTEMI (non-ST elevated myocardial infarction) ([facility] [address]     PONV (postoperative nausea and vomiting)     hx motion sickness    Skin cancer      PSHX:  has a past surgical history that includes Percutaneous Transluminal Coronary Angio (2009); Colon surgery (2009); Ankle surgery (Left, 10+ yrs ago); Tubal ligation (25 yrs ago); Tunneled venous port placement (2009); Tonsillectomy; other surgical history (04-); fracture surgery; Colonoscopy (3/4/15);  Colonoscopy (03/15/2017); and Upper gastrointestinal endoscopy (N/A, 2/26/2022). Allergies: Allergies   Allergen Reactions    Vicodin Hp [Hydrocodone-Acetaminophen] Other (See Comments)     Keep me awake    Tylenol With Codeine #3 [Acetaminophen-Codeine] Nausea And Vomiting     Fam HX: Reviewed family history includes Cancer in her brother, father, and mother; High Cholesterol in her father; No Known Problems in her daughter; Stroke in her father and sister. Soc HX:   Social History     Socioeconomic History    Marital status:      Spouse name: None    Number of children: None    Years of education: 6    Highest education level: None   Occupational History    Occupation: unemployed   Tobacco Use    Smoking status: Former Smoker     Packs/day: 0.25     Years: 35.00     Pack years: 8.75     Types: Cigarettes    Smokeless tobacco: Never Used    Tobacco comment: 6-7 cigarettes    Substance and Sexual Activity    Alcohol use: No     Comment: \"quit 3/2015\"use to drink a couple of times per week before\"    Drug use: No     Comment: 1-2 cups of coffee daily     Sexual activity: Yes   Other Topics Concern    None   Social History Narrative    None     Social Determinants of Health     Financial Resource Strain:     Difficulty of Paying Living Expenses: Not on file   Food Insecurity:     Worried About Running Out of Food in the Last Year: Not on file    Abran of Food in the Last Year: Not on file   Transportation Needs:     Lack of Transportation (Medical): Not on file    Lack of Transportation (Non-Medical):  Not on file   Physical Activity:     Days of Exercise per Week: Not on file    Minutes of Exercise per Session: Not on file   Stress:     Feeling of Stress : Not on file   Social Connections:     Frequency of Communication with Friends and Family: Not on file    Frequency of Social Gatherings with Friends and Family: Not on file    Attends Jain Services: Not on file    Active Member of Clubs or Organizations: Not on file    Attends Club or Organization Meetings: Not on file    Marital Status: Not on file   Intimate Partner Violence:     Fear of Current or Ex-Partner: Not on file    Emotionally Abused: Not on file    Physically Abused: Not on file    Sexually Abused: Not on file   Housing Stability:     Unable to Pay for Housing in the Last Year: Not on file    Number of Places Lived in the Last Year: Not on file    Unstable Housing in the Last Year: Not on file       Medications:   Medications:    [Held by provider] aspirin  81 mg Oral Daily    carvedilol  3.125 mg Oral BID WC    DULoxetine  30 mg Oral Daily    furosemide  20 mg Oral Daily    ketotifen  1 drop Both Eyes BID    potassium chloride  20 mEq Oral BID    rosuvastatin  40 mg Oral Nightly    [Held by provider] ticagrelor  90 mg Oral BID    sodium chloride flush  5-40 mL IntraVENous 2 times per day    pantoprazole  40 mg IntraVENous Q12H    And    sodium chloride (PF)  10 mL IntraVENous Q12H      Infusions:    sodium chloride      sodium chloride 150 mL/hr at 03/03/22 0454     PRN Meds: tiZANidine, 4 mg, Q6H PRN  sodium chloride flush, 5-40 mL, PRN  sodium chloride, 25 mL, PRN  acetaminophen, 650 mg, Q6H PRN   Or  acetaminophen, 650 mg, Q6H PRN  prochlorperazine, 10 mg, Q6H PRN  albuterol sulfate HFA, 1 puff, Q6H PRN   And  ipratropium, 1 puff, Q6H PRN        Labs      CBC:   Recent Labs     03/02/22 2227   WBC 11.2*   HGB 7.6*   *     BMP:    Recent Labs     03/02/22 2227      K 3.9   CL 97*   CO2 25   BUN 24*   CREATININE 1.2*   GLUCOSE 128*     Hepatic:   Recent Labs     03/02/22 2227   AST 32   ALT 19   BILITOT 0.2   ALKPHOS 148*     Lipids:   Lab Results   Component Value Date    CHOL 152 02/23/2022    CHOL 152 02/11/2022    CHOL 152 02/11/2022    HDL 68 02/23/2022    TRIG 115 02/23/2022     Hemoglobin A1C: No results found for: LABA1C  TSH:   Lab Results   Component Value Date    TSH 2.420 02/11/2022    TSH 2.420 02/11/2022     Troponin:   Lab Results   Component Value Date    TROPONINT 0.063 03/03/2022    TROPONINT 0.103 03/02/2022    TROPONINT <0.010 03/09/2021     Lactic Acid: No results for input(s): LACTA in the last 72 hours. BNP: No results for input(s): PROBNP in the last 72 hours. UA:  Lab Results   Component Value Date    NITRU NEGATIVE 03/02/2022    COLORU YELLOW 03/02/2022    WBCUA 2 03/02/2022    RBCUA NONE SEEN 03/02/2022    MUCUS RARE 03/02/2022    YEAST RARE 10/03/2015    BACTERIA RARE 03/02/2022    CLARITYU CLEAR 03/02/2022    SPECGRAV 1.025 03/02/2022    LEUKOCYTESUR TRACE 03/02/2022    UROBILINOGEN 0.2 03/02/2022    BILIRUBINUR NEGATIVE 03/02/2022    BLOODU NEGATIVE 03/02/2022    KETUA TRACE 03/02/2022     Urine Cultures: No results found for: Yvan Stone  Blood Cultures: No results found for: BC  No results found for: BLOODCULT2  Organism: No results found for: ORG    Imaging/Diagnostics Last 24 Hours   XR CHEST PORTABLE    Result Date: 3/2/2022  Radiographically clear lungs. Personally reviewed lab work and imaging. This dictation was created with voice recognition software. While attempts have been made to review the dictation as it is transcribed, on occasion the spoken word can be misinterpreted by the technology leading to omissions or inappropriate words, phrases or sentences.      Electronically signed by Jennifer Arshad DO on 3/3/2022 at 5:01 AM

## 2022-03-04 DIAGNOSIS — K92.2 GASTROINTESTINAL HEMORRHAGE, UNSPECIFIED GASTROINTESTINAL HEMORRHAGE TYPE: Primary | ICD-10-CM

## 2022-03-04 LAB
ANION GAP SERPL CALCULATED.3IONS-SCNC: 9 MMOL/L (ref 4–16)
BUN BLDV-MCNC: 10 MG/DL (ref 6–23)
CALCIUM SERPL-MCNC: 8.5 MG/DL (ref 8.3–10.6)
CHLORIDE BLD-SCNC: 108 MMOL/L (ref 99–110)
CO2: 22 MMOL/L (ref 21–32)
CREAT SERPL-MCNC: 0.7 MG/DL (ref 0.6–1.1)
GFR AFRICAN AMERICAN: >60 ML/MIN/1.73M2
GFR NON-AFRICAN AMERICAN: >60 ML/MIN/1.73M2
GLUCOSE BLD-MCNC: 101 MG/DL (ref 70–99)
HCT VFR BLD CALC: 22.7 % (ref 37–47)
HCT VFR BLD CALC: 23 % (ref 37–47)
HCT VFR BLD CALC: 25.7 % (ref 37–47)
HEMOGLOBIN: 7 GM/DL (ref 12.5–16)
HEMOGLOBIN: 7.1 GM/DL (ref 12.5–16)
HEMOGLOBIN: 8.1 GM/DL (ref 12.5–16)
POTASSIUM SERPL-SCNC: 4.2 MMOL/L (ref 3.5–5.1)
SODIUM BLD-SCNC: 139 MMOL/L (ref 135–145)

## 2022-03-04 PROCEDURE — 36430 TRANSFUSION BLD/BLD COMPNT: CPT

## 2022-03-04 PROCEDURE — 6370000000 HC RX 637 (ALT 250 FOR IP): Performed by: SPECIALIST

## 2022-03-04 PROCEDURE — 85018 HEMOGLOBIN: CPT

## 2022-03-04 PROCEDURE — 6360000002 HC RX W HCPCS: Performed by: SPECIALIST

## 2022-03-04 PROCEDURE — 36415 COLL VENOUS BLD VENIPUNCTURE: CPT

## 2022-03-04 PROCEDURE — 80048 BASIC METABOLIC PNL TOTAL CA: CPT

## 2022-03-04 PROCEDURE — 99233 SBSQ HOSP IP/OBS HIGH 50: CPT | Performed by: INTERNAL MEDICINE

## 2022-03-04 PROCEDURE — APPSS45 APP SPLIT SHARED TIME 31-45 MINUTES: Performed by: NURSE PRACTITIONER

## 2022-03-04 PROCEDURE — 1200000000 HC SEMI PRIVATE

## 2022-03-04 PROCEDURE — 2580000003 HC RX 258: Performed by: SPECIALIST

## 2022-03-04 PROCEDURE — C9113 INJ PANTOPRAZOLE SODIUM, VIA: HCPCS | Performed by: SPECIALIST

## 2022-03-04 PROCEDURE — 94761 N-INVAS EAR/PLS OXIMETRY MLT: CPT

## 2022-03-04 PROCEDURE — 85014 HEMATOCRIT: CPT

## 2022-03-04 RX ORDER — ASPIRIN 81 MG/1
81 TABLET, CHEWABLE ORAL DAILY
Status: DISCONTINUED | OUTPATIENT
Start: 2022-03-04 | End: 2022-03-05 | Stop reason: HOSPADM

## 2022-03-04 RX ORDER — SODIUM CHLORIDE 9 MG/ML
INJECTION, SOLUTION INTRAVENOUS PRN
Status: DISCONTINUED | OUTPATIENT
Start: 2022-03-04 | End: 2022-03-05 | Stop reason: HOSPADM

## 2022-03-04 RX ADMIN — SODIUM CHLORIDE, PRESERVATIVE FREE 10 ML: 5 INJECTION INTRAVENOUS at 09:12

## 2022-03-04 RX ADMIN — SODIUM CHLORIDE, PRESERVATIVE FREE 10 ML: 5 INJECTION INTRAVENOUS at 05:06

## 2022-03-04 RX ADMIN — PANTOPRAZOLE SODIUM 40 MG: 40 INJECTION, POWDER, FOR SOLUTION INTRAVENOUS at 16:37

## 2022-03-04 RX ADMIN — TICAGRELOR 90 MG: 90 TABLET ORAL at 21:21

## 2022-03-04 RX ADMIN — DULOXETINE HYDROCHLORIDE 30 MG: 30 CAPSULE, DELAYED RELEASE ORAL at 09:11

## 2022-03-04 RX ADMIN — PANTOPRAZOLE SODIUM 40 MG: 40 INJECTION, POWDER, FOR SOLUTION INTRAVENOUS at 05:05

## 2022-03-04 RX ADMIN — IRON SUCROSE 300 MG: 20 INJECTION, SOLUTION INTRAVENOUS at 16:49

## 2022-03-04 RX ADMIN — CARVEDILOL 3.12 MG: 3.12 TABLET, FILM COATED ORAL at 16:38

## 2022-03-04 RX ADMIN — CARVEDILOL 3.12 MG: 3.12 TABLET, FILM COATED ORAL at 09:11

## 2022-03-04 RX ADMIN — SODIUM CHLORIDE, PRESERVATIVE FREE 10 ML: 5 INJECTION INTRAVENOUS at 16:37

## 2022-03-04 RX ADMIN — ROSUVASTATIN 40 MG: 40 TABLET, FILM COATED ORAL at 21:21

## 2022-03-04 RX ADMIN — POTASSIUM CHLORIDE 20 MEQ: 20 TABLET, EXTENDED RELEASE ORAL at 21:21

## 2022-03-04 RX ADMIN — FUROSEMIDE 20 MG: 20 TABLET ORAL at 09:11

## 2022-03-04 RX ADMIN — TICAGRELOR 90 MG: 90 TABLET ORAL at 09:11

## 2022-03-04 RX ADMIN — SODIUM CHLORIDE, PRESERVATIVE FREE 10 ML: 5 INJECTION INTRAVENOUS at 21:30

## 2022-03-04 RX ADMIN — POTASSIUM CHLORIDE 20 MEQ: 20 TABLET, EXTENDED RELEASE ORAL at 09:11

## 2022-03-04 ASSESSMENT — PAIN SCALES - GENERAL
PAINLEVEL_OUTOF10: 0

## 2022-03-04 NOTE — PROGRESS NOTES
Hb 7.0 this am- no fresh or old blood seen at [facility] yesterday and she has seen none since  Suggest:   1) transfuse 1 unit RBC's today   2) continue IV iron- had one dose yesterday -would gve another dose today and a third dose tomorrow if still in [place]   3) she wants to go home but may be best to keep another day for additional IV iron and observation   4) would not give oral iron for next several weeks so can monitor stool color   5) monitor H/H as outpatient- I have ordered weekly CBC x 4 starting on Monday    NOTE: I will be away from now until Wednesday March 8 at 6:00 am. If GI follow up needed before then, please call the GI doctor on call  (but they will not see unless called).  I will be available, however, for phone consultation- [phone] except during times of plane flights

## 2022-03-04 NOTE — PROGRESS NOTES
[facility] [address] [facility],Third [address]  Phone: [phone]    Fax [phone]                  Nicolle Davenport MD, Lucía Goodwin MD, Pamela Noonan MD, MD Severino Montgomery, MD Gricelda Villalobos, MD Myranda Carroll, MD Page Santoro, APRN      Toni Hopkins, APRN  Romayne Areola, APRN    Betty Villalobos, APRN  Herbie Shannon PA-C     Cardiology Progress Note     Today's Plan: monitor [facility], start [address] Date:  3/2/2022    Consult reason/ Seen today for:  STEMI 2/22/2022- GIB  CARDIOLOGY ATTENDING ADDENDUM    MEDICAL DECISION MAKING; Difficult and complicated situation  1. GI bleeding. Recent STEMI with RCA stent . There is post AV malformation clipping getting blood transfusion keep hemoglobin above eight  2. Started on Brilinta start aspirin in a.m. if hemoglobin stable  3. Anemia  with GI bleeding: Transfuse for hemoglobin less than 8  4. Check CBC weekly  5. Hold Lasix for now due to concerns for volume loss  6. DVT prophylaxis if no contraindication  6. Dyslipidemia: continue statins   We'll see in office call with questions            HPI:  I have reviewed the HPI  And agree with above   Maia Alan is a 55 y. o.year old who and presents with had concerns including Rectal Bleeding (Chronic, started again today) and Shortness of Breath (started yesterday).   Chief Complaint   Patient presents with    Rectal Bleeding     Chronic, started again today    Shortness of Breath     started yesterday     Please review addendum/changes made to note above   Interval history: Underwent colonoscopy found to have AV malformation underwent clipping hemoglobin is stable now getting blood transfusion today started on Brilinta but not on aspirin as yet she is keen to go home    Physical Exam:  General:  Awake, alert, NAD  Head:normal  Eye:normal  Neck:  No JVD   Chest:  Clear to auscultation, respiration easy  Cardiovascular:  S1 and S2 audible, No added heart sounds, No signs of ankle edema, or volume overload, No evidence of JVD, No crackles  Abdomen:   nontender  Extremities:  No * edema  Pulses; palpable  Neuro: grossly normal        I agree with the plan, which was planned by myself and discussed with advanced level provider. My documented MDM is a substantive portion of the supervisory note. I have seen ,spoken to  and examined this patient personally, independently of the advanced level provider. I have spent substantiate  portion of this encounter independently myself in examining patient and developing the medical management plan . I have reviewed the hospital care given to date and reviewed all pertinent labs and imaging. The plan was developed mutually at the time of the visit with the patient,  NP /PA  and myself. I have spoken with patient, nursing staff and provided written and verbal instructions . The above note has been reviewed and I agree with the assessment, diagnosis, and treatment plan with changes made by me as follows . David Aguillon MD [facility] 03/04/22       Subjective and  Overnight Events:  Patient is anxious to leave hospital. She states she is feeling better. Chest pain no  Shortness of breath yes - improved  Palpitations no  Dizziness no  Swelling yes - trace to ankels      Assessment and Plan:  STEMI 2/22/2022: Continue brilinta today. Would like to start her on ASA prior to discharge if able. Will need to be seen in office with in the next week to follow up on GIB/HH.   GIB: AVM cauterized and clipped per Dr. Raad Coley yesterday. Hgb 7.0 this morning. 1 Unit PRBC's ordered. Recheck hh 1 hour post.       Telemetry Reviewed:   Sinus rhythm    ECHO :   Echocardiogram 2/22/2022   Summary   Left ventricular systolic function is normal.   Ejection fraction is visually estimated at 55-60%. No wall motion abnormalities detected. No significant valvular disease noted.    No evidence of any pericardial effusion. History of Presenting Illness:    Chief complain on admission : 55 y. o.year old who is admitted for  Chief Complaint   Patient presents with    Rectal Bleeding     Chronic, started again today    Shortness of Breath     started yesterday        Past medical history:    has a past medical history of Anal cancer ([facility] [address] Asthma, Broken ankle, CAD (coronary artery disease), H/O cardiovascular stress test, NSTEMI (non-ST elevated myocardial infarction) ([facility] [address] PONV (postoperative nausea and vomiting), and Skin cancer. Past surgical history:   has a past surgical history that includes Percutaneous Transluminal Coronary Angio (2009); Colon surgery (2009); Ankle surgery (Left, 10+ yrs ago); Tubal ligation (25 yrs ago); Tunneled venous port placement (2009); Tonsillectomy; other surgical history (04-); fracture surgery; Colonoscopy (3/4/15); Colonoscopy (03/15/2017); Upper gastrointestinal endoscopy (N/A, 2/26/2022); Colonoscopy (N/A, 3/3/2022); and Upper gastrointestinal endoscopy (N/A, 3/3/2022). Social History:   reports that she has quit smoking. Her smoking use included cigarettes. She has a 8.75 pack-year smoking history. She has never used smokeless tobacco. She reports that she does not drink alcohol and does not use drugs. Family history:  family history includes Cancer in her brother, father, and mother; High Cholesterol in her father; No Known Problems in her daughter; Stroke in her father and sister.     Allergies   Allergen Reactions    Vicodin Hp [Hydrocodone-Acetaminophen] Other (See Comments)     Keep me awake    Tylenol With Codeine #3 [Acetaminophen-Codeine] Nausea And Vomiting       Review of Systems   All 14 systems were reviewed and are negative  Except for the positive findings  which as documented     /74   Pulse 90   Temp 98.4 °F (36.9 °C) (Oral)   Resp 17   Wt (!) 421 lb 8.3 oz (191.2 kg)   SpO2 97%   BMI 77.10 kg/m²       Intake/Output Summary (Last 24 hours) at 3/4/2022 1047  Last data filed at 3/3/2022 1536  Gross per 24 hour   Intake 787.5 ml   Output --   Net 787.5 ml       Physical Exam  Vitals reviewed. Constitutional:       General: She is not in acute distress. Appearance: Normal appearance. She is obese. She is not ill-appearing. HENT:      Head: Atraumatic. Neck:      Vascular: No carotid bruit. Cardiovascular:      Rate and Rhythm: Normal rate and regular rhythm. Pulses: Normal pulses. Heart sounds: Normal heart sounds. No murmur heard. Pulmonary:      Effort: Pulmonary effort is normal. No respiratory distress. Breath sounds: Normal breath sounds. Musculoskeletal:         General: No swelling or deformity. Cervical back: Neck supple. No muscular tenderness. Neurological:      Mental Status: She is alert.              Medications:    carvedilol  3.125 mg Oral BID WC    DULoxetine  30 mg Oral Daily    furosemide  20 mg Oral Daily    ketotifen  1 drop Both Eyes BID    potassium chloride  20 mEq Oral BID    rosuvastatin  40 mg Oral Nightly    sodium chloride flush  5-40 mL IntraVENous 2 times per day    pantoprazole  40 mg IntraVENous Q12H    And    sodium chloride (PF)  10 mL IntraVENous Q12H    ticagrelor  90 mg Oral BID    iron sucrose  300 mg IntraVENous Q24H      sodium chloride      sodium chloride      sodium chloride      sodium chloride       sodium chloride, tiZANidine, sodium chloride flush, sodium chloride, acetaminophen **OR** acetaminophen, prochlorperazine, albuterol sulfate HFA **AND** ipratropium, sodium chloride, sodium chloride    Lab Data:  CBC:   Recent Labs     03/02/22  2227 03/02/22  2227 03/03/22  0502 03/03/22  0502 03/03/22  1036 03/03/22  2225 03/04/22  0339   WBC 11.2*  --  10.9*  --   --   --   --    HGB 7.6*   < > 6.6*   < > 7.9* 7.5* 7.0*   HCT 25.0*   < > 21.4*   < > 26.3* 23.4* 22.7*   MCV 87.4  --  87.7  --   --   --   --    *  --  404  --   --   --   --     < > = values in this interval not displayed. BMP:   Recent Labs     03/02/22 2227 03/04/22  0339    139   K 3.9 4.2   CL 97* 108   CO2 25 22   BUN 24* 10   CREATININE 1.2* 0.7     PT/INR:   Recent Labs     03/02/22 2227   PROTIME 10.8*   INR 0.84     BNP:  No results for input(s): PROBNP in the last 72 hours. TROPONIN:   Recent Labs     03/02/22 2227 03/03/22  0235   TROPONINT 0.103* 0.063*               All labs, medications and tests reviewed by myself , continue all other medications of all above medical condition listed as is except for changes mentioned above. Thank you very much for consult , please call with questions.     Electronically signed by FLAVIO Roman CNP on 3/4/2022 at 10:47 AM

## 2022-03-04 NOTE — PROGRESS NOTES
Hospitalist Progress Note      Name:  Brenda Vela /Age/Sex: 1965  (55 y.o. female)   MRN & CSN:  [id number] & [id number] Admission Date/Time: 3/2/2022 10:01 PM   Location:  [address] [place] [address] PCP: No primary care provider on file. Hospital Day: 3    Assessment and Plan:   Brenda Vela is a 55 y.o.  female  who presents with UGIB (upper gastrointestinal bleed)    1. Acute upper gastrointestinal bleed with associated blood loss anemia  2. Recurrent GI bleeds  1. Colonoscopy showed large AVM in the cecum coagulated with APC and 2 clips were applied. Also mild radiation proctitis and internal hemorrhoids. 2. Patient recent history of CAD status post stent placement, keep hemoglobin above 8.  3. Protonix bolus  4. Brilinta resumed yesterday. Also resumed aspirin today based on discussion with GI.  5. Repeat CBC in a.m.     3. CAD s/p PCI with recent STEMI  1. Keep hemoglobin above 8.  2. Brilinta and aspirin resumed today. 4. Elevated troponin  1. I appreciate cardiologist input. .  2. Continue home carvedilol, Lasix, and rosuvastatin.     5. JESSICA, secondary to above  1. Mild and transient  2. Likely due to prerenal azotemia from blood loss  3. Resolved with IV fluids     Other chronic medical conditions:   Anal cancer s/p chemotherapy and radiation  Anxiety  Depression  Hypertension  Asthma  SBO s/p bowel resection  Obesity  GERD  Muscle spasms        Diet ADULT DIET; Regular   DVT Prophylaxis [] Lovenox, []  Heparin, [] SCDs, [] Ambulation   GI Prophylaxis [] PPI,  [] H2 Blocker,  [] Carafate,  [] Diet/Tube Feeds   Code Status Full Code   Disposition Patient requires continued admission   MDM [] Low, [] Moderate,[]  High  Patient's risk as above     Subjective   Patient has no new complaints. She feels much better. Objective:        Intake/Output Summary (Last 24 hours) at 3/4/2022 1527  Last data filed at 3/3/2022 1536  Gross per 24 hour   Intake 300 ml   Output --   Net 300 ml      Vitals: Vitals:    03/04/22 1425   BP: 107/70   Pulse: 96   Resp: 18   Temp: 98.2 °F (36.8 °C)   SpO2: 97%     Physical Exam:   General appearance:  Appears comfortable. Well nourished  Eyes: Sclera clear, pupils equal  ENT: Moist mucus membranes, no thrush. Trachea midline. Cardiovascular: Regular rhythm, normal S1, S2. No murmur, gallop, rub. No edema in lower extremities  Respiratory: Clear to auscultation bilaterally, no wheeze, good inspiratory effort  Gastrointestinal: Abdomen soft, non-tender, not distended, normal bowel sounds  Musculoskeletal: No cyanosis in digits, neck supple  Neurology: Cranial nerves grossly intact. Alert and oriented in time, place and person. No speech or motor deficits  Psychiatry: Appropriate affect.  Not agitated  Skin: Warm, dry, normal turgor, no rash      Medications:   Medications:    aspirin  81 mg Oral Daily    carvedilol  3.125 mg Oral BID WC    DULoxetine  30 mg Oral Daily    furosemide  20 mg Oral Daily    ketotifen  1 drop Both Eyes BID    potassium chloride  20 mEq Oral BID    rosuvastatin  40 mg Oral Nightly    sodium chloride flush  5-40 mL IntraVENous 2 times per day    pantoprazole  40 mg IntraVENous Q12H    And    sodium chloride (PF)  10 mL IntraVENous Q12H    ticagrelor  90 mg Oral BID    iron sucrose  300 mg IntraVENous Q24H      Infusions:    sodium chloride      sodium chloride      sodium chloride      sodium chloride       PRN Meds: sodium chloride, , PRN  tiZANidine, 4 mg, Q6H PRN  sodium chloride flush, 5-40 mL, PRN  sodium chloride, 25 mL, PRN  acetaminophen, 650 mg, Q6H PRN   Or  acetaminophen, 650 mg, Q6H PRN  prochlorperazine, 10 mg, Q6H PRN  albuterol sulfate HFA, 1 puff, Q6H PRN   And  ipratropium, 1 puff, Q6H PRN  sodium chloride, , PRN  sodium chloride, , PRN            Electronically signed by Kati Michel MD on 3/4/2022 at 3:27 PM

## 2022-03-05 VITALS
BODY MASS INDEX: 77.1 KG/M2 | RESPIRATION RATE: 16 BRPM | WEIGHT: 293 LBS | TEMPERATURE: 97.6 F | OXYGEN SATURATION: 98 % | HEART RATE: 85 BPM | DIASTOLIC BLOOD PRESSURE: 82 MMHG | SYSTOLIC BLOOD PRESSURE: 118 MMHG

## 2022-03-05 LAB
HCT VFR BLD CALC: 27.4 % (ref 37–47)
HCT VFR BLD CALC: 30.9 % (ref 37–47)
HEMOGLOBIN: 8.7 GM/DL (ref 12.5–16)
HEMOGLOBIN: 9.5 GM/DL (ref 12.5–16)

## 2022-03-05 PROCEDURE — 2580000003 HC RX 258: Performed by: SPECIALIST

## 2022-03-05 PROCEDURE — 36415 COLL VENOUS BLD VENIPUNCTURE: CPT

## 2022-03-05 PROCEDURE — 85014 HEMATOCRIT: CPT

## 2022-03-05 PROCEDURE — 6370000000 HC RX 637 (ALT 250 FOR IP): Performed by: SPECIALIST

## 2022-03-05 PROCEDURE — 85018 HEMOGLOBIN: CPT

## 2022-03-05 PROCEDURE — C9113 INJ PANTOPRAZOLE SODIUM, VIA: HCPCS | Performed by: SPECIALIST

## 2022-03-05 PROCEDURE — 6360000002 HC RX W HCPCS: Performed by: SPECIALIST

## 2022-03-05 PROCEDURE — 6370000000 HC RX 637 (ALT 250 FOR IP): Performed by: INTERNAL MEDICINE

## 2022-03-05 PROCEDURE — P9016 RBC LEUKOCYTES REDUCED: HCPCS

## 2022-03-05 RX ORDER — PANTOPRAZOLE SODIUM 40 MG/1
40 TABLET, DELAYED RELEASE ORAL 2 TIMES DAILY
Qty: 60 TABLET | Refills: 3 | Status: SHIPPED
Start: 2022-03-05 | End: 2022-04-04 | Stop reason: ALTCHOICE

## 2022-03-05 RX ADMIN — PANTOPRAZOLE SODIUM 40 MG: 40 INJECTION, POWDER, FOR SOLUTION INTRAVENOUS at 03:03

## 2022-03-05 RX ADMIN — FUROSEMIDE 20 MG: 20 TABLET ORAL at 08:57

## 2022-03-05 RX ADMIN — DULOXETINE HYDROCHLORIDE 30 MG: 30 CAPSULE, DELAYED RELEASE ORAL at 08:57

## 2022-03-05 RX ADMIN — CARVEDILOL 3.12 MG: 3.12 TABLET, FILM COATED ORAL at 08:58

## 2022-03-05 RX ADMIN — SODIUM CHLORIDE, PRESERVATIVE FREE 10 ML: 5 INJECTION INTRAVENOUS at 08:59

## 2022-03-05 RX ADMIN — SODIUM CHLORIDE, PRESERVATIVE FREE 10 ML: 5 INJECTION INTRAVENOUS at 03:03

## 2022-03-05 RX ADMIN — POTASSIUM CHLORIDE 20 MEQ: 20 TABLET, EXTENDED RELEASE ORAL at 08:56

## 2022-03-05 RX ADMIN — TICAGRELOR 90 MG: 90 TABLET ORAL at 08:57

## 2022-03-05 RX ADMIN — ASPIRIN 81 MG 81 MG: 81 TABLET ORAL at 08:57

## 2022-03-05 ASSESSMENT — PAIN SCALES - GENERAL: PAINLEVEL_OUTOF10: 0

## 2022-03-05 NOTE — PROGRESS NOTES
Notified Wild Todd NP of pt hemoglobin of 7.1. orders to transfuse one unit of PRBC. Pt aware and okay with receiving blood.

## 2022-03-05 NOTE — DISCHARGE SUMMARY
Hospital Medicine Discharge Summary    Patient: Walker Andres     Gender: female  : 1965   Age: 55 y.o. 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  female  who presents with UGIB (upper gastrointestinal bleed)     1. Acute upper gastrointestinal bleed with associated blood loss anemia  2. Recurrent GI bleeds  1. Colonoscopy showed large AVM in the cecum coagulated with APC and 2 clips were applied. Also mild radiation proctitis and internal hemorrhoids. GI agreed with resumption of Brilinta and aspirin. Her hemoglobin has stabilized and she is medically optimized for home discharge. Patient knows to call his PCP/GI or report to ER for any concerning symptoms especially fresh blood per rectum or melanotic stool. 2. Patient recent history of CAD status post stent placement, keep hemoglobin above 8.  3. Patient discharged on Protonix. 4. Brilinta and aspirin already resumed.     3. CAD s/p PCI with recent STEMI  1. Keep hemoglobin above 8.  2. Brilinta and aspirin resumed  3. See discussion above. .     4. Elevated troponin  1. I appreciate cardiologist input. .  2. Continue home carvedilol, Lasix, and rosuvastatin.     5. JESSICA, secondary to above  1. Mild and transient  2. Likely due to prerenal azotemia from blood loss  3.  Resolved with IV fluids      Discharge Medications:   Current Discharge Medication List        Current Discharge Medication List      CONTINUE these medications which have CHANGED    Details   pantoprazole (PROTONIX) 40 MG tablet Take 1 tablet by mouth in the morning and at bedtime  Qty: 60 tablet, Refills: 3           Current Discharge Medication List      CONTINUE these medications which have NOT CHANGED    Details   carvedilol (COREG) 3.125 MG tablet Take 1 tablet by mouth 2 times daily (with meals)  Qty: 60 tablet, Refills: 3      furosemide (LASIX) 20 MG tablet Take 1 tablet by mouth daily  Qty: 60 tablet, Refills: 3      rosuvastatin (CRESTOR) 40 MG tablet Take 1 tablet by mouth nightly  Qty: 30 tablet, Refills: 3      ticagrelor (BRILINTA) 90 MG TABS tablet Take 1 tablet by mouth 2 times daily  Qty: 60 tablet, Refills: 0      olopatadine (PATANOL) 0.1 % ophthalmic solution Place 1 drop into both eyes 2 times daily      potassium chloride (KLOR-CON) 20 MEQ packet Take 20 mEq by mouth 2 times daily      Cyanocobalamin 2500 MCG TABS Take 2 tablets by mouth daily      tiZANidine (ZANAFLEX) 4 MG tablet Take 4 mg by mouth every 6 hours as needed      DULoxetine (CYMBALTA) 20 MG extended release capsule Take 3 mg by mouth daily       nitroGLYCERIN (NITROSTAT) 0.4 MG SL tablet up to max of 3 total doses. If no relief after 1 dose, call 911.   Qty: 25 tablet, Refills: 3      loperamide (IMODIUM) 2 MG capsule Take 2 mg by mouth as needed for Diarrhea      Multiple Vitamin (MULTIVITAMIN) capsule Take 1 capsule by mouth daily      hydrOXYzine (ATARAX) 25 MG tablet daily as needed       albuterol-ipratropium (COMBIVENT RESPIMAT)  MCG/ACT AERS inhaler Inhale into the lungs      aspirin 81 MG chewable tablet Take 1 tablet by mouth daily  Qty: 30 tablet, Refills: 6           Current Discharge Medication List            Discharge Exam:    /82   Pulse 85   Temp 97.6 °F (36.4 °C) (Oral)   Resp 16   Wt (!) 421 lb 8.3 oz (191.2 kg)   SpO2 98%   BMI 77.10 kg/m²   General appearance: NAD  Gen/overall appearance: Not in acute distress. Alert. Oriented x3. Head: Normocephalic, atraumatic  Eyes: EOMI, good acuity  ENT: Oral mucosa moist  Neck: No JVD, thyromegaly  CVS: Nml S1S2, no MRG. Slightly tachycardic. Pulm: Clear bilaterally. No crackles/wheezes  Gastrointestinal: Soft, NT/ND, +BS  Musculoskeletal: No edema. Warm  Neuro: No focal deficit. Moves extremity spontaneously. Psychiatry: Appropriate affect. Not agitated. Skin: Warm, dry with normal turgor. No rash  Capillary refill: Brisk,< 3 seconds   Peripheral Pulses: +2 palpable, equal bilaterally     Labs:  For convenience and continuity at follow-up the following most recent labs are provided:    Lab Results   Component Value Date    WBC 10.9 03/03/2022    HGB 9.5 03/05/2022    HCT 30.9 03/05/2022    MCV 87.7 03/03/2022     03/03/2022     03/04/2022    K 4.2 03/04/2022     03/04/2022    CO2 22 03/04/2022    BUN 10 03/04/2022    CREATININE 0.7 03/04/2022    CALCIUM 8.5 03/04/2022    ALKPHOS 148 03/02/2022    ALT 19 03/02/2022    AST 32 03/02/2022    BILITOT 0.2 03/02/2022    LABALBU 3.6 03/02/2022    LDLCALC 61 02/23/2022    TRIG 115 02/23/2022     Lab Results   Component Value Date    INR 0.84 03/02/2022    INR 0.92 04/23/2015       Radiology:  Echocardiogram limited    Result Date: 2/22/2022  Transthoracic Echocardiography Report (TTE)  Demographics   Patient Name        Tonia Smith  Date of Study        02/22/2022   Date of Birth       1965      Gender               Female   Age                 55 year(s)      Race                    Patient Number      [id number]      Room Number          2120   Visit Number        [id number]   Corporate ID        [id number]   Accession Number    [id number]      Jerome Cha RDMS   Ordering Physician  Colonel Raymundo HALL  Interpreting         Cecille Diaz MD Physician  Procedure Type of Study   TTE procedure:ECHOCARDIOGRAM LIMITED. Procedure Date Date: 02/22/2022 Start: 03:33 PM Study Location: Portable Technical Quality: Technically difficult study Indications:STEMI. Patient Status: Routine Height: 62 inches Weight: 186 pounds BSA: 1.85 m2 BMI: 34.02 kg/m2 HR: 92 bpm BP: 171/99 mmHg  Conclusions   Summary   Left ventricular systolic function is normal.  Ejection fraction is visually estimated at 55-60%. No wall motion abnormalities detected. No significant valvular disease noted. No evidence of any pericardial effusion. Signature   ------------------------------------------------------------------  Electronically signed by Matias Bauman MD (Interpreting  physician) on 02/22/2022 at 04:00 PM  ------------------------------------------------------------------   Findings   Left Ventricle  Left ventricular systolic function is normal.  Ejection fraction is visually estimated at 55-60%. E/A flow reversal is noted. No wall motion abnormalities detected. Left Atrium  Essentially normal left atrium. Right Atrium  Essentially normal right atrium. Right Ventricle  Essentially normal right ventricle. Aortic Valve  Trace aortic regurgitation noted. Mitral Valve  Trace mitral regurgitation. Tricuspid Valve  Mild tricuspid regurgitation; RVSP: 38 mmHg. Pulmonic Valve  The pulmonic valve was not well visualized. Pericardial Effusion  No evidence of any pericardial effusion. Pleural Effusion  No evidence of pleural effusion.   M-Mode/2D Measurements & Calculations   LV Diastolic Dimension: 3.36 cm LV Systolic Dimension: 9.55 cm  LV FS:27.4 %                    LV Volume Diastolic: 40 ml  LV PW Diastolic: 0.04 cm        LV Volume Systolic: 17 ml  LV PW Systolic: 3.39 cm         LV EDV/LV EDV Index: 40 ml/22 m2LV ESV/LV  Septum Diastolic: 1.2 cm        ESV Index: 17 ml/9 m2  Septum Systolic: 4.46 cm        EF Calculated (A4C): 57.5 % EF Calculated (2D): 54.1 %   LV Area Diastolic: 81.5 cm2     LV Length: 5.63 cm  LV Area Systolic: 0.23 cm2  Doppler Measurements & Calculations                               Estimated RVSP: 38 mmHg                              Estimated RAP:3 mmHg    Estimated PASP: 33.69 mmHg TR Velocity:277 cm/s                              TR Gradient:30.69 mmHg      XR RIBS RIGHT INCLUDE CHEST (MIN 3 VIEWS)    Result Date: 2/24/2022  EXAMINATION: 6 XRAY VIEWS OF THE RIGHT RIBS WITH FRONTAL XRAY VIEW OF THE CHEST 2/24/2022 1:05 pm COMPARISON: 03/08/2021 HISTORY: ORDERING SYSTEM PROVIDED HISTORY: rib pain. fall check for any fx TECHNOLOGIST PROVIDED HISTORY: Reason for exam:->rib pain. fall check for any fx Reason for Exam: right rib pain Additional signs and symptoms: fall Relevant Medical/Surgical History: na FINDINGS: The cardial-pericardial silhouette is normal in appearance. The lungs are clear. No pneumothorax is found. No free air. No acute bony abnormalities are identified. Specifically, no right-sided rib fractures are found. No acute abnormality detected. Specifically, no rib fractures are found. CT ABDOMEN PELVIS W IV CONTRAST    Result Date: 2/12/2022  CT ABDOMEN AND PELVIS WITH CONTRAST, 2/12/2022 3:30 PM HISTORY: GI bleed    History:  GI bleed, GI bleeding, history of rectal cancer. Number of Series/Images:  4. TECHNIQUE: Multidetector CT was performed from the lung bases to the proximal femurs after intravenous contrast     CT radiation dose optimization techniques (automated exposure control, use of iterative reconstruction techniques, or adjustment of the mA and/or kV according to patient size) were used to limit patient radiation dose. Contrast Medication(s): IOHEXOL 350 MG/ML IV SOLN Amount Administered = 75 mL COMPARISON: 2015 FINDINGS:  Topogram: No additional finding    LUNG BASES: Linear bands of atelectasis or scar in the lingula HEART: Normal size.  Diffuse coronary calcifications. No pericardial effusion SPLEEN: Normal PANCREAS: Normal LIVER: Hepatic steatosis. No lesion GALLBLADDER: Normal COMMON BILE DUCT: No biliary dilatation. ADRENALS: Normal KIDNEYS: Normal. No hydronephrosis. No urolithiasis URINARY BLADDER: Not distended PELVIC CAVITY: Uterus is present. No evidence for adnexal mass LARGE BOWEL: Mild to moderate amount of stool throughout the colon. A few scattered diverticula. Postsurgical findings APPENDIX: Normal SMALL BOWEL: No bowel obstruction. STOMACH: No hiatal hernia PERITONEAL CAVITY: No free air. No free fluid. ABDOMINAL AORTA: Normal caliber. Atherosclerotic calcification. LYMPH NODES: There is no retroperitoneal, mesenteric, or pelvic adenopathy. ABDOMINAL WALL: No ventral or inguinal hernia BONES: Bilateral femoral head osteonecrosis pattern. Mild bilateral hip joint osteoarthritis. [IMPRESSION] 1. Hepatic steatosis 2. Scattered colonic diverticulosis 3. Postsurgical findings 4. No bowel obstruction, free air, free fluid, or acute inflammatory process 5. Atherosclerotic calcification 6. Femoral head osteonecrosis See above for complete description Electronically Signed by: Jennifer Kwong MD, 2/12/2022 3:36 PM 6 - Marginal     XR CHEST PORTABLE    Result Date: 3/2/2022  EXAMINATION: ONE XRAY VIEW OF THE CHEST 3/2/2022 10:25 pm COMPARISON: None. HISTORY: ORDERING SYSTEM PROVIDED HISTORY: sob TECHNOLOGIST PROVIDED HISTORY: Reason for exam:->sob Reason for Exam: sob FINDINGS: No infiltrate or consolidation or effusion is identified. The heart size is normal.  Vascular calcifications are noted. A coronary artery stent is seen. Radiographically clear lungs. XR CHEST PORTABLE    Result Date: 2/12/2022  XR-CHEST PORTABLE STAT [id number] 2/12/2022 1:49 PM History: dyspnea  History:  dyspnea. Number of Series/Images:  1.  Comparison: 2017 Findings: Single mobile view of the chest demonstrates normal cardiomediastinal silhouette with midline trachea and clear lung fields bilaterally. Some pulmonary hyperinflation is once again suggested. [IMPRESSION] BONY HYPERINFLATION WITH NO ACUTE CHANGE Electronically Signed by: Inga Hobbs M.D., 2022 1:50 PM     ECHO Compl W Dop Color Flow    Result Date: 2022                         [facility]*                      Department of Cardiology                       [address] [place] [address]                        Newton Luo                  Ph [phone] Fax [phone] ------------------------------------------------------------------- Transthoracic Echocardiography Patient:     Marla Ackerman  :    1965 Height: Sully Calvert MR #:        [id number]       Gender: F          Weight: 185.6lb CSN #:       [id number]      Age:    55         BP:     114 / 74                                                        mmHg Study        2022    BSA:    1.85m^2    HR: Date/Time:   7:31AM Ordering Physician:   Breonna Jett Sonographer:          Long Stevenson  ------------------------------------------------------------------- Procedure:2D COMPLETE ECHO (COLORFLOW/DOPPLER, STRAIN) Accession [id number] ------------------------------------------------------------------- Indications:      Myocardial Infarction (acute). ------------------------------------------------------------------- Study data:  Transthoracic echocardiography. Location:  Bedside. Patient status:  Inpatient. Procedure:  Transthoracic echocardiography was performed. Image quality was adequate. Scanning was performed from the parasternal, apical, and subcostal acoustic windows. M-mode, complete 2D, complete spectral Doppler, and color Doppler. ------------------------------------------------------------------- CONCLUSIONS SUMMARY: 1. Left ventricle:  The cavity size was normal. Wall thickness was    normal. Systolic function was at the lower limits of normal. The    estimated ejection fraction was in the range of 50% to 55%. Wall    motion was normal; there were no regional wall motion    abnormalities. Left ventricular diastolic function parameters    were normal. 2. Right ventricle: The cavity size was normal. Wall thickness was    normal. Systolic function was normal. 3. Pericardium, extracardiac: There was no pericardial effusion. ------------------------------------------------------------------- Cardiac Anatomy Left ventricle: - The cavity size was normal. Wall thickness was normal. Systolic   function was at the lower limits of normal. The estimated   ejection fraction was in the range of 50% to 55%. Wall motion was   normal; there were no regional wall motion abnormalities. - Left ventricular diastolic function parameters were normal. Aortic valve: - The valve was structurally normal. The valve was trileaflet. Cusp   separation was normal. Doppler: - Transvalvular velocity was within the normal range. There was no   stenosis. There was no significant regurgitation. - Peak velocity ratio of LVOT to aortic valve: 0.96. Aorta: Aortic root: - The aortic root was normal in size. Mitral valve: - The valve was structurally normal. Leaflet separation was normal. Doppler: - Transvalvular velocity was within the normal range. There was no   evidence for stenosis. There was no significant regurgitation. - Valve area by pressure half-time: 3.6cm^2. Indexed valve area by   pressure half-time: 1.95cm^2/m^2. - Peak gradient (D): 3mm Hg. Left atrium: - The atrium was normal in size. Atrial septum: - The septum was normal. No defect or patent foramen ovale was   identified. Right ventricle: - The cavity size was normal. Wall thickness was normal. Systolic   function was normal. Ventricular septum: - Thickness was normal. The ventricular septum showed normal   function. The contour showed a normal configuration. There was no   evidence of a ventricular septal defect.  Pulmonic valve: - The valve was structurally normal. Cusp separation was normal. Doppler: - Transvalvular velocity was within the normal range. There was no   significant regurgitation. Tricuspid valve: - The valve was structurally normal. The leaflets were normal   thickness. Doppler: - Transvalvular velocity was within the normal range. There was no   evidence for stenosis. There was no significant regurgitation. Right atrium: - The atrium was normal in size. Pericardium: - There was no pericardial effusion. Systemic veins: Inferior vena cava: - The vessel was normal in size.  The respirophasic diameter changes   were in the normal range (= 50%). ------------------------------------------------------------------- Measurements  Left ventricle                           Value          Reference  LV ID, ED, PLAX chordal                  5.1   cm       ---------  LV ID, ES, PLAX chordal                  3.6   cm       ---------  LV fx shortening, PLAX chordal           29    %        27 - 45  LV PW thickness, ED                      0.9   cm       0.6 - 0.9  IVS/LV PW ratio, ED                      0.89           ---------  LV ejection fraction, 1-p A4C    (L)     53    %        &AZ;=33  LV end-diastolic volume, 2-p             73    ml       56 - 320  LV end-systolic volume, 2-p              34    ml       19 - 49  LV ejection fraction, 2-p        (L)     54    %        &gt;=55  Stroke volume, 2-p                       39    ml       ---------  LV end-diastolic volume/bsa, 2-p         39    ml/m^2   35 - 75  LV end-systolic volume/bsa, 2-p          18    ml/m^2   12 - 30  Stroke volume/bsa, 2-p                   21.1  ml/m^2   ---------  LV EF Teichholz                          56    %        &gt;=55  LV E/e', lateral                         8              ---------  LV E/e', medial                          9              ---------   Ventricular septum                       Value          Reference  IVS thickness, ED                        0.8   cm       0.6 - 0.9   LVOT Value          Reference  LVOT peak velocity, S                    1.35  m/sec    ---------  LVOT peak gradient, S                    7     mm Hg    ---------   Aortic valve                             Value          Reference  Aortic leaflet separation, MM            2.1   cm       ---------  Aortic valve peak velocity, S            1.41  m/sec    ---------  Velocity ratio, peak, LVOT/AV            0.96           ---------   Aorta                                    Value          Reference  Aortic root ID, ED, MM                   3.3   cm       ---------   Left atrium                              Value          Reference  LA ID, A-P, ES                           2.9   cm       2.7 - 3.8  LA ID/bsa, A-P                           1.6   cm/m^2   1. 5 - 2.3  LA volume, ES, 1-p A4C                   30    ml       22 - 52  LA volume/bsa, ES, 1-p A4C               16    ml/m^2   ---------  LA volume, ES, 1-p A2C                   38    ml       22 - 52  LA volume/bsa, ES, 1-p A2C               21    ml/m^2   ---------  LA volume, ES, 2-p                       35    ml       ---------  LA volume/bsa, ES, 2-p                   19    ml/m^2   ---------   Mitral valve                             Value          Reference  Mitral E-wave peak velocity              0.86  m/sec    ---------  Mitral A-wave peak velocity              0.72  m/sec    ---------  Mitral deceleration time                 209   ms       ---------  Mitral pressure half-time                61    ms       ---------  Mitral peak gradient, D                  3     mm Hg    ---------  Mitral E/A ratio, peak                   1.2            ---------  Mitral valve area, PHT, DP               3.6   cm^2     ---------  Mitral valve area/bsa, PHT, DP           1.95  cm^2/m^2 ---------   Systemic veins                           Value          Reference  Estimated CVP                            5     mm Hg    ---------   Right ventricle Value          Reference  RV ID, ED, PLAX                          2.7   cm       ---------  TAPSE                                    2.0   cm       ---------  RV s', lateral, S                        0.11  m/sec    --------- Legend: (L)  and  (H)  katharina values outside specified reference range. ------------------------------------------------------------------- Reviewed and confirmed by Sonia Bryant DO [id number]     CT ENTEROGRAPHY W CONTRAST    Result Date: 2/27/2022  EXAMINATION: CT ENTEROGRAPHY 2/25/2022 10:13 am TECHNIQUE: CT of the abdomen and pelvis was performed after the administration of intravenous contrast and negative oral contrast. Dose modulation, iterative reconstruction, and/or weight based adjustment of the mA/kV was utilized to reduce the radiation dose to as low as reasonably achievable. COMPARISON: CT abdomen/pelvis performed March 1, 2011. HISTORY: ORDERING SYSTEM PROVIDED HISTORY: chronic abd pain- history of remote small bowel resection for SBO TECHNOLOGIST PROVIDED HISTORY: Reason for exam:->chronic abd pain- history of remote small bowel resection for SBO Reason for Exam: chronic abd pain- history of remote small bowel resection for SBO Relevant Medical/Surgical History: 80 ML ISOVUE 370 FINDINGS: Lower Chest: Linear atelectasis versus scarring in the lingula and right lower lobe. No acute abnormality of the visualized lower chest. Organs: There is diffuse decreased attenuation of the liver parenchyma compatible with steatosis. The gallbladder, spleen, pancreas, adrenal glands, and kidneys are grossly unremarkable. No hydronephrosis. GI/Bowel: There is few loops of small bowel in the right mid and lower abdomen which demonstrate circumferential wall thickening and subtle adjacent fat stranding. These loops of bowel appear immediately upstream from a small bowel anastomosis noted in the mid lower abdomen. Findings are concerning for enteritis.   There are few loops of prominent fluid-filled small bowel also noted. Few sigmoid diverticula without evidence of acute diverticulitis. The appendix is normal. Pelvis: Uterus is grossly unremarkable. The urinary bladder is within normal limits. Small volume pelvic free fluid. Peritoneum/Retroperitoneum: No significant lymphadenopathy. The abdominal aorta is normal in course and caliber with scattered atherosclerotic disease. Bones/Soft Tissues: No acute soft tissue abnormality. 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 No significant subchondral collapse of the right femoral head. 1. Few loops of small bowel in the right mid and lower abdomen demonstrating circumferential wall thickening and subtle adjacent fat stranding concerning for enteritis. These loops are noted to be just upstream from a small bowel anastomosis in the lower abdomen. 2. There are few loops of prominent fluid-filled small bowel which could indicate ileus. Developing obstruction cannot be entirely excluded. 3. Hepatic steatosis. 4. 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 There is contour deformity of the left femoral head and subchondral collapse demonstrated. [facility] JESSICA DIGITAL SCREEN BILATERAL    Result Date: 2/11/2022  This is a summary report. The complete report is available in the patient's medical record. If you cannot access the medical record, please contact the sending organization for a detailed fax or copy. MA-MAMMO SCREENING 3D JESSICA BILATERAL INDICATION FOR EXAMINATION: Z12.31: Encounter for screening mammogram for malignant neoplasm of breast HISTORY: This is a 55year-old Female with a family history of breast cancer in 2 maternal aunts. COMPARISON: 5/16/2002 TECHNIQUE: Bilateral  2-D and 3-D tomosynthesis images of the breast(s) were obtained in the CC and MLO projections.  Computer aided detection (CAD) was used to assist in the interpretation of the 2D mammogram. BREAST COMPOSITION: The breast density is almost entirely fatty. FINDINGS: There is no dominant mass, architectural distortion, focal asymmetry, or suspicious microcalcifications to suggest malignancy. There is no concerning interval change. ASSESSMENT: BI-RADS CATEGORY (1) Negative. RECOMMENDATIONS: Bilateral: Routine screening 3D mammogram (Tomosynthesis) in 1 year. .           .           .    Cancer Risk Assessment: Patient Declined A result letter will be sent to the patient. Electronically Signed by: Jie Mathew MD, 2/11/2022 2:25 PM          Note that > than 30 minutes was spent in preparing discharge papers, discussing discharge with patient, medication review, etc.       Signed:    Pineda Laboy MD   3/5/2022      Thank you No primary care provider on file. for the opportunity to be involved in this patient's care. If you have any questions or concerns please feel free to contact me.

## 2022-03-06 LAB
ABO/RH: NORMAL
ANTIBODY SCREEN: NEGATIVE
COMPONENT: NORMAL
CROSSMATCH RESULT: NORMAL
STATUS: NORMAL
TRANSFUSION STATUS: NORMAL
UNIT DIVISION: 0
UNIT NUMBER: NORMAL

## 2022-03-09 NOTE — PROGRESS NOTES
Physician Progress Note      PATIENT:               Ryan Nieves  CSN #:                  [id number]  :                       1965  ADMIT DATE:       3/2/2022 10:01 PM  Almas Ennis DATE:        3/5/2022 5:02 PM  RESPONDING  PROVIDER #:        Teresa Matson          QUERY TEXT:    Patient admitted with BMI of 77.1. If possible, please document in progress   notes and discharge summary if you are evaluating and /or treating any of the   following: The medical record reflects the following:  Risk Factors: Lifestyle choices  Clinical Indicators: Pt is 5'2\", weighs 421lb, with BMI of 77.1  Treatment: Would require lifestyle modification    Thank you,  Flaquito Moses RN  [phone]  Options provided:  -- Morbid obesity  -- Other - I will add my own diagnosis  -- Disagree - Not applicable / Not valid  -- Disagree - Clinically unable to determine / Unknown  -- Refer to Clinical Documentation Reviewer    PROVIDER RESPONSE TEXT:    This patient has morbid obesity. Query created by: Natalia Taylor on 3/4/2022 10:21 AM      QUERY TEXT:    Pt admitted with GIB and has anemia documented. If possible, please document   in progress notes and discharge summary further specificity regarding the   acuity and type of anemia:    The medical record reflects the following:  Risk Factors: anal cancer, recurrent GIB  Clinical Indicators: Pt presents with melena. 3/ Hgb on arrival was 7.6. Hgb   dropped to 6.6 on 3/3. After transfusion, Hgb maxed to 7.9, most recent Hgb   3/4 was 7.0. 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 Mild radiation proctitis. H&P documented: Acute upper   gastrointestinal bleed with associated blood loss anemia. No acuity mentioned.   Treatment: PRBC transfused, GI consult, Cscope, H/H monitoring    Thank you,  Flaquito Moses RN  [phone]  Options provided:  -- Anemia due to acute blood loss  -- Other - I will add my own diagnosis  -- Disagree - Not applicable / Not valid  -- Disagree - Clinically unable to determine / Unknown  -- Refer to Clinical Documentation Reviewer    PROVIDER RESPONSE TEXT:    This patient has acute blood loss anemia. Query created by: Osmar Lobo on 3/4/2022 10:28 AM      QUERY TEXT:    Patient admitted with GI bleeding, noted to have anal cancer and large AVM of   cecum. If possible, please document in progress notes and discharge summary   the cause of the GI bleeding: The medical record reflects the following:  Risk Factors: frequent GIB's, anal cancer  Clinical Indicators: 3/3 Colonoscopy performed by Dr. Jatin Francisco revealed, large   AVM cecum- coagulated with APC and 2 clips applied, mild radiation proctitis,   and internal hemorrhoids. 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     Query created by: Osmar Lobo on 3/4/2022 10:33 AM      Electronically signed by:  Leticia Cervantes 3/9/2022 2:45 PM

## 2022-03-11 ENCOUNTER — TELEPHONE (OUTPATIENT)
Dept: CARDIOLOGY CLINIC | Age: 57
End: 2022-03-11

## 2022-03-18 ENCOUNTER — OFFICE VISIT (OUTPATIENT)
Dept: CARDIOLOGY CLINIC | Age: 57
End: 2022-03-18
Payer: COMMERCIAL

## 2022-03-18 VITALS
HEART RATE: 80 BPM | SYSTOLIC BLOOD PRESSURE: 118 MMHG | DIASTOLIC BLOOD PRESSURE: 80 MMHG | BODY MASS INDEX: 33.49 KG/M2 | WEIGHT: 182 LBS | HEIGHT: 62 IN

## 2022-03-18 DIAGNOSIS — I25.10 CORONARY ARTERY DISEASE INVOLVING NATIVE CORONARY ARTERY OF NATIVE HEART WITHOUT ANGINA PECTORIS: Primary | ICD-10-CM

## 2022-03-18 LAB
HCT VFR BLD CALC: 31.5 % (ref 34–46.6)
HEMOGLOBIN: 10.2 G/DL (ref 11.1–15.9)

## 2022-03-18 PROCEDURE — 1036F TOBACCO NON-USER: CPT | Performed by: INTERNAL MEDICINE

## 2022-03-18 PROCEDURE — G8417 CALC BMI ABV UP PARAM F/U: HCPCS | Performed by: INTERNAL MEDICINE

## 2022-03-18 PROCEDURE — 3017F COLORECTAL CA SCREEN DOC REV: CPT | Performed by: INTERNAL MEDICINE

## 2022-03-18 PROCEDURE — 1111F DSCHRG MED/CURRENT MED MERGE: CPT | Performed by: INTERNAL MEDICINE

## 2022-03-18 PROCEDURE — G8427 DOCREV CUR MEDS BY ELIG CLIN: HCPCS | Performed by: INTERNAL MEDICINE

## 2022-03-18 PROCEDURE — G8484 FLU IMMUNIZE NO ADMIN: HCPCS | Performed by: INTERNAL MEDICINE

## 2022-03-18 PROCEDURE — 99214 OFFICE O/P EST MOD 30 MIN: CPT | Performed by: INTERNAL MEDICINE

## 2022-03-18 RX ORDER — LANOLIN ALCOHOL/MO/W.PET/CERES
325 CREAM (GRAM) TOPICAL DAILY
COMMUNITY
Start: 2022-03-08 | End: 2023-03-08

## 2022-03-18 RX ORDER — ATORVASTATIN CALCIUM 40 MG/1
40 TABLET, FILM COATED ORAL DAILY
COMMUNITY
End: 2022-03-28

## 2022-03-18 NOTE — PATIENT INSTRUCTIONS
Please hold on to these instructions the  will call you within 1-9 business days when we receive authorization from your insurance. Treadmill Stress test    WHAT TO EXPECT:     The exercise stress test is a test used to provide information about how the heart responds to exertion. It involves walking on a treadmill at increasing levels of difficulty, while electrocardiogram, heart rate, and blood pressure are monitored. ? This test will take approximately 1 hours: Please arrive at the office 5-10 min before the scheduled testing time. ? Once you are taken back to the stress lab you will be asked to read and sign a consent form before proceeding with the test. At this time feel free to ask any question that you may have as the procedure is explained to you.   ? You will be attached to the EKG monitoring equipment, your blood pressure will be taken, and you will begin walking on the treadmill. The treadmill starts off slowly and every 3 minutes the treadmill speeds up and the elevation increases. The average person usually walks for a period of 6-8min. PREPARATION FOR TEST:    ? Eat a light meal such as juice and toast at least 2 hours prior to the procedure. ? AVOID CAFFEINE 24 HOURS PRIOR TO THE TEST: Including coffee, Tea, Mellisa and other soft drinks even those labeled  caffeine free or decaffeinated. ? Please wear loose comfortable clothing and comfortable walking shoes. Please wear a short sleeved shirt. ? Please shower or bathe and do not apply powder or lotion to the skin prior to testing, as the electrodes will adhere better giving us a clearer visual EKG recording.    ?

## 2022-03-18 NOTE — PROGRESS NOTES
Amelia Perez MD        OFFICE  FOLLOWUP NOTE    Chief complaints: patient is here for management of CAD,STEMI of RCA, GI bleeding,anal cancer DYSLPIDEMIA    Subjective: patient feels better, no chest pain, no shortness of breath, no dizziness, no palpitations    HPI Venita Merchant is a 65 y. o.year old who  has a past medical history of Anal cancer ([facility] [address] Asthma, Broken ankle, CAD (coronary artery disease), H/O cardiovascular stress test, NSTEMI (non-ST elevated myocardial infarction) ([facility] [address] PONV (postoperative nausea and vomiting), and Skin cancer.  and presents for management of CAD, DM, HTN, AFIB, which are well controlled    Patient has critical condition with STEMI of RCA AND GI BLEEDING, had blood transfusion also and SOUMYA of RCA    Current Outpatient Medications   Medication Sig Dispense Refill    atorvastatin (LIPITOR) 40 MG tablet Take 40 mg by mouth daily      ferrous sulfate (FE TABS 325) 325 (65 Fe) MG EC tablet Take 325 mg by mouth daily      Chlorpheniramine-Pseudoeph (SUDOGEST SINUS/ALLERGY PO) Take by mouth      VITAMIN D PO Take by mouth      pantoprazole (PROTONIX) 40 MG tablet Take 1 tablet by mouth in the morning and at bedtime 60 tablet 3    carvedilol (COREG) 3.125 MG tablet Take 1 tablet by mouth 2 times daily (with meals) 60 tablet 3    furosemide (LASIX) 20 MG tablet Take 1 tablet by mouth daily (Patient taking differently: Take 40 mg by mouth daily ) 60 tablet 3    ticagrelor (BRILINTA) 90 MG TABS tablet Take 1 tablet by mouth 2 times daily 60 tablet 0    olopatadine (PATANOL) 0.1 % ophthalmic solution Place 1 drop into both eyes 2 times daily      potassium chloride (KLOR-CON) 20 MEQ packet Take 20 mEq by mouth 2 times daily      Cyanocobalamin 2500 MCG TABS Take 2 tablets by mouth daily       DULoxetine (CYMBALTA) 20 MG extended release capsule Take 30 mg by mouth daily       loperamide (IMODIUM) 2 MG capsule Take 2 mg by mouth as needed for Diarrhea       Multiple Vitamin (MULTIVITAMIN) capsule Take 1 capsule by mouth daily      aspirin 81 MG chewable tablet Take 1 tablet by mouth daily 30 tablet 6    tiZANidine (ZANAFLEX) 4 MG tablet Take 4 mg by mouth every 6 hours as needed (Patient not taking: Reported on 3/18/2022)      nitroGLYCERIN (NITROSTAT) 0.4 MG SL tablet up to max of 3 total doses. If no relief after 1 dose, call 911. (Patient not taking: Reported on 3/18/2022) 25 tablet 3    hydrOXYzine (ATARAX) 25 MG tablet daily as needed  (Patient not taking: Reported on 3/18/2022)      albuterol-ipratropium (COMBIVENT RESPIMAT)  MCG/ACT AERS inhaler Inhale into the lungs (Patient not taking: Reported on 3/18/2022)       No current facility-administered medications for this visit.      Allergies: Adhesive tape, Vicodin hp [hydrocodone-acetaminophen], and Tylenol with codeine #3 [acetaminophen-codeine]  Past Medical History:   Diagnosis Date    Anal cancer (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 [address]     PONV (postoperative nausea and vomiting)     hx motion sickness    Skin cancer      Past Surgical History:   Procedure Laterality Date    ANKLE SURGERY Left 10+ yrs ago    with hardware    COLON SURGERY  2009    \"after had chemo removed some part of the cancer from the rectal area, then 7 months later had bowel blockage had to do surgery  to remove part of the bowel\"    COLONOSCOPY  3/4/15    mild proctitis    COLONOSCOPY  03/15/2017    mild radia proc, hypertroph pailla    COLONOSCOPY N/A 3/3/2022    COLONOSCOPY CONTROL HEMORRHAGE WITH STRAIGHT FIRE APC WITH CLIP PLACEMENT X 2 [facility]      [facility]    OTHER SURGICAL HISTORY  04-    mediport removal    PTCA  2009 4/22/2009 had angioplasty with stent to LAD & another day in 2009 stent x 2 to RCA done    TONSILLECTOMY      as a kid age 7   [address]  25 yrs ago    TUNNELED VENOUS PORT PLACEMENT  2009    port inserted     UPPER GASTROINTESTINAL ENDOSCOPY N/A 2/26/2022    EGD DIAGNOSTIC ONLY performed by Chris Meneses MD at [address] N/A 3/3/2022    ENTEROSCOPY PUSH DIAGNOSTIC performed by Chris Meneses MD at [address] [facility] ENDOSCOPY     Family History   Problem Relation Age of Onset    Cancer Mother         breast ca? ?    Cancer Father         prostate cancer- lung mets- bone mets    Stroke Father     High Cholesterol Father     Stroke Sister     Cancer Brother         neck cancer    No Known Problems Daughter      Social History     Tobacco Use    Smoking status: Former Smoker     Packs/day: 0.25     Years: 35.00     Pack years: 8.75     Types: Cigarettes    Smokeless tobacco: Never Used    Tobacco comment: 6-7 cigarettes    Substance Use Topics    Alcohol use: No     Comment: \"quit 3/2015\"use to drink a couple of times per week before\"      [unfilled]  Review of Systems:   · Constitutional: No Fever or Weight Loss   · Eyes: No Decreased Vision  · ENT: No Headaches, Hearing Loss or Vertigo  · Cardiovascular: No chest pain, dyspnea on exertion, palpitations or loss of consciousness  · Respiratory: No cough or wheezing    · Gastrointestinal: No abdominal pain, appetite loss, blood in stools, constipation, diarrhea or heartburn  · Genitourinary: No dysuria, trouble voiding, or hematuria  · Musculoskeletal:  No gait disturbance, weakness or joint complaints  · Integumentary: No rash or pruritis  · Neurological: No TIA or stroke symptoms  · Psychiatric: No anxiety or depression  · Endocrine: No malaise, fatigue or temperature intolerance  · Hematologic/Lymphatic: No bleeding problems, blood clots or swollen lymph nodes  · Allergic/Immunologic: No nasal congestion or hives  All systems negative except as marked. Objective:  /80   Pulse 80   Ht 5' 2\" (1.575 m)   Wt 182 lb (82.6 kg)   BMI 33.29 kg/m²   Wt Readings from Last 3 Encounters:   03/18/22 182 lb (82.6 kg)   03/03/22 (!) 421 lb 8.3 oz (191.2 kg)   02/22/22 197 lb 5 oz (89.5 kg)     Body mass index is 33.29 kg/m². GENERAL - Alert, oriented, pleasant, in no apparent distress,normal grooming  HEENT  pupils are intact, cornea intact, conjunctive normal color, ears are normal in exam,  Neck - Supple. No jugular venous distention noted. No carotid bruits, no apical -carotid delay  Respiratory:  Normal breath sounds, No respiratory distress, No wheezing, No chest tenderness. ,no use of accessory muscles, diaphragm movement is normal  Cardiovascular: (PMI) apex non displaced,no lifts no thrills, no s3,no s4, Normal heart rate, Normal rhythm, No murmurs, No rubs, No gallops. Carotid arteries pulse and amplitude are normal no bruit, no abdominal bruit noted ( normal abdominal aorta ausculation),   Extremities - No cyanosis, clubbing, or significant edema, no varicose veins    Abdomen  No masses, tenderness, or organomegaly, no hepato-splenomegally, no bruits  Musculoskeletal  No significant edema, no kyphosis or scoliosis, no deformity in any extremity noted, muscle strength and tone are normal  Skin: no ulcer,no scar,no stasis dermatitis   Neurologic  alert oriented times 3,Cranial nerves II through XII are grossly intact. There were no gross focal neurologic abnormalities.    Psychiatric: normal mood and affect    Lab Results   Component Value Date    TROPONINI 0.008 04/10/2012     BNP:  No results found for: BNP  PT/INR:  No results found for: PTINR  No results found for: LABA1C  Lab Results   Component Value Date    CHOL 152 02/23/2022    TRIG 115 02/23/2022    HDL 68 02/23/2022    LDLCALC 61 02/23/2022    LDLDIRECT 105 (H) 03/09/2021     Lab Results   Component Value Date    ALT 19 03/02/2022    AST 32 03/02/2022     TSH:    Lab Results   Component Value Date    TSH 2.420 02/11/2022    TSH 2.420 02/11/2022       Impression:  Onur Koenig is a 65 y. o.year old who  has a past medical history of Anal cancer ([facility] [address] Asthma, Broken ankle, CAD (coronary artery disease), H/O cardiovascular stress test, NSTEMI (non-ST elevated myocardial infarction) ([facility] [address] PONV (postoperative nausea and vomiting), and Skin cancer. and presents with     Plan:  1. CAD: recent PCI of RCA with SOUMYA, Continue aspirin and brilinta,  continue statins, betablockers. stress test and cardiac rehab ordered  2. Gi bleeding resolved, f/u with Dr. Koby Keene, had colon AVM, coagulated with APC and 2 clips  3. Dyslipidemia: continue statins  4. H/o anal cancer  5. Health maintenance: exerise and diet  All labs, medications and tests reviewed, continue all other medications of all above medical condition listed as is.     @[facility]@

## 2022-03-23 NOTE — PROGRESS NOTES
Physician Progress Note      PATIENT:               Epi Bell  CSN #:                  [id number]  :                       1965  ADMIT DATE:       2022 9:32 AM  DISCH DATE:        2022 12:07 PM  RESPONDING  PROVIDER #:        Alexandrea Artis MD          QUERY TEXT:    Pt admitted with STEMI. Pt noted to have 100% occluded with stent thrombosis   in RCA artery proximal and middle. If possible, please document in progress   notes and discharge summary the etiology of the STEMI. The medical record reflects the following:  Risk Factors: CAD, HTN  Clinical Indicators: Cath lab report: Chest pain, STEMI, CATH LAB ACTIVATED,   patient is taken to cath lab emergently and found 100% OCCLUDED RCA with   thrombus. Treatment: cardiology consult, SOUMYA, loaded with aspirin, brilinta and   integrelin ACACIA HindsN, RN  Clinical   [phone]  Options provided:  -- STEMI due to In stent thrombosis of RCA artery  -- STEMI not due to In stent thrombosis of RCA artery  -- Other - I will add my own diagnosis  -- Disagree - Not applicable / Not valid  -- Disagree - Clinically unable to determine / Unknown  -- Refer to Clinical Documentation Reviewer    PROVIDER RESPONSE TEXT:    Provider disagreed with this query.   Please ask hospitalist to udate progress note and discharge summary    Query created by: Marcus Jean on 3/23/2022 9:44 AM      Electronically signed by:  Alexandrea Artis MD 3/23/2022 2:06 PM

## 2022-03-25 ENCOUNTER — PROCEDURE VISIT (OUTPATIENT)
Dept: CARDIOLOGY CLINIC | Age: 57
End: 2022-03-25
Payer: COMMERCIAL

## 2022-03-25 VITALS
SYSTOLIC BLOOD PRESSURE: 102 MMHG | HEIGHT: 62 IN | WEIGHT: 182 LBS | HEART RATE: 87 BPM | BODY MASS INDEX: 33.49 KG/M2 | DIASTOLIC BLOOD PRESSURE: 60 MMHG

## 2022-03-25 DIAGNOSIS — I25.10 CORONARY ARTERY DISEASE INVOLVING NATIVE CORONARY ARTERY OF NATIVE HEART WITHOUT ANGINA PECTORIS: ICD-10-CM

## 2022-03-25 DIAGNOSIS — I21.11 ST ELEVATION MYOCARDIAL INFARCTION INVOLVING RIGHT CORONARY ARTERY (HCC): ICD-10-CM

## 2022-03-25 DIAGNOSIS — Z98.61 POST PTCA: Primary | ICD-10-CM

## 2022-03-25 LAB
HCT VFR BLD CALC: 33.8 % (ref 34–46.6)
HEMOGLOBIN: 10.6 G/DL (ref 11.1–15.9)

## 2022-03-25 PROCEDURE — 93015 CV STRESS TEST SUPVJ I&R: CPT | Performed by: INTERNAL MEDICINE

## 2022-03-27 NOTE — PROGRESS NOTES
Physician Progress Note      PATIENT:               Fadumo Gómez  CSN #:                  [id number]  :                       1965  ADMIT DATE:       2022 9:32 AM  DISCH DATE:        2022 12:07 PM  RESPONDING  PROVIDER #:        Taina Hamm MD          QUERY TEXT:    Attending- sent query to cardiology first, deferred to attending:    Pt admitted with STEMI. Pt noted to have 100% occluded with stent thrombosis   in RCA artery proximal and middle. If possible, please document in progress   notes and discharge summary the etiology of the STEMI. The medical record reflects the following:  Risk Factors: CAD, HTN  Clinical Indicators: Cath lab report: Chest pain, STEMI, CATH LAB ACTIVATED,   patient is taken to cath lab emergently and found 100% OCCLUDED RCA with   thrombus. Treatment: cardiology consult, SOUMYA, loaded with aspirin, brilinta and   integrelin ACACIA CordobaN, RN  Clinical   [phone]  Options provided:  -- STEMI due to In stent thrombosis of RCA artery  -- STEMI not due to In stent thrombosis of RCA artery  -- Other - I will add my own diagnosis  -- Disagree - Not applicable / Not valid  -- Disagree - Clinically unable to determine / Unknown  -- Refer to Clinical Documentation Reviewer    PROVIDER RESPONSE TEXT:    This patient has STEMI not due to in-stent thrombosis of RCA artery.     Query created by: Ania Roldan on 3/23/2022 3:49 PM      Electronically signed by:  Taina Hamm MD 3/27/2022 7:38 AM

## 2022-03-28 RX ORDER — ATORVASTATIN CALCIUM 40 MG/1
40 TABLET, FILM COATED ORAL DAILY
Qty: 30 TABLET | Refills: 5 | Status: SHIPPED | OUTPATIENT
Start: 2022-03-28 | End: 2022-06-17

## 2022-03-29 ENCOUNTER — TELEPHONE (OUTPATIENT)
Dept: GASTROENTEROLOGY | Age: 57
End: 2022-03-29

## 2022-03-29 NOTE — TELEPHONE ENCOUNTER
Pt. Called stating she was infomred by dr. Urias Duty to schedule a f/u appt in 5wk. She also states she is having discomfort and rectal bleeding.  Made appt for pt to see Anna on 4/4/22 @3:10pm

## 2022-04-01 LAB
HCT VFR BLD CALC: 33.8 % (ref 34–46.6)
HCT VFR BLD CALC: 33.8 % (ref 34–46.6)
HEMOGLOBIN: 10.9 G/DL (ref 11.1–15.9)
HEMOGLOBIN: 10.9 G/DL (ref 11.1–15.9)

## 2022-04-04 ENCOUNTER — OFFICE VISIT (OUTPATIENT)
Dept: GASTROENTEROLOGY | Age: 57
End: 2022-04-04
Payer: COMMERCIAL

## 2022-04-04 VITALS
SYSTOLIC BLOOD PRESSURE: 104 MMHG | HEART RATE: 94 BPM | TEMPERATURE: 96.8 F | WEIGHT: 184.4 LBS | HEIGHT: 62 IN | BODY MASS INDEX: 33.93 KG/M2 | OXYGEN SATURATION: 96 % | DIASTOLIC BLOOD PRESSURE: 60 MMHG

## 2022-04-04 DIAGNOSIS — K21.9 GASTROESOPHAGEAL REFLUX DISEASE, UNSPECIFIED WHETHER ESOPHAGITIS PRESENT: ICD-10-CM

## 2022-04-04 DIAGNOSIS — K92.2 GASTROINTESTINAL HEMORRHAGE, UNSPECIFIED GASTROINTESTINAL HEMORRHAGE TYPE: Primary | ICD-10-CM

## 2022-04-04 PROCEDURE — 99214 OFFICE O/P EST MOD 30 MIN: CPT | Performed by: SPECIALIST

## 2022-04-04 PROCEDURE — 3017F COLORECTAL CA SCREEN DOC REV: CPT | Performed by: SPECIALIST

## 2022-04-04 PROCEDURE — G8417 CALC BMI ABV UP PARAM F/U: HCPCS | Performed by: SPECIALIST

## 2022-04-04 PROCEDURE — G8427 DOCREV CUR MEDS BY ELIG CLIN: HCPCS | Performed by: SPECIALIST

## 2022-04-04 PROCEDURE — 1111F DSCHRG MED/CURRENT MED MERGE: CPT | Performed by: SPECIALIST

## 2022-04-04 PROCEDURE — 1036F TOBACCO NON-USER: CPT | Performed by: SPECIALIST

## 2022-04-04 RX ORDER — ESOMEPRAZOLE MAGNESIUM 40 MG/1
40 CAPSULE, DELAYED RELEASE ORAL DAILY
Qty: 30 CAPSULE | Refills: 5 | Status: SHIPPED | OUTPATIENT
Start: 2022-04-04 | End: 2022-06-17

## 2022-04-04 RX ORDER — FAMOTIDINE 20 MG/1
20 TABLET, FILM COATED ORAL 2 TIMES DAILY
COMMUNITY
End: 2022-04-04

## 2022-04-04 NOTE — PROGRESS NOTES
Gastroenterology Office Note            Satnam Bashir. [facility] MD      Subjective:      Patient ID:      Maryanne Posada                 1965    CC: follow up for GI bleed in the hospital    HPI:   Doing well- reports no visible blood in the stool. She does complain of \"indigestion\" and heartburn for which Pepcid is not working. No abd pain, nausea, vomiting, diarrhea, constipation      Review of Systems:    see HPI for positives and pertinent negatives.  All other systems reviewed and are negative     Objective:   PHYSICAL EXAM:    Vitals:  /60 (Site: Left Upper Arm, Position: Sitting, Cuff Size: Medium Adult)   Pulse 94   Temp 96.8 °F (36 °C) (Infrared)   Ht 5' 2\" (1.575 m)   Wt 184 lb 6.4 oz (83.6 kg)   SpO2 96%   BMI 33.73 kg/m²   CONSTITUTIONAL: alert    LUNGS:  clear to auscultation  CARDIOVASCULAR:  regular rate and rhythm and no murmur noted  ABDOMEN:  normal bowel sounds, non-distended, non-tender and no masses palpated, no hepatosplenomegaly  EXTREMITIES: no clubbing, cyanosis, or edema    Assessment:      1) occult GI bleed due to angiodysplasia- S/P coag of colonic AVM- stable   2) GERD- not responding to Pepcid and prn Protonix      Plan:      D/C Protonix and Pepcid  Nexium 40 mg q am  Continue to monitor H/H every 1-2 weeks  return 3 months  Call if any problems

## 2022-04-13 ENCOUNTER — HOSPITAL ENCOUNTER (OUTPATIENT)
Dept: CARDIAC REHAB | Age: 57
Setting detail: THERAPIES SERIES
Discharge: HOME | End: 2022-04-13
Payer: COMMERCIAL

## 2022-04-13 LAB
HCT VFR BLD CALC: 35.5 % (ref 34–46.6)
HCT VFR BLD CALC: 35.5 % (ref 34–46.6)
HEMOGLOBIN: 11.2 G/DL (ref 11.1–15.9)
HEMOGLOBIN: 11.2 G/DL (ref 11.1–15.9)
MAGNESIUM: 2.2 MG/DL (ref 1.6–2.3)
MAGNESIUM: 2.2 MG/DL (ref 1.6–2.3)

## 2022-04-13 PROCEDURE — G0423 INTENS CARDIAC REHAB NO EXER: HCPCS

## 2022-04-13 PROCEDURE — G0422 INTENS CARDIAC REHAB W/EXERC: HCPCS

## 2022-04-18 ENCOUNTER — HOSPITAL ENCOUNTER (OUTPATIENT)
Dept: CARDIAC REHAB | Age: 57
Setting detail: THERAPIES SERIES
Discharge: HOME | End: 2022-04-18
Payer: COMMERCIAL

## 2022-04-18 PROCEDURE — G0422 INTENS CARDIAC REHAB W/EXERC: HCPCS

## 2022-04-18 PROCEDURE — G0423 INTENS CARDIAC REHAB NO EXER: HCPCS

## 2022-04-20 LAB
HCT VFR BLD CALC: 35 % (ref 34–46.6)
HCT VFR BLD CALC: 35 % (ref 34–46.6)
HEMOGLOBIN: 11.4 G/DL (ref 11.1–15.9)
HEMOGLOBIN: 11.4 G/DL (ref 11.1–15.9)

## 2022-04-21 ENCOUNTER — HOSPITAL ENCOUNTER (OUTPATIENT)
Dept: CARDIAC REHAB | Age: 57
Setting detail: THERAPIES SERIES
Discharge: HOME | End: 2022-04-21
Payer: COMMERCIAL

## 2022-04-21 PROCEDURE — G0422 INTENS CARDIAC REHAB W/EXERC: HCPCS

## 2022-04-21 PROCEDURE — G0423 INTENS CARDIAC REHAB NO EXER: HCPCS

## 2022-04-25 ENCOUNTER — HOSPITAL ENCOUNTER (OUTPATIENT)
Dept: CARDIAC REHAB | Age: 57
Setting detail: THERAPIES SERIES
Discharge: HOME | End: 2022-04-25
Payer: COMMERCIAL

## 2022-04-25 ENCOUNTER — TELEPHONE (OUTPATIENT)
Dept: CARDIOLOGY CLINIC | Age: 57
End: 2022-04-25

## 2022-04-25 PROCEDURE — G0422 INTENS CARDIAC REHAB W/EXERC: HCPCS

## 2022-04-25 PROCEDURE — G0423 INTENS CARDIAC REHAB NO EXER: HCPCS

## 2022-04-25 NOTE — TELEPHONE ENCOUNTER
Patient checked B/P while on phone 446/78, HR 90. Patient wonders if Coreg dose too high, just doesn't have energy. Advised she is on low dose.  Patient has had issues with low blood count, she will contact PCP

## 2022-04-25 NOTE — TELEPHONE ENCOUNTER
Pt called with complaint of being very fatigued  And stating she is concerned that it might be from the increase of her carvediol?

## 2022-04-28 ENCOUNTER — HOSPITAL ENCOUNTER (OUTPATIENT)
Dept: CARDIAC REHAB | Age: 57
Setting detail: THERAPIES SERIES
Discharge: HOME | End: 2022-04-28
Payer: COMMERCIAL

## 2022-04-28 PROCEDURE — G0423 INTENS CARDIAC REHAB NO EXER: HCPCS

## 2022-04-28 PROCEDURE — G0422 INTENS CARDIAC REHAB W/EXERC: HCPCS

## 2022-05-03 ENCOUNTER — TELEPHONE (OUTPATIENT)
Dept: CARDIOLOGY CLINIC | Age: 57
End: 2022-05-03

## 2022-05-03 NOTE — TELEPHONE ENCOUNTER
Yes but usually when medication was first started. Given recent MI that was complicated, I would prefer her to be evaluated in the office before medication is changed.

## 2022-05-03 NOTE — TELEPHONE ENCOUNTER
Patient states symptom of SOB started when she started Brilinta. Has worsened since active with cardiac rehab.  Asking to consider changing

## 2022-05-04 LAB
BASOPHILS ABSOLUTE: 0 X10E3/UL (ref 0–0.2)
BASOPHILS ABSOLUTE: 0 X10E3/UL (ref 0–0.2)
BASOPHILS RELATIVE PERCENT: 1 %
BASOPHILS RELATIVE PERCENT: 1 %
BUN / CREAT RATIO: 12 (ref 9–23)
BUN BLDV-MCNC: 13 MG/DL (ref 6–24)
CALCIUM SERPL-MCNC: 9.1 MG/DL (ref 8.7–10.2)
CHLORIDE BLD-SCNC: 103 MMOL/L (ref 96–106)
CO2: 24 MMOL/L (ref 20–29)
CREAT SERPL-MCNC: 1.11 MG/DL (ref 0.57–1)
EOSINOPHILS ABSOLUTE: 0 X10E3/UL (ref 0–0.4)
EOSINOPHILS ABSOLUTE: 0 X10E3/UL (ref 0–0.4)
EOSINOPHILS RELATIVE PERCENT: 1 %
EOSINOPHILS RELATIVE PERCENT: 1 %
ERYTHROCYTES, NUCLEATED/100 LEU: ABNORMAL
ESTIMATED GLOMERULAR FILTRATION RATE CREATININE EQUATION: 58 ML/MIN/1.73
GLUCOSE BLD-MCNC: 105 MG/DL (ref 65–99)
HCT VFR BLD CALC: 36.3 % (ref 34–46.6)
HCT VFR BLD CALC: 36.3 % (ref 34–46.6)
HEMOGLOBIN: 11.5 G/DL (ref 11.1–15.9)
HEMOGLOBIN: 11.5 G/DL (ref 11.1–15.9)
IMMATURE CELLS ABSOLUTE COUNT: ABNORMAL
IMMATURE GRANS (ABS): 0 X10E3/UL (ref 0–0.1)
IMMATURE GRANS (ABS): 0 X10E3/UL (ref 0–0.1)
IMMATURE GRANULOCYTES: 0 %
IMMATURE GRANULOCYTES: 0 %
LYMPHOCYTES ABSOLUTE: 1.8 X10E3/UL (ref 0.7–3.1)
LYMPHOCYTES ABSOLUTE: 1.8 X10E3/UL (ref 0.7–3.1)
LYMPHOCYTES RELATIVE PERCENT: 37 %
LYMPHOCYTES RELATIVE PERCENT: 37 %
MCH RBC QN AUTO: 28 PG (ref 26.6–33)
MCH RBC QN AUTO: 28 PG (ref 26.6–33)
MCHC RBC AUTO-ENTMCNC: 31.7 G/DL (ref 31.5–35.7)
MCHC RBC AUTO-ENTMCNC: 31.7 G/DL (ref 31.5–35.7)
MCV RBC AUTO: 89 FL (ref 79–97)
MCV RBC AUTO: 89 FL (ref 79–97)
MONOCYTES ABSOLUTE: 0.4 X10E3/UL (ref 0.1–0.9)
MONOCYTES ABSOLUTE: 0.4 X10E3/UL (ref 0.1–0.9)
MONOCYTES RELATIVE PERCENT: 9 %
MONOCYTES RELATIVE PERCENT: 9 %
MORPHOLOGY: ABNORMAL
NEUTROPHILS ABSOLUTE: 2.6 X10E3/UL (ref 1.4–7)
NEUTROPHILS ABSOLUTE: 2.6 X10E3/UL (ref 1.4–7)
PDW BLD-RTO: 16.7 % (ref 11.7–15.4)
PDW BLD-RTO: 16.7 % (ref 11.7–15.4)
PLATELET # BLD: 289 X10E3/UL (ref 150–450)
PLATELET # BLD: 289 X10E3/UL (ref 150–450)
POTASSIUM SERPL-SCNC: 3.5 MMOL/L (ref 3.5–5.2)
RBC # BLD: 4.1 X10E6/UL (ref 3.77–5.28)
RBC # BLD: 4.1 X10E6/UL (ref 3.77–5.28)
SEGMENTED NEUTROPHILS RELATIVE PERCENT: 52 %
SEGMENTED NEUTROPHILS RELATIVE PERCENT: 52 %
SODIUM BLD-SCNC: 144 MMOL/L (ref 134–144)
WBC # BLD: 4.9 X10E3/UL (ref 3.4–10.8)
WBC # BLD: 4.9 X10E3/UL (ref 3.4–10.8)

## 2022-05-04 RX ORDER — CLOPIDOGREL BISULFATE 75 MG/1
75 TABLET ORAL DAILY
Qty: 30 TABLET | Refills: 5 | OUTPATIENT
Start: 2022-05-04 | End: 2022-10-03 | Stop reason: SDUPTHER

## 2022-05-04 NOTE — TELEPHONE ENCOUNTER
Perfect serve message to Dr Lou Salazar, load plavix 600mg then 75mg daily. Patient advised and voices understanding. patient also states at PCP visit yesterday B/P very load. Today sys 116.  Patient asked to monitor and record, bring to OV next week

## 2022-05-05 ENCOUNTER — HOSPITAL ENCOUNTER (OUTPATIENT)
Dept: CARDIAC REHAB | Age: 57
Setting detail: THERAPIES SERIES
Discharge: HOME | End: 2022-05-05
Payer: COMMERCIAL

## 2022-05-05 PROCEDURE — G0423 INTENS CARDIAC REHAB NO EXER: HCPCS

## 2022-05-05 PROCEDURE — G0422 INTENS CARDIAC REHAB W/EXERC: HCPCS

## 2022-05-09 ENCOUNTER — OFFICE VISIT (OUTPATIENT)
Dept: CARDIOLOGY CLINIC | Age: 57
End: 2022-05-09
Payer: COMMERCIAL

## 2022-05-09 VITALS
DIASTOLIC BLOOD PRESSURE: 74 MMHG | WEIGHT: 181 LBS | SYSTOLIC BLOOD PRESSURE: 116 MMHG | HEIGHT: 62 IN | BODY MASS INDEX: 33.31 KG/M2 | HEART RATE: 86 BPM

## 2022-05-09 DIAGNOSIS — I25.110 CORONARY ARTERY DISEASE INVOLVING NATIVE CORONARY ARTERY OF NATIVE HEART WITH UNSTABLE ANGINA PECTORIS (HCC): Primary | ICD-10-CM

## 2022-05-09 PROCEDURE — G8417 CALC BMI ABV UP PARAM F/U: HCPCS | Performed by: INTERNAL MEDICINE

## 2022-05-09 PROCEDURE — 1036F TOBACCO NON-USER: CPT | Performed by: INTERNAL MEDICINE

## 2022-05-09 PROCEDURE — G8427 DOCREV CUR MEDS BY ELIG CLIN: HCPCS | Performed by: INTERNAL MEDICINE

## 2022-05-09 PROCEDURE — 99214 OFFICE O/P EST MOD 30 MIN: CPT | Performed by: INTERNAL MEDICINE

## 2022-05-09 PROCEDURE — 3017F COLORECTAL CA SCREEN DOC REV: CPT | Performed by: INTERNAL MEDICINE

## 2022-05-09 NOTE — PROGRESS NOTES
Elida Yun MD        OFFICE  FOLLOWUP NOTE    Chief complaints: patient is here for management of CAD,STEMI of RCA, GI bleeding,anal cancer DYSLPIDEMIA    Subjective: patient feels tired with low blood pressure no chest pain, no shortness of breath, no dizziness, no palpitations    JOSE Redd is a 65 y. o.year old who  has a past medical history of Anal cancer ([facility] [address] Asthma, Broken ankle, CAD (coronary artery disease), H/O cardiovascular stress test, NSTEMI (non-ST elevated myocardial infarction) ([facility] [address] PONV (postoperative nausea and vomiting), Post PTCA, and Skin cancer. and presents for management of CAD,STEMI of RCA, GI bleeding,anal cancer DYSLPIDEMIA which are well controlled    She thinks coreg is causing her blood pressure drop at rehab, she had stemi of RCA and gi bleeding. , her blood pressure is 86  systolic  She has asked to change brilinta to plavix as it was causing shortness of breath.   Current Outpatient Medications   Medication Sig Dispense Refill    clopidogrel (PLAVIX) 75 MG tablet Take 1 tablet by mouth daily 30 tablet 5    esomeprazole (NEXIUM) 40 MG delayed release capsule Take 1 capsule by mouth daily 30 capsule 5    atorvastatin (LIPITOR) 40 MG tablet Take 1 tablet by mouth daily 30 tablet 5    ferrous sulfate (FE TABS 325) 325 (65 Fe) MG EC tablet Take 325 mg by mouth daily      VITAMIN D PO Take by mouth      carvedilol (COREG) 3.125 MG tablet Take 1 tablet by mouth 2 times daily (with meals) 60 tablet 3    furosemide (LASIX) 20 MG tablet Take 1 tablet by mouth daily (Patient taking differently: Take 40 mg by mouth daily ) 60 tablet 3    olopatadine (PATANOL) 0.1 % ophthalmic solution Place 1 drop into both eyes 2 times daily      potassium chloride (KLOR-CON) 20 MEQ packet Take 20 mEq by mouth 2 times daily      Cyanocobalamin 2500 MCG TABS Take 2 tablets by mouth daily       tiZANidine (ZANAFLEX) 4 MG tablet Take 4 mg by mouth every 6 hours as needed       DULoxetine (CYMBALTA) 20 MG extended release capsule Take 30 mg by mouth daily       loperamide (IMODIUM) 2 MG capsule Take 2 mg by mouth as needed for Diarrhea       Multiple Vitamin (MULTIVITAMIN) capsule Take 1 capsule by mouth daily      albuterol-ipratropium (COMBIVENT RESPIMAT)  MCG/ACT AERS inhaler Inhale into the lungs       aspirin 81 MG chewable tablet Take 1 tablet by mouth daily 30 tablet 6    Chlorpheniramine-Pseudoeph (SUDOGEST SINUS/ALLERGY PO) Take by mouth (Patient not taking: Reported on 4/4/2022)      nitroGLYCERIN (NITROSTAT) 0.4 MG SL tablet up to max of 3 total doses. If no relief after 1 dose, call 911. (Patient not taking: Reported on 3/18/2022) 25 tablet 3    hydrOXYzine (ATARAX) 25 MG tablet daily as needed  (Patient not taking: Reported on 3/18/2022)       No current facility-administered medications for this visit. 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     Skin cancer      Past Surgical History:   Procedure Laterality Date    ANKLE SURGERY Left 10+ yrs ago    with hardware    COLON SURGERY  2009    \"after had chemo removed some part of the cancer from the rectal area, then 7 months later had bowel blockage had to do surgery  to remove part of the bowel\"    COLONOSCOPY  03/04/2015    mild proctitis    COLONOSCOPY  03/15/2017    mild radia proc, hypertroph pailla    COLONOSCOPY N/A 2022    COLONOSCOPY CONTROL HEMORRHAGE WITH STRAIGHT FIRE APC WITH CLIP PLACEMENT X 2 performed by Bertrand Meigs, MD at [address]      left ankle    OTHER SURGICAL HISTORY  2015    mediport removal    PTCA  2009 had angioplasty with stent to LAD & another day in  stent x 2 to RCA done    PTCA  2022    RCA stented.  TONSILLECTOMY      as a kid age 7    TUBAL LIGATION  25 yrs ago    TUNNELED VENOUS PORT PLACEMENT      port inserted     UPPER GASTROINTESTINAL ENDOSCOPY N/A 2022    EGD DIAGNOSTIC ONLY performed by Bertrand Meigs, MD at [place] [address] N/A 2022    ENTEROSCOPY PUSH DIAGNOSTIC performed by Bertrand Meigs, MD at [facility] ENDOSCOPY     Family History   Problem Relation Age of Onset    Cancer Mother         breast ca? ?    Cancer Father         prostate cancer- lung mets- bone mets    Stroke Father     High Cholesterol Father     Stroke Sister     Cancer Brother         neck cancer    No Known Problems Daughter      Social History     Tobacco Use    Smoking status: Former Smoker     Packs/day: 0.25     Years: 35.00     Pack years: 8.75     Types: Cigarettes     Start date:      Quit date: 10/2021     Years since quittin.6    Smokeless tobacco: Never Used    Tobacco comment: 6-7 cigarettes    Substance Use Topics    Alcohol use: No     Comment: \"quit 3/2015\"use to drink a couple of times per week before\"      [unfilled]  Review of Systems:   · Constitutional: No Fever or Weight Loss   · Eyes: No Decreased Vision  · ENT: No Headaches, Hearing Loss or Vertigo  · Cardiovascular: No chest pain, dyspnea on exertion, palpitations or loss of consciousness  · Respiratory: No cough or wheezing    · Gastrointestinal: No abdominal pain, appetite loss, blood in stools, constipation, diarrhea or heartburn  · Genitourinary: No dysuria, trouble voiding, or hematuria  · Musculoskeletal:  No gait disturbance, weakness or joint complaints  · Integumentary: No rash or pruritis  · Neurological: No TIA or stroke symptoms  · Psychiatric: No anxiety or depression  · Endocrine: No malaise, fatigue or temperature intolerance  · Hematologic/Lymphatic: No bleeding problems, blood clots or swollen lymph nodes  · Allergic/Immunologic: No nasal congestion or hives  All systems negative except as marked. Objective:  /74   Pulse 86   Ht 5' 2\" (1.575 m)   Wt 181 lb (82.1 kg)   BMI 33.11 kg/m²   Wt Readings from Last 3 Encounters:   05/09/22 181 lb (82.1 kg)   04/04/22 184 lb 6.4 oz (83.6 kg)   03/25/22 182 lb (82.6 kg)     Body mass index is 33.11 kg/m². GENERAL - Alert, oriented, pleasant, in no apparent distress,normal grooming  HEENT - pupils are intact, cornea intact, conjunctive normal color, ears are normal in exam,  Neck - Supple. No jugular venous distention noted. No carotid bruits, no apical -carotid delay  Respiratory:  Normal breath sounds, No respiratory distress, No wheezing, No chest tenderness. ,no use of accessory muscles, diaphragm movement is normal  Cardiovascular: (PMI) apex non displaced,no lifts no thrills, no s3,no s4, Normal heart rate, Normal rhythm, No murmurs, No rubs, No gallops. Carotid arteries pulse and amplitude are normal no bruit, no abdominal bruit noted ( normal abdominal aorta ausculation),   Extremities - No cyanosis, clubbing, or significant edema, no varicose veins    Abdomen - No masses, tenderness, or organomegaly, no hepato-splenomegally, no bruits  Musculoskeletal - No significant edema, no kyphosis or scoliosis, no deformity in any extremity noted, muscle strength and tone are normal  Skin: no ulcer,no scar,no stasis dermatitis   Neurologic - alert oriented times 3,Cranial nerves II through XII are grossly intact. There were no gross focal neurologic abnormalities.    Psychiatric: normal mood and affect    Lab Results Component Value Date    TROPONINI 0.008 04/10/2012     BNP:  No results found for: BNP  PT/INR:  No results found for: PTINR  No results found for: LABA1C  Lab Results   Component Value Date    CHOL 152 02/23/2022    TRIG 115 02/23/2022    HDL 68 02/23/2022    LDLCALC 61 02/23/2022    LDLDIRECT 105 (H) 03/09/2021     Lab Results   Component Value Date    ALT 19 03/02/2022    AST 32 03/02/2022     TSH:    Lab Results   Component Value Date    TSH 2.420 02/11/2022    TSH 2.420 02/11/2022       Impression:  Sg Martins is a 65 y. o.year old who  has a past medical history of Anal cancer ([facility] [address] Asthma, Broken ankle, CAD (coronary artery disease), H/O cardiovascular stress test, NSTEMI (non-ST elevated myocardial infarction) ([facility] [address] PONV (postoperative nausea and vomiting), Post PTCA, and Skin cancer. and presents with     Plan:  1. CAD: recent PCI of RCA with SOUMYA, Continue aspirin and plavix  continue statins, ok to stop coregg as her blood pressure is low in 80s and 90s at rehab  2. Gi bleeding resolved, f/u with Dr. Javan Villanueva, had colon AVM, coagulated with APC and 2 clips  3. Dyslipidemia: continue statins  4. H/o anal cancer  All labs, medications and tests reviewed, continue all other medications of all above medical condition listed as is.

## 2022-05-12 ENCOUNTER — HOSPITAL ENCOUNTER (OUTPATIENT)
Dept: CARDIAC REHAB | Age: 57
Setting detail: THERAPIES SERIES
Discharge: HOME | End: 2022-05-12
Payer: COMMERCIAL

## 2022-05-12 PROCEDURE — G0423 INTENS CARDIAC REHAB NO EXER: HCPCS

## 2022-05-12 PROCEDURE — G0422 INTENS CARDIAC REHAB W/EXERC: HCPCS

## 2022-05-16 ENCOUNTER — HOSPITAL ENCOUNTER (OUTPATIENT)
Dept: CARDIAC REHAB | Age: 57
Setting detail: THERAPIES SERIES
Discharge: HOME | End: 2022-05-16
Payer: COMMERCIAL

## 2022-05-16 PROCEDURE — G0423 INTENS CARDIAC REHAB NO EXER: HCPCS

## 2022-05-16 PROCEDURE — G0422 INTENS CARDIAC REHAB W/EXERC: HCPCS

## 2022-05-19 ENCOUNTER — HOSPITAL ENCOUNTER (OUTPATIENT)
Dept: CARDIAC REHAB | Age: 57
Setting detail: THERAPIES SERIES
Discharge: HOME | End: 2022-05-19
Payer: COMMERCIAL

## 2022-05-19 PROCEDURE — G0422 INTENS CARDIAC REHAB W/EXERC: HCPCS

## 2022-05-19 PROCEDURE — G0423 INTENS CARDIAC REHAB NO EXER: HCPCS

## 2022-05-20 LAB
BUN / CREAT RATIO: 10 (ref 9–23)
BUN BLDV-MCNC: 9 MG/DL (ref 6–24)
CALCIUM SERPL-MCNC: 9.7 MG/DL (ref 8.7–10.2)
CHLORIDE BLD-SCNC: 100 MMOL/L (ref 96–106)
CO2: 27 MMOL/L (ref 20–29)
CREAT SERPL-MCNC: 0.87 MG/DL (ref 0.57–1)
EGFR (CKD-EPI): 78 ML/MIN/1.73
GLUCOSE BLD-MCNC: 87 MG/DL (ref 65–99)
HCT VFR BLD CALC: 37.1 % (ref 34–46.6)
HEMOGLOBIN: 11.7 G/DL (ref 11.1–15.9)
MCH RBC QN AUTO: 28.1 PG (ref 26.6–33)
MCHC RBC AUTO-ENTMCNC: 31.5 G/DL (ref 31.5–35.7)
MCV RBC AUTO: 89 FL (ref 79–97)
PDW BLD-RTO: 16.7 % (ref 11.7–15.4)
PLATELET # BLD: 264 X10E3/UL (ref 150–450)
POTASSIUM SERPL-SCNC: 4.2 MMOL/L (ref 3.5–5.2)
RBC # BLD: 4.16 X10E6/UL (ref 3.77–5.28)
SODIUM BLD-SCNC: 141 MMOL/L (ref 134–144)
WBC # BLD: 5.5 X10E3/UL (ref 3.4–10.8)

## 2022-05-23 ENCOUNTER — HOSPITAL ENCOUNTER (OUTPATIENT)
Dept: CARDIAC REHAB | Age: 57
Setting detail: THERAPIES SERIES
Discharge: HOME | End: 2022-05-23
Payer: COMMERCIAL

## 2022-05-23 PROCEDURE — G0423 INTENS CARDIAC REHAB NO EXER: HCPCS

## 2022-05-23 PROCEDURE — G0422 INTENS CARDIAC REHAB W/EXERC: HCPCS

## 2022-05-31 ENCOUNTER — HOSPITAL ENCOUNTER (OUTPATIENT)
Dept: CARDIAC REHAB | Age: 57
Setting detail: THERAPIES SERIES
Discharge: HOME | End: 2022-05-31
Payer: COMMERCIAL

## 2022-05-31 PROCEDURE — G0422 INTENS CARDIAC REHAB W/EXERC: HCPCS

## 2022-05-31 PROCEDURE — G0423 INTENS CARDIAC REHAB NO EXER: HCPCS

## 2022-06-02 ENCOUNTER — HOSPITAL ENCOUNTER (OUTPATIENT)
Dept: CARDIAC REHAB | Age: 57
Setting detail: THERAPIES SERIES
Discharge: HOME | End: 2022-06-02
Payer: COMMERCIAL

## 2022-06-02 PROCEDURE — G0423 INTENS CARDIAC REHAB NO EXER: HCPCS

## 2022-06-02 PROCEDURE — G0422 INTENS CARDIAC REHAB W/EXERC: HCPCS

## 2022-06-03 LAB
BUN / CREAT RATIO: 12 (ref 9–23)
BUN / CREAT RATIO: 12 (ref 9–23)
BUN BLDV-MCNC: 10 MG/DL (ref 6–24)
BUN BLDV-MCNC: 10 MG/DL (ref 6–24)
CALCIUM SERPL-MCNC: 9.6 MG/DL (ref 8.7–10.2)
CALCIUM SERPL-MCNC: 9.6 MG/DL (ref 8.7–10.2)
CHLORIDE BLD-SCNC: 100 MMOL/L (ref 96–106)
CHLORIDE BLD-SCNC: 100 MMOL/L (ref 96–106)
CO2: 26 MMOL/L (ref 20–29)
CO2: 26 MMOL/L (ref 20–29)
CREAT SERPL-MCNC: 0.84 MG/DL (ref 0.57–1)
CREAT SERPL-MCNC: 0.84 MG/DL (ref 0.57–1)
EGFR (CKD-EPI): 82 ML/MIN/1.73
ESTIMATED GLOMERULAR FILTRATION RATE CREATININE EQUATION: 82 ML/MIN/1.73
GLUCOSE BLD-MCNC: 98 MG/DL (ref 65–99)
GLUCOSE BLD-MCNC: 98 MG/DL (ref 65–99)
POTASSIUM SERPL-SCNC: 4.2 MMOL/L (ref 3.5–5.2)
POTASSIUM SERPL-SCNC: 4.2 MMOL/L (ref 3.5–5.2)
SODIUM BLD-SCNC: 140 MMOL/L (ref 134–144)
SODIUM BLD-SCNC: 140 MMOL/L (ref 134–144)

## 2022-06-06 ENCOUNTER — HOSPITAL ENCOUNTER (OUTPATIENT)
Dept: CARDIAC REHAB | Age: 57
Setting detail: THERAPIES SERIES
Discharge: HOME | End: 2022-06-06
Payer: COMMERCIAL

## 2022-06-06 PROCEDURE — G0423 INTENS CARDIAC REHAB NO EXER: HCPCS

## 2022-06-06 PROCEDURE — G0422 INTENS CARDIAC REHAB W/EXERC: HCPCS

## 2022-06-16 ENCOUNTER — HOSPITAL ENCOUNTER (OUTPATIENT)
Dept: CARDIAC REHAB | Age: 57
Setting detail: THERAPIES SERIES
Discharge: HOME | End: 2022-06-16
Payer: COMMERCIAL

## 2022-06-16 PROCEDURE — G0423 INTENS CARDIAC REHAB NO EXER: HCPCS

## 2022-06-16 PROCEDURE — G0422 INTENS CARDIAC REHAB W/EXERC: HCPCS

## 2022-06-17 ENCOUNTER — OFFICE VISIT (OUTPATIENT)
Dept: CARDIOLOGY CLINIC | Age: 57
End: 2022-06-17
Payer: COMMERCIAL

## 2022-06-17 VITALS
DIASTOLIC BLOOD PRESSURE: 70 MMHG | OXYGEN SATURATION: 96 % | BODY MASS INDEX: 32.72 KG/M2 | HEART RATE: 94 BPM | SYSTOLIC BLOOD PRESSURE: 116 MMHG | WEIGHT: 177.8 LBS | HEIGHT: 62 IN

## 2022-06-17 DIAGNOSIS — I73.9 INTERMITTENT CLAUDICATION (HCC): Primary | ICD-10-CM

## 2022-06-17 PROCEDURE — 1036F TOBACCO NON-USER: CPT | Performed by: INTERNAL MEDICINE

## 2022-06-17 PROCEDURE — 99214 OFFICE O/P EST MOD 30 MIN: CPT | Performed by: INTERNAL MEDICINE

## 2022-06-17 PROCEDURE — G8427 DOCREV CUR MEDS BY ELIG CLIN: HCPCS | Performed by: INTERNAL MEDICINE

## 2022-06-17 PROCEDURE — G8417 CALC BMI ABV UP PARAM F/U: HCPCS | Performed by: INTERNAL MEDICINE

## 2022-06-17 PROCEDURE — 3017F COLORECTAL CA SCREEN DOC REV: CPT | Performed by: INTERNAL MEDICINE

## 2022-06-17 RX ORDER — ROSUVASTATIN CALCIUM 40 MG/1
40 TABLET, COATED ORAL DAILY
COMMUNITY
Start: 2022-05-26

## 2022-06-17 RX ORDER — NITROGLYCERIN 0.4 MG/1
TABLET SUBLINGUAL
Qty: 25 TABLET | Refills: 3 | Status: SHIPPED | OUTPATIENT
Start: 2022-06-17

## 2022-06-17 NOTE — PATIENT INSTRUCTIONS
Please be informed that if you contact our office outside of normal business hours the physician on call cannot help with any scheduling or rescheduling issues, procedure instruction questions or any type of medication issue. We advise you for any urgent/emergency that you go to the nearest emergency room! PLEASE CALL OUR OFFICE DURING NORMAL BUSINESS HOURS    Monday - Friday   8 am to 5 pm    [place]: [address] [address]: [phone]    [place]:  [phone]    **It is YOUR responsibilty to bring medication bottles and/or updated medication list to [address] [place] [address].  This will allow us to better serve you and all your healthcare needs**

## 2022-06-17 NOTE — PROGRESS NOTES
Marylen Feast, MD        OFFICE  FOLLOWUP NOTE    Chief complaints: patient is here for management of CAD,STEMI of RCA, GI bleeding,anal cancer DYSLPIDEMIA    Subjective: some fluttering, possible claudication    HPI Amber Devine is a 65 y. o.year old who  has a past medical history of Anal cancer ([facility] [address] Asthma, Broken ankle, CAD (coronary artery disease), H/O cardiovascular stress test, NSTEMI (non-ST elevated myocardial infarction) ([facility] [address] PONV (postoperative nausea and vomiting), Post PTCA, and Skin cancer. and presents for management of CAD,STEMI of RCA, GI bleeding,anal cancer DYSLPIDEMIA which are well controlled      Current Outpatient Medications   Medication Sig Dispense Refill    rosuvastatin (CRESTOR) 40 MG tablet Take 40 mg by mouth daily      clopidogrel (PLAVIX) 75 MG tablet Take 1 tablet by mouth daily 30 tablet 5    ferrous sulfate (FE TABS 325) 325 (65 Fe) MG EC tablet Take 325 mg by mouth daily      furosemide (LASIX) 20 MG tablet Take 1 tablet by mouth daily (Patient taking differently: Take 40 mg by mouth daily ) 60 tablet 3    olopatadine (PATANOL) 0.1 % ophthalmic solution Place 1 drop into both eyes 2 times daily      potassium chloride (KLOR-CON) 20 MEQ packet Take 20 mEq by mouth 2 times daily      tiZANidine (ZANAFLEX) 4 MG tablet Take 4 mg by mouth every 6 hours as needed       nitroGLYCERIN (NITROSTAT) 0.4 MG SL tablet up to max of 3 total doses. If no relief after 1 dose, call 911. 25 tablet 3    loperamide (IMODIUM) 2 MG capsule Take 2 mg by mouth as needed for Diarrhea       albuterol-ipratropium (COMBIVENT RESPIMAT)  MCG/ACT AERS inhaler Inhale into the lungs       aspirin 81 MG chewable tablet Take 1 tablet by mouth daily 30 tablet 6     No current facility-administered medications for this visit.      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  Skin cancer      Past Surgical History:   Procedure Laterality Date    ANKLE SURGERY Left 10+ yrs ago    with hardware    COLON SURGERY  2009    \"after had chemo removed some part of the cancer from the rectal area, then 7 months later had bowel blockage had to do surgery  to remove part of the bowel\"    COLONOSCOPY  03/04/2015    mild proctitis    COLONOSCOPY  03/15/2017    mild radia proc, hypertroph pailla    COLONOSCOPY N/A 03/03/2022    COLONOSCOPY CONTROL HEMORRHAGE WITH STRAIGHT FIRE APC WITH CLIP PLACEMENT X 2 performed by Willy Duque MD at [address]      left ankle    OTHER SURGICAL HISTORY  04/23/2015    mediport removal    PTCA  2009 4/22/2009 had angioplasty with stent to LAD & another day in 2009 stent x 2 to RCA done    PTCA  02/22/2022    RCA stented.  TONSILLECTOMY      as a kid age 7    TUBAL LIGATION  25 yrs ago    TUNNELED VENOUS PORT PLACEMENT  2009    port inserted     UPPER GASTROINTESTINAL ENDOSCOPY N/A 02/26/2022    EGD DIAGNOSTIC ONLY performed by Willy Duque MD at [address] N/A 03/03/2022    ENTEROSCOPY PUSH DIAGNOSTIC performed by Willy Duque MD at [facility] ENDOSCOPY     Family History   Problem Relation Age of Onset    Cancer Mother         breast ca? ?    Cancer Father         prostate cancer- lung mets- bone mets    Stroke Father     High Cholesterol Father     Stroke Sister  Cancer Brother         neck cancer    No Known Problems Daughter      Social History     Tobacco Use    Smoking status: Former Smoker     Packs/day: 0.25     Years: 35.00     Pack years: 8.75     Types: Cigarettes     Start date:      Quit date: 10/2021     Years since quittin.7    Smokeless tobacco: Never Used    Tobacco comment: 6-7 cigarettes    Substance Use Topics    Alcohol use: No     Comment: \"quit 3/2015\"use to drink a couple of times per week before\"      [unfilled]  Review of Systems:   · Constitutional: No Fever or Weight Loss   · Eyes: No Decreased Vision  · ENT: No Headaches, Hearing Loss or Vertigo  · Cardiovascular: No chest pain, dyspnea on exertion, palpitations or loss of consciousness  · Respiratory: No cough or wheezing    · Gastrointestinal: No abdominal pain, appetite loss, blood in stools, constipation, diarrhea or heartburn  · Genitourinary: No dysuria, trouble voiding, or hematuria  · Musculoskeletal:  No gait disturbance, weakness or joint complaints  · Integumentary: No rash or pruritis  · Neurological: No TIA or stroke symptoms  · Psychiatric: No anxiety or depression  · Endocrine: No malaise, fatigue or temperature intolerance  · Hematologic/Lymphatic: No bleeding problems, blood clots or swollen lymph nodes  · Allergic/Immunologic: No nasal congestion or hives  All systems negative except as marked. Objective:  /70 (Site: Left Upper Arm, Position: Sitting, Cuff Size: Medium Adult)   Pulse 94   Ht 5' 2\" (1.575 m)   Wt 177 lb 12.8 oz (80.6 kg)   SpO2 96%   BMI 32.52 kg/m²   Wt Readings from Last 3 Encounters:   22 177 lb 12.8 oz (80.6 kg)   22 181 lb (82.1 kg)   22 184 lb 6.4 oz (83.6 kg)     Body mass index is 32.52 kg/m². GENERAL - Alert, oriented, pleasant, in no apparent distress,normal grooming  HEENT - pupils are intact, cornea intact, conjunctive normal color, ears are normal in exam,  Neck - Supple.   No jugular venous distention noted. No carotid bruits, no apical -carotid delay  Respiratory:  Normal breath sounds, No respiratory distress, No wheezing, No chest tenderness. ,no use of accessory muscles, diaphragm movement is normal  Cardiovascular: (PMI) apex non displaced,no lifts no thrills, no s3,no s4, Normal heart rate, Normal rhythm, No murmurs, No rubs, No gallops. Carotid arteries pulse and amplitude are normal no bruit, no abdominal bruit noted ( normal abdominal aorta ausculation),   Extremities - No cyanosis, clubbing, or significant edema, no varicose veins    Abdomen - No masses, tenderness, or organomegaly, no hepato-splenomegally, no bruits  Musculoskeletal - No significant edema, no kyphosis or scoliosis, no deformity in any extremity noted, muscle strength and tone are normal  Skin: no ulcer,no scar,no stasis dermatitis   Neurologic - alert oriented times 3,Cranial nerves II through XII are grossly intact. There were no gross focal neurologic abnormalities. Psychiatric: normal mood and affect    Lab Results   Component Value Date    TROPONINI 0.008 04/10/2012     BNP:  No results found for: BNP  PT/INR:  No results found for: PTINR  No results found for: LABA1C  Lab Results   Component Value Date    CHOL 152 02/23/2022    TRIG 115 02/23/2022    HDL 68 02/23/2022    LDLCALC 61 02/23/2022    LDLDIRECT 105 (H) 03/09/2021     Lab Results   Component Value Date    ALT 19 03/02/2022    AST 32 03/02/2022     TSH:    Lab Results   Component Value Date    TSH 2.420 02/11/2022    TSH 2.420 02/11/2022       Impression:  Sue Trejo is a 65 y. o.year old who  has a past medical history of Anal cancer ([facility] [address] Asthma, Broken ankle, CAD (coronary artery disease), H/O cardiovascular stress test, NSTEMI (non-ST elevated myocardial infarction) ([facility] [address] PONV (postoperative nausea and vomiting), Post PTCA, and Skin cancer.  and presents with     Plan:  1. CAD: recent PCI of RCA with SOUMYA, Continue aspirin and plavix  continue statins, ok stay off coregg as her blood pressure is low in 80s and 90s at rehab  2. PAD and leg pain with claudications: arterial doppler  3. Fluttering of chest: will get holter monitor if it gets worse  4. preop for hip sx recommend to hold off sx due to recent MI in februruary  5. Gi bleeding resolved, f/u with Dr. Saadia Fernandez, had colon AVM, coagulated with APC and 2 clips  6. Dyslipidemia: continue statins  7. H/o anal cancer  All labs, medications and tests reviewed, continue all other medications of all above medical condition listed as is.     @[facility]@

## 2022-06-20 ENCOUNTER — HOSPITAL ENCOUNTER (OUTPATIENT)
Dept: CARDIAC REHAB | Age: 57
Setting detail: THERAPIES SERIES
Discharge: HOME | End: 2022-06-20
Payer: COMMERCIAL

## 2022-06-20 PROCEDURE — G0423 INTENS CARDIAC REHAB NO EXER: HCPCS

## 2022-06-20 PROCEDURE — G0422 INTENS CARDIAC REHAB W/EXERC: HCPCS

## 2022-06-23 ENCOUNTER — OFFICE VISIT (OUTPATIENT)
Dept: ORTHOPEDIC SURGERY | Age: 57
End: 2022-06-23
Payer: COMMERCIAL

## 2022-06-23 VITALS
WEIGHT: 177 LBS | RESPIRATION RATE: 16 BRPM | SYSTOLIC BLOOD PRESSURE: 110 MMHG | HEART RATE: 97 BPM | HEIGHT: 62 IN | OXYGEN SATURATION: 97 % | BODY MASS INDEX: 32.57 KG/M2 | DIASTOLIC BLOOD PRESSURE: 77 MMHG

## 2022-06-23 DIAGNOSIS — M87.052 AVASCULAR NECROSIS OF LEFT FEMUR (HCC): Primary | ICD-10-CM

## 2022-06-23 DIAGNOSIS — M17.11 PRIMARY OSTEOARTHRITIS OF RIGHT KNEE: ICD-10-CM

## 2022-06-23 DIAGNOSIS — M16.12 PRIMARY OSTEOARTHRITIS OF LEFT HIP: ICD-10-CM

## 2022-06-23 DIAGNOSIS — M87.051: ICD-10-CM

## 2022-06-23 PROCEDURE — 3017F COLORECTAL CA SCREEN DOC REV: CPT | Performed by: ORTHOPAEDIC SURGERY

## 2022-06-23 PROCEDURE — 99213 OFFICE O/P EST LOW 20 MIN: CPT | Performed by: ORTHOPAEDIC SURGERY

## 2022-06-23 PROCEDURE — G8427 DOCREV CUR MEDS BY ELIG CLIN: HCPCS | Performed by: ORTHOPAEDIC SURGERY

## 2022-06-23 PROCEDURE — G8417 CALC BMI ABV UP PARAM F/U: HCPCS | Performed by: ORTHOPAEDIC SURGERY

## 2022-06-23 PROCEDURE — 1036F TOBACCO NON-USER: CPT | Performed by: ORTHOPAEDIC SURGERY

## 2022-06-23 PROCEDURE — 20610 DRAIN/INJ JOINT/BURSA W/O US: CPT | Performed by: ORTHOPAEDIC SURGERY

## 2022-06-23 RX ORDER — DIAPER,BRIEF,INFANT-TODD,DISP
EACH MISCELLANEOUS 2 TIMES DAILY
COMMUNITY
Start: 2022-02-08

## 2022-06-23 RX ORDER — TICAGRELOR 90 MG/1
TABLET ORAL
COMMUNITY
Start: 2022-05-19 | End: 2022-06-29

## 2022-06-23 RX ORDER — POTASSIUM CHLORIDE 1500 MG/1
TABLET, EXTENDED RELEASE ORAL
COMMUNITY
Start: 2022-06-16

## 2022-06-23 RX ORDER — NYSTATIN 100000 U/G
OINTMENT TOPICAL
COMMUNITY
Start: 2022-02-08 | End: 2023-02-03

## 2022-06-23 ASSESSMENT — ENCOUNTER SYMPTOMS
SHORTNESS OF BREATH: 0
WHEEZING: 0
EYE REDNESS: 0
VOMITING: 0
CHEST TIGHTNESS: 0
COLOR CHANGE: 0
EYE PAIN: 0
BACK PAIN: 1

## 2022-06-23 NOTE — PROGRESS NOTES
6/23/2022   Chief Complaint   Patient presents with    Hip Pain     Bilateral hips        History of Present Illness:                             Shola Sidhu is a 65 y.o. female returns today for follow-up of her bilateral left worse than right hip pain. She has been noticing increased soreness in her hip joints with increased activity at cardiac rehab. She recently had a cardiac event and has been started on anticoagulation following her stent placement. She complains of deep aching pain across both hips including radiation into the groin area involving the left hip primarily but also the right hip. Pain is worse with prolonged standing and walking. She has stiffness in both hips but more severe in the left especially with rotational movements. She has responded well in the past to steroid injection on the left. Patient returns to the office with bilateral hip pain. Pt stated that she recently had some health issues that require who to do rehab which she realized was intensifying the hip pain. Pt stated that she has felt the pain in both her groin and the posterior aspect of her hip. Pt stated often it will radiate down her leg and make walking very painful. Pt stated that she has tried taking tylenol and stretching for the pain but has not had any relief. Pt stated her pain today is about a 7/10. Medical History  Patient's medications, allergies, past medical, surgical, social and family histories were reviewed and updated as appropriate. Review of Systems   Constitutional: Negative for activity change, chills and fever. HENT: Negative for congestion and sneezing. Eyes: Negative for pain and redness. Respiratory: Negative for chest tightness, shortness of breath and wheezing. Cardiovascular: Negative for chest pain and palpitations. Gastrointestinal: Negative for vomiting. Musculoskeletal: Positive for arthralgias, back pain and gait problem.    Skin: Negative for color change and rash. Neurological: Negative for dizziness. Psychiatric/Behavioral: Negative for agitation. The patient is not nervous/anxious. Examination:  General Exam:  Vitals: /77   Pulse 97   Resp 16   Ht 5' 2\" (1.575 m)   Wt 177 lb (80.3 kg)   SpO2 97%   BMI 32.37 kg/m²    Physical Exam     Left Lower Extremity:  There is moderate to severe tenderness to palpation diffusely throughout the hip both anteriorly and posteriorly. Range of motion is significantly limited and painful at the extremes of motion. Hip flexion present to 90 degrees, abduction 20 degrees, extension 5 degrees, internal rotation 10 degrees, external rotation 10 degrees. Strength is 5 out of 5 with hip flexion, extension, and abduction however this is somewhat limited due to pain with resistance testing. BRITNEY and FADIR test reproduces pain to the groin and hip joint. Sensation is intact to light touch in the left lower extremity. Pulses are intact. Skin is intact without erythema. No lower extremity edema. Right Lower Extremity:  There is mild tenderness to palpation diffusely throughout the hip both anteriorly and posteriorly. Range of motion is mildly limited and painful at the extremes of motion. Hip flexion present to 120 degrees, abduction 40 degrees, extension 15 degrees, internal rotation 20 degrees, external rotation 30 degrees. Strength is 5 out of 5 with hip flexion, extension, and abduction. BRITNEY and FADIR test reproduces pain to the groin and hip joint. Sensation is intact to light touch in the left lower extremity. Pulses are intact. Skin is intact without erythema. No lower extremity edema.         Diagnostic testing:  X-rays reviewed in office, I independently reviewed the films in the office today:     X-ray report of the pelvis and left hip from 4/12/2022 were reviewed by myself and discussed the patient:     Findings:   AP pelvis and 2 views left hip show no displaced pelvic fracture. Sacroiliac joints symmetric. Superior joint space both hips mildly    narrowed. Osteophyte formation circumferentially at the head/neck    junctions bilaterally left greater than right suggesting osteoarthritis. No definitive evidence of fracture. Assessment for underlying AVM limited    due to the exuberant hypertrophic change       [IMPRESSION]   1. Osteoarthritis of left greater than right hips as above         Office Procedures:  No orders of the defined types were placed in this encounter. Assessment and Plan  1. Left hip primary osteoarthritis     2.  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 She is also having symptoms in the right hip sooner if she has experienced in the past on the left. They are less severe in the right hip and would like to have an injection today.     Procedure Note, Left Hip Injection:     The left lateral hip was prepped with alcohol and the posterior hip joint capsule was injected with 40 mg of Kenalog and 3 mL of 1% lidocaine through a 22-gauge needle. Sterile Band-Aid was applied. The patient tolerated it well without complications. Procedure Note, right hip Injection:     The right lateral hip was prepped with alcohol and the posterior hip joint capsule was injected with 40 mg of Kenalog and 3 mL of 1% lidocaine through a 22-gauge needle. Sterile Band-Aid was applied. The patient tolerated it well without complications.        She will continue with her stretching exercises at home program.  Continue with medications from her primary care physician.     We discussed taking a week or 2 off from her cardiac rehab to allow her hips to respond to the injection and then she should resume her cardiac rehab.     Follow-up as needed for repeat injections or discussion of surgical intervention in the future if needed.         Electronically signed by Jie Simpson MD on 6/23/2022 at 1:22 PM

## 2022-06-23 NOTE — PATIENT INSTRUCTIONS
Continue weight-bearing as tolerated. Continue range of motion exercises as instructed. Ice and elevate as needed. Tylenol or Motrin for pain. Steroid injection given into the bilateral hips. Follow up as needed.

## 2022-06-29 ENCOUNTER — TELEPHONE (OUTPATIENT)
Dept: CARDIOLOGY CLINIC | Age: 57
End: 2022-06-29

## 2022-06-29 NOTE — TELEPHONE ENCOUNTER
Patient called she was just started on Plavix   She has had a few nose bleeds , she is leaving for vacation and is concerned

## 2022-07-11 ENCOUNTER — HOSPITAL ENCOUNTER (OUTPATIENT)
Dept: CARDIAC REHAB | Age: 57
Setting detail: THERAPIES SERIES
Discharge: HOME | End: 2022-07-11
Payer: COMMERCIAL

## 2022-07-11 PROCEDURE — G0423 INTENS CARDIAC REHAB NO EXER: HCPCS

## 2022-07-11 PROCEDURE — G0422 INTENS CARDIAC REHAB W/EXERC: HCPCS

## 2022-07-14 ENCOUNTER — HOSPITAL ENCOUNTER (OUTPATIENT)
Dept: CARDIAC REHAB | Age: 57
Setting detail: THERAPIES SERIES
Discharge: HOME | End: 2022-07-14
Payer: COMMERCIAL

## 2022-07-14 PROCEDURE — G0423 INTENS CARDIAC REHAB NO EXER: HCPCS

## 2022-07-14 PROCEDURE — G0422 INTENS CARDIAC REHAB W/EXERC: HCPCS

## 2022-07-18 ENCOUNTER — HOSPITAL ENCOUNTER (OUTPATIENT)
Dept: CARDIAC REHAB | Age: 57
Setting detail: THERAPIES SERIES
Discharge: HOME | End: 2022-07-18
Payer: COMMERCIAL

## 2022-07-18 PROCEDURE — G0422 INTENS CARDIAC REHAB W/EXERC: HCPCS

## 2022-07-18 PROCEDURE — G0423 INTENS CARDIAC REHAB NO EXER: HCPCS

## 2022-07-21 ENCOUNTER — HOSPITAL ENCOUNTER (OUTPATIENT)
Dept: CARDIAC REHAB | Age: 57
Setting detail: THERAPIES SERIES
Discharge: HOME | End: 2022-07-21
Payer: COMMERCIAL

## 2022-07-21 PROCEDURE — G0423 INTENS CARDIAC REHAB NO EXER: HCPCS

## 2022-07-21 PROCEDURE — G0422 INTENS CARDIAC REHAB W/EXERC: HCPCS

## 2022-07-22 ENCOUNTER — TELEPHONE (OUTPATIENT)
Dept: CARDIOLOGY CLINIC | Age: 57
End: 2022-07-22

## 2022-07-22 NOTE — TELEPHONE ENCOUNTER
Patient called she is having some issues with her cholesterol medication side effects  dizzy,She is asking if she can change to the injectable medication

## 2022-07-25 ENCOUNTER — HOSPITAL ENCOUNTER (OUTPATIENT)
Dept: CARDIAC REHAB | Age: 57
Setting detail: THERAPIES SERIES
Discharge: HOME | End: 2022-07-25
Payer: COMMERCIAL

## 2022-07-25 PROCEDURE — G0423 INTENS CARDIAC REHAB NO EXER: HCPCS

## 2022-07-25 PROCEDURE — G0422 INTENS CARDIAC REHAB W/EXERC: HCPCS

## 2022-07-27 RX ORDER — ALIROCUMAB 75 MG/ML
75 INJECTION, SOLUTION SUBCUTANEOUS
Qty: 2.24 ML | Refills: 5 | Status: SHIPPED | OUTPATIENT
Start: 2022-07-27

## 2022-07-28 ENCOUNTER — TELEPHONE (OUTPATIENT)
Dept: CARDIOLOGY CLINIC | Age: 57
End: 2022-07-28

## 2022-07-28 ENCOUNTER — HOSPITAL ENCOUNTER (OUTPATIENT)
Dept: CARDIAC REHAB | Age: 57
Setting detail: THERAPIES SERIES
Discharge: HOME | End: 2022-07-28
Payer: COMMERCIAL

## 2022-07-28 ENCOUNTER — OFFICE VISIT (OUTPATIENT)
Dept: ORTHOPEDIC SURGERY | Age: 57
End: 2022-07-28
Payer: COMMERCIAL

## 2022-07-28 VITALS
BODY MASS INDEX: 31.28 KG/M2 | DIASTOLIC BLOOD PRESSURE: 88 MMHG | HEIGHT: 62 IN | RESPIRATION RATE: 18 BRPM | WEIGHT: 170 LBS | HEART RATE: 87 BPM | SYSTOLIC BLOOD PRESSURE: 124 MMHG | OXYGEN SATURATION: 96 %

## 2022-07-28 DIAGNOSIS — M17.11 PRIMARY OSTEOARTHRITIS OF RIGHT KNEE: Primary | ICD-10-CM

## 2022-07-28 PROCEDURE — G0423 INTENS CARDIAC REHAB NO EXER: HCPCS

## 2022-07-28 PROCEDURE — G8417 CALC BMI ABV UP PARAM F/U: HCPCS | Performed by: ORTHOPAEDIC SURGERY

## 2022-07-28 PROCEDURE — G8427 DOCREV CUR MEDS BY ELIG CLIN: HCPCS | Performed by: ORTHOPAEDIC SURGERY

## 2022-07-28 PROCEDURE — 3017F COLORECTAL CA SCREEN DOC REV: CPT | Performed by: ORTHOPAEDIC SURGERY

## 2022-07-28 PROCEDURE — G0422 INTENS CARDIAC REHAB W/EXERC: HCPCS

## 2022-07-28 PROCEDURE — 20610 DRAIN/INJ JOINT/BURSA W/O US: CPT | Performed by: ORTHOPAEDIC SURGERY

## 2022-07-28 PROCEDURE — 1036F TOBACCO NON-USER: CPT | Performed by: ORTHOPAEDIC SURGERY

## 2022-07-28 PROCEDURE — 99214 OFFICE O/P EST MOD 30 MIN: CPT | Performed by: ORTHOPAEDIC SURGERY

## 2022-07-28 RX ORDER — PRAVASTATIN SODIUM 40 MG
40 TABLET ORAL DAILY
COMMUNITY
Start: 2022-07-12 | End: 2023-07-12

## 2022-07-28 ASSESSMENT — ENCOUNTER SYMPTOMS
EYE REDNESS: 0
WHEEZING: 0
EYE PAIN: 0
CHEST TIGHTNESS: 0
COLOR CHANGE: 0
VOMITING: 0
SHORTNESS OF BREATH: 0

## 2022-07-28 NOTE — PATIENT INSTRUCTIONS
Continue weight-bearing as tolerated. Continue range of motion exercises as instructed. Ice and elevate as needed. Tylenol or Motrin for pain. Steroid injection given into the right knee. Follow up for hip injections.

## 2022-07-28 NOTE — TELEPHONE ENCOUNTER
Pt called and states Dr Kandi Min put pt on calcium pills 600 mg 2 a day. Wants to make sure this is ok with Nica. Ok to leave message per pt.

## 2022-07-28 NOTE — PROGRESS NOTES
7/28/2022   Chief Complaint   Patient presents with    Knee Pain     Right         History of Present Illness:                             Raphael Telles is a 65 y.o. female who presents today for evaluation of a new problem of left knee pain. Pain is worse with going downstairs. She complains of deep aching soreness along the anterior aspect of her knee along the patella. She denies any falls or injuries or instability events. She has had no swelling or effusion present. She has good range of motion at the knee but pain worse with repetitive weightbearing activities. She has been doing her cardiac rehab. Patient returns to the office with right knee pain. Pt stated the pain has increased following the issues with the left hip and favoring the right side because of it. Pt sated her pain is increased with walking up the stairs and bearing the weight. Pt stated the pain is more localized around the patella and she denies any injuries. Medical History  Patient's medications, allergies, past medical, surgical, social and family histories were reviewed and updated as appropriate. 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     Skin cancer      Past Surgical History:   Procedure Laterality Date    ANKLE SURGERY Left 10+ yrs ago    with hardware    COLON SURGERY  2009    \"after had chemo removed some part of the cancer from the rectal area, then 7 months later had bowel blockage had to do surgery  to remove part of the bowel\" COLONOSCOPY  2015    mild proctitis    COLONOSCOPY  03/15/2017    mild radia proc, hypertroph pailla    COLONOSCOPY N/A 2022    COLONOSCOPY CONTROL HEMORRHAGE WITH STRAIGHT FIRE APC WITH CLIP PLACEMENT X 2 performed by Efren Goddard MD at [address] [place] [address]      left ankle    OTHER SURGICAL HISTORY  2015    mediport removal    PTCA  2009 had angioplasty with stent to LAD & another day in  stent x 2 to RCA done    PTCA  2022    RCA stented. TONSILLECTOMY      as a kid age 7    TUBAL LIGATION  25 yrs ago    TUNNELED VENOUS PORT PLACEMENT      port inserted     UPPER GASTROINTESTINAL ENDOSCOPY N/A 2022    EGD DIAGNOSTIC ONLY performed by Efren Goddard MD at [place] [address] N/A 2022    ENTEROSCOPY PUSH DIAGNOSTIC performed by Efren Goddard MD at [facility] ENDOSCOPY     Family History   Problem Relation Age of Onset    Cancer Mother         breast ca? ?    Cancer Father         prostate cancer- lung mets- bone mets    Stroke Father     High Cholesterol Father     Stroke Sister     Cancer Brother         neck cancer    No Known Problems Daughter      Social History     Socioeconomic History    Marital status:     Years of education: 6   Occupational History    Occupation: unemployed   Tobacco Use    Smoking status: Former     Packs/day: 0.25     Years: 35.00     Pack years: 8.75     Types: Cigarettes     Start date:      Quit date: 10/2021     Years since quittin.8    Smokeless tobacco: Never    Tobacco comments:     6-7 cigarettes    Substance and Sexual Activity    Alcohol use: No     Comment: \"quit 3/2015\"use to drink a couple of times per week before\"    Drug use: No     Comment: 1-2 cups of coffee daily     Sexual activity: Yes     Current Outpatient Medications   Medication Sig Dispense Refill    pravastatin (PRAVACHOL) 40 MG tablet Take 40 mg by mouth in the morning.       alirocumab (PRALUENT) 75 MG/ML SOAJ injection pen Inject 1 mL into the skin every 14 days 2.24 mL 5    KLOR-CON M20 20 MEQ extended release tablet       nystatin (MYCOSTATIN) 536684 UNIT/GM ointment Apply to affected area twice daily. hydrocortisone 0.5 % ointment Apply topically 2 times daily      nitroGLYCERIN (NITROSTAT) 0.4 MG SL tablet up to max of 3 total doses. If no relief after 1 dose, call 911. 25 tablet 3    clopidogrel (PLAVIX) 75 MG tablet Take 1 tablet by mouth daily 30 tablet 5    ferrous sulfate (FE TABS 325) 325 (65 Fe) MG EC tablet Take 325 mg by mouth daily      furosemide (LASIX) 20 MG tablet Take 1 tablet by mouth daily (Patient taking differently: Take 40 mg by mouth in the morning.) 60 tablet 3    olopatadine (PATANOL) 0.1 % ophthalmic solution Place 1 drop into both eyes 2 times daily      potassium chloride (KLOR-CON) 20 MEQ packet Take 20 mEq by mouth 2 times daily      tiZANidine (ZANAFLEX) 4 MG tablet Take 4 mg by mouth every 6 hours as needed       loperamide (IMODIUM) 2 MG capsule Take 2 mg by mouth as needed for Diarrhea       albuterol-ipratropium (COMBIVENT RESPIMAT)  MCG/ACT AERS inhaler Inhale into the lungs       aspirin 81 MG chewable tablet Take 1 tablet by mouth daily 30 tablet 6    rosuvastatin (CRESTOR) 40 MG tablet Take 40 mg by mouth daily (Patient not taking: No sig reported)       No current facility-administered medications for this visit. Allergies   Allergen Reactions    Brilinta [Ticagrelor] Shortness Of Breath    Adhesive Tape Other (See Comments)    Crestor [Rosuvastatin Calcium]     Vicodin Hp [Hydrocodone-Acetaminophen] Other (See Comments)     Keep me awake    Tylenol With Codeine #3 [Acetaminophen-Codeine] Nausea And Vomiting         Review of Systems   Constitutional:  Negative for chills and fever. HENT:  Negative for congestion and sneezing. Eyes:  Negative for pain and redness.    Respiratory:  Negative for chest tightness, shortness of breath and wheezing. Cardiovascular:  Negative for chest pain and palpitations. Gastrointestinal:  Negative for vomiting. Musculoskeletal:  Positive for arthralgias. Skin:  Negative for color change and rash. Neurological:  Negative for weakness and numbness. Psychiatric/Behavioral:  Negative for agitation. The patient is not nervous/anxious. Examination:  General Exam:  Vitals: /88   Pulse 87   Resp 18   Ht 5' 2\" (1.575 m)   Wt 170 lb (77.1 kg)   SpO2 96%   BMI 31.09 kg/m²    Physical Exam  Vitals and nursing note reviewed. Constitutional:       Appearance: Normal appearance. HENT:      Head: Normocephalic and atraumatic. Eyes:      Conjunctiva/sclera: Conjunctivae normal.      Pupils: Pupils are equal, round, and reactive to light. Pulmonary:      Effort: Pulmonary effort is normal.   Musculoskeletal:      Cervical back: Normal range of motion. Right hip: Tenderness present. No deformity, bony tenderness or crepitus. Decreased range of motion. Left hip: Tenderness, bony tenderness and crepitus present. Decreased range of motion. Decreased strength. Left knee: No swelling, deformity, effusion, ecchymosis or lacerations. Normal range of motion. No tenderness. No medial joint line or lateral joint line tenderness. No LCL laxity or MCL laxity. Normal alignment and normal meniscus. Comments: Right Lower Extremity:    There is mild diffuse tenderness to palpation throughout the knee maximally along the medial joint line. There is no global swelling anteriorly at the knee with no obvious effusion. There is 5 out of 5 strength with knee flexion and extension. Sensation is intact throughout the lower extremity. There is full range of motion at the knee with discomfort at the extremes of motion, especially terminal flexion. No instability with varus or valgus stress testing or anterior/posterior drawer testing.   Negative medial to have a home exercise program that includes stretching and low impact activities such as walking, biking, or elliptical machines. We discussed the possibility of physical therapy. The patient would like to defer formal physical therapy at this time. They will call if they would like to have a referral sent. I recommend that she take calcium and vitamin D for bone density. She will check with her cardiologist regarding her ability to take calcium supplements. She will follow-up 3 months from her last hip injection for repeat evaluation of the hip and likely repeat injection. She would like to proceed with hip replacement surgery sometime in the future but needs to be allowed to come off of her blood thinners prior to this.       Electronically signed by Jose Hilario MD on 7/28/2022 at 8:35 AM

## 2022-07-28 NOTE — PROGRESS NOTES
Patient returns to the office with right knee pain. Pt stated the pain has increased following the issues with the left hip and favoring the right side because of it. Pt sated her pain is increased with walking up the stairs and bearing the weight. Pt stated the pain is more localized around the patella and she denies any injuries.

## 2022-07-29 ENCOUNTER — PROCEDURE VISIT (OUTPATIENT)
Dept: CARDIOLOGY CLINIC | Age: 57
End: 2022-07-29
Payer: COMMERCIAL

## 2022-07-29 DIAGNOSIS — I73.9 INTERMITTENT CLAUDICATION (HCC): ICD-10-CM

## 2022-07-29 PROCEDURE — 93922 UPR/L XTREMITY ART 2 LEVELS: CPT | Performed by: INTERNAL MEDICINE

## 2022-07-29 PROCEDURE — 93925 LOWER EXTREMITY STUDY: CPT | Performed by: INTERNAL MEDICINE

## 2022-08-01 ENCOUNTER — HOSPITAL ENCOUNTER (OUTPATIENT)
Dept: CARDIAC REHAB | Age: 57
Setting detail: THERAPIES SERIES
Discharge: HOME | End: 2022-08-01
Payer: COMMERCIAL

## 2022-08-01 PROCEDURE — G0423 INTENS CARDIAC REHAB NO EXER: HCPCS

## 2022-08-01 PROCEDURE — G0422 INTENS CARDIAC REHAB W/EXERC: HCPCS

## 2022-08-04 ENCOUNTER — HOSPITAL ENCOUNTER (OUTPATIENT)
Dept: CARDIAC REHAB | Age: 57
Setting detail: THERAPIES SERIES
Discharge: HOME | End: 2022-08-04
Payer: COMMERCIAL

## 2022-08-04 PROCEDURE — G0422 INTENS CARDIAC REHAB W/EXERC: HCPCS

## 2022-08-04 PROCEDURE — G0423 INTENS CARDIAC REHAB NO EXER: HCPCS

## 2022-08-11 ENCOUNTER — HOSPITAL ENCOUNTER (OUTPATIENT)
Dept: CARDIAC REHAB | Age: 57
Setting detail: THERAPIES SERIES
Discharge: HOME | End: 2022-08-11
Payer: COMMERCIAL

## 2022-08-11 PROCEDURE — G0422 INTENS CARDIAC REHAB W/EXERC: HCPCS

## 2022-08-11 PROCEDURE — G0423 INTENS CARDIAC REHAB NO EXER: HCPCS

## 2022-08-11 NOTE — PROGRESS NOTES
Cardiac Rehab Outpatient Medical Nutrition Therapy   08/11/2022  10:03-10:54     Client History:    Pertinent medications:   Current Outpatient Medications   Medication Instructions    albuterol-ipratropium (COMBIVENT RESPIMAT)  MCG/ACT AERS inhaler Inhalation    aspirin 81 mg, Oral, DAILY    clopidogrel (PLAVIX) 75 mg, Oral, DAILY    ferrous sulfate (FE TABS 325) 325 mg, Oral, DAILY    furosemide (LASIX) 20 mg, Oral, DAILY    hydrocortisone 0.5 % ointment Topical, 2 TIMES DAILY    KLOR-CON M20 20 MEQ extended release tablet No dose, route, or frequency recorded. loperamide (IMODIUM) 2 mg, Oral, PRN    nitroGLYCERIN (NITROSTAT) 0.4 MG SL tablet up to max of 3 total doses. If no relief after 1 dose, call 911.    nystatin (MYCOSTATIN) 990962 UNIT/GM ointment Apply to affected area twice daily. olopatadine (PATANOL) 0.1 % ophthalmic solution 1 drop, Both Eyes, 2 TIMES DAILY    potassium chloride (KLOR-CON) 20 MEQ packet 20 mEq, Oral, 2 TIMES DAILY    Praluent 75 mg, SubCUTAneous, EVERY 14 DAYS    pravastatin (PRAVACHOL) 40 mg, Oral, DAILY    rosuvastatin (CRESTOR) 40 mg, DAILY    tiZANidine (ZANAFLEX) 4 mg, Oral, EVERY 6 HOURS PRN       Social hx: Lives with spouse but also daughter-in-law and grandchildren living with them at this time, total 7 people in home. Does not use tobacco or alcohol. Does not sleep well at this time due to leg pain. Grocery shopping with spouse. Does some cooking at home but less than usual.      Pertinent Medical hx: MI with stent, hx skin and anal cancer s/p resection. Needs hip replacement but must wait for cardiac clearance. Activity habits: Less active in past 2 years with cardiac intervention and need for hip surgery. Food/nutrition habits: Eats 2 meals per day, may miss lunch. Eating breakfast more often now due to medications. Recently eating oatmeal, bananas. Drinks less milk since bowel surgery. Eating less beef, more chicken. Does not like fish.  Trying to eat more fruits and vegetables. Drinks mainly water, random zoltan aid and soda. Does not add salt to meals, using less in cooking. Dines out on Sundays and Wednesdays, usually not fast food. Some difficulty with chewing, no upper teeth and poor fit dentures. Biochemical data: lipids 2/2022  HDL 68, LDL 61, Trig 115     Anthropometrics:    Ht:62\"   Wt: 165#    IBW: 121#    % of IBW: 136          BMI: 30.2, obese    Weight hx: Loss during cardiac rehab program 183# down to 165#, usual weight range 155-160#    Diet hx: Recently making changes to lower salt  Estimated needs: ~1500 calories/day (based on current weight with mifflin st jeor and low active)     Nutrition dx: nutrition related knowledge deficit related to limited exposure to cardiac meal plan as evidenced by patient report, recent cardiac intervention    Nutrition Prescription: Low sodium meal plan 1500 mg/day, at least 5 servings/day fruits and vegetables, meal pattern with 3 meals each day      Nutrition Interventions: Discussion regarding meal pattern to include consistent meals, 3 per day. Concern with adequate nutrients if missing meals. Currently working on increasing fruit and vegetable intake and using less sodium. Spouse has cardiac hx and DM, tries to consider hip health when preparing supper.      Nutrition related goals: eat 3 meals each day, increase fruit and vegetable intake to consume at least 5 servings/day     Adherence/barriers to goals: motivated to continue making changes, some limitations with activity/desire for food prep due to leg pain     Primary learner: attended alone   Education materials provided: [employer] shopping list   Method of education:   Explanation       Handouts   Teach back     Response to education:    Verbalized understanding           Rec/plan:    Patient to continue cardiac rehab program

## 2022-08-15 ENCOUNTER — HOSPITAL ENCOUNTER (OUTPATIENT)
Dept: CARDIAC REHAB | Age: 57
Setting detail: THERAPIES SERIES
Discharge: HOME | End: 2022-08-15
Payer: COMMERCIAL

## 2022-08-15 ENCOUNTER — TELEPHONE (OUTPATIENT)
Dept: CARDIOLOGY CLINIC | Age: 57
End: 2022-08-15

## 2022-08-15 PROCEDURE — G0423 INTENS CARDIAC REHAB NO EXER: HCPCS

## 2022-08-15 PROCEDURE — G0422 INTENS CARDIAC REHAB W/EXERC: HCPCS

## 2022-08-15 NOTE — TELEPHONE ENCOUNTER
PA submitted for Praluent via [email or web address]. Per covermymeds, Vara Farshad is preferred. PA resubmitted for Repatha. Awaiting response.

## 2022-08-16 ENCOUNTER — APPOINTMENT (OUTPATIENT)
Dept: CARDIAC REHAB | Age: 57
End: 2022-08-16
Payer: COMMERCIAL

## 2022-08-16 ENCOUNTER — TELEPHONE (OUTPATIENT)
Dept: CARDIOLOGY CLINIC | Age: 57
End: 2022-08-16

## 2022-08-16 NOTE — TELEPHONE ENCOUNTER
Pt called states out of the \"pen\" and hasn't had meds for @ 1 month. Informed her that there is a PA in the works .  Please advise

## 2022-08-16 NOTE — TELEPHONE ENCOUNTER
Additional questions request received. Complete, scanned to chart and faxed to [facility]. Awaiting response.

## 2022-08-18 ENCOUNTER — APPOINTMENT (OUTPATIENT)
Dept: CARDIAC REHAB | Age: 57
End: 2022-08-18
Payer: COMMERCIAL

## 2022-08-22 ENCOUNTER — APPOINTMENT (OUTPATIENT)
Dept: CARDIAC REHAB | Age: 57
End: 2022-08-22
Payer: COMMERCIAL

## 2022-08-23 ENCOUNTER — APPOINTMENT (OUTPATIENT)
Dept: CARDIAC REHAB | Age: 57
End: 2022-08-23
Payer: COMMERCIAL

## 2022-08-25 ENCOUNTER — APPOINTMENT (OUTPATIENT)
Dept: CARDIAC REHAB | Age: 57
End: 2022-08-25
Payer: COMMERCIAL

## 2022-08-31 LAB
BASOPHILS ABSOLUTE: 0 X10E3/UL (ref 0–0.2)
BASOPHILS RELATIVE PERCENT: 1 %
BUN / CREAT RATIO: 11 (ref 9–23)
BUN BLDV-MCNC: 9 MG/DL (ref 6–24)
CALCIUM SERPL-MCNC: 9.4 MG/DL (ref 8.7–10.2)
CHLORIDE BLD-SCNC: 105 MMOL/L (ref 96–106)
CO2: 21 MMOL/L (ref 20–29)
CREAT SERPL-MCNC: 0.83 MG/DL (ref 0.57–1)
EGFR (CKD-EPI): 83 ML/MIN/1.73
EOSINOPHILS ABSOLUTE: 0 X10E3/UL (ref 0–0.4)
EOSINOPHILS RELATIVE PERCENT: 1 %
GLUCOSE BLD-MCNC: 99 MG/DL (ref 65–99)
HCT VFR BLD CALC: 41.6 % (ref 34–46.6)
HEMOGLOBIN: 13.5 G/DL (ref 11.1–15.9)
IMMATURE GRANS (ABS): 0 X10E3/UL (ref 0–0.1)
IMMATURE GRANULOCYTES: 0 %
LYMPHOCYTES ABSOLUTE: 1.8 X10E3/UL (ref 0.7–3.1)
LYMPHOCYTES RELATIVE PERCENT: 27 %
MAGNESIUM: 2.1 MG/DL (ref 1.6–2.3)
MCH RBC QN AUTO: 29.6 PG (ref 26.6–33)
MCHC RBC AUTO-ENTMCNC: 32.5 G/DL (ref 31.5–35.7)
MCV RBC AUTO: 91 FL (ref 79–97)
MONOCYTES ABSOLUTE: 0.5 X10E3/UL (ref 0.1–0.9)
MONOCYTES RELATIVE PERCENT: 7 %
NEUTROPHILS ABSOLUTE: 4.3 X10E3/UL (ref 1.4–7)
PDW BLD-RTO: 15.6 % (ref 11.7–15.4)
PLATELET # BLD: 273 X10E3/UL (ref 150–450)
POTASSIUM SERPL-SCNC: 3.9 MMOL/L (ref 3.5–5.2)
RBC # BLD: 4.56 X10E6/UL (ref 3.77–5.28)
SEGMENTED NEUTROPHILS RELATIVE PERCENT: 64 %
SODIUM BLD-SCNC: 140 MMOL/L (ref 134–144)
WBC # BLD: 6.6 X10E3/UL (ref 3.4–10.8)

## 2022-09-12 ENCOUNTER — TELEPHONE (OUTPATIENT)
Dept: ORTHOPEDIC SURGERY | Age: 57
End: 2022-09-12

## 2022-09-12 NOTE — TELEPHONE ENCOUNTER
Pt called LVM requesting to be seen sooner due to increase pain in the hip. Pt can not have an injection until 9/23/22 I tried to call patient several times to let her know to keep her apt but pt's phone is not working.

## 2022-09-27 ENCOUNTER — OFFICE VISIT (OUTPATIENT)
Dept: ORTHOPEDIC SURGERY | Age: 57
End: 2022-09-27
Payer: COMMERCIAL

## 2022-09-27 VITALS
BODY MASS INDEX: 31.09 KG/M2 | HEIGHT: 62 IN | SYSTOLIC BLOOD PRESSURE: 115 MMHG | OXYGEN SATURATION: 97 % | HEART RATE: 99 BPM | DIASTOLIC BLOOD PRESSURE: 83 MMHG

## 2022-09-27 DIAGNOSIS — M16.12 PRIMARY OSTEOARTHRITIS OF LEFT HIP: Primary | ICD-10-CM

## 2022-09-27 PROCEDURE — 99214 OFFICE O/P EST MOD 30 MIN: CPT | Performed by: ORTHOPAEDIC SURGERY

## 2022-09-27 PROCEDURE — G8417 CALC BMI ABV UP PARAM F/U: HCPCS | Performed by: ORTHOPAEDIC SURGERY

## 2022-09-27 PROCEDURE — 3017F COLORECTAL CA SCREEN DOC REV: CPT | Performed by: ORTHOPAEDIC SURGERY

## 2022-09-27 PROCEDURE — 1036F TOBACCO NON-USER: CPT | Performed by: ORTHOPAEDIC SURGERY

## 2022-09-27 PROCEDURE — G8427 DOCREV CUR MEDS BY ELIG CLIN: HCPCS | Performed by: ORTHOPAEDIC SURGERY

## 2022-09-27 RX ORDER — TRAMADOL HYDROCHLORIDE 50 MG/1
50 TABLET ORAL EVERY 8 HOURS PRN
Qty: 21 TABLET | Refills: 0 | Status: SHIPPED | OUTPATIENT
Start: 2022-09-27 | End: 2022-10-04

## 2022-09-27 RX ORDER — LEVOFLOXACIN 5 MG/ML
500 INJECTION, SOLUTION INTRAVENOUS
COMMUNITY

## 2022-09-27 RX ORDER — AZITHROMYCIN 1 G
1 PACKET (EA) ORAL ONCE
COMMUNITY

## 2022-09-27 RX ORDER — CETIRIZINE HYDROCHLORIDE 10 MG/1
10 TABLET ORAL DAILY
COMMUNITY

## 2022-09-27 NOTE — PROGRESS NOTES
and radiation- followed with Dr Lance Thomas ankle     CAD (coronary artery disease)     \"have 3 heart stents\" use to see Dr Tonia Lebron    H/O cardiovascular stress test 01/06/2016    treadmill    NSTEMI (non-ST elevated myocardial infarction) ([address]     PONV (postoperative nausea and vomiting)     hx motion sickness    Post PTCA 02/22/2022    RCA stented. Skin cancer      Past Surgical History:   Procedure Laterality Date    ANKLE SURGERY Left 10+ yrs ago    with hardware    COLON SURGERY  2009    \"after had chemo removed some part of the cancer from the rectal area, then 7 months later had bowel blockage had to do surgery  to remove part of the bowel\"    COLONOSCOPY  03/04/2015    mild proctitis    COLONOSCOPY  03/15/2017    mild radia proc, hypertroph pailla    COLONOSCOPY N/A 03/03/2022    COLONOSCOPY CONTROL HEMORRHAGE WITH STRAIGHT FIRE APC WITH CLIP PLACEMENT X 2 performed by Susan Rascon MD at [facility]      left ankle    OTHER SURGICAL HISTORY  04/23/2015    mediport removal    PTCA  2009 4/22/2009 had angioplasty with stent to LAD & another day in 2009 stent x 2 to RCA done    PTCA  02/22/2022    RCA stented. TONSILLECTOMY      as a kid age 7    TUBAL LIGATION  25 yrs ago    TUNNELED VENOUS PORT PLACEMENT  2009    port inserted     UPPER GASTROINTESTINAL ENDOSCOPY N/A 02/26/2022    EGD DIAGNOSTIC ONLY performed by Susan Rascon MD at [facility] N/A 03/03/2022    ENTEROSCOPY PUSH DIAGNOSTIC performed by Susan Rascon MD at [facility] [place] ENDOSCOPY     Family History   Problem Relation Age of Onset    Cancer Mother         breast ca? ?    Cancer Father         prostate cancer- lung mets- bone mets    Stroke Father     High Cholesterol Father     Stroke Sister     Cancer Brother         neck cancer    No Known Problems Daughter      Social History     Socioeconomic History    Marital status:     Years of education: 6 Occupational History    Occupation: unemployed   Tobacco Use    Smoking status: Former     Packs/day: 0.25     Years: 35.00     Pack years: 8.75     Types: Cigarettes     Start date:      Quit date: 10/2021     Years since quittin.9    Smokeless tobacco: Never    Tobacco comments:     6-7 cigarettes    Substance and Sexual Activity    Alcohol use: No     Comment: \"quit 3/2015\"use to drink a couple of times per week before\"    Drug use: No     Comment: 1-2 cups of coffee daily     Sexual activity: Yes     Current Outpatient Medications   Medication Sig Dispense Refill    azithromycin (ZITHROMAX) 1 g powder Take 1 packet by mouth once      levoFLOXacin (LEVAQUIN) 500 MG/100ML SOLN Infuse 500 mg intravenously every 24 hours      cetirizine (ZYRTEC) 10 MG tablet Take 10 mg by mouth daily      traMADol (ULTRAM) 50 MG tablet Take 1 tablet by mouth every 8 hours as needed for Pain for up to 7 days. Intended supply: 7 days. Take lowest dose possible to manage pain 21 tablet 0    pravastatin (PRAVACHOL) 40 MG tablet Take 40 mg by mouth in the morning. alirocumab (PRALUENT) 75 MG/ML SOAJ injection pen Inject 1 mL into the skin every 14 days 2.24 mL 5    KLOR-CON M20 20 MEQ extended release tablet       nystatin (MYCOSTATIN) 118485 UNIT/GM ointment Apply to affected area twice daily. hydrocortisone 0.5 % ointment Apply topically 2 times daily      rosuvastatin (CRESTOR) 40 MG tablet Take 40 mg by mouth daily      nitroGLYCERIN (NITROSTAT) 0.4 MG SL tablet up to max of 3 total doses.  If no relief after 1 dose, call 911. 25 tablet 3    clopidogrel (PLAVIX) 75 MG tablet Take 1 tablet by mouth daily 30 tablet 5    ferrous sulfate (FE TABS 325) 325 (65 Fe) MG EC tablet Take 325 mg by mouth daily      furosemide (LASIX) 20 MG tablet Take 1 tablet by mouth daily (Patient taking differently: Take 40 mg by mouth daily) 60 tablet 3    olopatadine (PATANOL) 0.1 % ophthalmic solution Place 1 drop into both eyes 2 times daily      potassium chloride (KLOR-CON) 20 MEQ packet Take 20 mEq by mouth 2 times daily      tiZANidine (ZANAFLEX) 4 MG tablet Take 4 mg by mouth every 6 hours as needed       loperamide (IMODIUM) 2 MG capsule Take 2 mg by mouth as needed for Diarrhea       albuterol-ipratropium (COMBIVENT RESPIMAT)  MCG/ACT AERS inhaler Inhale into the lungs       aspirin 81 MG chewable tablet Take 1 tablet by mouth daily 30 tablet 6     No current facility-administered medications for this visit. Allergies   Allergen Reactions    Brilinta [Ticagrelor] Shortness Of Breath    Adhesive Tape Other (See Comments)    Crestor [Rosuvastatin Calcium]     Vicodin Hp [Hydrocodone-Acetaminophen] Other (See Comments)     Keep me awake    Tylenol With Codeine #3 [Acetaminophen-Codeine] Nausea And Vomiting         Review of Systems   Constitutional:  Negative for chills and fever. HENT:  Negative for congestion and sneezing. Eyes:  Negative for pain and redness. Respiratory:  Negative for chest tightness, shortness of breath and wheezing. Cardiovascular:  Negative for chest pain and palpitations. Gastrointestinal:  Negative for vomiting. Musculoskeletal:  Positive for arthralgias. Skin:  Negative for color change and rash. Neurological:  Negative for weakness and numbness. Psychiatric/Behavioral:  Negative for agitation. The patient is not nervous/anxious. Examination:  General Exam:  Vitals: /83   Pulse 99   Ht 5' 2\" (1.575 m)   SpO2 97%   BMI 31.09 kg/m²    Physical Exam  Vitals and nursing note reviewed. Constitutional:       Appearance: Normal appearance. HENT:      Head: Normocephalic and atraumatic. Eyes:      Conjunctiva/sclera: Conjunctivae normal.      Pupils: Pupils are equal, round, and reactive to light. Pulmonary:      Effort: Pulmonary effort is normal.   Musculoskeletal:      Cervical back: Normal range of motion.       Lumbar back: No swelling, deformity or tenderness. Normal range of motion. Right hip: No deformity, tenderness, bony tenderness or crepitus. Normal range of motion. Normal strength. Right knee: Normal.      Left knee: No swelling, deformity, effusion or erythema. Normal range of motion. No medial joint line or lateral joint line tenderness. No LCL laxity or MCL laxity. Normal alignment. Comments: Left Lower Extremity:  There is moderate tenderness to palpation diffusely throughout the hip both anteriorly and posteriorly. Range of motion is significantly limited and painful at the extremes of motion. Hip flexion present to 90 degrees, abduction 20 degrees, extension 5 degrees, internal rotation 10 degrees, external rotation 10 degrees. Strength is 5 out of 5 with hip flexion, extension, and abduction however this is somewhat limited due to pain with resistance testing. BRITNEY and FADIR test reproduces pain to the groin and hip joint. Sensation is intact to light touch in the left lower extremity. Pulses are intact. Skin is intact without erythema. No lower extremity edema. Skin:     General: Skin is warm and dry. Neurological:      Mental Status: She is alert and oriented to person, place, and time. Psychiatric:         Mood and Affect: Mood normal.         Behavior: Behavior normal.          Diagnostic testing:  X-ray images were reviewed by myself and discussed with the patient:  AP pelvis and frog-leg lateral view of the left hip show evidence of    bilateral avascular necrosis present at the femoral heads with evidence of    mild degenerative changes of the right hip joint and moderate to severe    degenerative changes at the left hip joint. The left hip has advanced    joint space narrowing and areas of cortical irregularity and mild    spurring. No evidence of fracture or acute abnormality. Normal    alignment.        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 Prominent osteophyte formation and flattening of the humeral head consistent with underlying avascular necrosis    Office Procedures:  No orders of the defined types were placed in this encounter. Assessment and Plan  1. bilateral left worse than right hip primary osteoarthritis    2. Left femoral head avascular necrosis    We discussed the severity of the degenerative findings on both on the x-rays and physical exam.  The patient feels that they have exhausted conservative measures. The patient has previously tried injections, physical therapy, over-the-counter pain medications, and activity modification. The pain level is severe and bothers the patient on a daily basis, including interrupting sleep at night. Activities of daily living are difficult to perform. The patient has trouble getting dressed, getting up from a seated position, climbing stairs, and has fear of falling. The patient has tried to exercise and lose weight but feels that this has been difficult and limited because of ongoing hip pain. The pain and dysfunction at the hip are severely limiting at this stage and the patient would like to consider surgical intervention. I have recommended surgical intervention for a total hip replacement. We discussed the risks, benefits, and alternatives to surgery. We discussed the intended benefits from a well-functioning replacement and the anticipated recovery process. We discussed potential risks and complications including but not limited to DVT/PE, infection, stiffness, loosening, limb length inequality, dislocation, and fracture. We discussed pain control, physical therapy, and anticoagulation during the perioperative timeframe.     The patient would like to proceed with hip replacement surgery and will be enrolled in the joint replacement class    Plan will be to proceed with an anterior approach total hip replacement. She will be on aspirin in addition to her Plavix for postoperative DVT prophylaxis.   We will consult cardiology for preoperative clearance and to ensure that it is appropriate to hold her Plavix prior to surgery    Electronically signed by Willie Tam MD on 9/27/2022 at 5:24 PM

## 2022-09-27 NOTE — PROGRESS NOTES
Patient seen in office today for FU L hip injections given 6/23/22. Stated L hurts more than R. Injections did help but only for a few weeks. 9/10 pain level in L hip today, waking up in night because of pain going all the way down leg. Using OTC to alleviate pain. She's wanting another steroid injection but would like to discuss NUBIA.

## 2022-09-27 NOTE — PATIENT INSTRUCTIONS
We will schedule surgery at soonest convenience. If you have any questions regarding your surgery please call our office and ask to speak with Lynda Wells [phone]    Continue weight-bearing as tolerated. Continue range of motion exercises as instructed. Ice and elevate as needed. Tylenol or Motrin for pain.

## 2022-09-28 ASSESSMENT — ENCOUNTER SYMPTOMS
EYE REDNESS: 0
COLOR CHANGE: 0
SHORTNESS OF BREATH: 0
EYE PAIN: 0
VOMITING: 0
CHEST TIGHTNESS: 0
WHEEZING: 0

## 2022-09-29 NOTE — TELEPHONE ENCOUNTER
PA denied as she does not meet clinical criteria. Denial scanned to chart. Call to triage for notification.

## 2022-09-30 ENCOUNTER — TELEPHONE (OUTPATIENT)
Dept: ORTHOPEDIC SURGERY | Age: 57
End: 2022-09-30

## 2022-09-30 ENCOUNTER — TELEPHONE (OUTPATIENT)
Dept: CARDIOLOGY CLINIC | Age: 57
End: 2022-09-30

## 2022-09-30 NOTE — TELEPHONE ENCOUNTER
Scheduled patient in office for LT NUBIA on 10/19/2022 at [facility]. Patient voiced understanding of date/time and instructions. Procedure request along with pathway orders faxed. PCP and Cardiac Clearance request sent. PT and Home Health Orders created. Insurance Lead[facility]) contacted on 9/30/22 via [facility]. Medical Records Uploaded. CPT 90531 is pending clinical review.     Ref#: [id number]

## 2022-09-30 NOTE — TELEPHONE ENCOUNTER
Patient called stating that she needs cardiac  clearance for a hip replacement, she cannot take this pain anymore. She would like to speak with the nurse, please call her back at ph# [phone].

## 2022-10-03 ENCOUNTER — TELEPHONE (OUTPATIENT)
Dept: CARDIOLOGY CLINIC | Age: 57
End: 2022-10-03

## 2022-10-03 RX ORDER — CLOPIDOGREL BISULFATE 75 MG/1
75 TABLET ORAL DAILY
Qty: 30 TABLET | Refills: 5 | Status: SHIPPED | OUTPATIENT
Start: 2022-10-03

## 2022-10-03 NOTE — TELEPHONE ENCOUNTER
Cardiologist: Dr. Rupinder Montejo  Surgeon: Dr. Molly Mcbride  Surgery: Left total Hip arthroplasty  Anesthesia: Spinal  Date: 10/19/2022  FAX# [phone]  # [phone]    Last OV 6/17/2022 w/Nica      CAD: recent PCI of RCA with SOUMYA, Continue aspirin and plavix  continue statins, ok stay off coregg as her blood pressure is low in 80s and 90s at rehab  PAD and leg pain with claudications: arterial doppler  Fluttering of chest: will get holter monitor if it gets worse  preop for hip sx recommend to hold off sx due to recent MI in februruary  Gi bleeding resolved, f/u with Dr. Gallo Yoder, had colon AVM, coagulated with APC and 2 clips  Dyslipidemia: continue statins  H/o anal cancer        Last EKG- 3/2/2022      Stress Test- 3/25/2022   Limited Exercise capacity. 5 METs work load. Physiological BP response to exercise   ETT is non-diagnostic as THR is not achieved      Echo- 2/22/2022   Left ventricular systolic function is normal.   Ejection fraction is visually estimated at 55-60%. No wall motion abnormalities detected. No significant valvular disease noted. No evidence of any pericardial effusion.       Cath- 2/22/2022   STEMI presenting with 100% occlusion of RCA stent with thrombus,   s/p PTCA and then PCI with SOUMYA with excellent results      Plavix    Aspirin

## 2022-10-04 RX ORDER — SODIUM CHLORIDE, SODIUM LACTATE, POTASSIUM CHLORIDE, CALCIUM CHLORIDE 600; 310; 30; 20 MG/100ML; MG/100ML; MG/100ML; MG/100ML
INJECTION, SOLUTION INTRAVENOUS CONTINUOUS
Status: CANCELLED | OUTPATIENT
Start: 2022-10-19

## 2022-10-04 NOTE — TELEPHONE ENCOUNTER
Revised Cardiac Risk Index:   High risk type of surgery no   H/O ischemic heart disease  (h/o MI, or a positive stress test, current complaint of chest pain considered to be secondary to myocardial ischemia,  Use of nitrate therapy or ECG with pathological Q waves; do not count prior coronary revascularization procedure unless one of the other criteria for ischemic heart disease is present) yes     H/O heart failure no  H/O CVA no   H/O DM treated with insulin no  Preoperative serum creatinine >2.0 mg/dl (177 micromol/L) no     The calculated rate of cardiac death, nonfatal myocardial infarction, and nonfatal cardiac arrest according to the number of predictors is; Two risk factors  2.4% (95% CI: 1.3-3.5)     she is considered a moderate/javier risk for surgery. Antiplatelet therapy can be held prior to surgery at the no more than 72 hrs given STEMI in the last 8 months. To be resumed as soon as possible if held. Electronically signed by Nguyễn Rivas.  FLAVIO Ni CNP on 10/4/2022 at 1:20 PM

## 2022-10-05 ENCOUNTER — TELEPHONE (OUTPATIENT)
Dept: CARDIOLOGY CLINIC | Age: 57
End: 2022-10-05

## 2022-10-05 NOTE — LETTER
[facility]  [address], [address]  [place]  Phone: [phone]    Fax [phone]                  Ashley Evans MD, Syble Severance, MD, Lexis Reeves MD, Bolivar Lemus MD, MD Ramirez Mathews MD, MD May Fernandez, FLAVIO-DOM Salguero, FLAVIO-DOM West, FLAVIO-LENA Keyes    Cardiac Risk Assessment   10/5/2022        Surgery Date: 10/19/2022  Surgery: Left Total Hip Arthroplasty  Anesthesia Type: Spinal  Fax Number: 10/19/2022    To: Dr. Sandi Blair  From : LENA Rachel    Patient Name: Marlin Hernandez     YOB: 1965  MRN: [id number]    Mariln Hernandez was evaluated from a cardiac standpoint for her surgery or procedure and based on her history, diagnosis, recent cardiac testing, she is considered a moderate to high risk candidate for any fidelia-operative cardiac complications. Patients with known CAD with moderate or high risk should have surgical procedures done where they have access to invasive cardiology services if emergently needed. Antiplatelet/anticoagulant therapy can be held prior to the surgery or procedure at the discretion of the surgeon to be resumed as soon as possible if held. Please call with any further questions. Respectfully,     LENA Rachel  As/bl    This cardiac clearance is good for 6 months from 10/5/2022 unless new cardiac symptoms arise.

## 2022-10-07 NOTE — PROGRESS NOTES
Left VM with call back number, reminder for [address] 10/11/2022, also please call PAT nurse for assessment and surgery instructions.

## 2022-10-10 RX ORDER — TRAMADOL HYDROCHLORIDE 50 MG/1
50 TABLET ORAL EVERY 6 HOURS PRN
Status: ON HOLD | COMMUNITY
End: 2022-10-21 | Stop reason: SDUPTHER

## 2022-10-10 RX ORDER — ACETAMINOPHEN 325 MG/1
650 TABLET ORAL EVERY 6 HOURS PRN
Status: ON HOLD | COMMUNITY
End: 2022-10-21 | Stop reason: HOSPADM

## 2022-10-10 NOTE — PROGRESS NOTES
.Surgery @ [facility] on 10/19/22 you will be called 10/18/22 with times               1. Do not eat or drink anything after midnight - unless instructed by your doctor prior to surgery. This includes                   no water, chewing gum or mints. 2. Follow your directions as prescribed by the doctor for your procedure and medications. Use inhaler if needed. 3. Check with your Doctor regarding stopping vitamins, supplements, blood thinners and follow their instructions. Stop vitamins, supplements and NSAIDS: 10/12/2022   4. Do not smoke, vape or use chewing tobacco morning of surgery. Do not drink any alcoholic beverages 24 hours prior to surgery. This includes NA Beer. No street drugs 7 days prior to surgery. 5. You may brush your teeth and gargle the morning of surgery. DO NOT SWALLOW WATER   6. You MUST make arrangements for a responsible adult to take you home after your surgery and be able to check on you every couple                   hours for the day. You will not be allowed to leave alone or drive yourself home. It is strongly suggested someone stay with you the first 24                   hrs. Your surgery will be cancelled if you do not have a ride home. 7. Please wear simple, loose fitting clothing to the hospital.  Johnny Liu not bring valuables (money, credit cards, checkbooks, etc.) Do not wear any                   makeup (including no eye makeup) or nail polish on your fingers or toes. 8. DO NOT wear any jewelry or piercings on day of surgery. All body piercing jewelry must be removed. 9. If you have dentures, they will be removed before going to the OR; we will provide you a container. If you wear contact lenses or glasses,                  they will be removed; please bring a case for them. 10. If you  have a Living Will and Durable Power of  for Healthcare, please bring in a copy.            11. Please bring picture ID,  insurance card, paperwork from the doctors office    (H & P, Consent, & card for implantable devices). 12. Take a shower the morning of your procedure with Hibiclens or an anti-bacterial soap. Do not apply any make-up, deodorant, lotion, oil or powder. 15.  Enter thru the main entrance on the day of surgery.

## 2022-10-11 ENCOUNTER — HOSPITAL ENCOUNTER (OUTPATIENT)
Dept: PREADMISSION TESTING | Age: 57
Discharge: HOME | End: 2022-10-15
Payer: COMMERCIAL

## 2022-10-11 VITALS
TEMPERATURE: 97.4 F | BODY MASS INDEX: 28.06 KG/M2 | DIASTOLIC BLOOD PRESSURE: 94 MMHG | OXYGEN SATURATION: 94 % | RESPIRATION RATE: 20 BRPM | SYSTOLIC BLOOD PRESSURE: 141 MMHG | WEIGHT: 153.4 LBS | HEART RATE: 88 BPM

## 2022-10-11 DIAGNOSIS — Z01.818 PRE-OP TESTING: Primary | ICD-10-CM

## 2022-10-11 LAB
ALBUMIN SERPL-MCNC: 4.2 GM/DL (ref 3.4–5)
ALP BLD-CCNC: 133 IU/L (ref 40–129)
ALT SERPL-CCNC: 12 U/L (ref 10–40)
ANION GAP SERPL CALCULATED.3IONS-SCNC: 7 MMOL/L (ref 4–16)
AST SERPL-CCNC: 15 IU/L (ref 15–37)
BASOPHILS ABSOLUTE: 0 K/CU MM
BASOPHILS RELATIVE PERCENT: 0.5 % (ref 0–1)
BILIRUB SERPL-MCNC: 0.3 MG/DL (ref 0–1)
BILIRUBIN URINE: NEGATIVE
BLOOD, URINE: NEGATIVE
BUN BLDV-MCNC: 8 MG/DL (ref 6–23)
CALCIUM SERPL-MCNC: 9.5 MG/DL (ref 8.3–10.6)
CHLORIDE BLD-SCNC: 103 MMOL/L (ref 99–110)
CLARITY: CLEAR
CO2: 28 MMOL/L (ref 21–32)
COLOR: YELLOW
CREAT SERPL-MCNC: 0.7 MG/DL (ref 0.6–1.1)
DIFFERENTIAL TYPE: ABNORMAL
EOSINOPHILS ABSOLUTE: 0 K/CU MM
EOSINOPHILS RELATIVE PERCENT: 0.5 % (ref 0–3)
ERYTHROCYTE SEDIMENTATION RATE: 25 MM/HR (ref 0–30)
GFR AFRICAN AMERICAN: >60 ML/MIN/1.73M2
GFR NON-AFRICAN AMERICAN: >60 ML/MIN/1.73M2
GLUCOSE BLD-MCNC: 85 MG/DL (ref 70–99)
GLUCOSE, URINE: NEGATIVE MG/DL
HCT VFR BLD CALC: 44.2 % (ref 37–47)
HEMOGLOBIN: 14.5 GM/DL (ref 12.5–16)
IMMATURE NEUTROPHIL %: 0.3 % (ref 0–0.43)
KETONES, URINE: NEGATIVE MG/DL
LEUKOCYTE ESTERASE, URINE: NEGATIVE
LYMPHOCYTES ABSOLUTE: 1.8 K/CU MM
LYMPHOCYTES RELATIVE PERCENT: 28.5 % (ref 24–44)
MCH RBC QN AUTO: 31.3 PG (ref 27–31)
MCHC RBC AUTO-ENTMCNC: 32.8 % (ref 32–36)
MCV RBC AUTO: 95.5 FL (ref 78–100)
MONOCYTES ABSOLUTE: 0.4 K/CU MM
MONOCYTES RELATIVE PERCENT: 6.5 % (ref 0–4)
NITRITE URINE, QUANTITATIVE: NEGATIVE
NUCLEATED RBC %: 0 %
PDW BLD-RTO: 16.8 % (ref 11.7–14.9)
PH, URINE: 6
PLATELET # BLD: 309 K/CU MM (ref 140–440)
PMV BLD AUTO: 9.6 FL (ref 7.5–11.1)
POTASSIUM SERPL-SCNC: 4.5 MMOL/L (ref 3.5–5.1)
PROTEIN UA: NEGATIVE MG/DL
RBC # BLD: 4.63 M/CU MM (ref 4.2–5.4)
SEGMENTED NEUTROPHILS ABSOLUTE COUNT: 4.1 K/CU MM
SEGMENTED NEUTROPHILS RELATIVE PERCENT: 63.7 % (ref 36–66)
SODIUM BLD-SCNC: 138 MMOL/L (ref 135–145)
SPECIFIC GRAVITY UA: <=1.005
TOTAL IMMATURE NEUTOROPHIL: 0.02 K/CU MM
TOTAL NUCLEATED RBC: 0 K/CU MM
TOTAL PROTEIN: 6.5 GM/DL (ref 6.4–8.2)
UROBILINOGEN, URINE: 0.2 MG/DL
WBC # BLD: 6.4 K/CU MM (ref 4–10.5)

## 2022-10-11 PROCEDURE — 80053 COMPREHEN METABOLIC PANEL: CPT

## 2022-10-11 PROCEDURE — 36415 COLL VENOUS BLD VENIPUNCTURE: CPT

## 2022-10-11 PROCEDURE — 85025 COMPLETE CBC W/AUTO DIFF WBC: CPT

## 2022-10-11 PROCEDURE — 85652 RBC SED RATE AUTOMATED: CPT

## 2022-10-11 PROCEDURE — 81003 URINALYSIS AUTO W/O SCOPE: CPT

## 2022-10-11 PROCEDURE — 87086 URINE CULTURE/COLONY COUNT: CPT

## 2022-10-12 LAB
CULTURE: NORMAL
Lab: NORMAL
SPECIMEN: NORMAL

## 2022-10-17 ENCOUNTER — ANESTHESIA EVENT (OUTPATIENT)
Dept: OPERATING ROOM | Age: 57
End: 2022-10-17
Payer: COMMERCIAL

## 2022-10-17 NOTE — ANESTHESIA PRE PROCEDURE
Department of Anesthesiology  Preprocedure Note       Name:  Cindy Caballero   Age:  65 y.o.  :  1965                                          MRN:  [id number]         Date:  10/17/2022      Surgeon: Maru Garza):  Mike Tomlin MD    Procedure: Procedure(s):  LEFT HIP TOTAL ARTHROPLASTY ANTERIOR APPROACH    Medications prior to admission:   Prior to Admission medications    Medication Sig Start Date End Date Taking? Authorizing Provider   acetaminophen (TYLENOL) 325 MG tablet Take 650 mg by mouth every 6 hours as needed for Pain    Historical Provider, MD   traMADol (ULTRAM) 50 MG tablet Take 50 mg by mouth every 6 hours as needed for Pain. Historical Provider, MD   clopidogrel (PLAVIX) 75 MG tablet Take 1 tablet by mouth daily 10/3/22   FLAVIO Bowens CNP   azithromycin (ZITHROMAX) 1 g powder Take 1 packet by mouth once  Patient not taking: Reported on 10/10/2022    Historical Provider, MD   levoFLOXacin (LEVAQUIN) 500 MG/100ML SOLN Infuse 500 mg intravenously every 24 hours  Patient not taking: Reported on 10/10/2022    Historical Provider, MD   cetirizine (ZYRTEC) 10 MG tablet Take 10 mg by mouth daily  Patient not taking: Reported on 10/10/2022    Historical Provider, MD   pravastatin (PRAVACHOL) 40 MG tablet Take 40 mg by mouth in the morning. 22  Historical Provider, MD   alirocumab (PRALUENT) 75 MG/ML SOAJ injection pen Inject 1 mL into the skin every 14 days  Patient not taking: Reported on 10/10/2022 7/27/22   FLAVIO Bowens CNP   KLOR-CON M20 20 MEQ extended release tablet  22   Historical Provider, MD   nystatin (MYCOSTATIN) 768611 UNIT/GM ointment Apply to affected area twice daily.  2/8/22 2/3/23  Historical Provider, MD   hydrocortisone 0.5 % ointment Apply topically 2 times daily 22   Historical Provider, MD   rosuvastatin (CRESTOR) 40 MG tablet Take 40 mg by mouth daily  Patient not taking: Reported on 10/10/2022 5/26/22   Historical Provider, MD nitroGLYCERIN (NITROSTAT) 0.4 MG SL tablet up to max of 3 total doses. If no relief after 1 dose, call 911. 6/17/22   Talon Berrios MD   ferrous sulfate (FE TABS 325) 325 (65 Fe) MG EC tablet Take 325 mg by mouth daily 3/8/22 3/8/23  Historical Provider, MD   furosemide (LASIX) 20 MG tablet Take 1 tablet by mouth daily  Patient taking differently: Take 40 mg by mouth daily Takes PRN 2/27/22   Shree Kumar MD   olopatadine (PATANOL) 0.1 % ophthalmic solution Place 1 drop into both eyes 2 times daily    Historical Provider, MD   potassium chloride (KLOR-CON) 20 MEQ packet Take 20 mEq by mouth 2 times daily  Patient not taking: Reported on 10/10/2022    Historical Provider, MD   tiZANidine (ZANAFLEX) 4 MG tablet Take 4 mg by mouth every 6 hours as needed   Patient not taking: Reported on 10/10/2022    Historical Provider, MD   loperamide (IMODIUM) 2 MG capsule Take 2 mg by mouth as needed for Diarrhea     Historical Provider, MD   albuterol-ipratropium (COMBIVENT RESPIMAT)  MCG/ACT AERS inhaler Inhale into the lungs  5/15/17   Historical Provider, MD   aspirin 81 MG chewable tablet Take 1 tablet by mouth daily 7/7/16   Danette Holder MD       Current medications:    Current Outpatient Medications   Medication Sig Dispense Refill    acetaminophen (TYLENOL) 325 MG tablet Take 650 mg by mouth every 6 hours as needed for Pain      traMADol (ULTRAM) 50 MG tablet Take 50 mg by mouth every 6 hours as needed for Pain.       clopidogrel (PLAVIX) 75 MG tablet Take 1 tablet by mouth daily 30 tablet 5    azithromycin (ZITHROMAX) 1 g powder Take 1 packet by mouth once (Patient not taking: Reported on 10/10/2022)      levoFLOXacin (LEVAQUIN) 500 MG/100ML SOLN Infuse 500 mg intravenously every 24 hours (Patient not taking: Reported on 10/10/2022)      cetirizine (ZYRTEC) 10 MG tablet Take 10 mg by mouth daily (Patient not taking: Reported on 10/10/2022)      pravastatin (PRAVACHOL) 40 MG tablet Take 40 mg by mouth in the morning.  alirocumab (PRALUENT) 75 MG/ML SOAJ injection pen Inject 1 mL into the skin every 14 days (Patient not taking: Reported on 10/10/2022) 2.24 mL 5    KLOR-CON M20 20 MEQ extended release tablet  (Patient not taking: Reported on 10/10/2022)      nystatin (MYCOSTATIN) 711032 UNIT/GM ointment Apply to affected area twice daily.  hydrocortisone 0.5 % ointment Apply topically 2 times daily      rosuvastatin (CRESTOR) 40 MG tablet Take 40 mg by mouth daily (Patient not taking: Reported on 10/10/2022)      nitroGLYCERIN (NITROSTAT) 0.4 MG SL tablet up to max of 3 total doses. If no relief after 1 dose, call 911. 25 tablet 3    ferrous sulfate (FE TABS 325) 325 (65 Fe) MG EC tablet Take 325 mg by mouth daily      furosemide (LASIX) 20 MG tablet Take 1 tablet by mouth daily (Patient taking differently: Take 40 mg by mouth daily Takes PRN) 60 tablet 3    olopatadine (PATANOL) 0.1 % ophthalmic solution Place 1 drop into both eyes 2 times daily      potassium chloride (KLOR-CON) 20 MEQ packet Take 20 mEq by mouth 2 times daily (Patient not taking: Reported on 10/10/2022)      tiZANidine (ZANAFLEX) 4 MG tablet Take 4 mg by mouth every 6 hours as needed  (Patient not taking: Reported on 10/10/2022)      loperamide (IMODIUM) 2 MG capsule Take 2 mg by mouth as needed for Diarrhea       albuterol-ipratropium (COMBIVENT RESPIMAT)  MCG/ACT AERS inhaler Inhale into the lungs       aspirin 81 MG chewable tablet Take 1 tablet by mouth daily 30 tablet 6     No current facility-administered medications for this visit. Allergies:     Allergies   Allergen Reactions    Brilinta [Ticagrelor] Shortness Of Breath    Adhesive Tape Other (See Comments)    Crestor [Rosuvastatin Calcium]     Vicodin Hp [Hydrocodone-Acetaminophen] Other (See Comments)     Keep me awake    Tylenol With Codeine #3 [Acetaminophen-Codeine] Nausea And Vomiting       Problem List:    Patient Active Problem List Diagnosis Code    Chest pain R07.9    NSTEMI (non-ST elevated myocardial infarction) ([facility] [address] I21.4    Coronary artery disease involving native coronary artery of native heart with unstable angina pectoris (HCC) I25.110    Mild intermittent asthma without complication I52.98    Pain in both lower extremities M79.604, M79.605    Dizziness R42    Palpitations R00.2    Primary osteoarthritis of left hip M16.12    Avascular necrosis of left femur ([facility]) M87.052    Unstable angina (HCC) I20.0    Dyslipidemia E78.5    Primary hypertension I10    STEMI (ST elevation myocardial infarction) ([facility]) I21.3    Gastrointestinal hemorrhage K92.2    Anemia D64.9    Pain of upper abdomen R10.10    Anticoagulated Z79.01    UGIB (upper gastrointestinal bleed) K92.2    Rectal bleeding K62.5    AVM (arteriovenous malformation) of colon K55.20    Post PTCA Z98.61    Primary osteoarthritis of right knee M17.11    Avascular necrosis of proximal end of right femur ([facility] [address] M87.051       Past Medical History:        Diagnosis Date    Anal cancer (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 [address]     pt states she has had two NSTEMI    PONV (postoperative nausea and vomiting)     hx motion sickness    Post PTCA 02/22/2022    RCA stented.     Skin cancer        Past Surgical History:        Procedure Laterality Date    ANKLE SURGERY Left 10+ yrs ago    with hardware    COLON SURGERY  2009    \"after had chemo removed some part of the cancer from the rectal area, then 7 months later had bowel blockage had to do surgery  to remove part of the bowel\"    COLONOSCOPY  03/04/2015 mild proctitis    COLONOSCOPY  03/15/2017    mild radia proc, hypertroph pailla    COLONOSCOPY N/A 2022    COLONOSCOPY CONTROL HEMORRHAGE WITH STRAIGHT FIRE APC WITH CLIP PLACEMENT X 2 performed by Jay Maloney MD at [address]      left ankle    OTHER SURGICAL HISTORY  2015    mediport removal    PTCA  2009 had angioplasty with stent to LAD & another day in  stent x 2 to RCA done    PTCA  2022    RCA stented.  TONSILLECTOMY      as a kid age 7    TUBAL LIGATION  25 yrs ago    TUNNELED VENOUS PORT PLACEMENT      port inserted     UPPER GASTROINTESTINAL ENDOSCOPY N/A 2022    EGD DIAGNOSTIC ONLY performed by Jay Maloney MD at [place] [address] N/A 2022    ENTEROSCOPY PUSH DIAGNOSTIC performed by [facility]oney MD at [facility] ENDOSCOPY       Social History:    Social History     Tobacco Use    Smoking status: Former     Packs/day: 0.25     Years: 35.00     Pack years: 8.75     Types: Cigarettes     Start date:      Quit date: 10/2021     Years since quittin.0    Smokeless tobacco: Never    Tobacco comments:     6-7 cigarettes    Substance Use Topics    Alcohol use: No     Comment: \"quit 3/2015\"use to drink a couple of times per week before\"                                Counseling given: Not Answered  Tobacco comments: 6-7 cigarettes       Vital Signs (Current): There were no vitals filed for this visit.                                            BP Readings from Last 3 Encounters:   10/11/22 (!) 141/94   22 115/83   22 124/88       NPO Status:                                                                                 BMI:   Wt Readings from Last 3 Encounters:   10/11/22 153 lb 6.4 oz (69.6 kg)   22 170 lb (77.1 kg)   22 177 lb (80.3 kg)     There is no height or weight on file to calculate BMI.    CBC:   Lab Results   Component Value Date/Time WBC 6.4 10/11/2022 09:58 AM    RBC 4.63 10/11/2022 09:58 AM    HGB 14.5 10/11/2022 09:58 AM    HCT 44.2 10/11/2022 09:58 AM    MCV 95.5 10/11/2022 09:58 AM    RDW 16.8 10/11/2022 09:58 AM     10/11/2022 09:58 AM       CMP:   Lab Results   Component Value Date/Time     10/11/2022 09:58 AM    K 4.5 10/11/2022 09:58 AM     10/11/2022 09:58 AM    CO2 28 10/11/2022 09:58 AM    BUN 8 10/11/2022 09:58 AM    CREATININE 0.7 10/11/2022 09:58 AM    GFRAA >60 10/11/2022 09:58 AM    AGRATIO 1.5 02/11/2022 08:36 AM    AGRATIO 1.5 02/11/2022 08:36 AM    LABGLOM >60 10/11/2022 09:58 AM    GLUCOSE 85 10/11/2022 09:58 AM    PROT 6.5 10/11/2022 09:58 AM    PROT 7.6 04/10/2012 11:05 AM    CALCIUM 9.5 10/11/2022 09:58 AM    BILITOT 0.3 10/11/2022 09:58 AM    ALKPHOS 133 10/11/2022 09:58 AM    AST 15 10/11/2022 09:58 AM    ALT 12 10/11/2022 09:58 AM       POC Tests: No results for input(s): POCGLU, POCNA, POCK, POCCL, POCBUN, POCHEMO, POCHCT in the last 72 hours. Coags:   Lab Results   Component Value Date/Time    PROTIME 10.8 03/02/2022 10:27 PM    INR 0.84 03/02/2022 10:27 PM    APTT 24.0 03/02/2022 10:27 PM       HCG (If Applicable):   Lab Results   Component Value Date    PREGTESTUR NEGATIVE 10/03/2015        ABGs: No results found for: PHART, PO2ART, HFT2FKD, JSW2EVA, BEART, T9GVGOQI     Type & Screen (If Applicable):  No results found for: LABABO, LABRH    Drug/Infectious Status (If Applicable):  No results found for: HIV, HEPCAB    COVID-19 Screening (If Applicable):   Lab Results   Component Value Date/Time    COVID19 NOT DETECTED 02/22/2022 11:30 AM           Anesthesia Evaluation  Patient summary reviewed   history of anesthetic complications: PONV.   Airway: Mallampati: II  TM distance: <3 FB   Neck ROM: full  Mouth opening: > = 3 FB   Dental:    (+) upper dentures      Pulmonary: breath sounds clear to auscultation  (+) asthma:                           ROS comment: Former Smoker - 8.75 pack years Cardiovascular:    (+) hypertension:, angina:, past MI: 3-6 months, CAD:, CABG/stent:, hyperlipidemia      ECG reviewed  Rhythm: regular    Echocardiogram reviewed  Stress test reviewed  Cleared by cardiology           ROS comment: Echo 2/22/202:  Left ventricular systolic function is normal.   Ejection fraction is visually estimated at 55-60%. No wall motion abnormalities detected. No significant valvular disease noted. No evidence of any pericardial effusion. Echo 2/22/2022:  Diagnostic procedure:Coronary Angiogram without LHC, Right   Coronary Angiography, Left Coronary Angiography      PCI procedure:DE Stent, RCA: DE Stent Plcmt Initl Vsl, Balloon   Angioplasty, RCA: Balloon Angioplasty Initl Vsl, Pt is Referred   to Cardiac Rehab Phase 2 as OP      Conclusions      Procedure Summary   STEMI presenting with 100% occlusion of RCA stent with thrombus,   s/p PTCA and then PCI with SOUMYA with excellent results      Recommendations   load brilinta, aspirin, integrillin, echo 2/2022   Procedure Summary   STEMI presenting with 100% occlusion of RCA stent with thrombus,   s/p PTCA and then PCI with SOUMYA with excellent results      Recommendations   load brilinta, aspirin, integrillin, echo      Signatures      --------------------------------------------------------   Electronically signed by Ching Rosales MD (Performing Physician) on 02/24/2022 at 14:27   --------------------------------------------------------   Summary   Overall Impression:   Limited Exercise capacity. 5 METs work load. Physiological BP response to exercise   ETT is non-diagnostic as THR is not achieved.       Signature      ------------------------------------------------------------------   Electronically signed by Diane Pizarro MD (Interpreting   physician) on 03/25/2022 at 05:50 PM       Evangelina Suresh was evaluated from a cardiac standpoint for her surgery or procedure and based on her history, diagnosis, recent cardiac testing, she is considered a moderate to high risk candidate for any fidelia-operative cardiac complications.     Patients with known CAD with moderate or high risk should have surgical procedures done where they have access to invasive cardiology services if emergently needed.     Antiplatelet/anticoagulant therapy can be held prior to the surgery or procedure at the discretion of the surgeon to be resumed as soon as possible if held.     Please call with any further questions.     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 Endo/Other:    (+) blood dyscrasia: anemia and anticoagulation therapy, arthritis: OA., malignancy/cancer. Abdominal:   (+) obese,           Vascular: negative vascular ROS. Other Findings:             Anesthesia Plan      general     ASA 4     (Pt refuses SAB  Last dose plavix 7 days ago)  Induction: intravenous. MIPS: Postoperative opioids intended and Prophylactic antiemetics administered. Anesthetic plan and risks discussed with patient. Plan discussed with CRNA. Attending anesthesiologist reviewed and agrees with Preprocedure content                FLAVIO Cote CRNA   10/17/2022         Pre Anesthesia Assessment complete.  Chart reviewed on 10/17/2022

## 2022-10-18 NOTE — PROGRESS NOTES
Notified patient of surgery time at [facility] 10/19/2022 0865 with arrival at [address] pt verbalized understanding

## 2022-10-19 ENCOUNTER — APPOINTMENT (OUTPATIENT)
Dept: GENERAL RADIOLOGY | Age: 57
End: 2022-10-19
Attending: ORTHOPAEDIC SURGERY
Payer: COMMERCIAL

## 2022-10-19 ENCOUNTER — ANESTHESIA (OUTPATIENT)
Dept: OPERATING ROOM | Age: 57
End: 2022-10-19
Payer: COMMERCIAL

## 2022-10-19 ENCOUNTER — HOSPITAL ENCOUNTER (OUTPATIENT)
Age: 57
Setting detail: OBSERVATION
Discharge: HOME | End: 2022-10-21
Attending: ORTHOPAEDIC SURGERY | Admitting: ORTHOPAEDIC SURGERY
Payer: COMMERCIAL

## 2022-10-19 DIAGNOSIS — Z96.642 STATUS POST TOTAL HIP REPLACEMENT, LEFT: Primary | ICD-10-CM

## 2022-10-19 PROBLEM — M16.12 PRIMARY OSTEOARTHRITIS OF LEFT HIP: Status: ACTIVE | Noted: 2022-10-19

## 2022-10-19 LAB
ANION GAP SERPL CALCULATED.3IONS-SCNC: 10 MMOL/L (ref 4–16)
BASOPHILS ABSOLUTE: 0 K/CU MM
BASOPHILS RELATIVE PERCENT: 0.1 % (ref 0–1)
BUN BLDV-MCNC: 10 MG/DL (ref 6–23)
CALCIUM SERPL-MCNC: 8.9 MG/DL (ref 8.3–10.6)
CHLORIDE BLD-SCNC: 100 MMOL/L (ref 99–110)
CO2: 24 MMOL/L (ref 21–32)
CREAT SERPL-MCNC: 0.8 MG/DL (ref 0.6–1.1)
DIFFERENTIAL TYPE: ABNORMAL
EOSINOPHILS ABSOLUTE: 0 K/CU MM
EOSINOPHILS RELATIVE PERCENT: 0 % (ref 0–3)
GFR SERPL CREATININE-BSD FRML MDRD: >60 ML/MIN/1.73M2
GLUCOSE BLD-MCNC: 184 MG/DL (ref 70–99)
HCT VFR BLD CALC: 36.4 % (ref 37–47)
HEMOGLOBIN: 11.9 GM/DL (ref 12.5–16)
IMMATURE NEUTROPHIL %: 0.4 % (ref 0–0.43)
LYMPHOCYTES ABSOLUTE: 0.9 K/CU MM
LYMPHOCYTES RELATIVE PERCENT: 10.4 % (ref 24–44)
MCH RBC QN AUTO: 31.2 PG (ref 27–31)
MCHC RBC AUTO-ENTMCNC: 32.7 % (ref 32–36)
MCV RBC AUTO: 95.3 FL (ref 78–100)
MONOCYTES ABSOLUTE: 0.8 K/CU MM
MONOCYTES RELATIVE PERCENT: 8.8 % (ref 0–4)
NUCLEATED RBC %: 0 %
PDW BLD-RTO: 15.9 % (ref 11.7–14.9)
PLATELET # BLD: 289 K/CU MM (ref 140–440)
PMV BLD AUTO: 9.8 FL (ref 7.5–11.1)
POTASSIUM SERPL-SCNC: 4.7 MMOL/L (ref 3.5–5.1)
RBC # BLD: 3.82 M/CU MM (ref 4.2–5.4)
SEGMENTED NEUTROPHILS ABSOLUTE COUNT: 7.1 K/CU MM
SEGMENTED NEUTROPHILS RELATIVE PERCENT: 80.3 % (ref 36–66)
SODIUM BLD-SCNC: 134 MMOL/L (ref 135–145)
TOTAL IMMATURE NEUTOROPHIL: 0.04 K/CU MM
TOTAL NUCLEATED RBC: 0 K/CU MM
WBC # BLD: 8.9 K/CU MM (ref 4–10.5)

## 2022-10-19 PROCEDURE — 6370000000 HC RX 637 (ALT 250 FOR IP): Performed by: ORTHOPAEDIC SURGERY

## 2022-10-19 PROCEDURE — 2709999900 HC NON-CHARGEABLE SUPPLY: Performed by: ORTHOPAEDIC SURGERY

## 2022-10-19 PROCEDURE — 72170 X-RAY EXAM OF PELVIS: CPT

## 2022-10-19 PROCEDURE — 2500000003 HC RX 250 WO HCPCS

## 2022-10-19 PROCEDURE — 3600000005 HC SURGERY LEVEL 5 BASE: Performed by: ORTHOPAEDIC SURGERY

## 2022-10-19 PROCEDURE — 80048 BASIC METABOLIC PNL TOTAL CA: CPT

## 2022-10-19 PROCEDURE — 2580000003 HC RX 258: Performed by: ORTHOPAEDIC SURGERY

## 2022-10-19 PROCEDURE — 6360000002 HC RX W HCPCS: Performed by: ANESTHESIOLOGY

## 2022-10-19 PROCEDURE — 97530 THERAPEUTIC ACTIVITIES: CPT

## 2022-10-19 PROCEDURE — 6360000002 HC RX W HCPCS

## 2022-10-19 PROCEDURE — 76000 FLUOROSCOPY <1 HR PHYS/QHP: CPT

## 2022-10-19 PROCEDURE — 3600000015 HC SURGERY LEVEL 5 ADDTL 15MIN: Performed by: ORTHOPAEDIC SURGERY

## 2022-10-19 PROCEDURE — 7100000001 HC PACU RECOVERY - ADDTL 15 MIN: Performed by: ORTHOPAEDIC SURGERY

## 2022-10-19 PROCEDURE — C1776 JOINT DEVICE (IMPLANTABLE): HCPCS | Performed by: ORTHOPAEDIC SURGERY

## 2022-10-19 PROCEDURE — C1713 ANCHOR/SCREW BN/BN,TIS/BN: HCPCS | Performed by: ORTHOPAEDIC SURGERY

## 2022-10-19 PROCEDURE — 2700000000 HC OXYGEN THERAPY PER DAY

## 2022-10-19 PROCEDURE — 27130 TOTAL HIP ARTHROPLASTY: CPT

## 2022-10-19 PROCEDURE — G0378 HOSPITAL OBSERVATION PER HR: HCPCS

## 2022-10-19 PROCEDURE — 94150 VITAL CAPACITY TEST: CPT

## 2022-10-19 PROCEDURE — 97162 PT EVAL MOD COMPLEX 30 MIN: CPT

## 2022-10-19 PROCEDURE — 94664 DEMO&/EVAL PT USE INHALER: CPT

## 2022-10-19 PROCEDURE — 27130 TOTAL HIP ARTHROPLASTY: CPT | Performed by: ORTHOPAEDIC SURGERY

## 2022-10-19 PROCEDURE — 6370000000 HC RX 637 (ALT 250 FOR IP): Performed by: NURSE PRACTITIONER

## 2022-10-19 PROCEDURE — 6360000002 HC RX W HCPCS: Performed by: ORTHOPAEDIC SURGERY

## 2022-10-19 PROCEDURE — 3700000001 HC ADD 15 MINUTES (ANESTHESIA): Performed by: ORTHOPAEDIC SURGERY

## 2022-10-19 PROCEDURE — 7100000000 HC PACU RECOVERY - FIRST 15 MIN: Performed by: ORTHOPAEDIC SURGERY

## 2022-10-19 PROCEDURE — 36415 COLL VENOUS BLD VENIPUNCTURE: CPT

## 2022-10-19 PROCEDURE — 85025 COMPLETE CBC W/AUTO DIFF WBC: CPT

## 2022-10-19 PROCEDURE — 3700000000 HC ANESTHESIA ATTENDED CARE: Performed by: ORTHOPAEDIC SURGERY

## 2022-10-19 RX ORDER — OXYCODONE HYDROCHLORIDE 5 MG/1
10 TABLET ORAL PRN
Status: DISCONTINUED | OUTPATIENT
Start: 2022-10-19 | End: 2022-10-19 | Stop reason: HOSPADM

## 2022-10-19 RX ORDER — ONDANSETRON 2 MG/ML
4 INJECTION INTRAMUSCULAR; INTRAVENOUS
Status: DISCONTINUED | OUTPATIENT
Start: 2022-10-19 | End: 2022-10-19 | Stop reason: HOSPADM

## 2022-10-19 RX ORDER — ONDANSETRON 4 MG/1
4 TABLET, ORALLY DISINTEGRATING ORAL EVERY 8 HOURS PRN
Status: DISCONTINUED | OUTPATIENT
Start: 2022-10-19 | End: 2022-10-21 | Stop reason: HOSPADM

## 2022-10-19 RX ORDER — ACETAMINOPHEN 325 MG/1
650 TABLET ORAL EVERY 4 HOURS PRN
Status: DISCONTINUED | OUTPATIENT
Start: 2022-10-19 | End: 2022-10-20

## 2022-10-19 RX ORDER — OXYCODONE HYDROCHLORIDE 5 MG/1
5 TABLET ORAL PRN
Status: DISCONTINUED | OUTPATIENT
Start: 2022-10-19 | End: 2022-10-19 | Stop reason: HOSPADM

## 2022-10-19 RX ORDER — ALBUTEROL SULFATE 90 UG/1
2 AEROSOL, METERED RESPIRATORY (INHALATION) EVERY 6 HOURS PRN
Status: DISCONTINUED | OUTPATIENT
Start: 2022-10-19 | End: 2022-10-21 | Stop reason: HOSPADM

## 2022-10-19 RX ORDER — LIDOCAINE HYDROCHLORIDE 20 MG/ML
INJECTION, SOLUTION INTRAVENOUS PRN
Status: DISCONTINUED | OUTPATIENT
Start: 2022-10-19 | End: 2022-10-19 | Stop reason: SDUPTHER

## 2022-10-19 RX ORDER — OXYCODONE HYDROCHLORIDE AND ACETAMINOPHEN 5; 325 MG/1; MG/1
1 TABLET ORAL EVERY 4 HOURS PRN
Status: DISCONTINUED | OUTPATIENT
Start: 2022-10-19 | End: 2022-10-20

## 2022-10-19 RX ORDER — HYDRALAZINE HYDROCHLORIDE 20 MG/ML
10 INJECTION INTRAMUSCULAR; INTRAVENOUS
Status: DISCONTINUED | OUTPATIENT
Start: 2022-10-19 | End: 2022-10-19 | Stop reason: HOSPADM

## 2022-10-19 RX ORDER — SODIUM CHLORIDE 9 MG/ML
INJECTION, SOLUTION INTRAVENOUS PRN
Status: DISCONTINUED | OUTPATIENT
Start: 2022-10-19 | End: 2022-10-21 | Stop reason: HOSPADM

## 2022-10-19 RX ORDER — SODIUM CHLORIDE 0.9 % (FLUSH) 0.9 %
5-40 SYRINGE (ML) INJECTION PRN
Status: DISCONTINUED | OUTPATIENT
Start: 2022-10-19 | End: 2022-10-19 | Stop reason: HOSPADM

## 2022-10-19 RX ORDER — SODIUM CHLORIDE 0.9 % (FLUSH) 0.9 %
5-40 SYRINGE (ML) INJECTION EVERY 12 HOURS SCHEDULED
Status: DISCONTINUED | OUTPATIENT
Start: 2022-10-19 | End: 2022-10-21 | Stop reason: HOSPADM

## 2022-10-19 RX ORDER — CLOPIDOGREL BISULFATE 75 MG/1
75 TABLET ORAL DAILY
Status: DISCONTINUED | OUTPATIENT
Start: 2022-10-20 | End: 2022-10-21 | Stop reason: HOSPADM

## 2022-10-19 RX ORDER — SODIUM CHLORIDE, SODIUM LACTATE, POTASSIUM CHLORIDE, CALCIUM CHLORIDE 600; 310; 30; 20 MG/100ML; MG/100ML; MG/100ML; MG/100ML
INJECTION, SOLUTION INTRAVENOUS CONTINUOUS
Status: DISCONTINUED | OUTPATIENT
Start: 2022-10-19 | End: 2022-10-19 | Stop reason: HOSPADM

## 2022-10-19 RX ORDER — PROPOFOL 10 MG/ML
INJECTION, EMULSION INTRAVENOUS PRN
Status: DISCONTINUED | OUTPATIENT
Start: 2022-10-19 | End: 2022-10-19 | Stop reason: SDUPTHER

## 2022-10-19 RX ORDER — PHENYLEPHRINE HCL IN 0.9% NACL 1 MG/10 ML
SYRINGE (ML) INTRAVENOUS PRN
Status: DISCONTINUED | OUTPATIENT
Start: 2022-10-19 | End: 2022-10-19 | Stop reason: SDUPTHER

## 2022-10-19 RX ORDER — FENTANYL CITRATE 50 UG/ML
50 INJECTION, SOLUTION INTRAMUSCULAR; INTRAVENOUS EVERY 5 MIN PRN
Status: DISCONTINUED | OUTPATIENT
Start: 2022-10-19 | End: 2022-10-19 | Stop reason: HOSPADM

## 2022-10-19 RX ORDER — SODIUM CHLORIDE 9 MG/ML
25 INJECTION, SOLUTION INTRAVENOUS PRN
Status: DISCONTINUED | OUTPATIENT
Start: 2022-10-19 | End: 2022-10-19 | Stop reason: HOSPADM

## 2022-10-19 RX ORDER — SODIUM CHLORIDE 0.9 % (FLUSH) 0.9 %
5-40 SYRINGE (ML) INJECTION EVERY 12 HOURS SCHEDULED
Status: DISCONTINUED | OUTPATIENT
Start: 2022-10-19 | End: 2022-10-19 | Stop reason: HOSPADM

## 2022-10-19 RX ORDER — ONDANSETRON 2 MG/ML
INJECTION INTRAMUSCULAR; INTRAVENOUS PRN
Status: DISCONTINUED | OUTPATIENT
Start: 2022-10-19 | End: 2022-10-19 | Stop reason: SDUPTHER

## 2022-10-19 RX ORDER — ROCURONIUM BROMIDE 10 MG/ML
INJECTION, SOLUTION INTRAVENOUS PRN
Status: DISCONTINUED | OUTPATIENT
Start: 2022-10-19 | End: 2022-10-19 | Stop reason: SDUPTHER

## 2022-10-19 RX ORDER — TRAMADOL HYDROCHLORIDE 50 MG/1
50 TABLET ORAL EVERY 6 HOURS PRN
Status: DISCONTINUED | OUTPATIENT
Start: 2022-10-19 | End: 2022-10-21 | Stop reason: HOSPADM

## 2022-10-19 RX ORDER — FENTANYL CITRATE 50 UG/ML
25 INJECTION, SOLUTION INTRAMUSCULAR; INTRAVENOUS EVERY 5 MIN PRN
Status: DISCONTINUED | OUTPATIENT
Start: 2022-10-19 | End: 2022-10-19 | Stop reason: HOSPADM

## 2022-10-19 RX ORDER — LABETALOL HYDROCHLORIDE 5 MG/ML
10 INJECTION, SOLUTION INTRAVENOUS
Status: DISCONTINUED | OUTPATIENT
Start: 2022-10-19 | End: 2022-10-19 | Stop reason: HOSPADM

## 2022-10-19 RX ORDER — SODIUM CHLORIDE 0.9 % (FLUSH) 0.9 %
5-40 SYRINGE (ML) INJECTION PRN
Status: DISCONTINUED | OUTPATIENT
Start: 2022-10-19 | End: 2022-10-21 | Stop reason: HOSPADM

## 2022-10-19 RX ORDER — DEXAMETHASONE SODIUM PHOSPHATE 4 MG/ML
INJECTION, SOLUTION INTRA-ARTICULAR; INTRALESIONAL; INTRAMUSCULAR; INTRAVENOUS; SOFT TISSUE PRN
Status: DISCONTINUED | OUTPATIENT
Start: 2022-10-19 | End: 2022-10-19 | Stop reason: SDUPTHER

## 2022-10-19 RX ORDER — SODIUM CHLORIDE, SODIUM LACTATE, POTASSIUM CHLORIDE, CALCIUM CHLORIDE 600; 310; 30; 20 MG/100ML; MG/100ML; MG/100ML; MG/100ML
INJECTION, SOLUTION INTRAVENOUS CONTINUOUS
Status: DISCONTINUED | OUTPATIENT
Start: 2022-10-19 | End: 2022-10-21 | Stop reason: HOSPADM

## 2022-10-19 RX ORDER — FERROUS SULFATE 325(65) MG
325 TABLET ORAL DAILY
Status: DISCONTINUED | OUTPATIENT
Start: 2022-10-19 | End: 2022-10-21 | Stop reason: HOSPADM

## 2022-10-19 RX ORDER — ONDANSETRON 2 MG/ML
4 INJECTION INTRAMUSCULAR; INTRAVENOUS EVERY 6 HOURS PRN
Status: DISCONTINUED | OUTPATIENT
Start: 2022-10-19 | End: 2022-10-21 | Stop reason: HOSPADM

## 2022-10-19 RX ORDER — FENTANYL CITRATE 50 UG/ML
INJECTION, SOLUTION INTRAMUSCULAR; INTRAVENOUS PRN
Status: DISCONTINUED | OUTPATIENT
Start: 2022-10-19 | End: 2022-10-19 | Stop reason: SDUPTHER

## 2022-10-19 RX ORDER — TRANEXAMIC ACID 100 MG/ML
1000 INJECTION, SOLUTION INTRAVENOUS
Status: DISCONTINUED | OUTPATIENT
Start: 2022-10-19 | End: 2022-10-19

## 2022-10-19 RX ADMIN — HYDROMORPHONE HYDROCHLORIDE 0.5 MG: 1 INJECTION, SOLUTION INTRAMUSCULAR; INTRAVENOUS; SUBCUTANEOUS at 14:19

## 2022-10-19 RX ADMIN — OXYCODONE HYDROCHLORIDE AND ACETAMINOPHEN 1 TABLET: 5; 325 TABLET ORAL at 16:32

## 2022-10-19 RX ADMIN — PROPOFOL 150 MG: 10 INJECTION, EMULSION INTRAVENOUS at 08:53

## 2022-10-19 RX ADMIN — OXYCODONE HYDROCHLORIDE AND ACETAMINOPHEN 1 TABLET: 5; 325 TABLET ORAL at 20:54

## 2022-10-19 RX ADMIN — ONDANSETRON 4 MG: 2 INJECTION INTRAMUSCULAR; INTRAVENOUS at 09:01

## 2022-10-19 RX ADMIN — SODIUM CHLORIDE, POTASSIUM CHLORIDE, SODIUM LACTATE AND CALCIUM CHLORIDE: 600; 310; 30; 20 INJECTION, SOLUTION INTRAVENOUS at 12:50

## 2022-10-19 RX ADMIN — SUGAMMADEX 200 MG: 100 INJECTION, SOLUTION INTRAVENOUS at 11:20

## 2022-10-19 RX ADMIN — Medication 100 MCG: at 09:10

## 2022-10-19 RX ADMIN — DEXAMETHASONE SODIUM PHOSPHATE 4 MG: 4 INJECTION, SOLUTION INTRAMUSCULAR; INTRAVENOUS at 09:01

## 2022-10-19 RX ADMIN — Medication 50 MCG: at 11:17

## 2022-10-19 RX ADMIN — Medication 50 MCG: at 09:04

## 2022-10-19 RX ADMIN — FENTANYL CITRATE 50 MCG: 50 INJECTION, SOLUTION INTRAMUSCULAR; INTRAVENOUS at 12:29

## 2022-10-19 RX ADMIN — HYDROMORPHONE HYDROCHLORIDE 0.25 MG: 1 INJECTION, SOLUTION INTRAMUSCULAR; INTRAVENOUS; SUBCUTANEOUS at 10:00

## 2022-10-19 RX ADMIN — SODIUM CHLORIDE, POTASSIUM CHLORIDE, SODIUM LACTATE AND CALCIUM CHLORIDE: 600; 310; 30; 20 INJECTION, SOLUTION INTRAVENOUS at 07:11

## 2022-10-19 RX ADMIN — ROCURONIUM BROMIDE 20 MG: 10 INJECTION, SOLUTION INTRAVENOUS at 10:20

## 2022-10-19 RX ADMIN — SODIUM CHLORIDE, POTASSIUM CHLORIDE, SODIUM LACTATE AND CALCIUM CHLORIDE: 600; 310; 30; 20 INJECTION, SOLUTION INTRAVENOUS at 11:21

## 2022-10-19 RX ADMIN — LIDOCAINE HYDROCHLORIDE 100 MG: 20 INJECTION, SOLUTION INTRAVENOUS at 08:53

## 2022-10-19 RX ADMIN — CEFAZOLIN 2000 MG: 2 INJECTION, POWDER, FOR SOLUTION INTRAMUSCULAR; INTRAVENOUS at 08:42

## 2022-10-19 RX ADMIN — ACETAMINOPHEN 650 MG: 325 TABLET ORAL at 18:21

## 2022-10-19 RX ADMIN — ROCURONIUM BROMIDE 20 MG: 10 INJECTION, SOLUTION INTRAVENOUS at 10:00

## 2022-10-19 RX ADMIN — HYDROMORPHONE HYDROCHLORIDE 0.25 MG: 1 INJECTION, SOLUTION INTRAMUSCULAR; INTRAVENOUS; SUBCUTANEOUS at 09:18

## 2022-10-19 RX ADMIN — Medication 50 MCG: at 09:24

## 2022-10-19 RX ADMIN — FENTANYL CITRATE 50 MCG: 50 INJECTION, SOLUTION INTRAMUSCULAR; INTRAVENOUS at 12:08

## 2022-10-19 RX ADMIN — Medication 50 MCG: at 09:28

## 2022-10-19 RX ADMIN — ROCURONIUM BROMIDE 50 MG: 10 INJECTION, SOLUTION INTRAVENOUS at 08:53

## 2022-10-19 RX ADMIN — CEFAZOLIN 2000 MG: 2 INJECTION, POWDER, FOR SOLUTION INTRAMUSCULAR; INTRAVENOUS at 16:42

## 2022-10-19 RX ADMIN — FENTANYL CITRATE 50 MCG: 50 INJECTION, SOLUTION INTRAMUSCULAR; INTRAVENOUS at 09:13

## 2022-10-19 RX ADMIN — HYDROMORPHONE HYDROCHLORIDE 0.25 MG: 1 INJECTION, SOLUTION INTRAMUSCULAR; INTRAVENOUS; SUBCUTANEOUS at 11:07

## 2022-10-19 RX ADMIN — ONDANSETRON 4 MG: 2 INJECTION INTRAMUSCULAR; INTRAVENOUS at 11:20

## 2022-10-19 RX ADMIN — ROCURONIUM BROMIDE 10 MG: 10 INJECTION, SOLUTION INTRAVENOUS at 10:57

## 2022-10-19 ASSESSMENT — PAIN - FUNCTIONAL ASSESSMENT

## 2022-10-19 ASSESSMENT — PAIN DESCRIPTION - LOCATION
LOCATION: INCISION;HIP
LOCATION: HIP
LOCATION: INCISION;HIP
LOCATION: HIP
LOCATION: INCISION;HIP
LOCATION: HIP
LOCATION: HIP

## 2022-10-19 ASSESSMENT — PAIN DESCRIPTION - FREQUENCY
FREQUENCY: CONTINUOUS

## 2022-10-19 ASSESSMENT — PAIN SCALES - GENERAL
PAINLEVEL_OUTOF10: 9
PAINLEVEL_OUTOF10: 0
PAINLEVEL_OUTOF10: 7
PAINLEVEL_OUTOF10: 10
PAINLEVEL_OUTOF10: 10
PAINLEVEL_OUTOF10: 6
PAINLEVEL_OUTOF10: 6
PAINLEVEL_OUTOF10: 9
PAINLEVEL_OUTOF10: 6
PAINLEVEL_OUTOF10: 10

## 2022-10-19 ASSESSMENT — PAIN DESCRIPTION - ONSET
ONSET: ON-GOING

## 2022-10-19 ASSESSMENT — PAIN DESCRIPTION - PAIN TYPE
TYPE: SURGICAL PAIN

## 2022-10-19 ASSESSMENT — PAIN DESCRIPTION - DESCRIPTORS
DESCRIPTORS: ACHING

## 2022-10-19 ASSESSMENT — ENCOUNTER SYMPTOMS
NAUSEA: 0
COUGH: 0
ABDOMINAL PAIN: 0
SHORTNESS OF BREATH: 0
VOMITING: 0

## 2022-10-19 ASSESSMENT — PAIN DESCRIPTION - ORIENTATION
ORIENTATION: LEFT

## 2022-10-19 NOTE — PROGRESS NOTES
1153- pt. Arrived to pacu via bed from OR. Pt. Is attached to monitor and alarms are on. Received report from AMIRA ALEJO and Bhupinder Chu RN. Pt. Is drowsy from anesthesia but responds to verbal stimuli. She is able to follow commands. Pt. Is wearing a simple mask at 6L. SR on the monitor. Dressing to left hip is clean dry and intact. Pt. Is able to move her left foot and toes. Skin is warm and dry. Pulses are +2 bilaterally in pedal and post tib. Iv infusing without any issues. SCDs are turned on. 1255- pt. Is awake and alert. She states pain medication did help with her pain and it more tolerable now. Pt. Is able to  her legs and toes bilaterally. She has full feeling and denies numbness or tingling. Skin is warm and dry. Dressing to left hip is clean dry and intact. She is on 2L via [place]. Sr on the monitor. At this time pt. Is ready to transfer to floor for continuation of care. Called and gave report to Remi Sebastian Rn. Pt. Denies any other questions or concerns. Pt.  updated in the waiting room and he has pt's belongings.

## 2022-10-19 NOTE — CONSULTS
V2.0  [facility] Consult Note      Name:  Italo Pleitez /Age/Sex: 1965  (65 y.o. female)   MRN & CSN:  [id number] & [id number] Encounter Date/Time: 10/19/2022 3:46 PM EDT   [facility]  [facility] PCP: MD Kenya Rodriguez MD  Consulting Provider: FLAVIO Madden [facility] Day: 1    Assessment and Recommendations   Italo Pleitez is a 65 y.o. female with pmh of CAD s/p PTCA. Hyperlipidemia, Anal cancer, OA who presents with Primary osteoarthritis of left hip and scheduled left hip arthroplasty    Hospital Problems             Last Modified POA    * (Principal) Primary osteoarthritis of left hip 10/19/2022 Yes    Unilateral primary osteoarthritis, left hip 10/19/2022 Yes       Recommendations:    Primary osteoarthritis left hip status post arthroplasty:  -Observation  -Primary service orthopedics  -IM consulted for medical management  -Antiemetics and analgesics  -PT OT eval and treat  -Ice therapy  -SCDs for DVT prophylaxis  -Aspirin 325 mg twice daily  -Encourage deep breathing  -CBC and BMP pending. CAD s/p PTCA: SOUMYA of RCA (3/2022)  [address] cardiology outpatient Dr. Kendrick Him  -Continue Plavix and aspirin  -Currently on full dose aspirin twice daily for postop  -Patient states she will not take statin medications  -Follow-up with cardiology as planned  -last lipid panel ()    Dyslipidemia: Patient states she will not take statin medications as she will like the way they make her feel. COPD/centrilobular  emphysema: follows PCP Dr. Butch Schirmer  -has been on albuterol PRN, patient states not on any medication.   -Albuterol MDI prn     Iron deficiency anemia:  2/2 to GI blood loss. H&H-14.5/44.2. (10/11)  -Continue iron supplementation  -CBC pending    History of GI bleeding: S/p colon AVM. 2 clips placed.  -Follows with Dr. Den Iverson. Remote History of anal cancer: () followed with Dr. Shital Jack;  Regular   DVT Prophylaxis [] Lovenox, [] Heparin, [x] SCDs, [] Ambulation,  [] Eliquis, [] Xarelto, ASA full dose. Code Status Full Code   Surrogate Decision Maker/ POA  spouse   Hi  History Obtained From:    patient    History of Present Illness:     Chief Complaint: Primary osteoarthritis of left hip    Renata Melton is a 65 y.o. female who is seen today by the hospitalist team at the request of Dr. Lewis Balderrama, for medical management and recent significant cardiac history. Patient was seen at bedside, she is complaining of left hip pain and states the \"meds are making feel tired\". Patient then became agitated discussing her medication regimen states she takes Plavix and aspirin \"cannot take any statin meds I do not like doing to make me feel \", she does have a recent history of stent placement March 2022, patient completed cardiac rehab. She has a history of iron deficiency anemia in the setting of acute GI blood loss. She is on iron supplementation. She again became upset when she was informed we will check her labs to follow for hemoglobin and after surgery, patient again became upset states \"he could only draw my labs once to check and no more I have enough data for the last time \". Review of Systems:    Review of Systems   Constitutional:  Positive for fatigue. Negative for chills and fever. \"Feeling tired from the meds\"   HENT:  Negative for congestion. Respiratory:  Negative for cough and shortness of breath. Cardiovascular:  Negative for chest pain, palpitations and leg swelling. Gastrointestinal:  Negative for abdominal pain, nausea and vomiting. Genitourinary:  Negative for dysuria. Musculoskeletal:         Left hip pain, post op   Skin: Negative. Neurological:  Negative for dizziness and weakness. Psychiatric/Behavioral:  Negative for agitation. Objective:      Intake/Output Summary (Last 24 hours) at 10/19/2022 1546  Last data filed at 10/19/2022 1121  Gross per 24 hour   Intake 1000 ml   Output 200 ml   Net 800 ml      Vitals:   Vitals:    10/19/22 1300 10/19/22 1336 10/19/22 1419 10/19/22 1518   BP: (!) 122/93 138/86     Pulse: 90 91     Resp: 16 16 20    Temp: 97.9 °F (36.6 °C) 97.7 °F (36.5 °C)     TempSrc: Temporal Oral     SpO2: 95% 95%  95%   Weight:       Height:           Medications Prior to Admission     Prior to Admission medications    Medication Sig Start Date End Date Taking? Authorizing Provider   acetaminophen (TYLENOL) 325 MG tablet Take 650 mg by mouth every 6 hours as needed for Pain   Yes Historical Provider, MD   traMADol (ULTRAM) 50 MG tablet Take 50 mg by mouth every 6 hours as needed for Pain. Yes Historical Provider, MD   clopidogrel (PLAVIX) 75 MG tablet Take 1 tablet by mouth daily 10/3/22   FLAVIO Bowens - CNP   azithromycin (ZITHROMAX) 1 g powder Take 1 packet by mouth once  Patient not taking: Reported on 10/10/2022    Historical Provider, MD   levoFLOXacin (LEVAQUIN) 500 MG/100ML SOLN Infuse 500 mg intravenously every 24 hours  Patient not taking: Reported on 10/10/2022    Historical Provider, MD   cetirizine (ZYRTEC) 10 MG tablet Take 10 mg by mouth daily  Patient not taking: Reported on 10/10/2022    Historical Provider, MD   pravastatin (PRAVACHOL) 40 MG tablet Take 40 mg by mouth in the morning. Patient not taking: Reported on 10/19/2022 7/12/22 7/12/23  Historical Provider, MD   alirocumab (PRALUENT) 75 MG/ML SOAJ injection pen Inject 1 mL into the skin every 14 days  Patient not taking: Reported on 10/10/2022 7/27/22   FLAVIO Bowens - CNP   KLOR-CON M20 20 MEQ extended release tablet  6/16/22   Historical Provider, MD   nystatin (MYCOSTATIN) 179674 UNIT/GM ointment Apply to affected area twice daily.  2/8/22 2/3/23  Historical Provider, MD   hydrocortisone 0.5 % ointment Apply topically 2 times daily 2/8/22   Historical Provider, MD   rosuvastatin (CRESTOR) 40 MG tablet Take 40 mg by mouth daily  Patient not taking: Reported on 10/10/2022 5/26/22 Historical Provider, MD   nitroGLYCERIN (NITROSTAT) 0.4 MG SL tablet up to max of 3 total doses. If no relief after 1 dose, call 911. 6/17/22   Charissa Quezada MD   ferrous sulfate (FE TABS 325) 325 (65 Fe) MG EC tablet Take 325 mg by mouth daily 3/8/22 3/8/23  Historical Provider, MD   furosemide (LASIX) 20 MG tablet Take 1 tablet by mouth daily  Patient taking differently: Take 40 mg by mouth daily Takes PRN 2/27/22   Shree Patton MD   olopatadine (PATANOL) 0.1 % ophthalmic solution Place 1 drop into both eyes 2 times daily  Patient not taking: Reported on 10/19/2022    Historical Provider, MD   potassium chloride (KLOR-CON) 20 MEQ packet Take 20 mEq by mouth 2 times daily  Patient not taking: Reported on 10/10/2022    Historical Provider, MD   tiZANidine (ZANAFLEX) 4 MG tablet Take 4 mg by mouth every 6 hours as needed   Patient not taking: Reported on 10/10/2022    Historical Provider, MD   loperamide (IMODIUM) 2 MG capsule Take 2 mg by mouth as needed for Diarrhea     Historical Provider, MD   albuterol-ipratropium (COMBIVENT RESPIMAT)  MCG/ACT AERS inhaler Inhale into the lungs  5/15/17   Historical Provider, MD   aspirin 81 MG chewable tablet Take 1 tablet by mouth daily 7/7/16   Erika Velasco MD       Physical Exam: Need 8 Elements   General: NAD  Eyes: EOMI  ENT: neck supple  Cardiovascular: Regular rate. Respiratory: Clear to auscultation  Gastrointestinal: Soft, non tender  Genitourinary: no suprapubic tenderness  Musculoskeletal: No edema  Skin: warm, dry  Neuro: Alert. Psych: Mood appropriate.        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  Skin cancer      PSHX:  has a past surgical history that includes Percutaneous Transluminal Coronary Angio (); Colon surgery (); Ankle surgery (Left, 10+ yrs ago); Tubal ligation (25 yrs ago); Tunneled venous port placement (); Tonsillectomy; other surgical history (2015); fracture surgery; Colonoscopy (2015); Colonoscopy (03/15/2017); Upper gastrointestinal endoscopy (N/A, 2022); Colonoscopy (N/A, 2022); Upper gastrointestinal endoscopy (N/A, 2022); and Percutaneous Transluminal Coronary Angio (2022). Allergies: Allergies   Allergen Reactions    Brilinta [Ticagrelor] Shortness Of Breath    Adhesive Tape Other (See Comments)    Crestor [Rosuvastatin Calcium]     Vicodin Hp [Hydrocodone-Acetaminophen] Other (See Comments)     Keep me awake    Tylenol With Codeine #3 [Acetaminophen-Codeine] Nausea And Vomiting     Fam HX: family history includes Cancer in her brother, father, and mother; High Cholesterol in her father; No Known Problems in her daughter; Stroke in her father and sister.   Soc HX:   Social History     Socioeconomic History    Marital status:      Spouse name: None    Number of children: None    Years of education: 6    Highest education level: None   Occupational History    Occupation: unemployed   Tobacco Use    Smoking status: Former     Packs/day: 0.25     Years: 35.00     Pack years: 8.75     Types: Cigarettes     Start date:      Quit date: 10/2021     Years since quittin.0    Smokeless tobacco: Never    Tobacco comments:     6-7 cigarettes    Vaping Use    Vaping Use: Never used   Substance and Sexual Activity    Alcohol use: No     Comment: \"quit 3/2015\"use to drink a couple of times per week before\"    Drug use: No     Comment: 1-2 cups of coffee daily     Sexual activity: Yes       Medications:   Medications:    [START ON 10/20/2022] clopidogrel  75 mg Oral Daily    ferrous sulfate  325 mg Oral Daily    sodium chloride flush  5-40 mL IntraVENous 2 times per day    ceFAZolin (ANCEF) IVPB  2,000 mg IntraVENous Q8H    aspirin  325 mg Oral BID      Infusions:    sodium chloride      lactated ringers 75 mL/hr at 10/19/22 1250     PRN Meds: sodium chloride flush, 5-40 mL, PRN  sodium chloride, , PRN  HYDROmorphone, 0.25 mg, Q3H PRN   Or  HYDROmorphone, 0.5 mg, Q3H PRN  acetaminophen, 650 mg, Q4H PRN  bisacodyl, 5 mg, Daily PRN  ondansetron, 4 mg, Q8H PRN   Or  ondansetron, 4 mg, Q6H PRN  oxyCODONE-acetaminophen, 1 tablet, Q4H PRN        Labs and Imaging   XR PELVIS (1-2 VIEWS)    Result Date: 10/19/2022  EXAMINATION: ONE XRAY VIEW OF THE PELVIS 10/19/2022 12:15 pm COMPARISON: 02/19/2021 HISTORY: ORDERING SYSTEM PROVIDED HISTORY: post op TECHNOLOGIST PROVIDED HISTORY: Single view AP pelvis to include the prosthesis Reason for exam:->post op Reason for Exam: post op FINDINGS: Patchy sclerosis and lucency in the right proximal femur felt related to avascular necrosis. Postoperative repair noted in the left hip with left hip arthroplasty hardware seen. Soft tissue gas related to the postoperative status. No acute osseous abnormality is identified. Satisfactory appearance following left hip arthroplasty. FL LESS THAN 1 HOUR    Result Date: 10/19/2022  Radiology exam is complete. No Radiologist dictation. Please follow up with ordering provider. CBC: No results for input(s): WBC, HGB, PLT in the last 72 hours. BMP:  No results for input(s): NA, K, CL, CO2, BUN, CREATININE, GLUCOSE in the last 72 hours. Hepatic: No results for input(s): AST, ALT, ALB, BILITOT, ALKPHOS in the last 72 hours.   Lipids:   Lab Results   Component Value Date/Time    CHOL 152 02/23/2022 04:02 AM    CHOL 152 02/11/2022 08:36 AM    CHOL 152 02/11/2022 08:36 AM    HDL 68 02/23/2022 04:02 AM    TRIG 115 02/23/2022 04:02 AM     Hemoglobin A1C: No results found for: LABA1C  TSH:   Lab Results   Component Value Date/Time    TSH 2.420 02/11/2022 08:36 AM    TSH 2.420 02/11/2022 08:36 AM     Troponin:   Lab Results   Component Value Date/Time    TROPONINT 0.063 03/03/2022 02:35 AM    TROPONINT 0.103 03/02/2022 10:27 PM    TROPONINT <0.010 03/09/2021 03:43 AM     Lactic Acid: No results for input(s): LACTA in the last 72 hours. BNP: No results for input(s): PROBNP in the last 72 hours.   UA:  Lab Results   Component Value Date/Time    NITRU NEGATIVE 10/11/2022 09:58 AM    COLORU YELLOW 10/11/2022 09:58 AM    WBCUA 2 03/02/2022 11:45 PM    RBCUA NONE SEEN 03/02/2022 11:45 PM    MUCUS RARE 03/02/2022 11:45 PM    YEAST RARE 10/03/2015 04:10 PM    BACTERIA RARE 03/02/2022 11:45 PM    CLARITYU CLEAR 10/11/2022 09:58 AM    SPECGRAV <=1.005 10/11/2022 09:58 AM    LEUKOCYTESUR NEGATIVE 10/11/2022 09:58 AM    UROBILINOGEN 0.2 10/11/2022 09:58 AM    BILIRUBINUR NEGATIVE 10/11/2022 09:58 AM    BLOODU NEGATIVE 10/11/2022 09:58 AM    KETUA NEGATIVE 10/11/2022 09:58 AM     Urine Cultures: No results found for: LABURIN  Blood Cultures: No results found for: BC  No results found for: BLOODCULT2  Organism: No results found for: [facility]        Electronically signed by FLAVIO Arzola CNP on 10/19/2022 at 3:46 PM

## 2022-10-19 NOTE — H&P
Chief Complaint   Patient presents with    Follow-up       B/L hip injections 6/23/22         History of Present Illness:                             Rebecca Hopper is a 65 y.o. female who returns today for a follow-up of her bilateral left worse than right hip pain. The injections have helped in the past but the most recent injection only gave her pain relief for about 3 weeks. She is interested in moving forward with surgical intervention because of the severity of her pain. She is having very severe pain throughout the left hip and into the groin which is constant throughout the day and also bothers her at night. She is having difficulty with walking and standing and has noticed that she is unable to be as active as she would like to be. She has trouble doing simple activities of daily living such as going up and down stairs, getting up from a seated position, getting dressed, and putting on her shoes. She has been working with her medical doctors regarding her pain medications and has been using over-the-counter creams and anti-inflammatory medications. She would like to discuss definitive treatment as far as total hip replacement on the left. She needs to check with her cardiologist regarding potential clearance given her history of cardiac event. Patient seen in office today for FU L hip injections given 6/23/22. Stated L hurts more than R. Injections did help but only for a few weeks. 9/10 pain level in L hip today, waking up in night because of pain going all the way down leg. Using OTC to alleviate pain. She's wanting another steroid injection but would like to discuss NUBIA. Medical History  Patient's medications, allergies, past medical, surgical, social and family histories were reviewed and updated as appropriate.      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 Skin cancer           Past Surgical History         Past Surgical History:   Procedure Laterality Date    ANKLE SURGERY Left 10+ yrs ago     with hardware    COLON SURGERY   2009     \"after had chemo removed some part of the cancer from the rectal area, then 7 months later had bowel blockage had to do surgery  to remove part of the bowel\"    COLONOSCOPY   03/04/2015     mild proctitis    COLONOSCOPY   03/15/2017     mild radia proc, hypertroph pailla    COLONOSCOPY N/A 03/03/2022     COLONOSCOPY CONTROL HEMORRHAGE WITH STRAIGHT FIRE APC WITH CLIP PLACEMENT X 2 performed by Johny Monsalve MD at [address] [place] [address]         left ankle    OTHER SURGICAL HISTORY   04/23/2015     mediport removal    PTCA   2009 4/22/2009 had angioplasty with stent to LAD & another day in 2009 stent x 2 to RCA done    PTCA   02/22/2022     RCA stented. TONSILLECTOMY         as a kid age 7    TUBAL LIGATION   25 yrs ago    TUNNELED VENOUS PORT PLACEMENT   2009     port inserted     UPPER GASTROINTESTINAL ENDOSCOPY N/A 02/26/2022     EGD DIAGNOSTIC ONLY performed by Johny Monsalve MD at [facility] N/A 03/03/2022     ENTEROSCOPY PUSH DIAGNOSTIC performed by Johny Monsalve MD at [address] [facility] ENDOSCOPY         Family History         Family History   Problem Relation Age of Onset    Cancer Mother           breast ca? ?    Cancer Father           prostate cancer- lung mets- bone mets    Stroke Father      High Cholesterol Father      Stroke Sister      Cancer Brother           neck cancer    No Known Problems Daughter           Social History   Social History            Socioeconomic History    Marital status:     Years of education: 6   Occupational History    Occupation: unemployed   Tobacco Use    Smoking status: Former       Packs/day: 0.25       Years: 35.00       Pack years: 8.75       Types: Cigarettes       Start date:        Quit date: 10/2021       Years since quittin.9    Smokeless tobacco: Never    Tobacco comments:       6-7 cigarettes    Substance and Sexual Activity    Alcohol use: No       Comment: \"quit 3/2015\"use to drink a couple of times per week before\"    Drug use: No       Comment: 1-2 cups of coffee daily     Sexual activity: Yes         Current Facility-Administered Medications          Current Outpatient Medications   Medication Sig Dispense Refill    azithromycin (ZITHROMAX) 1 g powder Take 1 packet by mouth once        levoFLOXacin (LEVAQUIN) 500 MG/100ML SOLN Infuse 500 mg intravenously every 24 hours        cetirizine (ZYRTEC) 10 MG tablet Take 10 mg by mouth daily        traMADol (ULTRAM) 50 MG tablet Take 1 tablet by mouth every 8 hours as needed for Pain for up to 7 days. Intended supply: 7 days. Take lowest dose possible to manage pain 21 tablet 0    pravastatin (PRAVACHOL) 40 MG tablet Take 40 mg by mouth in the morning. alirocumab (PRALUENT) 75 MG/ML SOAJ injection pen Inject 1 mL into the skin every 14 days 2.24 mL 5    KLOR-CON M20 20 MEQ extended release tablet          nystatin (MYCOSTATIN) 738700 UNIT/GM ointment Apply to affected area twice daily. hydrocortisone 0.5 % ointment Apply topically 2 times daily        rosuvastatin (CRESTOR) 40 MG tablet Take 40 mg by mouth daily        nitroGLYCERIN (NITROSTAT) 0.4 MG SL tablet up to max of 3 total doses.  If no relief after 1 dose, call 911. 25 tablet 3    clopidogrel (PLAVIX) 75 MG tablet Take 1 tablet by mouth daily 30 tablet 5    ferrous sulfate (FE TABS 325) 325 (65 Fe) MG EC tablet Take 325 mg by mouth daily        furosemide (LASIX) 20 MG tablet Take 1 tablet by mouth daily (Patient taking differently: Take 40 mg by mouth daily) 60 tablet 3    olopatadine (PATANOL) 0.1 % ophthalmic solution Place 1 drop into both eyes 2 times daily        potassium chloride (KLOR-CON) 20 MEQ packet Take 20 mEq by mouth 2 times daily        tiZANidine (ZANAFLEX) 4 MG tablet Take 4 mg by mouth every 6 hours as needed         loperamide (IMODIUM) 2 MG capsule Take 2 mg by mouth as needed for Diarrhea         albuterol-ipratropium (COMBIVENT RESPIMAT)  MCG/ACT AERS inhaler Inhale into the lungs         aspirin 81 MG chewable tablet Take 1 tablet by mouth daily 30 tablet 6      No current facility-administered medications for this visit. Allergies   Allergen Reactions    Brilinta [Ticagrelor] Shortness Of Breath    Adhesive Tape Other (See Comments)    Crestor [Rosuvastatin Calcium]      Vicodin Hp [Hydrocodone-Acetaminophen] Other (See Comments)       Keep me awake    Tylenol With Codeine #3 [Acetaminophen-Codeine] Nausea And Vomiting            Review of Systems   Constitutional:  Negative for chills and fever. HENT:  Negative for congestion and sneezing. Eyes:  Negative for pain and redness. Respiratory:  Negative for chest tightness, shortness of breath and wheezing. Cardiovascular:  Negative for chest pain and palpitations. Gastrointestinal:  Negative for vomiting. Musculoskeletal:  Positive for arthralgias. Skin:  Negative for color change and rash. Neurological:  Negative for weakness and numbness. Psychiatric/Behavioral:  Negative for agitation. The patient is not nervous/anxious. Examination:  General Exam:  Vitals: /83   Pulse 99   Ht 5' 2\" (1.575 m)   SpO2 97%   BMI 31.09 kg/m²    Physical Exam  Vitals and nursing note reviewed. Constitutional:       Appearance: Normal appearance.    HENT:      Head: Normocephalic and atraumatic. Eyes:      Conjunctiva/sclera: Conjunctivae normal.      Pupils: Pupils are equal, round, and reactive to light. Pulmonary:      Effort: Pulmonary effort is normal.   Musculoskeletal:      Cervical back: Normal range of motion. Lumbar back: No swelling, deformity or tenderness. Normal range of motion. Right hip: No deformity, tenderness, bony tenderness or crepitus. Normal range of motion. Normal strength. Right knee: Normal.      Left knee: No swelling, deformity, effusion or erythema. Normal range of motion. No medial joint line or lateral joint line tenderness. No LCL laxity or MCL laxity. Normal alignment. Comments: Left Lower Extremity:  There is moderate tenderness to palpation diffusely throughout the hip both anteriorly and posteriorly. Range of motion is significantly limited and painful at the extremes of motion. Hip flexion present to 90 degrees, abduction 20 degrees, extension 5 degrees, internal rotation 10 degrees, external rotation 10 degrees. Strength is 5 out of 5 with hip flexion, extension, and abduction however this is somewhat limited due to pain with resistance testing. BRITNEY and FADIR test reproduces pain to the groin and hip joint. Sensation is intact to light touch in the left lower extremity. Pulses are intact. Skin is intact without erythema. No lower extremity edema. Skin:     General: Skin is warm and dry. Neurological:      Mental Status: She is alert and oriented to person, place, and time.    Psychiatric:         Mood and Affect: Mood normal.         Behavior: Behavior normal.            Diagnostic testing:  X-ray images were reviewed by myself and discussed with the patient:  AP pelvis and frog-leg lateral view of the left hip show evidence of    bilateral avascular necrosis present at the femoral heads with evidence of    mild degenerative changes of the right hip joint and moderate to severe    degenerative changes at the left hip joint. The left hip has advanced    joint space narrowing and areas of cortical irregularity and mild    spurring. No evidence of fracture or acute abnormality. Normal    alignment. Impression: Left hip degenerative joint disease with underlying avascular    necrosis         X-ray report from 4/12/2022:  Bilateral hip primary osteoarthritis left worse than right. Prominent osteophyte formation and flattening of the humeral head consistent with underlying avascular necrosis     Office Procedures:  No orders of the defined types were placed in this encounter. Assessment and Plan  1. bilateral left worse than right hip primary osteoarthritis    2. Left femoral head avascular necrosis    We discussed the severity of the degenerative findings on both on the x-rays and physical exam.  The patient feels that they have exhausted conservative measures. The patient has previously tried injections, physical therapy, over-the-counter pain medications, and activity modification. The pain level is severe and bothers the patient on a daily basis, including interrupting sleep at night. Activities of daily living are difficult to perform. The patient has trouble getting dressed, getting up from a seated position, climbing stairs, and has fear of falling. The patient has tried to exercise and lose weight but feels that this has been difficult and limited because of ongoing hip pain. The pain and dysfunction at the hip are severely limiting at this stage and the patient would like to consider surgical intervention. I have recommended surgical intervention for a total hip replacement. We discussed the risks, benefits, and alternatives to surgery. We discussed the intended benefits from a well-functioning replacement and the anticipated recovery process.   We discussed potential risks and complications including but not limited to DVT/PE, infection, stiffness, loosening, limb length inequality, dislocation, and fracture. We discussed pain control, physical therapy, and anticoagulation during the perioperative timeframe. The patient would like to proceed with hip replacement surgery and will be enrolled in the joint replacement class     Plan will be to proceed with an anterior approach total hip replacement. She will be on aspirin in addition to her Plavix for postoperative DVT prophylaxis. We will consult cardiology for preoperative clearance and to ensure that it is appropriate to hold her Plavix prior to surgery     History and Physical exam note from the office visit is copied above from epic. I have reviewed the note and examined the patient and find no relevant changes.      I have reviewed with the patient and/or family the risks, benefits, and alternatives to the procedure as well as expected postoperative course    Pepe Munoz MD

## 2022-10-19 NOTE — ANESTHESIA POSTPROCEDURE EVALUATION
Department of Anesthesiology  Postprocedure Note    Patient: Haritha Suazo  MRN: [id number]  YOB: 1965  Date of evaluation: 10/19/2022      Procedure Summary     Date: 10/19/22 Room / Location: [address]    Anesthesia Start: 0848 Anesthesia Stop: 1767    Procedure: LEFT HIP TOTAL ARTHROPLASTY ANTERIOR APPROACH (Left: Hip) Diagnosis:       Primary osteoarthritis of left hip      Left hip pain      (Primary osteoarthritis of left hip [M16.12])      (Left hip pain [M25.552])    Surgeons: Melinda Mobley MD Responsible Provider: Dejah Maxwell MD    Anesthesia Type: General ASA Status: 4          Anesthesia Type: General    Dionisio Phase I:      Dionisio Phase II:        Anesthesia Post Evaluation    Patient location during evaluation: PACU  Patient participation: complete - patient participated  Level of consciousness: sleepy but conscious  Airway patency: patent  Nausea & Vomiting: no nausea and no vomiting  Complications: no  Cardiovascular status: hemodynamically stable  Respiratory status: face mask and spontaneous ventilation  Hydration status: stable

## 2022-10-19 NOTE — OP NOTE
Operative Note      Patient: Jayshree Pathak  YOB: 1965  MRN: [id number]    Date of Procedure: 10/19/2022    Pre-Op Diagnosis: Primary osteoarthritis of left hip [M16.12]  Left hip pain [M25.552]    Post-Op Diagnosis: Same       Procedure(s):  LEFT HIP TOTAL ARTHROPLASTY ANTERIOR APPROACH    Surgeon(s):  Magdi Wood MD    Assistant:   Physician Assistant: YOSELYN Altamirano was present for the entirety of the procedure and vital for the performance of the procedure. Lewis Anand assisted with positioning, prepping, draping of the patient before the procedure and instrument manipulation, extremity repositioning during the procedure as well as wound closure, dressing application and brace application after the procedure. Please note that no intern, resident, or other hospital staff was available to assist during the surgery    Anesthesia: General    Estimated Blood Loss (mL): 058     Complications: Other: Fracture of the greater trochanter    Specimens:   * No specimens in log *    Implants:  Implant Name Type Inv. Item Serial No.  Lot No. LRB No. Used Action   SHELL ACET SZ D [id number] CLUS H TRITANIUM PRESSFIT KARLOS - [id number]  SHELL ACET SZ D [id number] CLUS H TRITANIUM PRESSFIT KARLOS  [employer] [facility] [id number] Left 1 Implanted   INSERT ACET D 0 DEG 36 MM HIP X3 TRIDENT - [id number]  INSERT ACET D 0 DEG 36 MM HIP X3 TRIDENT  [employer] [facility] [id number] Left 1 Implanted   SCREW BNE L25MM DIA6. 5MM HEX LO PROF TRIDENT II - [id number]  SCREW BNE L25MM DIA6. 5MM HEX [facility] TRIDENT II  [employer] [facility] [id number] Left 1 Implanted   STEM FEM SZ 5 L108MM NK L35MM 44MM OFFSET 127DEG HIP TI - [id number]  STEM FEM SZ 5 L108MM NK L35MM 44MM OFFSET 127DEG HIP TI  [employer] [facility] [id number] Left 1 Implanted   HEAD FEM [id number] -5MM OFFSET HIP BIOLOX DELT CERAMIC TAPR - [id number]  HEAD FEM [id number] -5MM OFFSET HIP BIOLOX DELT CERAMIC TAPR  [employer] [facility] [id number] Left 1 Implanted   CABLE SURG L750MM DIA1MM S STL SMOOTH W/ CRMP - [id number]  CABLE SURG L750MM DIA1MM S STL SMOOTH W/ CRMP  [employer] [id number] Left 1 Implanted   CABLE SURG L750MM DIA1MM S STL SMOOTH W/ CRMP - [id number]  CABLE SURG L750MM DIA1MM S STL SMOOTH W/ CRMP  [employer] [id number] Left 1 Implanted         Drains: * No LDAs found *    Findings: There was extensive avascular necrosis present at the femoral head. Bone density was compromise from chronicity of her disease. Detailed Description of Procedure:     Patient was identified in the preoperative area and the site was marked. After initiation of the above anesthesia, the patient was placed supine on the traction [place] table. Both legs were secured. The operative hip was prepped and draped in sterile fashion timeout was performed and preoperative antibiotics were given. An incision was made over the anterior lateral aspect of the proximal femur posterior to the anterior superior iliac spine and in line with the tensor fascia muscle belly. Dissection was carried down through subcutaneous tissues and bleeding was controlled with the Bovie. The fascia overlying the tensor was incised sharply in line with the incision. The muscle belly was freed from its anterior fascia and retracted laterally and protected throughout the case. A Cobra retractor was placed over the superior aspect of the femoral neck. A Hohmann retractor was placed over the medial aspect of the femoral hip joint capsule. A Cottrell elevator was used to elevate the rectus femoris and reflected head off the anterior hip joint capsule. The lateral femoral circumflex vessels were identified and cauterized. The fascia interval was extended distally to expose the anterior aspect of the proximal femur. A capsulotomy was performed along the anterior femoral neck extending down towards the vastus tubercle.   Tag sutures were placed for later capsular repair. Retractors were then repositioned within the joint capsule exposing the femoral neck. A sagittal saw was used to perform a femoral neck osteotomy approximately 15 mm above the lesser trochanter. A second osteotomy was performed in the subcapital region and the napkin ring femoral neck was removed. A corkscrew was placed in the femoral head and the femoral head was then excised. Retractors were placed at the acetabulum. Residual labrum was cleared with a deep knife. Sequential reamers were utilized under fluoroscopy to ensure appropriate abduction and anteversion of the cup. Between reamers x-rays were checked to ensure there was appropriate medialization and resection of bone. The size 48 reamer had good peripheral fill at the acetabulum and excellent cancellous bone was present following the reaming. The size 48 mm Trident press-fit acetabular cup was impacted into place with guidance of fluoroscopy and it seated well with excellent purchase and fixation of the acetabular bone. Fixation was augmented with a 6.5 mm acetabular screw and posterior superior quadrant. The size 36 mm neutral articular surface liner was impacted in place and seated well. Attention was then taken to the proximal femur. Capsular releases were performed inferiorly at the femoral neck. A femoral hook was used around the posterior aspect of the proximal femur and the hip was externally rotated, extended, and adducted. The capsule was released along the inner margin of the greater trochanter extending posteriorly to the piriformis and conjoint tendon. Appropriate releases were performed to access the proximal femur. Retractors were placed at the femoral neck and greater trochanter. A box osteotome was utilized to gain access to the proximal femur. A curette was used to clear bone laterally.   Sequential broaches were utilized and impacted into place and natural version in line with the posterior cortex. During retracting soft tissues and broaching of the proximal femur there was found to be fracture with comminution at the greater trochanter. The fracture did not extend into the shaft or below the lesser trochanter. A size 5, 127 degree neck angle Accolade 2 femoral trial was selected. This had excellent fixation and rotational control of the proximal femur. Trial components were placed and trialed under fluoroscopy. A size 36 head with -5mm mm neck length provided excellent restoration of the anatomy of the hip verified with fluoroscopy. The hip was dislocated and femoral trials were removed. The wound was thoroughly irrigated with pulse lavage. The size 5, 127 degree neck angle femoral stem was impacted into place and seated well at the osteotomy site. Next the size 36 mm with -5 mm neck length ceramic femoral head was impacted in place. Hip was again reduced and taken through trial and verified with fluoroscopy. The femoral stem was stable during testing and movement of the hip and stable under live fluoroscopy with no evidence of loosening or subsidence. The greater trochanteric fracture fragments were evaluated and there was comminution present but no displacement seen on exam or on the x-rays. Due to the fracture was felt that she would benefit from additional fixation with cerclage cables. A cable was passed around the femoral neck above the lesser trochanter. Due to the comminution of the greater trochanter the cable was up against the lateral aspect of the stem and was tightened appropriately and cinched in place and the cable was cut. It was felt that with the comminution present greater trochanter but it the cable was unable to be fully brought around the main fracture fragments. A second cable was placed distal to the lesser trochanter and verified  With the C arm. The cable was tensioned appropriately and clamped and cut.   Again x-rays were taken confirming excellent alignment of the hip replacement with equal leg lengths and appropriate position of the greater trochanter. There is no evidence of displacement and the trochanter was stable and moved as a single unit with the femur during rotational movements under live fluoroscopy. The hip was then thoroughly irrigated. Anterior hip capsule was repaired. The fascia layer was closed with a running #1 strata fix suture. Deep subcutaneous layers were closed with buried 2-0 Vicryl. A 3-0 strata fix Monocryl suture was placed in the subcuticular layer. A Prineo Dermabond skin closure was then applied. Sterile dressing was placed with 4 x 8's, ABD, and tape. Patient was awakened and taken to PACU in stable condition. All sponge and needle counts are correct. Postoperative plan:  Patient will be protected in her weightbearing for 4 to 6 weeks and work immediately with physical therapy. She will perform no active abduction exercises initially. Anterior hip precautions will be in place. Chemical DVT prophylaxis has been ordered. Patient will be admitted to the hospital for overnight observation, pain control, physical therapy, and medical monitoring.      Electronically signed by Liz Mares MD on 10/19/2022 at 11:50 AM

## 2022-10-19 NOTE — CONSULTS
[address] [facility] PHYSICAL THERAPY EVALUATION  Nelwyn Paget, 1965, 1123/1123-A, 10/19/2022    History  [place]:  There were no encounter diagnoses. Patient  has a past medical history of Anal cancer ([facility] [address] Asthma, Broken ankle, CAD (coronary artery disease), H/O cardiovascular stress test, History of blood transfusion, NSTEMI (non-ST elevated myocardial infarction) ([facility] [address] PONV (postoperative nausea and vomiting), Post PTCA, and Skin cancer. Patient  has a past surgical history that includes Percutaneous Transluminal Coronary Angio (2009); Colon surgery (2009); Ankle surgery (Left, 10+ yrs ago); Tubal ligation (25 yrs ago); Tunneled venous port placement (2009); Tonsillectomy; other surgical history (04/23/2015); fracture surgery; Colonoscopy (03/04/2015); Colonoscopy (03/15/2017); Upper gastrointestinal endoscopy (N/A, 02/26/2022); Colonoscopy (N/A, 03/03/2022); Upper gastrointestinal endoscopy (N/A, 03/03/2022); and Percutaneous Transluminal Coronary Angio (02/22/2022). Subjective:  Patient states:  \"I'll try but if I can't then I can't, you hear me? \"    Pain:  9/10 pain in L hip at sx site.     Communication with other providers:  Handoff to RN  Restrictions: 50% Wb'ing L LE, anterior hip precautions L LE, general precautions, fall risk    Home Setup/Prior level of function  Social/Functional History  Lives With: Family  Type of Home: House  Home Layout: Two level  Home Access: Stairs to enter with rails  Entrance Stairs - Number of Steps: 3  Bathroom Shower/Tub: Walk-in shower, Tub/Shower unit  Bathroom Toilet: Standard  Bathroom Equipment: Shower chair, Toilet raiser  Home Equipment: susan Raya Cane (does not use AD at baseline)  ADL Assistance: Independent  Homemaking Assistance: Independent  Homemaking Responsibilities: Yes  Ambulation Assistance: Independent  Transfer Assistance: Independent  Active : Yes    Examination of body systems (includes body structures/functions, activity/participation limitations):  Observation:  Pt supine in bed with family present upon arrival and agreeable to therapy  Vision:  WFL  [facility]  Cardiopulmonary:  2L O2, does not wear at baseline  Cognition: WFL but lethargic this date, see OT/SLP note for further evaluation. Musculoskeletal  ROM R/L:  WFL. Strength R LE 5/5, L LE at least 3/5 observed in function. Not formally tested due to pain. Moderate impairment in function and endurance. Neuro:  WFL      Mobility:  Rolling L/R:  SBA with cues for sequencing. Performed multiple times bilaterally for repositioning  Supine to sit:  min A with assist at L LE and increased time to complete. Cues provided for sequencing  Sit to supine: min A with assist at bilateral LEs and cues for sequencing  Transfers: NT, deferred due to increased pain  Sitting balance:  fair+, pt sat EOB ~6 minutes SBA. Standing balance:  NT.    Gait: NT  Education: pt and family educated on PT role, PT plan, WB'ing precautions, anterior hip precautions. Pt will need reinforcement with education secondary to side effects of pain medicine this date    [facility] 6 Clicks Inpatient Mobility:  AM-PAC Inpatient Mobility Raw Score : 10    Safety: patient left supine in bed, family present, alarm on, call light within reach, RN notified, gait belt used. Assessment:  Pt is a 65 y.o. female admitted to the hospital for primary osteoarthritis of the left hip. Pt underwent a L total hip replacement on 10/18/2022. Pt is typically independent with all ambulation and transfers without a device. Pt is currently performing bed mobility min A, sitting balance SBA, and unable to transfer or ambulate this date secondary to pain. Pt is presenting with decreased endurance, impaired balance, impaired transfers, impaired gait, increased pain, and impaired bed mobility.  Pt would benefit from continued acute care PT as well as ARU vs [facility] PT upon discharge to continue to address impairments. Will continue to assess as anticipate pt's functional mobility to improve as pain and lethargy approve. Complexity: moderate    Prognosis: Good, no significant barriers to participation at this time.      General Plan:  (BID)/week     Equipment: none, pt owns RW    Goals:  Short Term Goals  Time Frame for Short Term Goals: 1 week  Short Term Goal 1: Pt to complete all bed mobility CGA  Short Term Goal 2: Pt to complete STS to/from bed, chair, and commode mod A  Short Term Goal 3: pt to ambulate 22' with LRAD mod A       Treatment plan:  Bed mobility, transfers, balance, gait, TA, TX    Recommendations for NURSING mobility: supine to sitting EOB min A    Time:   Time in: 1430  Time out: 1506  Timed treatment minutes: 26  Total time: 36    Electronically signed by:    Anthony Bosch PT  10/19/2022, 3:27 PM

## 2022-10-20 LAB
BASOPHILS ABSOLUTE: 0 K/CU MM
BASOPHILS RELATIVE PERCENT: 0.2 % (ref 0–1)
DIFFERENTIAL TYPE: ABNORMAL
EOSINOPHILS ABSOLUTE: 0 K/CU MM
EOSINOPHILS RELATIVE PERCENT: 0 % (ref 0–3)
HCT VFR BLD CALC: 32.3 % (ref 37–47)
HEMOGLOBIN: 10.8 GM/DL (ref 12.5–16)
IMMATURE NEUTROPHIL %: 0.2 % (ref 0–0.43)
LYMPHOCYTES ABSOLUTE: 1.7 K/CU MM
LYMPHOCYTES RELATIVE PERCENT: 25.5 % (ref 24–44)
MCH RBC QN AUTO: 31.1 PG (ref 27–31)
MCHC RBC AUTO-ENTMCNC: 33.4 % (ref 32–36)
MCV RBC AUTO: 93.1 FL (ref 78–100)
MONOCYTES ABSOLUTE: 1 K/CU MM
MONOCYTES RELATIVE PERCENT: 16.1 % (ref 0–4)
NUCLEATED RBC %: 0 %
PDW BLD-RTO: 15.9 % (ref 11.7–14.9)
PLATELET # BLD: 270 K/CU MM (ref 140–440)
PMV BLD AUTO: 9.6 FL (ref 7.5–11.1)
RBC # BLD: 3.47 M/CU MM (ref 4.2–5.4)
SEGMENTED NEUTROPHILS ABSOLUTE COUNT: 3.8 K/CU MM
SEGMENTED NEUTROPHILS RELATIVE PERCENT: 58 % (ref 36–66)
TOTAL IMMATURE NEUTOROPHIL: 0.01 K/CU MM
TOTAL NUCLEATED RBC: 0 K/CU MM
WBC # BLD: 6.5 K/CU MM (ref 4–10.5)

## 2022-10-20 PROCEDURE — 36415 COLL VENOUS BLD VENIPUNCTURE: CPT

## 2022-10-20 PROCEDURE — 6360000002 HC RX W HCPCS: Performed by: ORTHOPAEDIC SURGERY

## 2022-10-20 PROCEDURE — 2700000000 HC OXYGEN THERAPY PER DAY

## 2022-10-20 PROCEDURE — 94150 VITAL CAPACITY TEST: CPT

## 2022-10-20 PROCEDURE — 97116 GAIT TRAINING THERAPY: CPT

## 2022-10-20 PROCEDURE — G0378 HOSPITAL OBSERVATION PER HR: HCPCS

## 2022-10-20 PROCEDURE — 6370000000 HC RX 637 (ALT 250 FOR IP): Performed by: ORTHOPAEDIC SURGERY

## 2022-10-20 PROCEDURE — 85025 COMPLETE CBC W/AUTO DIFF WBC: CPT

## 2022-10-20 PROCEDURE — 97166 OT EVAL MOD COMPLEX 45 MIN: CPT

## 2022-10-20 PROCEDURE — 97530 THERAPEUTIC ACTIVITIES: CPT

## 2022-10-20 PROCEDURE — 96374 THER/PROPH/DIAG INJ IV PUSH: CPT

## 2022-10-20 PROCEDURE — 94761 N-INVAS EAR/PLS OXIMETRY MLT: CPT

## 2022-10-20 PROCEDURE — 97535 SELF CARE MNGMENT TRAINING: CPT

## 2022-10-20 PROCEDURE — 2580000003 HC RX 258: Performed by: ORTHOPAEDIC SURGERY

## 2022-10-20 PROCEDURE — 97110 THERAPEUTIC EXERCISES: CPT

## 2022-10-20 PROCEDURE — 96361 HYDRATE IV INFUSION ADD-ON: CPT

## 2022-10-20 RX ORDER — ZOLPIDEM TARTRATE 5 MG/1
5 TABLET ORAL NIGHTLY PRN
Status: DISCONTINUED | OUTPATIENT
Start: 2022-10-20 | End: 2022-10-21 | Stop reason: HOSPADM

## 2022-10-20 RX ORDER — LANOLIN ALCOHOL/MO/W.PET/CERES
3 CREAM (GRAM) TOPICAL NIGHTLY PRN
Status: DISCONTINUED | OUTPATIENT
Start: 2022-10-20 | End: 2022-10-21 | Stop reason: HOSPADM

## 2022-10-20 RX ORDER — ACETAMINOPHEN 500 MG
1000 TABLET ORAL EVERY 6 HOURS SCHEDULED
Status: DISCONTINUED | OUTPATIENT
Start: 2022-10-20 | End: 2022-10-21 | Stop reason: HOSPADM

## 2022-10-20 RX ADMIN — TRAMADOL HYDROCHLORIDE 50 MG: 50 TABLET, COATED ORAL at 00:27

## 2022-10-20 RX ADMIN — CLOPIDOGREL BISULFATE 75 MG: 75 TABLET ORAL at 08:35

## 2022-10-20 RX ADMIN — ONDANSETRON 4 MG: 2 INJECTION INTRAMUSCULAR; INTRAVENOUS at 13:53

## 2022-10-20 RX ADMIN — FERROUS SULFATE TAB 325 MG (65 MG ELEMENTAL FE) 325 MG: 325 (65 FE) TAB at 08:35

## 2022-10-20 RX ADMIN — ACETAMINOPHEN 1000 MG: 500 TABLET ORAL at 17:18

## 2022-10-20 RX ADMIN — SODIUM CHLORIDE, POTASSIUM CHLORIDE, SODIUM LACTATE AND CALCIUM CHLORIDE: 600; 310; 30; 20 INJECTION, SOLUTION INTRAVENOUS at 13:59

## 2022-10-20 RX ADMIN — ACETAMINOPHEN 1000 MG: 500 TABLET ORAL at 08:36

## 2022-10-20 RX ADMIN — ZOLPIDEM TARTRATE 5 MG: 5 TABLET ORAL at 19:18

## 2022-10-20 RX ADMIN — CEFAZOLIN 2000 MG: 2 INJECTION, POWDER, FOR SOLUTION INTRAMUSCULAR; INTRAVENOUS at 00:29

## 2022-10-20 RX ADMIN — ACETAMINOPHEN 1000 MG: 500 TABLET ORAL at 11:48

## 2022-10-20 RX ADMIN — ASPIRIN 325 MG: 325 TABLET, COATED ORAL at 19:17

## 2022-10-20 RX ADMIN — OXYCODONE HYDROCHLORIDE AND ACETAMINOPHEN 1 TABLET: 5; 325 TABLET ORAL at 01:34

## 2022-10-20 RX ADMIN — TRAMADOL HYDROCHLORIDE 50 MG: 50 TABLET, COATED ORAL at 09:33

## 2022-10-20 RX ADMIN — ASPIRIN 325 MG: 325 TABLET, COATED ORAL at 08:36

## 2022-10-20 RX ADMIN — TRAMADOL HYDROCHLORIDE 50 MG: 50 TABLET, COATED ORAL at 17:19

## 2022-10-20 RX ADMIN — Medication 3 MG: at 01:34

## 2022-10-20 RX ADMIN — OXYCODONE HYDROCHLORIDE AND ACETAMINOPHEN 1 TABLET: 5; 325 TABLET ORAL at 05:07

## 2022-10-20 ASSESSMENT — PAIN DESCRIPTION - FREQUENCY
FREQUENCY: CONTINUOUS
FREQUENCY: CONTINUOUS

## 2022-10-20 ASSESSMENT — PAIN DESCRIPTION - LOCATION
LOCATION: HIP
LOCATION: LEG;KNEE
LOCATION: HIP

## 2022-10-20 ASSESSMENT — ENCOUNTER SYMPTOMS
NAUSEA: 0
SORE THROAT: 0
VOMITING: 0
COUGH: 0
SHORTNESS OF BREATH: 0
BACK PAIN: 0
DIARRHEA: 0

## 2022-10-20 ASSESSMENT — PAIN DESCRIPTION - ORIENTATION
ORIENTATION: LEFT
ORIENTATION: LEFT;UPPER
ORIENTATION: LEFT

## 2022-10-20 ASSESSMENT — PAIN DESCRIPTION - PAIN TYPE
TYPE: SURGICAL PAIN
TYPE: SURGICAL PAIN

## 2022-10-20 ASSESSMENT — PAIN SCALES - GENERAL
PAINLEVEL_OUTOF10: 7
PAINLEVEL_OUTOF10: 8
PAINLEVEL_OUTOF10: 0
PAINLEVEL_OUTOF10: 5
PAINLEVEL_OUTOF10: 8
PAINLEVEL_OUTOF10: 6

## 2022-10-20 ASSESSMENT — PAIN DESCRIPTION - ONSET
ONSET: ON-GOING
ONSET: ON-GOING

## 2022-10-20 ASSESSMENT — PAIN DESCRIPTION - DESCRIPTORS
DESCRIPTORS: ACHING;CRUSHING;DISCOMFORT
DESCRIPTORS: ACHING

## 2022-10-20 ASSESSMENT — PAIN - FUNCTIONAL ASSESSMENT: PAIN_FUNCTIONAL_ASSESSMENT: PREVENTS OR INTERFERES SOME ACTIVE ACTIVITIES AND ADLS

## 2022-10-20 NOTE — CARE COORDINATION
This RN CM to bedside to introduce self and initiate discharge planning. Pt is from home with family. Independent prior to admission. Pt has PCP and insurance. Pt does not use any DME or have any [place] [address] services. Pt has reliable transportation . Pt would like [facility] [place] [address] if her insurance covers it. She does not have a preference on the agency. [place] [facility]

## 2022-10-20 NOTE — PROGRESS NOTES
Physical Therapy    Physical Therapy Treatment Note  Name: Manuel Lacey MRN: [id number] :   1965   Date:  10/20/2022   Admission Date: 10/19/2022 Room:  [address] [place] [address]   Restrictions/Precautions:          50% Wb'ing L LE, anterior hip precautions L LE, general precautions, fall risk  Communication with other providers:  nurse gave pain meds at 1330  Subjective:  Patient states:  agreeable to tx  Pain:   Location, Type, Intensity (0/10 to 10/10):  pt c/o pain but did not rate. Objective:    Observation:  alert and oriented. Dghter and  present and supportive. Treatment, including education/measures:  Sit<=>stand from chair and commode sba but needing increased time and effort  Amb with rw 10' x 1, 40' sba and cues for sequence, pt taking longer steps with right LE. Pt declined longer bout of amb and having increased difficult following cues for step to gt pattern. Ex with written copy in long sitting  10 reps aps  10 reps quad sets  10 reps glut sets  10 reps heel slides with leg   Assessment / Impression:      Patient's tolerance of treatment:  good   Adverse Reaction: na  Significant change in status and impact:  na  Barriers to improvement:  pain strength and safety  Plan for Next Session:    Cont.  POC  Time in:  1450  Time out:  1515  Timed treatment minutes:  25  Total treatment time:  25    Previously filed items:  Social/Functional History  Lives With: Family  Type of Home: House  Home Layout: Two level  Home Access: Stairs to enter with rails  Entrance Stairs - Number of Steps: 3  Bathroom Shower/Tub: Walk-in shower, Tub/Shower unit  Bathroom Toilet: Standard  Bathroom Equipment: Shower chair, Toilet raiser  Home Equipment: susan Al, [address] [facility] (does not use AD at baseline)  ADL Assistance: [facility]: Independent  Homemaking Responsibilities: Yes  Ambulation Assistance: Independent  Transfer Assistance: Independent  Active : Yes        Short Term Goals  Time Frame for Short Term Goals: 1 week  Short Term Goal 1: Pt to complete all bed mobility CGA  Short Term Goal 2: Pt to complete STS to/from bed, chair, and commode mod A  Short Term Goal 3: pt to ambulate 22' with LRAD mod A    Electronically signed by:    Diane Alves PTA  10/20/2022, 1:12 PM

## 2022-10-20 NOTE — PROGRESS NOTES
V2.0  [facility] Hospitalist Progress Note      Name:  Cindy Caballero /Age/Sex: 1965  (65 y.o. female)   MRN & CSN:  [id number] & [id number] Encounter Date/Time: 10/20/2022 10:04 AM EDT    Location:  [address] [place] [address] PCP: Ashly Whitney MD       Hospital Day: 2    Assessment and Plan:   Cindy Caballero is a 65 y.o. female  who presents with Primary osteoarthritis of left hip      Plan:    Primary osteoarthritis tachycardia status post left hip total arthroplasty  -Doing okay postop still has pain in the left hip which is somewhat controlled with p.o. meds. -PT OT, encourage mobility.  -Currently on aspirin 325 twice daily for DVT prophylaxis as per orthopedic surgery. CAD status post stents  -Continue home aspirin and Plavix. She is on an elevated dose of aspirin right now for DVT prophylaxis as per orthopedic surgery. Monitor for signs of bleeding.  -Patient not interested in any statin therapy as it makes her feel abnormal.    COPD  Not in acute exacerbation. Continue home inhalers    Acute on chronic blood loss anemia  Likely secondary to blood loss during surgery. Also has a history of colonic AVMs with clips placed. Patient asymptomatic. Monitor daily. Continue iron supplementation. Comment: Please note this report has been produced using speech recognition software and may contain errors related to that system including errors in grammar, punctuation, and spelling, as well as words and phrases that may be inappropriate. If there are any questions or concerns please feel free to contact the dictating provider for clarification. Diet ADULT DIET; Regular   DVT Prophylaxis Aspirin 325 twice daily as per orthopedic surgery. Code Status Full Code   Disposition From: Home  Expected Disposition: Home  Estimated Date of Discharge: As per orthopedic surgery pending therapy and pain control.    Surrogate Decision Maker/ POA Spouse     Subjective:     Chief Complaint: Status post left hip arthroplasty. She feels okay. Still has persistent pain in her left hip. Has not gotten out of bed yet and was eager to get out of bed this morning. Denies any nausea, vomiting. Reports she is able to tolerate her diet yesterday. Denies any lightheadedness dizziness, shortness of breath or chest pain. Review of Systems:    Review of Systems   Constitutional:  Negative for chills and fever. HENT:  Negative for sore throat. Eyes:  Negative for visual disturbance. Respiratory:  Negative for cough and shortness of breath. Cardiovascular:  Negative for chest pain, palpitations and leg swelling. Gastrointestinal:  Negative for diarrhea, nausea and vomiting. Endocrine: Negative for polyuria. Genitourinary:  Negative for dysuria. Musculoskeletal:  Positive for arthralgias. Negative for back pain. Neurological:  Negative for dizziness. Psychiatric/Behavioral:  Negative for confusion. Objective: Intake/Output Summary (Last 24 hours) at 10/20/2022 1004  Last data filed at 10/20/2022 0555  Gross per 24 hour   Intake 500 ml   Output 900 ml   Net -400 ml        Vitals:   Vitals:    10/20/22 0740   BP: 119/83   Pulse: 93   Resp: 18   Temp: 98.1 °F (36.7 °C)   SpO2: 93%       Physical Exam:   Physical Exam  Constitutional:       General: She is not in acute distress. HENT:      Mouth/Throat:      Mouth: Mucous membranes are moist.      Pharynx: No posterior oropharyngeal erythema. Eyes:      General: No scleral icterus. Pupils: Pupils are equal, round, and reactive to light. Cardiovascular:      Rate and Rhythm: Normal rate and regular rhythm. Heart sounds: No murmur heard. Pulmonary:      Effort: Pulmonary effort is normal.      Breath sounds: No wheezing or rales. Abdominal:      Palpations: Abdomen is soft. Tenderness: There is no abdominal tenderness. Musculoskeletal:      Right lower leg: No edema. Left lower leg: No edema.       Comments: Left hip range of motion restricted due to pain. Is able to plantar and dorsiflex with her left lower extremity. Mild tenderness around her surgical site. Skin:     General: Skin is warm. Neurological:      General: No focal deficit present. Mental Status: She is alert and oriented to person, place, and time. Cranial Nerves: No cranial nerve deficit. Motor: No weakness.       Comments: Sensation to light touch intact over bilateral upper and lower extremities   Psychiatric:         Mood and Affect: Mood normal.       Medications:   Medications:    acetaminophen  1,000 mg Oral 4 times per day    clopidogrel  75 mg Oral Daily    ferrous sulfate  325 mg Oral Daily    sodium chloride flush  5-40 mL IntraVENous 2 times per day    aspirin  325 mg Oral BID      Infusions:    sodium chloride      lactated ringers 75 mL/hr at 10/19/22 1250     PRN Meds: melatonin, 3 mg, Nightly PRN  sodium chloride flush, 5-40 mL, PRN  sodium chloride, , PRN  HYDROmorphone, 0.25 mg, Q3H PRN   Or  HYDROmorphone, 0.5 mg, Q3H PRN  bisacodyl, 5 mg, Daily PRN  ondansetron, 4 mg, Q8H PRN   Or  ondansetron, 4 mg, Q6H PRN  albuterol sulfate HFA, 2 puff, Q6H PRN  traMADol, 50 mg, Q6H PRN        Labs      Recent Results (from the past 24 hour(s))   CBC with Auto Differential    Collection Time: 10/19/22  7:04 PM   Result Value Ref Range    WBC 8.9 4.0 - 10.5 K/CU MM    RBC 3.82 (L) 4.2 - 5.4 M/CU MM    Hemoglobin 11.9 (L) 12.5 - 16.0 GM/DL    Hematocrit 36.4 (L) 37 - 47 %    MCV 95.3 78 - 100 FL    MCH 31.2 (H) 27 - 31 PG    MCHC 32.7 32.0 - 36.0 %    RDW 15.9 (H) 11.7 - 14.9 %    Platelets 518 884 - 919 K/CU MM    MPV 9.8 7.5 - 11.1 FL    Differential Type AUTOMATED DIFFERENTIAL     Segs Relative 80.3 (H) 36 - 66 %    Lymphocytes % 10.4 (L) 24 - 44 %    Monocytes % 8.8 (H) 0 - 4 %    Eosinophils % 0.0 0 - 3 %    Basophils % 0.1 0 - 1 %    Segs Absolute 7.1 K/CU MM    Lymphocytes Absolute 0.9 K/CU MM    Monocytes Absolute 0.8 K/CU MM Eosinophils Absolute 0.0 K/CU MM    Basophils Absolute 0.0 K/CU MM    Nucleated RBC % 0.0 %    Total Nucleated RBC 0.0 K/CU MM    Total Immature Neutrophil 0.04 K/CU MM    Immature Neutrophil % 0.4 0 - 0.43 %   Basic Metabolic Panel w/ Reflex to MG    Collection Time: 10/19/22  7:04 PM   Result Value Ref Range    Sodium 134 (L) 135 - 145 MMOL/L    Potassium 4.7 3.5 - 5.1 MMOL/L    Chloride 100 99 - 110 mMol/L    CO2 24 21 - 32 MMOL/L    Anion Gap 10 4 - 16    BUN 10 6 - 23 MG/DL    Creatinine 0.8 0.6 - 1.1 MG/DL    Est, Glom Filt Rate >60 >60 mL/min/1.73m2    Glucose 184 (H) 70 - 99 MG/DL    Calcium 8.9 8.3 - 10.6 MG/DL   CBC auto differential    Collection Time: 10/20/22  5:26 AM   Result Value Ref Range    WBC 6.5 4.0 - 10.5 K/CU MM    RBC 3.47 (L) 4.2 - 5.4 M/CU MM    Hemoglobin 10.8 (L) 12.5 - 16.0 GM/DL    Hematocrit 32.3 (L) 37 - 47 %    MCV 93.1 78 - 100 FL    MCH 31.1 (H) 27 - 31 PG    MCHC 33.4 32.0 - 36.0 %    RDW 15.9 (H) 11.7 - 14.9 %    Platelets 605 783 - 624 K/CU MM    MPV 9.6 7.5 - 11.1 FL    Differential Type AUTOMATED DIFFERENTIAL     Segs Relative 58.0 36 - 66 %    Lymphocytes % 25.5 24 - 44 %    Monocytes % 16.1 (H) 0 - 4 %    Eosinophils % 0.0 0 - 3 %    Basophils % 0.2 0 - 1 %    Segs Absolute 3.8 K/CU MM    Lymphocytes Absolute 1.7 K/CU MM    Monocytes Absolute 1.0 K/CU MM    Eosinophils Absolute 0.0 K/CU MM    Basophils Absolute 0.0 K/CU MM    Nucleated RBC % 0.0 %    Total Nucleated RBC 0.0 K/CU MM    Total Immature Neutrophil 0.01 K/CU MM    Immature Neutrophil % 0.2 0 - 0.43 %        Imaging/Diagnostics Last 24 Hours   XR PELVIS (1-2 VIEWS)    Result Date: 10/19/2022  EXAMINATION: ONE XRAY VIEW OF THE PELVIS 10/19/2022 12:15 pm COMPARISON: 02/19/2021 HISTORY: ORDERING SYSTEM PROVIDED HISTORY: post op TECHNOLOGIST PROVIDED HISTORY: Single view AP pelvis to include the prosthesis Reason for exam:->post op Reason for Exam: post op FINDINGS: Patchy sclerosis and lucency in the right proximal femur felt related to avascular necrosis. Postoperative repair noted in the left hip with left hip arthroplasty hardware seen. Soft tissue gas related to the postoperative status. No acute osseous abnormality is identified. Satisfactory appearance following left hip arthroplasty. FL LESS THAN 1 HOUR    Result Date: 10/19/2022  Radiology exam is complete. No Radiologist dictation. Please follow up with ordering provider.        Electronically signed by Vignesh Warren MD on 10/20/2022 at 10:04 AM

## 2022-10-20 NOTE — PROGRESS NOTES
Occupational Therapy    [address] [facility] ACUTE CARE OCCUPATIONAL THERAPY EVALUATION    Gilbert Rice, 1965, 1123/1123-A, 10/20/2022    Discharge Recommendation: Home with initial 24 hour supervision/assistance, Home Health OT services (S Level 2)      History:  [place]:  There were no encounter diagnoses. Subjective:  Patient states: \"Oh I didn't think it would be this painful! \"   Pain: Pt reported 8/10 surgical pain in Lt hip  Communication with other providers: LILIANA Castle  Restrictions: General Precautions, Low Fall Risk, 50% PWB Lt LE, Anterolateral Hip Precautions, IV, Bed/chair alarm    Home Setup/Prior level of function:  Social/Functional History  Lives With: Spouse  Type of Home: House  Home Layout: Two level  Home Access: Stairs to enter with rails  Entrance Stairs - Number of Steps: 3  Bathroom Shower/Tub: Walk-in shower, Tub/Shower unit  Bathroom Toilet: Standard  Bathroom Equipment: Shower chair, Toilet raiser  Home Equipment: Jerilee Jerardo, rolling, Cane   ADL Assistance: Independent  Homemaking Assistance: Independent  Homemaking Responsibilities: Yes  Ambulation Assistance: Independent (uses no AD)  Transfer Assistance: Independent  Active : Yes  Occupation: Unemployed    Examination:  Observation: Sitting in chair upon arrival. Agreeable to evaluation.  present during session. Vision: WFL  Hearing: WFL  Vitals: Stable vitals throughout session    Body Systems and functions:  ROM: WFL all joints in BL UEs  Strength: 4+/5 MMT all major muscle groups BL UEs  Sensation: WFL (denies numbness/tingling)  Tone: Normal  Coordination: WFL for ADLs  Perception: WNL    Activities of Daily Living (ADLs):  Feeding: Independent   Grooming: Supervision (seated facial hygiene task at chair level; not ideal to complete in standing at this time due to 50% PWB status Lt LE)  UB bathing: SBA   LB bathing:  Mod A (reaching distal BL LEs; provided long handled sponge at end of session)  UB dressing: SBA (donning clean robe at chair level)  LB dressing: Max A (dependent initially for donning BL socks at chair level, but able to manage clothing to hips in standing with CGA for steadying; provided LB AE including reacher, sock aid, and long handled shoe horn at end of session and able to don/doff BL socks SBA using LB AE)  Toileting: CGA    Cognitive and Psychosocial Functioning:  Overall cognitive status: WFL  Affect: Normal     Balance:   Sitting: SBA at edge of chair  Standing: CGA with RW; able to maintain 50% PWB status Lt LE with min coaching    Functional Mobility:  Bed Mobility: Not observed. Pt received sitting in chair upon OT arrival.  Transfers: CGA to and from recliner (min cues for safe hand placement each direction and maintaining 50% PWB status Lt LE)  Ambulation: CGA with RW ~50 ft in hallway; min coaching for utilizing step-to pattern and maintaining 50% PWB status Lt LE      AM-PAC 6 click short form for inpatient daily activity:   How much help from another person does the patient currently need. .. Unable  Dep A Lot  Max A A Lot   Mod A A Little  Min A A Little   CGA  SBA None   Mod I  Indep  Sup   1. Putting on and taking off regular lower body clothing? [] 1    [x] 2   [] 2   [] 3   [] 3   [] 4      2. Bathing (including washing, rinsing, drying)? [] 1   [] 2   [] 2 [x] 3 [] 3 [] 4   3. Toileting, which includes using toilet, bedpan, or urinal? [] 1    [] 2   [] 2   [] 3   [x] 3   [] 4     4. Putting on and taking off regular upper body clothing? [] 1   [] 2   [] 2   [] 3   [x] 3    [] 4      5. Taking care of personal grooming such as brushing teeth? [] 1   [] 2    [] 2 [] 3    [] 3   [x] 4      6. Eating meals?    [] 1   [] 2   [] 2   [] 3   [] 3   [x] 4      Raw Score:  19    [24=0% impaired(CH), 23=1-19%(CI), 20-22=20-39%(CJ), 15-19=40-59%(CK), 10-14=60-79%(CL), 7-9=80-99%(CM), 6=100%(CN)]     Treatment:  Self Care Training:   Cues were given for safety, sequence, UE/LE placement, visual cues, and balance. Activities performed today included UB/LB dressing tasks, facial hygiene, education/trial/procurement of LB AE including reacher/sock aid/long handled shoe horn/long handled sponge, education on modifying LB ADLs with anterolateral hip precautions and 50% PWB status Lt LE    Safety Measures: Gait belt used, Left in Chair, Alarm in place    Assessment:  Pt is a 65year old female with a past medical history of Anal cancer ([facility] [address] Asthma, Broken ankle, CAD (coronary artery disease), H/O cardiovascular stress test, History of blood transfusion, NSTEMI (non-ST elevated myocardial infarction) ([facility] [address] PONV (postoperative nausea and vomiting), Post PTCA, and Skin cancer. Pt admitted with Lt hip DJD and underwent elective Lt hip NUBIA on 10-19. Pt lives at home with her spouse and at baseline is fully independent with ADLs, IADLs, mobility, and driving. Pt performed well this date, and from a self-care and mobility standpoint, is safe to return home at discharge with initial 24 hour supervision and [facility] OT services recommended. Complexity: Moderate  Prognosis: Good  Plan: 2+x/week      Goals:  1. Pt will complete all aspects of bed mobility for EOB/OOB ADLs SBA with HOB flat  2. Pt will complete UB/LB bathing CGA with setup using long handled sponge  3. Pt will complete all aspects of LB dressing CGA with setup using LB AE  4. Pt will complete all functional transfers to and from bed, chair, toilet, shower chair SBA/good adherence to 50% PWB status Lt LE  5. Pt will ambulate HH distance to bathroom for toileting SBA with RW and good adherence to 50% PWB status Lt LE  6. Pt will complete all aspects of toileting task SBA  7.  Pt will verbalize and be compliant with 3/3 anterolateral hip precautions 100% of the time    Time:   Time in: 1110  Time out: 1135  Timed treatment minutes: 10  Total time: 25      Electronically signed by:    TIP Jeong/ROSEANNA, [facility], [id number]

## 2022-10-20 NOTE — PROGRESS NOTES
Physical Therapy    Physical Therapy Treatment Note  Name: Briana Mckoy MRN: [id number] :   1965   Date:  10/20/2022   Admission Date: 10/19/2022 Room:  [address] [place] [address]   Restrictions/Precautions:          50% Wb'ing L LE, anterior hip precautions L LE, general precautions, fall risk  Communication with other providers:   nurse gave pain meds during tx session  Subjective:  Patient states:  pt agreeable to tx. Pt reports not sleeping well past tonights and feeling very tired  Pain:   Location, Type, Intensity (0/10 to 10/10):  rates left hip pain 8 and given ice pks at end of tx  Objective:    Observation:  alert and oriented pt did not have O2 on and it was looped around her neck. Pt's O2 at 91% and re-applied.  (Ed) present and supportive. Treatment, including education/measures:  Pt given leg , reacher and educated on use. reviewed NUBIA precautions before getting out of bed. Sup to sit with leg  and min assist needing increased time and effort. Sit<=>stand cga and cues for hand placement for safety from bed, chair, and commode. Pt was able to stand at sink with sba to wash hands. Amb with rw 10' x 3, 40' x 1 cga/sba and cues. Up/down 3 small steps with bilateral hand rails cga and cues. Pt declined doing 8\" steps this am.  Pt given NUBIA booklet and reviewed car transfers and home safety. Ex with written copy in sup and sitting;  Trunk stretches with deep breathing  10 reps aps  10 reps quad sets  10 reps glut sets  10 reps laqs. Safety  Patient left safely in the chair, with call light/phone in reach. Gait belt was used for transfers and gait. Pt refused chair alarm and tech in room with pt at end of tx session  Assessment / Impression:      Patient's tolerance of treatment:  good   Adverse Reaction: na  Significant change in status and impact:  na  Barriers to improvement:  strength and safety  Plan for Next Session:    Cont.  POC  Time in:  0840  Time out:  0950  Timed treatment minutes:  70  Total treatment time:  79    Previously filed items:  Social/Functional History  Lives With: Family  Type of Home: House  Home Layout: Two level  Home Access: Stairs to enter with rails  Entrance Stairs - Number of Steps: 3  Bathroom Shower/Tub: Walk-in shower, Tub/Shower unit  Bathroom Toilet: Standard  Bathroom Equipment: Shower chair, Toilet raiser  Home Equipment: [place], rolling, [employer] (does not use AD at baseline)  ADL Assistance: [address] [place] [address] Avenue: Independent  Homemaking Responsibilities: Yes  Ambulation Assistance: Independent  Transfer Assistance: Independent  Active : Yes        Short Term Goals  Time Frame for Short Term Goals: 1 week  Short Term Goal 1: Pt to complete all bed mobility CGA  Short Term Goal 2: Pt to complete STS to/from bed, chair, and commode mod A  Short Term Goal 3: pt to ambulate 25' with LRAD mod A    Electronically signed by:    Emilie Amezcua PTA  10/20/2022, 8:19 AM

## 2022-10-20 NOTE — CARE COORDINATION
[address] [facility] Liaison spoke with pt and pt is agreeable to c at discharge. Pt wants to know co-pay and referral initiated and will check with [employer] concerning co pay in the am. Address and number verified.  Please place inpatient consult to home health needs order in [facility] [place].

## 2022-10-20 NOTE — PROGRESS NOTES
Doing well postoperatively. Pain controlled    Objective:   Patient Vitals for the past 4 hrs:   BP Temp Temp src Pulse Resp SpO2 Weight   10/20/22 0537 -- -- -- -- 20 -- --   10/20/22 0351 (!) 145/88 98.6 °F (37 °C) Oral 94 20 94 % 170 lb 13.7 oz (77.5 kg)         Physical Exam:     The patient is awake and alert  Resting comfortably in bed    Operative extremity:    The dressing is clean dry and intact. Dressing removed and incision is intact with Dermabond dressing. No erythema, drainage, or induration. Calf is soft and nontender. Sensation and motor function intact distally      LABS   CBC:   Recent Labs     10/19/22  1904 10/20/22  0526   WBC 8.9 6.5   HGB 11.9* 10.8*    270       Postoperative x-rays show well aligned prosthesis with no complications    Assessment and Plan     1. POD # 1 left total hip replacement    1:  Physical therapy consult for mobilization   -Patient will be protected with 50% weightbearing on the left lower extremity due to her bone quality. anterior hip precautions. 2:  DVT prophylaxis   -Aspirin twice a day for 1 month  3:  Continue Pain Control   -Continue oral pain medications. IV medications for breakthrough pain only. I have ordered Tylenol 1000mg every 6 hours scheduled. She can use tramadol 50 mg every 6 hours as needed in between Tylenol doses. The patient is trying to avoid narcotic pain medications if possible. 4.  Medical management per hospitalist service  5:  D/C Planning:     -Patient would benefit from additional physical therapy and pain control through the day today and overnight. Will reassess tomorrow for possible discharge home.          Joao Alarcon MD

## 2022-10-21 VITALS
TEMPERATURE: 98.8 F | DIASTOLIC BLOOD PRESSURE: 73 MMHG | BODY MASS INDEX: 31.44 KG/M2 | RESPIRATION RATE: 18 BRPM | WEIGHT: 170.86 LBS | SYSTOLIC BLOOD PRESSURE: 112 MMHG | HEART RATE: 97 BPM | HEIGHT: 62 IN | OXYGEN SATURATION: 93 %

## 2022-10-21 PROBLEM — Z96.642 STATUS POST TOTAL HIP REPLACEMENT, LEFT: Status: ACTIVE | Noted: 2022-10-21

## 2022-10-21 PROCEDURE — 2700000000 HC OXYGEN THERAPY PER DAY

## 2022-10-21 PROCEDURE — 97110 THERAPEUTIC EXERCISES: CPT

## 2022-10-21 PROCEDURE — 94761 N-INVAS EAR/PLS OXIMETRY MLT: CPT

## 2022-10-21 PROCEDURE — 97116 GAIT TRAINING THERAPY: CPT

## 2022-10-21 PROCEDURE — 96376 TX/PRO/DX INJ SAME DRUG ADON: CPT

## 2022-10-21 PROCEDURE — 6370000000 HC RX 637 (ALT 250 FOR IP): Performed by: ORTHOPAEDIC SURGERY

## 2022-10-21 PROCEDURE — 96375 TX/PRO/DX INJ NEW DRUG ADDON: CPT

## 2022-10-21 PROCEDURE — 94150 VITAL CAPACITY TEST: CPT

## 2022-10-21 PROCEDURE — 94664 DEMO&/EVAL PT USE INHALER: CPT

## 2022-10-21 PROCEDURE — 2580000003 HC RX 258: Performed by: ORTHOPAEDIC SURGERY

## 2022-10-21 PROCEDURE — 6360000002 HC RX W HCPCS: Performed by: ORTHOPAEDIC SURGERY

## 2022-10-21 PROCEDURE — 96361 HYDRATE IV INFUSION ADD-ON: CPT

## 2022-10-21 PROCEDURE — G0378 HOSPITAL OBSERVATION PER HR: HCPCS

## 2022-10-21 RX ORDER — ACETAMINOPHEN 500 MG
1000 TABLET ORAL EVERY 6 HOURS PRN
Qty: 120 TABLET | Refills: 1 | Status: SHIPPED | OUTPATIENT
Start: 2022-10-21 | End: 2022-11-08

## 2022-10-21 RX ORDER — TRAMADOL HYDROCHLORIDE 50 MG/1
50 TABLET ORAL EVERY 6 HOURS PRN
Qty: 30 TABLET | Refills: 0 | Status: SHIPPED | OUTPATIENT
Start: 2022-10-21 | End: 2022-10-28

## 2022-10-21 RX ORDER — ASPIRIN 81 MG/1
81 TABLET, CHEWABLE ORAL 2 TIMES DAILY
Qty: 60 TABLET | Refills: 0 | Status: SHIPPED | OUTPATIENT
Start: 2022-10-21

## 2022-10-21 RX ORDER — ONDANSETRON 4 MG/1
4 TABLET, ORALLY DISINTEGRATING ORAL EVERY 8 HOURS PRN
Qty: 20 TABLET | Refills: 1 | Status: SHIPPED | OUTPATIENT
Start: 2022-10-21

## 2022-10-21 RX ADMIN — ACETAMINOPHEN 1000 MG: 500 TABLET ORAL at 05:55

## 2022-10-21 RX ADMIN — ONDANSETRON 4 MG: 2 INJECTION INTRAMUSCULAR; INTRAVENOUS at 07:31

## 2022-10-21 RX ADMIN — HYDROMORPHONE HYDROCHLORIDE 0.5 MG: 1 INJECTION, SOLUTION INTRAMUSCULAR; INTRAVENOUS; SUBCUTANEOUS at 05:56

## 2022-10-21 RX ADMIN — SODIUM CHLORIDE, POTASSIUM CHLORIDE, SODIUM LACTATE AND CALCIUM CHLORIDE: 600; 310; 30; 20 INJECTION, SOLUTION INTRAVENOUS at 00:14

## 2022-10-21 RX ADMIN — HYDROMORPHONE HYDROCHLORIDE 0.5 MG: 1 INJECTION, SOLUTION INTRAMUSCULAR; INTRAVENOUS; SUBCUTANEOUS at 00:07

## 2022-10-21 RX ADMIN — TRAMADOL HYDROCHLORIDE 50 MG: 50 TABLET, COATED ORAL at 03:42

## 2022-10-21 RX ADMIN — FERROUS SULFATE TAB 325 MG (65 MG ELEMENTAL FE) 325 MG: 325 (65 FE) TAB at 09:58

## 2022-10-21 RX ADMIN — CLOPIDOGREL BISULFATE 75 MG: 75 TABLET ORAL at 09:59

## 2022-10-21 RX ADMIN — ACETAMINOPHEN 1000 MG: 500 TABLET ORAL at 11:32

## 2022-10-21 RX ADMIN — ASPIRIN 325 MG: 325 TABLET, COATED ORAL at 09:58

## 2022-10-21 RX ADMIN — SODIUM CHLORIDE, PRESERVATIVE FREE 10 ML: 5 INJECTION INTRAVENOUS at 09:59

## 2022-10-21 ASSESSMENT — PAIN DESCRIPTION - DESCRIPTORS
DESCRIPTORS: SORE;ACHING
DESCRIPTORS: ACHING
DESCRIPTORS: ACHING;DISCOMFORT
DESCRIPTORS: DISCOMFORT
DESCRIPTORS: ACHING

## 2022-10-21 ASSESSMENT — PAIN DESCRIPTION - ORIENTATION
ORIENTATION: LEFT

## 2022-10-21 ASSESSMENT — PAIN DESCRIPTION - LOCATION
LOCATION: HIP

## 2022-10-21 ASSESSMENT — PAIN DESCRIPTION - FREQUENCY
FREQUENCY: INTERMITTENT

## 2022-10-21 ASSESSMENT — PAIN - FUNCTIONAL ASSESSMENT
PAIN_FUNCTIONAL_ASSESSMENT: ACTIVITIES ARE NOT PREVENTED

## 2022-10-21 ASSESSMENT — ENCOUNTER SYMPTOMS
SORE THROAT: 0
VOMITING: 0
BACK PAIN: 0
COUGH: 0
SHORTNESS OF BREATH: 0
DIARRHEA: 0
NAUSEA: 0

## 2022-10-21 ASSESSMENT — PAIN SCALES - GENERAL
PAINLEVEL_OUTOF10: 7
PAINLEVEL_OUTOF10: 3
PAINLEVEL_OUTOF10: 8
PAINLEVEL_OUTOF10: 7
PAINLEVEL_OUTOF10: 4
PAINLEVEL_OUTOF10: 8
PAINLEVEL_OUTOF10: 5
PAINLEVEL_OUTOF10: 5

## 2022-10-21 ASSESSMENT — PAIN DESCRIPTION - PAIN TYPE
TYPE: SURGICAL PAIN

## 2022-10-21 NOTE — PROGRESS NOTES
Physical Therapy    Physical Therapy Treatment Note  Name: Mando Jackson MRN: [id number] :   1965   Date:  10/21/2022   Admission Date: 10/19/2022 Room:  [address] [place] [address]   Restrictions/Precautions:           50% Wb'ing L LE, anterior hip precautions L LE, general precautions, fall risk  Communication with other providers:  nurse and Dr. Alexandre Savage in room at end of tx session. Subjective:  Patient states:  agreeable to tx and states \" I want to go home\"  Pain:   Location, Type, Intensity (0/10 to 10/10):  rates pain 7 Left hip and given ice pks  Objective:    Observation:  alert and oriented    Treatment, including education/measures:  Sit<=>stand from chair sba  Pt educated on 50% WB LLE and this therapist demonstrated step to gt. Pt starts with good technique but then needs cues to slow down for safety. Amb with rw [de-identified]' sba and cues for step to sequence. Ex with written copy in long sitting and sitting  Tunk stretches with deep breathing  10 reps aps  10 reps quad sets  10 reps glut sets  10 reps saqs  10 reps heel slides with leg   10 reps laqs  Safety  Patient left safely in the chair, with call light/phone in reach with alarm applied. Gait belt was used for transfers and gait. Assessment / Impression:      Patient's tolerance of treatment:  good   Adverse Reaction: na  Significant change in status and impact:  na  Barriers to improvement:  pt has difficult with sequence and amb to fast.  Plan for Next Session:    Cont.  POC  Time in:  0920  Time out:  1000  Timed treatment minutes:  40  Total treatment time:  40    Previously filed items:  Social/Functional History  Lives With: Family  Type of Home: House  Home Layout: Two level  Home Access: Stairs to enter with rails  Entrance Stairs - Number of Steps: 3  Bathroom Shower/Tub: Walk-in shower, Tub/Shower unit  Bathroom Toilet: Standard  Bathroom Equipment: Shower chair, Toilet raiser  Home Equipment: Crutches, Walker, rolling, [address] (does not use AD at baseline)  ADL Assistance: Independent  Homemaking Assistance: Independent  Homemaking Responsibilities: Yes  Ambulation Assistance: Independent  Transfer Assistance: Independent  Active : Yes        Short Term Goals  Time Frame for Short Term Goals: 1 week  Short Term Goal 1: Pt to complete all bed mobility CGA  Short Term Goal 2: Pt to complete STS to/from bed, chair, and commode mod A  Short Term Goal 3: pt to ambulate 22' with LRAD mod A    Electronically signed by:    Sissy Tejeda PTA  10/21/2022, 8:18 AM

## 2022-10-21 NOTE — DISCHARGE INSTRUCTIONS
Discharge instructions  Total hip replacement    -There is a mesh tape with skin glue over top of the incision. This tape is watertight and you may shower. Mild bloody drainage for the first few days is normal and you can cover any areas that may have small drainage with piece of gauze and tape. Okay to gently clean the hip and leg with soap and water. Do not soak in the tub. You may leave the incision open to air once there is no longer any drainage.    -Physical therapy is very important. This may be done as an outpatient or as a home physical therapy.      -Weight-bear as tolerated with a walker and transition to a cane once approved by your physical therapist.  Continue to protect the hip with anterior hip precautions and avoid extending the leg behind your body. -Taper off of the narcotic pain medication as pain allows. You may substitute a dose of Tylenol or ibuprofen in place of the narcotic pain medication as the pain improves. -Continue your daily blood thinner to help prevent the formation of a blood clot in the leg. Take your aspirin twice a day for 1 month. -Return to the office as scheduled in 2-3 weeks for x-rays     -You may call the office if there are any concerns for wound healing problems, infection, increased calf pain and swelling, or medical problems.

## 2022-10-21 NOTE — CARE COORDINATION
Pt declined home health care due to co pay is too expensive for patient and family at this time. Pt would like to do OP therapy. Sticky note posted requesting OP therapy order at discharge.

## 2022-10-21 NOTE — DISCHARGE SUMMARY
DISCHARGE SUMMARY:  Edna Farmer MD, MD     Date of Admission:      10/19/2022  Date of Discharge:    10/21/2022     Admission Diagnosis   Primary osteoarthritis of left hip [M16.12]  Left hip pain [M25.552]   Discharge Diagnosis   Same    Procedure:    Left Total hip replacement 10/19/2022    Consultations:  IP CONSULT TO HOSPITALIST  IP CONSULT TO CASE MANAGEMENT  IP CONSULT TO HOME CARE NEEDS    Brief history and hospital stay. Talon Boyle is a 65 y.o. female who underwent total hip replacement procedure without complication. Talon Boyle was admitted to the floor following surgery. Appropriate consults were obtained as outlined in progress notes. The patients diet was advanced as tolerated. The patient remained hemodynamically stable and neurovascularly intact in the lower extremity throughout the hospital course. On the day of discharge the patient's calf remained soft and showed no evidence of DVT. Physical therapy and occupational therapy were consulted. The patient progressed well with PT/OT throughout the stay. DVT prophylaxis was initiated and will continue for 4 weeks. The patients pain was controlled initially with IV pain medications and then was transitioned to oral pain medications prior to discharge    The patient was discharged to Home and will continue to follow the precautions outlined to them by us and PT/OT.      Condition on Discharge: Stable    Medications       Medication List        START taking these medications      ondansetron 4 MG disintegrating tablet  Commonly known as: ZOFRAN-ODT  Take 1 tablet by mouth every 8 hours as needed for Nausea or Vomiting            CHANGE how you take these medications      acetaminophen 500 MG tablet  Commonly known as: TYLENOL  Take 2 tablets by mouth every 6 hours as needed for Pain  What changed:   medication strength  how much to take     aspirin 81 MG chewable tablet  Take 1 tablet by mouth in the morning and at bedtime  What changed: when to take this            CONTINUE taking these medications      albuterol-ipratropium  MCG/ACT Aers inhaler  Commonly known as: COMBIVENT RESPIMAT     clopidogrel 75 MG tablet  Commonly known as: Plavix  Take 1 tablet by mouth daily     ferrous sulfate 325 (65 Fe) MG EC tablet  Commonly known as: FE TABS 325     hydrocortisone 0.5 % ointment     loperamide 2 MG capsule  Commonly known as: IMODIUM     nitroGLYCERIN 0.4 MG SL tablet  Commonly known as: NITROSTAT  up to max of 3 total doses. If no relief after 1 dose, call 911.     nystatin 111487 UNIT/GM ointment  Commonly known as: MYCOSTATIN     traMADol 50 MG tablet  Commonly known as: ULTRAM  Take 1 tablet by mouth every 6 hours as needed for Pain for up to 7 days.             ASK your doctor about these medications      azithromycin 1 g powder  Commonly known as: ZITHROMAX     cetirizine 10 MG tablet  Commonly known as: ZYRTEC     furosemide 20 MG tablet  Commonly known as: LASIX  Take 1 tablet by mouth daily     Klor-Con M20 20 MEQ extended release tablet  Generic drug: potassium chloride     levoFLOXacin 500 MG/100ML Soln  Commonly known as: LEVAQUIN     olopatadine 0.1 % ophthalmic solution  Commonly known as: PATANOL     potassium chloride 20 MEQ packet  Commonly known as: KLOR-CON     Praluent 75 MG/ML Soaj injection pen  Generic drug: alirocumab  Inject 1 mL into the skin every 14 days     pravastatin 40 MG tablet  Commonly known as: PRAVACHOL     rosuvastatin 40 MG tablet  Commonly known as: CRESTOR     tiZANidine 4 MG tablet  Commonly known as: Toni Bradley               Where to Get Your Medications        These medications were sent to [address] [place] [address], [address] [facility]       Phone: [phone]   acetaminophen 500 MG tablet  aspirin 81 MG chewable tablet  ondansetron 4 MG disintegrating tablet  traMADol 50 MG tablet           Plan  Discharge instructions were given along with prescriptions for pain medications as well as DVT prophylaxis. Plan is to follow up in 2-3 weeks for a wound check and x-rays. Patient was instructed on the use of pain medications, the signs and symptoms of infection or blood clot, and was given our number to call should they have any questions or concerns following discharge.     Earl Best MD, MD

## 2022-10-21 NOTE — PROGRESS NOTES
V2.0  [facility] [place] [place] Hospitalist Progress Note      Name:  Mando Jackson /Age/Sex: 1965  (65 y.o. female)   MRN & CSN:  [id number] & [id number] Encounter Date/Time: 10/21/2022 10:04 AM EDT    Location:  [address] [place] [address] PCP: Fernie Razo MD       Hospital Day: 3    Assessment and Plan:   Mando Jackson is a 65 y.o. female  who presents with Primary osteoarthritis of left hip      Plan:    Primary osteoarthritis tachycardia status post left hip total arthroplasty  -Doing okay postop still has pain in the left hip which is somewhat controlled with p.o. meds. -PT OT, encourage mobility.  -Currently on aspirin 325 twice daily for DVT prophylaxis as per orthopedic surgery. Hypoxia  -Likely secondary to atelectasis. Patient has not been using incentive spirometer. Encouraged to use it. Discussed with respiratory therapist.  Expect the patient to be weaned off of nasal cannula oxygen prior to discharge. If persistent will get chest x-ray however low suspicion for pneumonia at this point given stable vital signs, no shortness of breath cough. CAD status post stents  -Continue home aspirin and Plavix. She is on an elevated dose of aspirin right now for DVT prophylaxis as per orthopedic surgery. Monitor for signs of bleeding.  -Patient not interested in any statin therapy as it makes her feel abnormal.    COPD  Not in acute exacerbation. Continue home inhalers    Acute on chronic blood loss anemia  Likely secondary to blood loss during surgery. Also has a history of colonic AVMs with clips placed. Patient asymptomatic. Monitor daily. Continue iron supplementation. Comment: Please note this report has been produced using speech recognition software and may contain errors related to that system including errors in grammar, punctuation, and spelling, as well as words and phrases that may be inappropriate.  If there are any questions or concerns please feel free to contact the dictating provider for clarification. Diet ADULT DIET; Regular   DVT Prophylaxis Aspirin 325 twice daily as per orthopedic surgery. Code Status Full Code   Disposition From: Home  Expected Disposition: Home  Estimated Date of Discharge: As per orthopedic surgery pending therapy and pain control. Surrogate Decision Maker/ POA Spouse     Subjective:     Chief Complaint: Status post left hip arthroplasty. She feels okay. Pain in left hip is better. Reports she was able to walk with therapy yesterday. Denies any nausea, vomiting. Reports she is able to tolerate her diet yesterday. Denies any lightheadedness dizziness, shortness of breath or chest pain. RT made me aware that patient desaturated to 87% on room air. Review of Systems:    Review of Systems   Constitutional:  Negative for chills and fever. HENT:  Negative for sore throat. Eyes:  Negative for visual disturbance. Respiratory:  Negative for cough and shortness of breath. Cardiovascular:  Negative for chest pain, palpitations and leg swelling. Gastrointestinal:  Negative for diarrhea, nausea and vomiting. Endocrine: Negative for polyuria. Genitourinary:  Negative for dysuria. Musculoskeletal:  Positive for arthralgias. Negative for back pain. Neurological:  Negative for dizziness. Psychiatric/Behavioral:  Negative for confusion. Objective:   No intake or output data in the 24 hours ending 10/21/22 0939       Vitals:   Vitals:    10/21/22 0844   BP: 112/73   Pulse: 97   Resp: 18   Temp: 98.8 °F (37.1 °C)   SpO2: 93%       Physical Exam:   Physical Exam  Constitutional:       General: She is not in acute distress. HENT:      Mouth/Throat:      Mouth: Mucous membranes are moist.      Pharynx: No posterior oropharyngeal erythema. Eyes:      General: No scleral icterus. Pupils: Pupils are equal, round, and reactive to light. Cardiovascular:      Rate and Rhythm: Normal rate and regular rhythm.       Heart sounds: No murmur heard.  Pulmonary:      Effort: Pulmonary effort is normal.      Breath sounds: No wheezing or rales. Abdominal:      Palpations: Abdomen is soft. Tenderness: There is no abdominal tenderness. Musculoskeletal:      Right lower leg: No edema. Left lower leg: No edema. Comments: Left hip range of motion restricted due to pain. Is able to plantar and dorsiflex with her left lower extremity. Mild tenderness around her surgical site. Skin:     General: Skin is warm. Neurological:      General: No focal deficit present. Mental Status: She is alert and oriented to person, place, and time. Cranial Nerves: No cranial nerve deficit. Motor: No weakness. Comments: Sensation to light touch intact over bilateral upper and lower extremities   Psychiatric:         Mood and Affect: Mood normal.       Medications:   Medications:    acetaminophen  1,000 mg Oral 4 times per day    clopidogrel  75 mg Oral Daily    ferrous sulfate  325 mg Oral Daily    sodium chloride flush  5-40 mL IntraVENous 2 times per day    aspirin  325 mg Oral BID      Infusions:    sodium chloride      lactated ringers 75 mL/hr at 10/21/22 0014     PRN Meds: melatonin, 3 mg, Nightly PRN  zolpidem, 5 mg, Nightly PRN  sodium chloride flush, 5-40 mL, PRN  sodium chloride, , PRN  HYDROmorphone, 0.25 mg, Q3H PRN   Or  HYDROmorphone, 0.5 mg, Q3H PRN  bisacodyl, 5 mg, Daily PRN  ondansetron, 4 mg, Q8H PRN   Or  ondansetron, 4 mg, Q6H PRN  albuterol sulfate HFA, 2 puff, Q6H PRN  traMADol, 50 mg, Q6H PRN      Labs      No results found for this or any previous visit (from the past 24 hour(s)).        Imaging/Diagnostics Last 24 Hours   XR PELVIS (1-2 VIEWS)    Result Date: 10/19/2022  EXAMINATION: ONE XRAY VIEW OF THE PELVIS 10/19/2022 12:15 pm COMPARISON: 02/19/2021 HISTORY: ORDERING SYSTEM PROVIDED HISTORY: post op TECHNOLOGIST PROVIDED HISTORY: Single view AP pelvis to include the prosthesis Reason for exam:->post op Reason for Exam: post op FINDINGS: Patchy sclerosis and lucency in the right proximal femur felt related to avascular necrosis. Postoperative repair noted in the left hip with left hip arthroplasty hardware seen. Soft tissue gas related to the postoperative status. No acute osseous abnormality is identified. Satisfactory appearance following left hip arthroplasty. FL LESS THAN 1 HOUR    Result Date: 10/19/2022  Radiology exam is complete. No Radiologist dictation. Please follow up with ordering provider.        Electronically signed by Manasa Franz MD on 10/21/2022 at 9:39 AM

## 2022-10-21 NOTE — PROGRESS NOTES
Doing well postoperatively. Pain controlled  Did well with physical therapy    Objective:   Patient Vitals for the past 4 hrs:   BP Temp Temp src Pulse Resp SpO2   10/21/22 0844 112/73 98.8 °F (37.1 °C) Oral 97 18 93 %   10/21/22 0804 -- -- -- -- -- 91 %   10/21/22 0800 -- -- -- -- -- (!) 87 %   10/21/22 0626 -- -- -- -- 16 --         Physical Exam:     The patient is awake and alert  Resting comfortably in bed    Operative extremity:    The dressing is clean dry and intact. Dressing removed and incision is intact with Dermabond dressing. No erythema, drainage, or induration. Calf is soft and nontender. Sensation and motor function intact distally  Moderate ecchymosis present. LABS   CBC:   Recent Labs     10/19/22  1904 10/20/22  0526   WBC 8.9 6.5   HGB 11.9* 10.8*    270       Postoperative x-rays show well aligned prosthesis with no complications    Assessment and Plan     1. POD # 2 total hip replacement    1:  Physical therapy consult for mobilization   -Protected 50% weightbearing due to patient's poor bone density. Author Sam Anterior hip precautions. 2:  DVT prophylaxis   -Aspirin twice a day for 1 month. Plavix has been restarted  3:  Continue Pain Control   -Continue oral pain medications. IV medications for breakthrough pain only. 4.  Medical management per hospitalist service  5:  D/C Planning:     -Plan discharge to home later today if mobilizing well with therapy and pain is well controlled. Follow-up in 2 weeks for x-rays and wound check.          Ayo Martinez MD

## 2022-10-24 ENCOUNTER — TELEPHONE (OUTPATIENT)
Dept: ORTHOPEDIC SURGERY | Age: 57
End: 2022-10-24

## 2022-10-24 DIAGNOSIS — Z96.642 STATUS POST TOTAL HIP REPLACEMENT, LEFT: Primary | ICD-10-CM

## 2022-10-24 RX ORDER — OXYCODONE HYDROCHLORIDE AND ACETAMINOPHEN 5; 325 MG/1; MG/1
1 TABLET ORAL EVERY 8 HOURS PRN
Qty: 14 TABLET | Refills: 0 | Status: SHIPPED | OUTPATIENT
Start: 2022-10-24 | End: 2022-10-31

## 2022-10-24 NOTE — TELEPHONE ENCOUNTER
PT's daughter called in stating her mother is needing stronger pain medication because she is still in a lot of pain. Please advise.

## 2022-10-25 ENCOUNTER — HOSPITAL ENCOUNTER (OUTPATIENT)
Dept: PHYSICAL THERAPY | Age: 57
Setting detail: THERAPIES SERIES
Discharge: HOME | End: 2022-10-25
Payer: COMMERCIAL

## 2022-10-25 DIAGNOSIS — M16.12 PRIMARY OSTEOARTHRITIS OF LEFT HIP: ICD-10-CM

## 2022-10-25 DIAGNOSIS — Z96.642 STATUS POST TOTAL HIP REPLACEMENT, LEFT: Primary | ICD-10-CM

## 2022-10-25 PROCEDURE — 97161 PT EVAL LOW COMPLEX 20 MIN: CPT

## 2022-10-25 PROCEDURE — 97535 SELF CARE MNGMENT TRAINING: CPT

## 2022-10-25 PROCEDURE — 97110 THERAPEUTIC EXERCISES: CPT

## 2022-10-25 ASSESSMENT — PAIN SCALES - GENERAL: PAINLEVEL_OUTOF10: 8

## 2022-10-25 ASSESSMENT — PAIN DESCRIPTION - FREQUENCY: FREQUENCY: INTERMITTENT

## 2022-10-25 ASSESSMENT — PAIN DESCRIPTION - PAIN TYPE: TYPE: CHRONIC PAIN

## 2022-10-25 ASSESSMENT — PAIN DESCRIPTION - ORIENTATION: ORIENTATION: LEFT

## 2022-10-25 ASSESSMENT — PAIN DESCRIPTION - LOCATION: LOCATION: HIP

## 2022-10-25 NOTE — PLAN OF CARE
Physical Therapy: Initial Evaluation    Patient: Shruthi Fairchild (14 y.o. female)   Examination Date:   Plan of Care Certification Period: 10/25/2022 to        :  1965 ;    Confirmed: Yes MRN: [id number]  CSN: [id number]   Insurance: Payor: Viraj Lange / Plan: [facility] / Product Type: *No Product type* /   Insurance ID: [id number] - (Commercial) Secondary Insurance (if applicable):    Referring Physician: Alisia Kulkarni MD     PCP: Humberto Can MD Visits to Date/Visits Approved:   /  (pcy)    No Show/Cancelled Appts:   /       Medical Diagnosis: Coronary angioplasty status [Z98.61] L hip OA s/p NUBIA  Treatment Diagnosis: gait impairment, left hip pain, impaired strength/ROM left hip     PERTINENT MEDICAL HISTORY   Patient Assessed for Rehabilitation Services: Yes  Self reported health status[de-identified] Good    Medical History: Chart Reviewed: Yes   Past Medical History:   Diagnosis Date    Anal cancer ([facility] [address]     per old chart 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     Skin cancer      Surgical History:   Past Surgical History:   Procedure Laterality Date    ANKLE SURGERY Left 10+ yrs ago    with hardware    COLON SURGERY      \"after had chemo removed some part of the cancer from the rectal area, then 7 months later had bowel blockage had to do surgery  to remove part of the bowel\"    COLONOSCOPY  2015    mild proctitis    COLONOSCOPY  03/15/2017    mild radia proc, hypertroph pailla COLONOSCOPY N/A 03/03/2022    COLONOSCOPY CONTROL HEMORRHAGE WITH STRAIGHT FIRE APC WITH CLIP PLACEMENT X 2 performed by Elle Yu MD at [facility]      left ankle    OTHER SURGICAL HISTORY  04/23/2015    mediport removal    PTCA  2009 4/22/2009 had angioplasty with stent to LAD & another day in 2009 stent x 2 to RCA done    PTCA  02/22/2022    RCA stented. TONSILLECTOMY      as a kid age 7    TOTAL HIP ARTHROPLASTY Left 10/19/2022    LEFT HIP TOTAL ARTHROPLASTY ANTERIOR APPROACH performed by Lavern Gottron, MD at [address] [place] [address]  25 yrs ago    TUNNELED VENOUS PORT PLACEMENT  2009    port inserted     UPPER GASTROINTESTINAL ENDOSCOPY N/A 02/26/2022    EGD DIAGNOSTIC ONLY performed by Elle Yu MD at [address] [place] [address] Dr Cramer N/A 03/03/2022    ENTEROSCOPY PUSH DIAGNOSTIC performed by Elle Yu MD at [facility] ENDOSCOPY       Medications:   Current Outpatient Medications:     oxyCODONE-acetaminophen (PERCOCET) 5-325 MG per tablet, Take 1 tablet by mouth every 8 hours as needed for Pain for up to 7 days. Intended supply: 7 days. Take lowest dose possible to manage pain, Disp: 14 tablet, Rfl: 0    aspirin 81 MG chewable tablet, Take 1 tablet by mouth in the morning and at bedtime, Disp: 60 tablet, Rfl: 0    ondansetron (ZOFRAN-ODT) 4 MG disintegrating tablet, Take 1 tablet by mouth every 8 hours as needed for Nausea or Vomiting, Disp: 20 tablet, Rfl: 1    acetaminophen (TYLENOL) 500 MG tablet, Take 2 tablets by mouth every 6 hours as needed for Pain, Disp: 120 tablet, Rfl: 1    traMADol (ULTRAM) 50 MG tablet, Take 1 tablet by mouth every 6 hours as needed for Pain for up to 7 days. , Disp: 30 tablet, Rfl: 0    clopidogrel (PLAVIX) 75 MG tablet, Take 1 tablet by mouth daily, Disp: 30 tablet, Rfl: 5    azithromycin (ZITHROMAX) 1 g powder, Take 1 packet by mouth once (Patient not taking: Reported on 10/10/2022), Disp: , Rfl:     levoFLOXacin (LEVAQUIN) 500 MG/100ML SOLN, Infuse 500 mg intravenously every 24 hours (Patient not taking: Reported on 10/10/2022), Disp: , Rfl:     cetirizine (ZYRTEC) 10 MG tablet, Take 10 mg by mouth daily (Patient not taking: Reported on 10/10/2022), Disp: , Rfl:     pravastatin (PRAVACHOL) 40 MG tablet, Take 40 mg by mouth in the morning. (Patient not taking: Reported on 10/19/2022), Disp: , Rfl:     alirocumab (PRALUENT) 75 MG/ML SOAJ injection pen, Inject 1 mL into the skin every 14 days (Patient not taking: Reported on 10/10/2022), Disp: 2.24 mL, Rfl: 5    KLOR-CON M20 20 MEQ extended release tablet, , Disp: , Rfl:     nystatin (MYCOSTATIN) 166114 UNIT/GM ointment, Apply to affected area twice daily. , Disp: , Rfl:     hydrocortisone 0.5 % ointment, Apply topically 2 times daily, Disp: , Rfl:     rosuvastatin (CRESTOR) 40 MG tablet, Take 40 mg by mouth daily (Patient not taking: Reported on 10/10/2022), Disp: , Rfl:     nitroGLYCERIN (NITROSTAT) 0.4 MG SL tablet, up to max of 3 total doses.  If no relief after 1 dose, call 911., Disp: 25 tablet, Rfl: 3    ferrous sulfate (FE TABS 325) 325 (65 Fe) MG EC tablet, Take 325 mg by mouth daily, Disp: , Rfl:     furosemide (LASIX) 20 MG tablet, Take 1 tablet by mouth daily (Patient taking differently: Take 40 mg by mouth daily Takes PRN), Disp: 60 tablet, Rfl: 3    olopatadine (PATANOL) 0.1 % ophthalmic solution, Place 1 drop into both eyes 2 times daily (Patient not taking: Reported on 10/19/2022), Disp: , Rfl:     potassium chloride (KLOR-CON) 20 MEQ packet, Take 20 mEq by mouth 2 times daily (Patient not taking: Reported on 10/10/2022), Disp: , Rfl:     tiZANidine (ZANAFLEX) 4 MG tablet, Take 4 mg by mouth every 6 hours as needed  (Patient not taking: Reported on 10/10/2022), Disp: , Rfl:     loperamide (IMODIUM) 2 MG capsule, Take 2 mg by mouth as needed for Diarrhea , Disp: , Rfl:     albuterol-ipratropium (COMBIVENT RESPIMAT)  MCG/ACT AERS inhaler, Inhale into the lungs , Disp: , Rfl:   Allergies: Brilinta [ticagrelor], Adhesive tape, Crestor [rosuvastatin calcium], Vicodin hp [hydrocodone-acetaminophen], and Tylenol with codeine #3 [acetaminophen-codeine]      SUBJECTIVE EXAMINATION     History obtained from[de-identified] Patient, Chart Review,      Family/Caregiver Present: Yes (spouse)    Subjective History: Onset Date: 10/19/22  Subjective: Pt reports chronic L hip pain x 2 years. PLOF:  Ambulated w/o AD, did not use DME, Ind. homemaking, stairs, adls, drove, community mobility. CURRENTLY:  Using RW, using shower chair and toilet riser since surgery, sleeping on the sofa d/t inability to ascend stairs to BR. Pt is having significant discomfort left buttock and lateral leg to knee.   Additional Pertinent Hx (if applicable): PMH:  PTCA, MI x 2, rectal ca surgery, partial colectomy, skin ca, CAD, NSTEMI, L broken ankle w/hardware          Learning/Language: Learning  Does the patient/guardian have any barriers to learning?: No barriers  Will there be a co-learner?: Yes  Co-learner name (if applicable): spouse  Does the co-learner have any barriers to learning?: No barriers  What is the preferred language of the co-learner?: English  How does the co-learner prefer to learn new concepts?: Listening, Demonstration, Pictures/Videos, Reading  What is the preferred language of the patient/guardian?: English  Is an  required?: No  How does the patient/guardian prefer to learn new concepts?: Listening, Reading, Demonstration, Pictures/Videos     Pain Screening   Pain Screening  Patient Currently in Pain: Yes  Pain Assessment: 0-10  Pain Level: 8  Best Pain Level: 0  Worst Pain Level: 10  Patient's Stated Pain Goal: 0 - No pain  Pain Type: Chronic pain  Pain Location: Hip  Pain Orientation: Left  Pain Frequency: Intermittent  Aggravating factors: Walking, Standing, Sitting, Movement  Pain Management/Relieving Factors: Rest, Medications    Functional Status         Social History:  Social History  Lives With: Spouse  Type of Home: House  Home Layout: Two level  Home Access: Stairs to enter with rails  Entrance Stairs - Rails: Right  Entrance Stairs - Number of Steps: 3  Bathroom Shower/Tub: Tub/Shower unit  Bathroom Toilet: Handicap height (riser)  Bathroom Equipment: Grab bars in shower  Home Equipment: Grab bars, Cane, Rollator    Occupation/Interests:       Prior Level of Function:             Current Level of Function:                 OBJECTIVE EXAMINATION   Restrictions:        Position Activity Restriction  Hip Precautions: Anterior hip precautions, No hip external rotation, No hip extension, No active ABduction  Other position/activity restrictions: 50% wbing using RW  WB Status: 50% WBing using FWW    Review of Systems:  Follows Commands: Within Functional Limits          Observations:  General Observations  General Observations: Yes  Description: ambulating w/RW, spouse present, left foot inward turned    Ambulation/Gait (if applicable):  Ambulation  WB Status: 50% WBing using FWW  Ambulation  Surface: Level tile, Carpet  Device: Rolling Walker  Assistance: Modified Independent  Quality of Gait: step-to-gait pattern, left foot inward turned (chronic per pt/hx surgery), dec stance time LLE, short R step  Gait Deviations: Slow Susy  Distance: 50 ft x 2    Balance Screen:       Neuro Screen:  Not Assessed  Left AROM  Right AROM         AROM LLE (degrees)  L Hip Flexion 0-125: 0-95 aarom  L Hip Extension 0-10: 0  L Hip ABduction 0-45: 0-30 aarom  L Hip ADduction 0-10: 0  L Ankle Forefoot Eversion 0-20: 0-5          Left PROM  Right PROM      General PROM LE: PROM Assessed  PROM LLE (degrees)  L Hip Flexion 0-125: 0-110 deg General PROM LE: PROM Assessed        Left Strength  Right Strength      General Strength Testing LE: Right WFL  Strength LLE  L Hip Flexion: 2+/5  L Hip Extension: NT  L Hip ABduction: 2+/5  L Hip ADduction: 3-/5  L Knee Flexion: 3/5  L Knee Extension: 3-/5  L Ankle Dorsiflexion: 4/5 General Strength Testing LE: Right WFL          Balance/Gait Assessment(s) Performed:  Not Assessed    Additional Finding(s) (if applicable):    LEFS 94% disability       ASSESSMENT     Impression: Assessment: Pt is a 65 y.o. female s/p L NUBIA related to OA. Pt presents today w/covered anterior hip surgical incision and 5/10 pain. Pt reports chronic L hip pain x 2 years. PLOF:  Ambulated w/o AD, did not use DME, Ind. homemaking, stairs, adls, drove, community mobility. CURRENTLY:  Using RW, using shower chair and toilet riser since surgery, sleeping on the sofa d/t inability to ascend stairs to BR. Pt is having significant discomfort left buttock and lateral leg to knee. Patient will benefit from physical therapy to advance mobility and self-care skills post-NUBIA, w/ultimate goal of return to prior level of Independent function. Body Structures, Functions, Activity Limitations Requiring Skilled Therapeutic Intervention: Decreased functional mobility , Decreased ADL status, Decreased ROM, Decreased strength, Decreased high-level IADLs, Decreased balance, Increased pain    Statement of Medical Necessity: Physical Therapy is both indicated and medically necessary as outlined in the POC to increase the likelihood of meeting the functionally related goals stated below. Patient's Activity Tolerance: Patient limited by pain      Patient's rehabilitation potential/prognosis is considered to be: Good    Factors which may impact rehabilitation potential include: None        GOALS   Patient Goal(s): Return to prior Ind. LOF, be able to go upstairs to bed, walk w/o AD  Short Term Goals Completed by In 4 weeks, patient will Goal Status   demonstrate compliance and independence w/HEP. demonstrate compliance and independence w/anterior hip precautions to reduce risk of anterior subluxation/dislocation.      score at <= 50% disability on LEFS as indication of improved function w/daily activities. ambulate >= 250 ft w/spc (once cleared by physician to be WBAT) to advance mobility skills and safety. ascend/descend 1 flight of stairs w/close supervision, safest method bearing wbing precautions in mind, AD prn                                       Long Term Goals Completed by In 8 weeks, patient will Goal Status   demonstrate compliance and indepenence w/HEP. score at <= 30% disability of LEFS as indication of improved function w/daily activity. ambulate >= 500 ft w/spc, reciprocal pattern and normalized gait for advanced mobility in home and community. ambulate >= 250 ft no AD (as approved by surgeon), level surfaces, normalized gait for advancement toward Ind. gait/mobility in home/community. ascend/descend 1 flight of stairs, utilizing (1) HR for light support, safest method based upon any residual hip precautions.                                         TREATMENT PLAN       Treatment Initiated : YES  Specific Instructions for Next Treatment: NUBIA pathway anterior approach at 50% wbing using RW (per pt, must use RW x 1 mo d/t surgical concerns)    Pt. actively involved in establishing Plan of Care and Goals: Yes  Patient/ Caregiver education and instruction: Goals, PT Role, Plan of Care, Home Exercise Program, Weight-bearing Education, Precautions anterior hip precautions           Treatment may include any combination of the following: Strengthening, ROM, Balance training, Transfer training, Manual Therapy - Soft Tissue Mobilization, Neuromuscular re-education, Stair training, Gait training, Pain management, Home exercise program, Modalities, Patient/Caregiver education & training, Therapeutic activities, ADL/Self-care training     Frequency / Duration:  Patient to be seen 1x/wk x 4 weeks then 2x/wk x 4 weeks for 8 weeks      Eval Complexity: Overall Evaluation : Low  Decision Making: Low Complexity  History: Personal Factors and/or Comorbidities Impacting POC: Low  History: see above  Examination of body system(s) including body structures and functions, activity limitations, and/or participation restrictions: Low  Exam: MMT, ROM, gait  Clinical Presentation: Low  Clinical Presentation: stable     Therapist Signature: Tanesha Macedo, CHRISTIAN    Date: 77/38/7812     I certify that the above Therapy Services are being furnished while the patient is under my care. I agree with the treatment plan and certify that this therapy is necessary. Physician's Signature:  ___________________________   Date:_______                                                                   Peng Salazar MD        Physician Comments: _______________________________________________    Please sign and return to [id number]  [facility]. Please fax to the location listed below.  Taina Dominguez for this referral!    [address] [facility] [address], [address] [address]  Dept: [phone]  Loc: [phone]       POC NOTE

## 2022-10-25 NOTE — FLOWSHEET NOTE
Outpatient Physical Therapy  [place]           [x] Phone: [phone]   Fax: [phone]  [facility]ol[facility] Pedroza           [] Phone: [phone]   Fax: [phone]        Physical Therapy Daily Treatment Note  Date:  10/25/2022    Patient Name:  Chitra Falcon    :  1965  MRN: [id number]  Restrictions/Precautions: No data recorded   Position Activity Restriction  Hip Precautions: Anterior hip precautions  Other position/activity restrictions: 50% wbing, anterior approach  Diagnosis:   Coronary angioplasty status [Z98.61]   Date of Injury/Surgery: 10/19/22  Treatment Diagnosis:  gait impairment, left hip pain, impaired strength/ROM left hipL NUBIA/OA  Insurance/Certification information: [facility]  Referring Physician:  Leta Ramey MD     PCP: Aby Crockett MD  Next Doctor Visit:  unknown  Plan of care signed (Y/N): pending   Outcome Measure:   10/25/22:  LEFS 62% disability  Visit# / total visits:     Pain level: 5/10 L hip and anterior leg    Goals:     Patient goals: Return to prior Ind. LOF, be able to go upstairs to bed, walk w/o AD  Short term goals  Time Frame for Short term goals: In 4 weeks, patient will  demonstrate compliance and independence w/HEP. demonstrate compliance and independence w/anterior hip precautions to reduce risk of anterior subluxation/dislocation. score at <= 50% disability on LEFS as indication of improved function w/daily activities. ambulate >= 250 ft w/spc (once cleared by physician to be WBAT) to advance mobility skills and safety. ascend/descend 1 flight of stairs w/close supervision, safest method bearing wbing precautions in mind, AD prn  Long Term Goals  Time Frame for Long Term Goals: In 8 weeks, patient will  demonstrate compliance and indepenence w/HEP. score at <= 30% disability of LEFS as indication of improved function w/daily activity.   ambulate >= 500 ft w/spc, reciprocal pattern and normalized gait for advanced mobility in home and community. ambulate >= 250 ft no AD (as approved by surgeon), level surfaces, normalized gait for advancement toward Ind. gait/mobility in home/community. ascend/descend 1 flight of stairs, utilizing (1) HR for light support, safest method based upon any residual hip precautions. Summary of Evaluation:  Assessment: Pt is a 65 y.o. female s/p L NUBIA related to OA. Pt presents today w/covered anterior hip surgical incision and 5/10 pain. Pt reports chronic L hip pain x 2 years. PLOF:  Ambulated w/o AD, did not use DME, Ind. homemaking, stairs, adls, drove, community mobility. CURRENTLY:  Using RW, using shower chair and toilet riser since surgery, sleeping on the sofa d/t inability to ascend stairs to BR. Pt is having significant discomfort left buttock and lateral leg to knee. Patient will benefit from physical therapy to advance mobility and self-care skills post-NBUIA, w/ultimate goal of return to prior level of Independent function. Subjective:  See leighal         Any changes in Ambulatory Summary Sheet? None        Objective:  See eval   COVID screening questions were asked and patient attested that there had been no contact or symptoms        Exercises: (No more than 4 columns)   Exercise/Equipment Date 10/25/22 #1 Date Date           WARM UP                     TABLE      Ankle pumps X 10     Quad sets X 10     SAQ X 10     Hip abd/add (30 deg aarom) X 10     Heel slides X 10 aarom                    STANDING                                                     PROPRIOCEPTION                                    MODALITIES                      Other Therapeutic Activities/Education:    POC and tx rationale/progression expected reviewed w/patient. While patient is 50% wbing, it has been decided to proceed at 1x/wk. Pt is performing exercises daily. Home Exercise Program:      Access Code: [email or web address]  URL: [email or web address]  Date: 10/25/2022  Prepared by: Key Grey Notes  NUBIA  - anterior approach      Exercises  Supine Gluteal Sets - 2 x daily - 7 x weekly - 2 sets - 10 reps - 5 hold  Supine Ankle Pumps - 2 x daily - 7 x weekly - 2 sets - 10 reps - 5 hold  Supine Quad Set - 2 x daily - 7 x weekly - 2 sets - 10 reps  Supine Heel Slide - 2 x daily - 7 x weekly - 2 sets - 10 reps  Supine Hip Abduction - 2 x daily - 7 x weekly - 2 sets - 10 reps  Seated Long Arc Quad - 2 x daily - 7 x weekly - 2 sets - 10 reps    Patient Education provided handouts  Going Up and Down Stairs with Walker  Car Transfer with Walker and Surgery Precautions  Tub Transfer with ROSARIO Ramirez  Standing Tub Transfer with [facility]    Manual Treatments:  NO      Modalities:  NO      Communication with other providers:    POC faxed 10/25/22    Assessment:  (Response towards treatment session) (Pain Rating)  End pain 5/10    Assessment: Pt is a 65 y.o. female s/p L NUBIA related to OA. Pt presents today w/covered anterior hip surgical incision and 5/10 pain. Pt reports chronic L hip pain x 2 years. PLOF:  Ambulated w/o AD, did not use DME, Ind. homemaking, stairs, adls, drove, community mobility. CURRENTLY:  Using RW, using shower chair and toilet riser since surgery, sleeping on the sofa d/t inability to ascend stairs to BR. Pt is having significant discomfort left buttock and lateral leg to knee. Patient will benefit from physical therapy to advance mobility and self-care skills post-NUBIA, w/ultimate goal of return to prior level of Independent function.       Plan for Next Session:   Specific Instructions for Next Treatment: NUBIA pathway anterior approach at 50% wbing using RW (per pt, must use RW x 1 mo d/t surgical concerns)    Time In / Time Out:    1435/1525       If BWC Please Indicate Time In/Out/Total Time  CPT Code Time in Time out Total Time                                                            Total for session             Timed Code/Total Treatment Minutes:  30'/50'  TE 15' (1), ADL 15' (1)      Next Progress Note due:  11/25/22      Plan of Care Interventions:  [x] Therapeutic Exercise  [x] Modalities:  [x] Therapeutic Activity     [] Ultrasound  [] Estim  [x] Gait Training      [] Cervical Traction [] Lumbar Traction  [x] Neuromuscular Re-education    [x] Cold/hotpack [] Iontophoresis   [x] Instruction in HEP      [] Vasopneumatic   [] Dry Needling    [x] Manual Therapy               [] Aquatic Therapy              Electronically signed by:  Segundo Stevens PT, 10/25/2022, 6:00 PM

## 2022-10-25 NOTE — PLAN OF CARE
[address] [facility]  [address]  [facility] [address]  Dept: [phone]  Loc: [phone]    PHYSICAL THERAPY PLAN OF CARE: INITIAL EVALUATION    Patient: Elias Leyva (8 y.o. female)   Examination Date:   Plan of Care Certification Period: 10/25/2022 to        :  1965  MRN: [id number]  CSN: [id number]   Insurance: Payor: Malika Bañuelos / Plan: [facility] / Product Type: *No Product type* /   Insurance ID: [id number] - (Commercial) Secondary Insurance (if applicable):    Referring Physician: Darlene Jimenez MD     PCP: Grayson Espinosa MD Visits to Date/Visits Approved:   /  (pcy)    No Show/Cancelled Appts:   /       Medical Diagnosis: Coronary angioplasty status [Z98.61] L hip OA s/p NUBIA  Treatment Diagnosis: gait impairment, left hip pain, impaired strength/ROM left hip                  Patient Status: [x] Continue / Initiate Plan of Care     Signature: Electronically signed by Sunny Benjamin PT on 10/25/2022 at 5:51 PM.     If you have any questions or concerns, please don't hesitate to call.   Thank you for your referral!

## 2022-11-01 ENCOUNTER — HOSPITAL ENCOUNTER (OUTPATIENT)
Dept: PHYSICAL THERAPY | Age: 57
Setting detail: THERAPIES SERIES
Discharge: HOME | End: 2022-11-01
Payer: COMMERCIAL

## 2022-11-01 PROCEDURE — 97530 THERAPEUTIC ACTIVITIES: CPT

## 2022-11-01 PROCEDURE — 97110 THERAPEUTIC EXERCISES: CPT

## 2022-11-01 NOTE — FLOWSHEET NOTE
Outpatient Physical Therapy  [place]           [x] Phone: [phone]   Fax: [phone]  [facility]           [] Phone: [phone]   Fax: [phone]        Physical Therapy Daily Treatment Note  Date:  2022    Patient Name:  Gurinder Wynne    :  1965  MRN: [id number]  Restrictions/Precautions: No data recorded   Position Activity Restriction  Hip Precautions: Anterior hip precautions  Other position/activity restrictions: 50% wbing, anterior approach  Diagnosis:   Coronary angioplasty status [Z98.61]   Date of Injury/Surgery: 10/19/22  Treatment Diagnosis:  gait impairment, left hip pain, impaired strength/ROM left hipL NUBIA/OA  Insurance/Certification information: [facility]  Referring Physician:  Rimma Medrano MD     PCP: Saray Palacio MD  Next Doctor Visit:  unknown  Plan of care signed (Y/N): pending   Outcome Measure:   10/25/22:  LEFS 62% disability  Visit# / total visits:     Pain level: 7/10 L hip and anterior leg    Goals:     Patient goals: Return to prior Ind. LOF, be able to go upstairs to bed, walk w/o AD  Short term goals  Time Frame for Short term goals: In 4 weeks, patient will  demonstrate compliance and independence w/HEP. demonstrate compliance and independence w/anterior hip precautions to reduce risk of anterior subluxation/dislocation. score at <= 50% disability on LEFS as indication of improved function w/daily activities. ambulate >= 250 ft w/spc (once cleared by physician to be WBAT) to advance mobility skills and safety. ascend/descend 1 flight of stairs w/close supervision, safest method bearing wbing precautions in mind, AD prn  Long Term Goals  Time Frame for Long Term Goals: In 8 weeks, patient will  demonstrate compliance and indepenence w/HEP. score at <= 30% disability of LEFS as indication of improved function w/daily activity.   ambulate >= 500 ft w/spc, reciprocal pattern and normalized gait for advanced mobility in home and community. ambulate >= 250 ft no AD (as approved by surgeon), level surfaces, normalized gait for advancement toward Ind. gait/mobility in home/community. ascend/descend 1 flight of stairs, utilizing (1) HR for light support, safest method based upon any residual hip precautions. Summary of Evaluation:  Assessment: Pt is a 65 y.o. female s/p L NUBIA related to OA. Pt presents today w/covered anterior hip surgical incision and 5/10 pain. Pt reports chronic L hip pain x 2 years. PLOF:  Ambulated w/o AD, did not use DME, Ind. homemaking, stairs, adls, drove, community mobility. CURRENTLY:  Using RW, using shower chair and toilet riser since surgery, sleeping on the sofa d/t inability to ascend stairs to BR. Pt is having significant discomfort left buttock and lateral leg to knee. Patient will benefit from physical therapy to advance mobility and self-care skills post-NUBIA, w/ultimate goal of return to prior level of Independent function. Subjective:  Pt stated that her pain was about 7/10 from sitting in waiting room. Pt stated that she has difficulty sitting and has to place a thick wallet under her bottom to help her tolerate sitting. Pt stated that she has to continue to stay 50% weight bearing until she returns to see the surgeon. Pt stated that she is doing her HEP at home. Any changes in Ambulatory Summary Sheet?   None        Objective:   COVID screening questions were asked and patient attested that there had been no contact or symptoms    Ambulated into clinic on FWW  Needed assist for L LE to get onto table      Exercises: (No more than 4 columns)   Exercise/Equipment Date 10/25/22 #1 11/1/2022 #2 Date           WARM UP                     TABLE      Ankle pumps X 10 10x2    Quad sets X 10 10x2 5\"     Hamstring set  10x2 5\"    GS  10x2 5\"     SAQ X 10 10x2 5\"     Hip abd/add (30 deg aarom) X 10 Sliding board 10x2    Heel slides X 10 aarom Sliding board and MA 10x2    LAQ   10x2 5\" STANDING                                                     PROPRIOCEPTION                                    MODALITIES                      Other Therapeutic Activities/Education:    POC and tx rationale/progression expected reviewed w/patient. While patient is 50% wbing, it has been decided to proceed at 1x/wk. Pt is performing exercises daily. Home Exercise Program:      Access Code: [email or web address]  URL: [email or web address]  Date: 10/25/2022  Prepared by: Marcio Lazaro    Program Notes  NUBIA  - anterior approach      Exercises  Supine Gluteal Sets - 2 x daily - 7 x weekly - 2 sets - 10 reps - 5 hold  Supine Ankle Pumps - 2 x daily - 7 x weekly - 2 sets - 10 reps - 5 hold  Supine Quad Set - 2 x daily - 7 x weekly - 2 sets - 10 reps  Supine Heel Slide - 2 x daily - 7 x weekly - 2 sets - 10 reps  Supine Hip Abduction - 2 x daily - 7 x weekly - 2 sets - 10 reps  Seated Long Arc Quad - 2 x daily - 7 x weekly - 2 sets - 10 reps    Patient Education provided handouts  Going Up and Down Stairs with Walker  Car Transfer with Walker and Surgery Precautions  Tub Transfer with ROSARIO Ramirez  Standing Tub Transfer with [facility]    Manual Treatments:  NO      Modalities:  NO      Communication with other providers:    POC faxed 10/25/22    Assessment:  (Response towards treatment session) (Pain Rating) Pt tolerated treatment fair today. Pt was able to increase activity. Pt rated pain at 5/10 after treatment. End pain 5/10    Assessment: Pt is a 65 y.o. female s/p L NUBIA related to OA. Pt presents today w/covered anterior hip surgical incision and 5/10 pain. Pt reports chronic L hip pain x 2 years. PLOF:  Ambulated w/o AD, did not use DME, Ind. homemaking, stairs, adls, drove, community mobility. CURRENTLY:  Using RW, using shower chair and toilet riser since surgery, sleeping on the sofa d/t inability to ascend stairs to BR.   Pt is having significant discomfort left buttock and lateral leg to knee.  Patient will benefit from physical therapy to advance mobility and self-care skills post-NUBIA, w/ultimate goal of return to prior level of Independent function.       Plan for Next Session:   Specific Instructions for Next Treatment: NUBIA pathway anterior approach at 50% wbing using RW (per pt, must use RW x 1 mo d/t surgical concerns)    Time In / Time Out: 1350/ 1430             Timed Code/Total Treatment Minutes:  40'/ 40'  1 TA ( 10') 2 TE (30')       Next Progress Note due:  11/25/22      Plan of Care Interventions:  [x] Therapeutic Exercise  [x] Modalities:  [x] Therapeutic Activity     [] Ultrasound  [] Estim  [x] Gait Training      [] Cervical Traction [] Lumbar Traction  [x] Neuromuscular Re-education    [x] Cold/hotpack [] Iontophoresis   [x] Instruction in HEP      [] Vasopneumatic   [] Dry Needling    [x] Manual Therapy               [] Aquatic Therapy              Electronically signed by:  Noah Geller PTA11/1/2022, 12:59 PM     11/1/2022,7:59 PM

## 2022-11-08 ENCOUNTER — OFFICE VISIT (OUTPATIENT)
Dept: ORTHOPEDIC SURGERY | Age: 57
End: 2022-11-08

## 2022-11-08 ENCOUNTER — HOSPITAL ENCOUNTER (OUTPATIENT)
Dept: PHYSICAL THERAPY | Age: 57
Setting detail: THERAPIES SERIES
Discharge: HOME | End: 2022-11-08
Payer: COMMERCIAL

## 2022-11-08 VITALS
HEART RATE: 85 BPM | OXYGEN SATURATION: 97 % | WEIGHT: 165 LBS | BODY MASS INDEX: 30.36 KG/M2 | DIASTOLIC BLOOD PRESSURE: 86 MMHG | SYSTOLIC BLOOD PRESSURE: 132 MMHG | RESPIRATION RATE: 16 BRPM | HEIGHT: 62 IN

## 2022-11-08 DIAGNOSIS — M79.662 PAIN AND SWELLING OF LEFT LOWER LEG: Primary | ICD-10-CM

## 2022-11-08 DIAGNOSIS — M79.89 PAIN AND SWELLING OF LEFT LOWER LEG: Primary | ICD-10-CM

## 2022-11-08 DIAGNOSIS — Z96.642 STATUS POST TOTAL HIP REPLACEMENT, LEFT: ICD-10-CM

## 2022-11-08 PROCEDURE — 99024 POSTOP FOLLOW-UP VISIT: CPT | Performed by: ORTHOPAEDIC SURGERY

## 2022-11-08 PROCEDURE — 97110 THERAPEUTIC EXERCISES: CPT

## 2022-11-08 PROCEDURE — 97535 SELF CARE MNGMENT TRAINING: CPT

## 2022-11-08 PROCEDURE — 20561 NDL INSJ W/O NJX 3+ MUSC: CPT

## 2022-11-08 NOTE — FLOWSHEET NOTE
Outpatient Physical Therapy  [place]           [x] Phone: [phone]   Fax: [phone]  [place]           [] Phone: [phone]   Fax: [phone]        Physical Therapy Daily Treatment Note  Date:  2022    Patient Name:  Manuel Lacey    :  1965  MRN: [id number]  Restrictions/Precautions: No data recorded   Position Activity Restriction  Hip Precautions: Anterior hip precautions  Other position/activity restrictions: 50% wbing, anterior approach  Diagnosis:   Coronary angioplasty status [Z98.61]   Date of Injury/Surgery: 10/19/22  Treatment Diagnosis:  gait impairment, left hip pain, impaired strength/ROM left hipL NUBIA/OA  Insurance/Certification information: [facility]  Referring Physician:  Rowan Panchal MD     PCP: Aleida Gómez MD  Next Doctor Visit:  unknown  Plan of care signed (Y/N): pending   Outcome Measure:   10/25/22:  LEFS 62% disability  Visit# / total visits:   3/12  Pain level: 5/10 L posterior hip and lower leg    Goals:     Patient goals: Return to prior Ind. LOF, be able to go upstairs to bed, walk w/o AD  Short term goals  Time Frame for Short term goals: In 4 weeks, patient will  demonstrate compliance and independence w/HEP. demonstrate compliance and independence w/anterior hip precautions to reduce risk of anterior subluxation/dislocation. score at <= 50% disability on LEFS as indication of improved function w/daily activities. ambulate >= 250 ft w/spc (once cleared by physician to be WBAT) to advance mobility skills and safety. ascend/descend 1 flight of stairs w/close supervision, safest method bearing wbing precautions in mind, AD prn  Long Term Goals  Time Frame for Long Term Goals: In 8 weeks, patient will  demonstrate compliance and indepenence w/HEP. score at <= 30% disability of LEFS as indication of improved function w/daily activity.   ambulate >= 500 ft w/spc, reciprocal pattern and normalized gait for advanced mobility in home and community. ambulate >= 250 ft no AD (as approved by surgeon), level surfaces, normalized gait for advancement toward Ind. gait/mobility in home/community. ascend/descend 1 flight of stairs, utilizing (1) HR for light support, safest method based upon any residual hip precautions. Summary of Evaluation:  Assessment: Pt is a 65 y.o. female s/p L NUBIA related to OA. Pt presents today w/covered anterior hip surgical incision and 5/10 pain. Pt reports chronic L hip pain x 2 years. PLOF:  Ambulated w/o AD, did not use DME, Ind. homemaking, stairs, adls, drove, community mobility. CURRENTLY:  Using RW, using shower chair and toilet riser since surgery, sleeping on the sofa d/t inability to ascend stairs to BR. Pt is having significant discomfort left buttock and lateral leg to knee. Patient will benefit from physical therapy to advance mobility and self-care skills post-NUBIA, w/ultimate goal of return to prior level of Independent function. Subjective:    L knee painful w/movent proximal/distal.  Posterior hip and knee to lower leg 5/10. Goes to surgeon this afternoon for a F/U. Any changes in Ambulatory Summary Sheet? None        Objective:   COVID screening questions were asked and patient attested that there had been no contact or symptoms    Ambulating w/RW, antalgic gait. Spouse present. Repositions often to take weight off L posterior hip. TTP L glute medius and piriformis. Edema LLE (appears to be post surgical, no redness or warmth to touch)  Mid patella edema 40 cm  Ambulated into clinic on FWW  Needed assist for L LE to get onto table.       Exercises: (No more than 4 columns)   Exercise/Equipment Date 10/25/22 #1 11/1/2022 #2 Date 11/08/22 #3           WARM UP                     TABLE      Ankle pumps X 10 10x2 10x2 5\"   Quad sets X 10 10x2 5\"  10x2 5\"   Hamstring set  10x2 5\" 10x2 5\"   GS  10x2 5\"     SAQ X 10 10x2 5\"  10x2 5\"   Hip abd/add (30 deg aarom) X 10 Sliding board 10x2 Side board  10 x 2 AROM w/target to stop at 30 deg. Heel slides X 10 aarom Sliding board and MA 10x2 Sliding board 10x2   LAQ   10x2 5\"  10x2 5\"   HC stretch     3 x 30 sec   STANDING                                                     PROPRIOCEPTION                                    MODALITIES                      Other Therapeutic Activities/Education:    POC and tx rationale/progression expected reviewed w/patient. While patient is 50% wbing, it has been decided to proceed at 1x/wk. Pt is performing exercises daily. 11/08/22:  DNT Acknowledgement form was provided, reviewed and signed. Pt notified that mild needle soreness x 1-2 days may be present and is an expected side effect. Mild bruising at needle site(s) may also occur. Patient wished to proceed w/DNT. Spouse present. Home Exercise Program:      Access Code: [email or web address]  URL: [email or web address]  Date: 10/25/2022  Prepared by: Stephanie Bliss    Program Notes  NUBIA  - anterior approach      Exercises  Supine Gluteal Sets - 2 x daily - 7 x weekly - 2 sets - 10 reps - 5 hold  Supine Ankle Pumps - 2 x daily - 7 x weekly - 2 sets - 10 reps - 5 hold  Supine Quad Set - 2 x daily - 7 x weekly - 2 sets - 10 reps  Supine Heel Slide - 2 x daily - 7 x weekly - 2 sets - 10 reps  Supine Hip Abduction - 2 x daily - 7 x weekly - 2 sets - 10 reps  Seated Long Arc Quad - 2 x daily - 7 x weekly - 2 sets - 10 reps    Patient Education provided handouts  Going Up and Down Stairs with Walker  Car Transfer with Walker and Surgery Precautions  Tub Transfer with ROSARIO Ramirez  Standing Tub Transfer with [facility]    Manual Treatments:  NO      Modalities:  NO      Communication with other providers:    POC faxed 10/25/22    Assessment:  (Response towards treatment session) (Pain Rating)   End pain = no pain while walking in the buttock area, however usually doesn't notice it until she sits.   Tolerated DNT for myofascial pain/hypertonicity left glute medius and piriformis. Pt will benefit from continued therapy to address deficits identified which limit safe and independent mobility. Assessment: Pt is a 65 y.o. female s/p L NUBIA related to OA. Pt presents today w/covered anterior hip surgical incision and 5/10 pain. Pt reports chronic L hip pain x 2 years. PLOF:  Ambulated w/o AD, did not use DME, Ind. homemaking, stairs, adls, drove, community mobility. CURRENTLY:  Using RW, using shower chair and toilet riser since surgery, sleeping on the sofa d/t inability to ascend stairs to BR. Pt is having significant discomfort left buttock and lateral leg to knee. Patient will benefit from physical therapy to advance mobility and self-care skills post-NUBIA, w/ultimate goal of return to prior level of Independent function.       Plan for Next Session:   Specific Instructions for Next Treatment: NUBIA pathway anterior approach at 50% wbing using RW (per pt, must use RW x 1 mo d/t surgical concerns)    Time In / Time Out: 1351/1054             Timed Code/Total Treatment Minutes:  60'/60'  TE 30' (2), ADL 15' (1), DNT 13' (1)      Next Progress Note due:  11/25/22      Plan of Care Interventions:  [x] Therapeutic Exercise  [x] Modalities:  [x] Therapeutic Activity     [] Ultrasound  [] Estim  [x] Gait Training      [] Cervical Traction [] Lumbar Traction  [x] Neuromuscular Re-education    [x] Cold/hotpack [] Iontophoresis   [x] Instruction in HEP      [] Vasopneumatic   [] Dry Needling    [x] Manual Therapy               [] Aquatic Therapy              Electronically signed by:  Rell Cadet, PT 11/8/2022, 1:14 PM     11/8/2022,1:14 PM

## 2022-11-08 NOTE — PATIENT INSTRUCTIONS
Continue doing physical therapy  Continue using walker or cane  Continue weight bear as tolerated  Continue range of motion and exercises as instruction  Ice and elevate as needed  Tylenol or Motrin for pain  Follow up in 3 weeks     We are committed to providing you the best care possible. If you receive a survey after visiting one of our offices, please take time to share your experience concerning your physician office visit. These surveys are confidential and no health information about you is shared. We are eager to improve for you and we are counting on your feedback to help make that happen.

## 2022-11-08 NOTE — PROGRESS NOTES
Date of surgery:   October 19th   Surgeon: Dr. Lavern Gottron    History:  Ms. Yandy Christy is here in follow up regarding her left total hip replacement  She is doing rather well. She is having 5/10 pain. She denies chest pain, SOB,fever,wound drainage. Patient states that she was having lower calf pain and radiates into the lateral side of the calf. Patient denies any swelling within the ankle.

## 2022-11-11 NOTE — PROGRESS NOTES
11/8/2022   Chief Complaint   Patient presents with    Post-Op Check     Left NUBIA DOS 10/19/2022        History of Present Illness:                             Hollie Rangel is a 65 y.o. female who returns today for follow-up of her left total hip replacement. She is having some discomfort in the lateral calf extending into the lateral ankle as well. She denies any swelling or redness or calf muscular pain. Pain does occasionally affect posterior hip and lower back region. She has been doing well making advancements with physical therapy walking with a walker. Date of surgery:   October 19th   Surgeon: Dr. Kumar Harper     History:  Ms. Hollie Rangel is here in follow up regarding her left total hip replacement  She is doing rather well. She is having 5/10 pain. She denies chest pain, SOB,fever,wound drainage. Patient states that she was having lower calf pain and radiates into the lateral side of the calf. Patient denies any swelling within the ankle. Medical History  Patient's medications, allergies, past medical, surgical, social and family histories were reviewed and updated as appropriate. Review of Systems                                            Examination:  General Exam:  Vitals: /86 (Site: Left Upper Arm, Position: Sitting, Cuff Size: Medium Adult)   Pulse 85   Resp 16   Ht 5' 2\" (1.575 m)   Wt 165 lb (74.8 kg)   SpO2 97%   BMI 30.18 kg/m²    Physical Exam   Left lower extremity:  Well-healed surgical scar at anterior lateral aspect of the hip. No erythema, drainage, or induration. No pain or instability with gentle active and passive range of motion and weightbearing activities. Negative Homans' sign. No soft tissue swelling or discoloration of the calf. Paresthesias anesthesias present at the lateral aspect of the leg. Sensation and motor function is intact of the foot and toes.       Diagnostic testing:  X-rays reviewed in office, I independently reviewed the films in the office today:     AP pelvis and frog-leg lateral view of the left hip show well aligned left total hip replacement with stable position of the cerclage cables and minimally displaced peritrochanteric fracture. There is no subsidence or shortening or evidence of instability at the femoral stem. Office Procedures:  No orders of the defined types were placed in this encounter. Assessment and Plan  1. Status post left total hip replacement    2. Left lower extremity radiculopathy      We reviewed her x-rays and I reassured patient that her hip replacement is well aligned and healing appropriately. We discussed the fracture and weakness of her bones and the expectation that her fracture site will continue to heal with current position. However I recommended that she continue use of her walker and transition to a cane at the guidance of physical therapy. She can be weightbearing as tolerated at this point. Follow-up in 3 to 4 weeks for repeat check. We discussed her radicular symptoms which I attribute to nerve irritation. Discussed signs symptoms of DVT. She will call if she is having any worsening symptoms or signs of DVT.           Electronically signed by Sailaja Montoya MD on 11/11/2022 at 8:42 AM

## 2022-11-15 ENCOUNTER — HOSPITAL ENCOUNTER (OUTPATIENT)
Dept: PHYSICAL THERAPY | Age: 57
Setting detail: THERAPIES SERIES
Discharge: HOME | End: 2022-11-15
Payer: COMMERCIAL

## 2022-11-15 PROCEDURE — 20561 NDL INSJ W/O NJX 3+ MUSC: CPT

## 2022-11-15 PROCEDURE — 97140 MANUAL THERAPY 1/> REGIONS: CPT

## 2022-11-15 PROCEDURE — 97110 THERAPEUTIC EXERCISES: CPT

## 2022-11-15 NOTE — FLOWSHEET NOTE
Outpatient Physical Therapy  [facility]           [x] Phone: [phone]   Fax: [phone]  [facility]           [] Phone: [phone]   Fax: [phone]        Physical Therapy Daily Treatment Note  Date:  11/15/2022    Patient Name:  Anai Jha    :  1965  MRN: [id number]  Restrictions/Precautions: No data recorded   Position Activity Restriction  Hip Precautions: Anterior hip precautions  Other position/activity restrictions: 50% wbing, anterior approach  Diagnosis:   Coronary angioplasty status [Z98.61]   Date of Injury/Surgery: 10/19/22  Treatment Diagnosis:  gait impairment, left hip pain, impaired strength/ROM left hipL NUBIA/OA  Insurance/Certification information: [facility]  Referring Physician:  Chantal Crum MD     PCP: Reymundo Oneill MD  Next Doctor Visit:    Plan of care signed (Y/N): pending   Outcome Measure:   10/25/22:  LEFS 62% disability  Visit# / total visits:     Pain level: 4/10 L posterior hip and down thigh to knee, 7/10 lower leg    Goals:     Patient goals: Return to prior Ind. LOF, be able to go upstairs to bed, walk w/o AD  Short term goals  Time Frame for Short term goals: In 4 weeks, patient will  demonstrate compliance and independence w/HEP. demonstrate compliance and independence w/anterior hip precautions to reduce risk of anterior subluxation/dislocation. score at <= 50% disability on LEFS as indication of improved function w/daily activities. ambulate >= 250 ft w/spc (once cleared by physician to be WBAT) to advance mobility skills and safety. ascend/descend 1 flight of stairs w/close supervision, safest method bearing wbing precautions in mind, AD prn  Long Term Goals  Time Frame for Long Term Goals: In 8 weeks, patient will  demonstrate compliance and indepenence w/HEP. score at <= 30% disability of LEFS as indication of improved function w/daily activity.   ambulate >= 500 ft w/spc, reciprocal pattern and normalized gait for advanced mobility in home and community. ambulate >= 250 ft no AD (as approved by surgeon), level surfaces, normalized gait for advancement toward Ind. gait/mobility in home/community. ascend/descend 1 flight of stairs, utilizing (1) HR for light support, safest method based upon any residual hip precautions. Summary of Evaluation:  Assessment: Pt is a 65 y.o. female s/p L NUBIA related to OA. Pt presents today w/covered anterior hip surgical incision and 5/10 pain. Pt reports chronic L hip pain x 2 years. PLOF:  Ambulated w/o AD, did not use DME, Ind. homemaking, stairs, adls, drove, community mobility. CURRENTLY:  Using RW, using shower chair and toilet riser since surgery, sleeping on the sofa d/t inability to ascend stairs to BR. Pt is having significant discomfort left buttock and lateral leg to knee. Patient will benefit from physical therapy to advance mobility and self-care skills post-NUBIA, w/ultimate goal of return to prior level of Independent function. Subjective:    Pt states she saw surgeon and he thinks lower leg pain is from nerves and to continue with Dry Needling. Pt states she did feel better after Dry Needling last session. Pt also states surgeon said she could start weaning off walker. Any changes in Ambulatory Summary Sheet? None        Objective:   COVID screening questions were asked and patient attested that there had been no contact or symptoms    Came in today amb with st cane demonstrating antalgic gt pattern - improved with HHA    Repositions often to take weight off L posterior hip. TTP L glute medius and piriformis. Needed assist for L LE to get onto table.       Exercises: (No more than 4 columns)   Exercise/Equipment Date 10/25/22 #1 11/1/2022 #2 Date 11/08/22 #3 11/15/22 #4            WARM UP                        TABLE       Ankle pumps X 10 10x2 10x2 5\" HEP   Quad sets X 10 10x2 5\"  10x2 5\" 10x2 5\"   Hamstring set  10x2 5\" 10x2 5\" 10x2 5\"   GS  10x2 5\"      SAQ X 10 10x2 5\" 10x2 5\" 2x10 with assist   Hip abd/add (30 deg aarom) X 10 Sliding board 10x2 Side board  10 x 2 AROM w/target to stop at 30 deg. 2x10 active   Heel slides X 10 aarom Sliding board and MA 10x2 Sliding board 10x2 Sliding board 10x2   LAQ   10x2 5\"  10x2 5\" 10x2 5\"   HC stretch     3 x 30 sec 3x30 sec   STANDING                                                             PROPRIOCEPTION                                          MODALITIES                         Other Therapeutic Activities/Education:    POC and tx rationale/progression expected reviewed w/patient. While patient is 50% wbing, it has been decided to proceed at 1x/wk. Pt is performing exercises daily. 11/08/22:  DNT Acknowledgement form was provided, reviewed and signed. Pt notified that mild needle soreness x 1-2 days may be present and is an expected side effect. Mild bruising at needle site(s) may also occur. Patient wished to proceed w/DNT. Spouse present. Home Exercise Program:      Access Code: [email or web address]  URL: [email or web address]  Date: 10/25/2022  Prepared by: Rodrigo Kevin    Program Notes  NUBIA  - anterior approach      Exercises  Supine Gluteal Sets - 2 x daily - 7 x weekly - 2 sets - 10 reps - 5 hold  Supine Ankle Pumps - 2 x daily - 7 x weekly - 2 sets - 10 reps - 5 hold  Supine Quad Set - 2 x daily - 7 x weekly - 2 sets - 10 reps  Supine Heel Slide - 2 x daily - 7 x weekly - 2 sets - 10 reps  Supine Hip Abduction - 2 x daily - 7 x weekly - 2 sets - 10 reps  Seated Long Arc Quad - 2 x daily - 7 x weekly - 2 sets - 10 reps    Patient Education provided handouts  Going Up and Down Stairs with Walker  Car Transfer with Walker and Surgery Precautions  [facility] [employer] [facility]    Manual Treatments:  STM to quad, lateral thigh and lower leg      Modalities:    DNT  R side lying w/pillow b/t knees  L lumbar paraspinals  0.30 x 30 mm x 3 needles  L gluteus medius and piriformis  0.30 x 40 mm x 3 needles ea  L sciatic distribution along thigh and lower leg  0.30 x 30 mm x 6 needles    Communication with other providers:    POC faxed 10/25/22    Assessment:  (Response towards treatment session) (Pain Rating) Pt demonstrated overall fair tolerance to today's exercises. Does report of increase lower leg pain with SAQ and LAQ having to stop and rest frequently due to pain. STM tolerated well. End pain = 4/10 post hip, 7/10 lower leg following exercises  End pain = no change following DNT  Tolerated DNT for myofascial pain/hypertonicity left glute medius and piriformis. Uncertain if it will impact the neuropathic pain lower LLE as there is a history of injury/surgery which may be impacting lower leg symptoms. May need to consider complex regional pain syndrome. Patient is to have a doppler US study to r/o vascular concerns. Will monitor. Pt will benefit from continued therapy to address deficits identified which limit safe and independent mobility. Assessment: Pt is a 65 y.o. female s/p L NUBIA related to OA. Pt presents today w/covered anterior hip surgical incision and 5/10 pain. Pt reports chronic L hip pain x 2 years. PLOF:  Ambulated w/o AD, did not use DME, Ind. homemaking, stairs, adls, drove, community mobility. CURRENTLY:  Using RW, using shower chair and toilet riser since surgery, sleeping on the sofa d/t inability to ascend stairs to BR. Pt is having significant discomfort left buttock and lateral leg to knee. Patient will benefit from physical therapy to advance mobility and self-care skills post-NUBIA, w/ultimate goal of return to prior level of Independent function.       Plan for Next Session:   Specific Instructions for Next Treatment: NUBIA pathway anterior approach at 50% wbing using RW (per pt, must use RW x 1 mo d/t surgical concerns)    Time In / Time Out: 2667/7325 Chanda  PTA                                     1350/ 0         Gallito Mario PT Timed Code/Total Treatment Minutes:  40/40, (2) TE, (1) LINDA Krause PTA;  plus 28' (1)  DNT 28' Amina Samson PT)      Next Progress Note due:  11/25/22      Plan of Care Interventions:  [x] Therapeutic Exercise  [x] Modalities:  [x] Therapeutic Activity     [] Ultrasound  [] Estim  [x] Gait Training      [] Cervical Traction [] Lumbar Traction  [x] Neuromuscular Re-education    [x] Cold/hotpack [] Iontophoresis   [x] Instruction in HEP      [] Vasopneumatic   [] Dry Needling    [x] Manual Therapy               [] Aquatic Therapy              Electronically signed by:  Óscar Marte PTA 11/15/2022, 12:58 PM  Luis Antonio Mackey PT     11/15/2022,12:58 PM

## 2022-11-21 ENCOUNTER — TELEPHONE (OUTPATIENT)
Dept: CARDIOLOGY CLINIC | Age: 57
End: 2022-11-21

## 2022-11-21 NOTE — TELEPHONE ENCOUNTER
Pt states she wants to stop ASA until she can gt into GYN due to her not having one. Does not want to have another blood transfusion due to bleeding. Expressed the seriousness and risks of coming off ASA pt understood but still wishes to come off ASA.

## 2022-11-21 NOTE — TELEPHONE ENCOUNTER
Pt having vaginal bleeding and has not had a menstrual cycle since 10-12 years so its causing her some concern. Wants to know if she can stop ASA, please advise.

## 2022-11-22 ENCOUNTER — TELEPHONE (OUTPATIENT)
Dept: CARDIOLOGY CLINIC | Age: 57
End: 2022-11-22

## 2022-11-22 ENCOUNTER — HOSPITAL ENCOUNTER (OUTPATIENT)
Dept: PHYSICAL THERAPY | Age: 57
Setting detail: THERAPIES SERIES
Discharge: HOME | End: 2022-11-22
Payer: COMMERCIAL

## 2022-11-22 PROCEDURE — 97116 GAIT TRAINING THERAPY: CPT

## 2022-11-22 PROCEDURE — 97110 THERAPEUTIC EXERCISES: CPT

## 2022-11-22 NOTE — PROGRESS NOTES
Physical Therapy      Outpatient Physical Therapy           [place]           [] Phone: [phone]   Fax: [phone]  [facility]           [] Phone: [phone]   Fax: [phone]      To: Maki Hi MD     From: Cris Mccormick, PT   Patient: Leland Hi                    : 1965  Diagnosis:  Coronary angioplasty status [Z98.61]        Treatment Diagnosis:    gait impairment, left hip pain, impaired strength/ROM left hipL NUBIA   Date: 2022  [x]  Progress Note                []  Discharge Note    Evaluation Date:   10/25/22  Total Visits to date:   5 Cancels/No-shows to date:  0    Subjective:    Pain can increase to 7-8/10 left hip and leg down to top of foot. Sometimes has low back pain   Will be returning to surgeon on 22; he may be ordering an ultrasound if leg continues to hurt. Pt feels DNT has been helpful w/buttock pain, but not lower leg pain. States lower leg and knee pain is \"moving up\" to left lateral and front thigh. 50% improvement in L buttock pain. No improvement in pain knee and below. Able to go up/down stairs at home using (1) HR and spc, takes one step at a time. Plan of Care/Treatment to date:  [x] Therapeutic Exercise    [x] Modalities:  [x] Therapeutic Activity     [] Ultrasound  [] Electrical Stimulation  [x] Gait Training      [] Cervical Traction   [] Lumbar Traction  [x] Neuromuscular Re-education  [] Cold/hotpack [] Iontophoresis  [x] Instruction in HEP      Other:  [x] Manual Therapy       []  Vasopneumatic  [] Aquatic Therapy       [x]   Dry Needle Therapy                      Objective/Significant Findings At Last Visit/Comments:    Ambulating w/SPC, toes inwardly turned, L>R (from previous condition per pt.)  Spouse present and providing HHA as well as needed. TTP L glute medius and piriformis. Ind. w/raising LLE onto table.      MMT:  LLE  Hip flexion 3+/t  Hip abd 4-/5  Knee flex 4/5  Knee ext 4-/5    Assessment:     0/10 post hip and leg after exercise and w/gait. Patient has been seen x 5 physical therapy visits from 10/25/22 - 11/22/22. Diagnosis:   Coronary angioplasty status [Z98.61]   Date of Injury/Surgery: 10/19/22  Treatment Diagnosis:  gait impairment, left hip pain, impaired strength/ROM left hipL NUBIA/OA  STGs and LTGs partially met. LEFS unchanged, however initially tested w/RW and now w/SPC. Pt able to ambulate 250' w/spc, near gait normalization (has chronic in-toeing)  Pt able to ambulate 175' w/o AD, increased lateral sway, more in-toeing and more narrow KATHIE. Good rehab potential.  Continue post-hip rehabilitation to increase strength and function LLE, gait/balance/endurance safety and tolerance, stair training, pain management, progression of HEP. Goal Status:  [] Achieved [x] Partially Achieved  [] Not Achieved   Patient goals: Return to prior Ind. LOF, be able to go upstairs to bed, walk w/o AD  Short term goals  Time Frame for Short term goals: In 4 weeks, patient will  demonstrate compliance and independence w/HEP. - INCONSISTENT D/T PAIN/UNMET 11/22/22  demonstrate compliance and independence w/anterior hip precautions to reduce risk of anterior subluxation/dislocation. - MET 11/22/22  score at <= 50% disability on LEFS as indication of improved function w/daily activities. - UNMET 11/22/22  ambulate >= 250 ft w/spc (once cleared by physician to be WBAT) to advance mobility skills and safety. - MET 11/22/22  ascend/descend 1 flight of stairs w/close supervision, safest method bearing wbing precautions in mind, AD prn - NT today 11/22/22  Long Term Goals  Time Frame for Long Term Goals: In 8 weeks, patient will  demonstrate compliance and indepenence w/HEP. score at <= 30% disability of LEFS as indication of improved function w/daily activity. ambulate >= 500 ft w/spc, reciprocal pattern and normalized gait for advanced mobility in home and community.   ambulate >= 250 ft no AD (as approved by surgeon), level surfaces, normalized gait for advancement toward Ind. gait/mobility in home/community. - PARTIALLY MET 11/22/22  ascend/descend 1 flight of stairs, utilizing (1) HR for light support, safest method based upon any residual hip precautions. Frequency/Duration:  # Days per week: [] 1 day # Weeks: [] 1 week [x] 4 weeks [] 8 weeks     [x] 2 days   [] 2 weeks [] 5 weeks [] 10 weeks     [] 3 days   [] 3 weeks [] 6 weeks [] 12 weeks       Rehab Potential: [] Excellent [x] Good [] Fair  [] Poor     Patient Status: [x] Continue per initial plan of Care     [] Patient now discharged     [] Additional visits requested, Please re-certify for additional visits:      Requested frequency/duration:  2x/wk through 12/22/22. If we are requesting more visits, we fully anticipate the patient's condition is expected to improve within the treatment timeframe we are requesting. Electronically signed by:  Sunny Benjamin PT 11/22/2022, 2:20 PM    If you have any questions or concerns, please don't hesitate to call.   Thank you for your referral.    Physician Signature:______________________ Date:______ Time: ________  By signing above, therapists plan is approved by physician

## 2022-11-22 NOTE — TELEPHONE ENCOUNTER
Pt is worried about vaginal bleeding still and not having a GYN states she is seeing PCP today and they were wondering when she can come off ASA, states 1 yr post PTCA if February and she was told she can come off of it then. Does not have  GYN and doesn't know how long it will be till she can find one and get in.

## 2022-11-22 NOTE — FLOWSHEET NOTE
Outpatient Physical Therapy  [place]           [x] Phone: [phone]   Fax: [phone]  [facility]           [] Phone: [phone]   Fax: [phone]        Physical Therapy Daily Treatment Note  Date:  2022    Patient Name:  Mando Jackson    :  1965  MRN: [id number]  Restrictions/Precautions: No data recorded   Position Activity Restriction  Hip Precautions: Anterior hip precautions  Other position/activity restrictions: 100% WBING Diagnosis:   Coronary angioplasty status [Z98.61]   Date of Injury/Surgery: 10/19/22  Treatment Diagnosis:  gait impairment, left hip pain, impaired strength/ROM left hipL NUBIA/OA  Insurance/Certification information: [facility]  Referring Physician:  Chase White MD     PCP: Fernie Razo MD  Next Doctor Visit:    Plan of care signed (Y/N): pending   Outcome Measure:   10/25/22:  LEFS 62% disability(using RW)  22:  LEFS 62.5% disability (using spc)  Visit# / total visits:     Pain level: 5/10 L posterior hip and down thigh to knee, 5/10 lower leg/calf    Goals:     Patient goals: Return to prior Ind. LOF, be able to go upstairs to bed, walk w/o AD  Short term goals  Time Frame for Short term goals: In 4 weeks, patient will  demonstrate compliance and independence w/HEP. - INCONSISTENT D/T PAIN/UNMET 22  demonstrate compliance and independence w/anterior hip precautions to reduce risk of anterior subluxation/dislocation. - MET 22  score at <= 50% disability on LEFS as indication of improved function w/daily activities. - UNMET 22  ambulate >= 250 ft w/spc (once cleared by physician to be WBAT) to advance mobility skills and safety. - MET 22  ascend/descend 1 flight of stairs w/close supervision, safest method bearing wbing precautions in mind, AD prn - NT today 22  Long Term Goals  Time Frame for Long Term Goals: In 8 weeks, patient will  demonstrate compliance and indepenence w/HEP.   score at <= 30% disability of LEFS as indication of improved function w/daily activity. ambulate >= 500 ft w/spc, reciprocal pattern and normalized gait for advanced mobility in home and community. ambulate >= 250 ft no AD (as approved by surgeon), level surfaces, normalized gait for advancement toward Ind. gait/mobility in home/community. - PARTIALLY MET 11/22/22  ascend/descend 1 flight of stairs, utilizing (1) HR for light support, safest method based upon any residual hip precautions. Summary of Evaluation:  Assessment: Pt is a 65 y.o. female s/p L NUBIA related to OA. Pt presents today w/covered anterior hip surgical incision and 5/10 pain. Pt reports chronic L hip pain x 2 years. PLOF:  Ambulated w/o AD, did not use DME, Ind. homemaking, stairs, adls, drove, community mobility. CURRENTLY:  Using RW, using shower chair and toilet riser since surgery, sleeping on the sofa d/t inability to ascend stairs to BR. Pt is having significant discomfort left buttock and lateral leg to knee. Patient will benefit from physical therapy to advance mobility and self-care skills post-NUBIA, w/ultimate goal of return to prior level of Independent function. Subjective:    Pt states she saw surgeon and he thinks lower leg pain is from nerves and to continue with Dry Needling. Pt states she did feel better after Dry Needling last session. Pt also states surgeon said she could start weaning off walker. 11/22/22:  Pain can increase to 7-8/10 left hip and leg down to top of foot. Sometimes has low back pain   Will be returning to surgeon on 11/29/22; he may be ordering an ultrasound if leg continues to hurt. Pt feels DNT has been helpful w/buttock pain, but not lower leg pain. States lower leg and knee pain is \"moving up\" to left lateral and front thigh. 50% improvement in L buttock pain. No improvement in pain knee and below. Able to go up/down stairs at home using (1) HR and spc, takes one step at a time.       Any changes in Ambulatory Summary Sheet? None        Objective:   COVID screening questions were asked and patient attested that there had been no contact or symptoms    Ambulating w/SPC, toes inwardly turned, L>R (from previous condition per pt.)  Spouse present and providing HHA as well as needed. TTP L glute medius and piriformis. Ind. w/raising LLE onto table. MMT:  LLE  Hip flexion 3+/t  Hip abd 4-/5  Knee flex 4/5  Knee ext 4-/5      Exercises: (No more than 4 columns)   Exercise/Equipment 11/1/2022 #2 Date 11/08/22 #3 11/15/22 #4 11/22/22 #5  PROGRESS NOTE/GOALS ASSESSED`            WARM UP                        TABLE       Ankle pumps 10x2 10x2 5\" HEP HEP   Quad sets 10x2 5\"  10x2 5\" 10x2 5\" 10x2 5\"   Hamstring set 10x2 5\" 10x2 5\" 10x2 5\" 10x2 5\"   GS 10x2 5\"    1 x 10   SAQ 10x2 5\"  10x2 5\" 2x10 with assist 2 x 10 w/increased quad and lateral thigh pain. Hip abd/add (30 deg aarom) Sliding board 10x2 Side board  10 x 2 AROM w/target to stop at 30 deg. 2x10 active 2x10 active  Sliding board   Heel slides Sliding board and MA 10x2 Sliding board 10x2 Sliding board 10x2 10x2  Sliding board   LAQ  10x2 5\"  10x2 5\" 10x2 5\" 1 x 5 w/inc quad and lateral thigh pain. HC stretch    3 x 30 sec 3x30 sec 3x30 sec   STANDING              gait    Amb 250' w/spc, Mod Ind. consistent foot placement, toes inward going, KATHIE WFL  Mild increased lateral sway    Amb 175' w/o AD, close supervision  Increased in-toeing, increased L lateral sway and narrow KATHIE. Patient reported no LLE pain during gait. PROPRIOCEPTION                                          MODALITIES                         Other Therapeutic Activities/Education:    POC and tx rationale/progression expected reviewed w/patient. While patient is 50% wbing, it has been decided to proceed at 1x/wk. Pt is performing exercises daily. 11/08/22:  DNT Acknowledgement form was provided, reviewed and signed.     Pt notified that mild needle soreness x 1-2 days may be present and is an expected side effect. Mild bruising at needle site(s) may also occur. Patient wished to proceed w/DNT. Spouse present. Home Exercise Program:      Access Code: [email or web address]  URL: [email or web address]  Date: 10/25/2022  Prepared by: Marcio Lazaro    Program Notes  NUBIA  - anterior approach      Exercises  Supine Gluteal Sets - 2 x daily - 7 x weekly - 2 sets - 10 reps - 5 hold  Supine Ankle Pumps - 2 x daily - 7 x weekly - 2 sets - 10 reps - 5 hold  Supine Quad Set - 2 x daily - 7 x weekly - 2 sets - 10 reps  Supine Heel Slide - 2 x daily - 7 x weekly - 2 sets - 10 reps  Supine Hip Abduction - 2 x daily - 7 x weekly - 2 sets - 10 reps  Seated Long Arc Quad - 2 x daily - 7 x weekly - 2 sets - 10 reps    Patient Education provided handouts  Going Up and Down Stairs with Walker  Car Transfer with Walker and Surgery Precautions  Tub Transfer with ROSARIO Ramirez  Standing Tub Transfer with [facility]    Manual Treatments:  STM to quad, lateral thigh and lower leg 3'      Modalities:      Communication with other providers:    POC faxed 10/25/22, PN 11/22/22    Assessment:  (Response towards treatment session) (Pain Rating) End pain =     0/10 post hip and leg after exercise and w/gait. Patient has been seen x 5 physical therapy visits from 10/25/22 - 11/22/22. Diagnosis:   Coronary angioplasty status [Z98.61]   Date of Injury/Surgery: 10/19/22  Treatment Diagnosis:  gait impairment, left hip pain, impaired strength/ROM left hipL NUBIA/OA  STGs and LTGs partially met. LEFS unchanged, however initially tested w/RW and now w/SPC. Pt able to ambulate 250' w/spc, near gait normalization (has chronic in-toeing)  Pt able to ambulate 175' w/o AD, increased lateral sway, more in-toeing and more narrow KATHIE.   Good rehab potential.  Continue post-hip rehabilitation to increase strength and function LLE, gait/balance/endurance safety and tolerance, stair training, pain management, progression of HEP. Assessment: Pt is a 65 y.o. female s/p L NUBIA related to OA. Pt presents today w/covered anterior hip surgical incision and 5/10 pain. Pt reports chronic L hip pain x 2 years. PLOF:  Ambulated w/o AD, did not use DME, Ind. homemaking, stairs, adls, drove, community mobility. CURRENTLY:  Using RW, using shower chair and toilet riser since surgery, sleeping on the sofa d/t inability to ascend stairs to BR. Pt is having significant discomfort left buttock and lateral leg to knee. Patient will benefit from physical therapy to advance mobility and self-care skills post-NUBIA, w/ultimate goal of return to prior level of Independent function. Plan for Next Session:   NUBIA anterior approach protocol. Advance gait/balance/proprioception, eventual gait w/o AD. Stair training.   STM and DNT w/Makayla prn    Time In / Time Out: 1300/1350       Timed Code/Total Treatment Minutes:  50'/50'  TE 35' (2), GT 15' (1)    Next Progress Note due:  12/22/22      Plan of Care Interventions:  [x] Therapeutic Exercise  [x] Modalities:  [x] Therapeutic Activity     [] Ultrasound  [] Estim  [x] Gait Training      [] Cervical Traction [] Lumbar Traction  [x] Neuromuscular Re-education    [x] Cold/hotpack [] Iontophoresis   [x] Instruction in HEP      [] Vasopneumatic   [x] Dry Needling    [x] Manual Therapy               [] Aquatic Therapy              Electronically signed by:  Sonya Taylor, PT  11/22/2022, 1:07 PM  Lisa Leonard, PT     11/22/2022,1:07 PM

## 2022-11-29 ENCOUNTER — HOSPITAL ENCOUNTER (OUTPATIENT)
Dept: PHYSICAL THERAPY | Age: 57
Setting detail: THERAPIES SERIES
Discharge: HOME | End: 2022-11-29
Payer: COMMERCIAL

## 2022-11-29 ENCOUNTER — OFFICE VISIT (OUTPATIENT)
Dept: ORTHOPEDIC SURGERY | Age: 57
End: 2022-11-29

## 2022-11-29 VITALS — WEIGHT: 165 LBS | RESPIRATION RATE: 15 BRPM | HEIGHT: 62 IN | BODY MASS INDEX: 30.36 KG/M2

## 2022-11-29 DIAGNOSIS — Z96.642 STATUS POST TOTAL HIP REPLACEMENT, LEFT: Primary | ICD-10-CM

## 2022-11-29 PROCEDURE — 97112 NEUROMUSCULAR REEDUCATION: CPT

## 2022-11-29 PROCEDURE — 97110 THERAPEUTIC EXERCISES: CPT

## 2022-11-29 PROCEDURE — 97116 GAIT TRAINING THERAPY: CPT

## 2022-11-29 PROCEDURE — 99024 POSTOP FOLLOW-UP VISIT: CPT | Performed by: ORTHOPAEDIC SURGERY

## 2022-11-29 RX ORDER — DOXYCYCLINE HYCLATE 50 MG/1
324 CAPSULE, GELATIN COATED ORAL
COMMUNITY

## 2022-11-29 RX ORDER — MULTIVIT-MIN/IRON/FOLIC ACID/K 18-600-40
CAPSULE ORAL
COMMUNITY

## 2022-11-29 NOTE — PROGRESS NOTES
Patient returns to the office today for FU of the left NUBIA DOS 10/19/22. Pt states pain today is a 0/10. Her left knee is causing her some discomfort with walking. Pt states she does have some pain that will start in the back and travel into the left leg. She has been working with PT which is going well.

## 2022-11-29 NOTE — PROGRESS NOTES
11/29/2022   Chief Complaint   Patient presents with    Post-Op Check     Left NUBIA DOS 10/19/22        History of Present Illness:                             Gurinder Wynne is a 65 y.o. female who returns today for follow-up of her left total hip replacement. She has done well advancing her activities and working with therapy. She is using her cane but can walk distances around the house without it. She denies any problems or complications. She does have some soreness that radiates down the leg into the knee but this is generally better with rest.    Patient returns to the office today for FU of the left NUBIA DOS 10/19/22. Pt states pain today is a 0/10. Her left knee is causing her some discomfort with walking. Pt states she does have some pain that will start in the back and travel into the left leg. She has been working with PT which is going well. Medical History  Patient's medications, allergies, past medical, surgical, social and family histories were reviewed and updated as appropriate. Review of Systems                                            Examination:  General Exam:  Vitals: Resp 15   Ht 5' 2\" (1.575 m)   Wt 165 lb (74.8 kg)   BMI 30.18 kg/m²    Physical Exam     Left lower extremity:  Well-healed surgical scar. No pain with gentle active and passive range of motion of the hip. Leg has equal leg lengths and good stability. Strength is 5 out of 5 with hip flexion, extension, and abduction. She is ambulating today well with a mild limp. Sensation motor functions intact distally      Office Procedures:  No orders of the defined types were placed in this encounter. Assessment and Plan  1. status post left total hip replacement    She is doing well with rehabilitation activities.   I reassured her that she is healing well on my exam.  I encouraged her to continue working with physical therapy and she can advance her weightbearing and strengthening activities as pain allows.     I would like her to follow-up in 6 weeks for repeat x-rays and exam.        Electronically signed by Lewis Balderrama MD on 11/29/2022 at 1:11 PM

## 2022-11-29 NOTE — PROGRESS NOTES
Physical Therapy      Outpatient Physical Therapy           [place]           [] Phone: [phone]   Fax: [phone]  [place] [facility]           [] Phone: [phone]   Fax: [phone]      To: Edna Farmer MD     From: Elvis Olvera, PT   Patient: Talon Boyle                    : 1965  Diagnosis:  Coronary angioplasty status [Z98.61]        Treatment Diagnosis:    gait impairment, left hip pain, impaired strength/ROM left hipL NUBIA   Date: 2022  [x]  Progress Note                []  Discharge Note    Evaluation Date:   10/25/22  Total Visits to date:   5 Cancels/No-shows to date:  0    Subjective:    Pain can increase to 7-8/10 left hip and leg down to top of foot. Sometimes has low back pain   Will be returning to surgeon on 22; he may be ordering an ultrasound if leg continues to hurt. Pt feels DNT has been helpful w/buttock pain, but not lower leg pain. States lower leg and knee pain is \"moving up\" to left lateral and front thigh. 50% improvement in L buttock pain. No improvement in pain knee and below. Able to go up/down stairs at home using (1) HR and spc, takes one step at a time. Plan of Care/Treatment to date:  [x] Therapeutic Exercise    [x] Modalities:  [x] Therapeutic Activity     [] Ultrasound  [] Electrical Stimulation  [x] Gait Training      [] Cervical Traction   [] Lumbar Traction  [x] Neuromuscular Re-education  [] Cold/hotpack [] Iontophoresis  [x] Instruction in HEP      Other:  [x] Manual Therapy       []  Vasopneumatic  [] Aquatic Therapy       [x]   Dry Needle Therapy                      Objective/Significant Findings At Last Visit/Comments:    Ambulating w/SPC, toes inwardly turned, L>R (from previous condition per pt.)  Spouse present and providing HHA as well as needed. TTP L glute medius and piriformis. Ind. w/raising LLE onto table.      MMT:  LLE  Hip flexion 3+/t  Hip abd 4-/5  Knee flex 4/5  Knee ext 4-/5    Assessment:     0/10 post hip and leg after exercise and w/gait. Patient has been seen x 5 physical therapy visits from 10/25/22 - 11/22/22. Diagnosis:   L NUBIA anterior approach  Date of Injury/Surgery: 10/19/22  Treatment Diagnosis:  gait impairment, left hip pain, impaired strength/ROM left hipL NUBIA/OA  STGs and LTGs partially met. LEFS unchanged, however initially tested w/RW and now w/SPC. Pt able to ambulate 250' w/spc, near gait normalization (has chronic in-toeing)  Pt able to ambulate 175' w/o AD, increased lateral sway, more in-toeing and more narrow KATHIE. Good rehab potential.  Continue post-hip rehabilitation to increase strength and function LLE, gait/balance/endurance safety and tolerance, stair training, pain management, progression of HEP. Goal Status:  [] Achieved [x] Partially Achieved  [] Not Achieved   Patient goals: Return to prior Ind. LOF, be able to go upstairs to bed, walk w/o AD  Short term goals  Time Frame for Short term goals: In 4 weeks, patient will  demonstrate compliance and independence w/HEP. - INCONSISTENT D/T PAIN/UNMET 11/22/22  demonstrate compliance and independence w/anterior hip precautions to reduce risk of anterior subluxation/dislocation. - MET 11/22/22  score at <= 50% disability on LEFS as indication of improved function w/daily activities. - UNMET 11/22/22  ambulate >= 250 ft w/spc (once cleared by physician to be WBAT) to advance mobility skills and safety. - MET 11/22/22  ascend/descend 1 flight of stairs w/close supervision, safest method bearing wbing precautions in mind, AD prn - NT today 11/22/22  Long Term Goals  Time Frame for Long Term Goals: In 8 weeks, patient will  demonstrate compliance and indepenence w/HEP. score at <= 30% disability of LEFS as indication of improved function w/daily activity. ambulate >= 500 ft w/spc, reciprocal pattern and normalized gait for advanced mobility in home and community.   ambulate >= 250 ft no AD (as approved by surgeon), level surfaces, normalized gait for advancement toward Ind. gait/mobility in home/community. - PARTIALLY MET 11/22/22  ascend/descend 1 flight of stairs, utilizing (1) HR for light support, safest method based upon any residual hip precautions. Frequency/Duration:  # Days per week: [] 1 day # Weeks: [] 1 week [x] 4 weeks [] 8 weeks     [x] 2 days   [] 2 weeks [] 5 weeks [] 10 weeks     [] 3 days   [] 3 weeks [] 6 weeks [] 12 weeks       Rehab Potential: [] Excellent [x] Good [] Fair  [] Poor     Patient Status: [x] Continue per initial plan of Care     [] Patient now discharged     [] Additional visits requested, Please re-certify for additional visits:      Requested frequency/duration:  2x/wk through 12/22/22. If we are requesting more visits, we fully anticipate the patient's condition is expected to improve within the treatment timeframe we are requesting. Electronically signed by:  Niels Velasco PT 11/29/2022, 3:05 PM    If you have any questions or concerns, please don't hesitate to call.   Thank you for your referral.    Physician Signature:______________________ Date:______ Time: ________  By signing above, therapists plan is approved by physician

## 2022-11-29 NOTE — FLOWSHEET NOTE
Outpatient Physical Therapy  [place]           [x] Phone: [phone]   Fax: [phone]  [facility]           [] Phone: [phone]   Fax: [phone]        Physical Therapy Daily Treatment Note  Date:  2022    Patient Name:  Haritha Suazo    :  1965  MRN: [id number]  Restrictions/Precautions: No data recorded   Position Activity Restriction  Hip Precautions: Anterior hip precautions  Other position/activity restrictions: 100% WBING Diagnosis:   David Osei MD: L hip OA s/p NUBIA  Date of Injury/Surgery: 10/19/22  Treatment Diagnosis:  gait impairment, left hip pain, impaired strength/ROM left hipL NUBIA/OA  Insurance/Certification information: [facility]  Referring Physician:  David Osei MD  PCP: Ewa Quiroz MD  Next Doctor Visit:    Plan of care signed (Y/N): YES  Outcome Measure:   10/25/22:  LEFS 62% disability(using RW)  22:  LEFS 62.5% disability (using spc)  Visit# / total visits:     Pain level: 3/10 L knee    Goals:     Patient goals: Return to prior Ind. LOF, be able to go upstairs to bed, walk w/o AD  Short term goals  Time Frame for Short term goals: In 4 weeks, patient will  demonstrate compliance and independence w/HEP. - INCONSISTENT D/T PAIN/UNMET 22  demonstrate compliance and independence w/anterior hip precautions to reduce risk of anterior subluxation/dislocation. - MET 22  score at <= 50% disability on LEFS as indication of improved function w/daily activities. - UNMET 22  ambulate >= 250 ft w/spc (once cleared by physician to be WBAT) to advance mobility skills and safety. - MET 22  ascend/descend 1 flight of stairs w/close supervision, safest method bearing wbing precautions in mind, AD prn - NT today 22  Long Term Goals  Time Frame for Long Term Goals: In 8 weeks, patient will  demonstrate compliance and indepenence w/HEP. score at <= 30% disability of LEFS as indication of improved function w/daily activity.   ambulate >= 500 ft w/spc, reciprocal pattern and normalized gait for advanced mobility in home and community. ambulate >= 250 ft no AD (as approved by surgeon), level surfaces, normalized gait for advancement toward Ind. gait/mobility in home/community. - PARTIALLY MET 11/22/22  ascend/descend 1 flight of stairs, utilizing (1) HR for light support, safest method based upon any residual hip precautions. Summary of Evaluation:  Assessment: Pt is a 65 y.o. female s/p L NUBIA related to OA. Pt presents today w/covered anterior hip surgical incision and 5/10 pain. Pt reports chronic L hip pain x 2 years. PLOF:  Ambulated w/o AD, did not use DME, Ind. homemaking, stairs, adls, drove, community mobility. CURRENTLY:  Using RW, using shower chair and toilet riser since surgery, sleeping on the sofa d/t inability to ascend stairs to BR. Pt is having significant discomfort left buttock and lateral leg to knee. Patient will benefit from physical therapy to advance mobility and self-care skills post-NUBIA, w/ultimate goal of return to prior level of Independent function. Subjective:    LLE less painful. Pain today only L knee at 3/10 on arrival.  Performing HEP. Ambulating ad-matilda w/spc. Any changes in Ambulatory Summary Sheet? None        Objective:   COVID screening questions were asked and patient attested that there had been no contact or symptoms    MMT:  LLE  Hip flexion 4-/5  Hip abd 4-/5  Knee flex 4/5  Knee ext 4-/5    Ambulating w/spc, fair shaq, in-toeing (chronic)  Left lateral sway w/LLE wbing when not using spc. Exercises: (No more than 4 columns)   Exercise/Equipment 11/15/22 #4 11/22/22 #5  PROGRESS NOTE/GOALS ASSESSED` 11/29/22  #6           WARM UP        NuStep   LV 4, 8'         TABLE      Ankle pumps HEP HEP    Quad sets 10x2 5\" 10x2 5\" 10x2 5\"   Hamstring set 10x2 5\" 10x2 5\" 10x2 5\"   GS  1 x 10 1 x 10   SAQ 2x10 with assist 2 x 10 w/increased quad and lateral thigh pain.  2 x 10  Medium bolster   Hip abd/add (30 deg aarom) 2x10 active 2x10 active  Sliding board 2 x 10 slide board  arom   Heel slides Sliding board 10x2 10x2  Sliding board 2 x 10  Slide board  arom   LAQ  10x2 5\" 1 x 5 w/inc quad and lateral thigh pain. HC stretch   3x30 sec 3x30 sec 2 x 30 sec   STANDING            gait  Amb 250' w/spc, Mod Ind. consistent foot placement, toes inward going, KATHIE WFL  Mild increased lateral sway    Amb 175' w/o AD, close supervision  Increased in-toeing, increased L lateral sway and narrow KATHIE. Patient reported no LLE pain during gait. Ambulating w/o AD, consistent foot placement and equal step length  Mild increased left lateral sway when LLE wbing. Stairs:  Descended 11 stairs x 2 reps using L HR and R spc. Single stair at a time encouraged for safety. PROPRIOCEPTION            Static stand 3\" blue foam   Eyes open  Feet apart  1 x 30 sec    Eyes open  Left FF  1 x 30 sec    Eyes open  Right FF  1 x 30 sec    Eyes closed  Feet apart   1 x 30 sec    All above no UE support/no LOB/supervision for safety                     MODALITIES                      Other Therapeutic Activities/Education:    POC and tx rationale/progression expected reviewed w/patient. While patient is 50% wbing, it has been decided to proceed at 1x/wk. Pt is performing exercises daily. 11/08/22:  DNT Acknowledgement form was provided, reviewed and signed. Pt notified that mild needle soreness x 1-2 days may be present and is an expected side effect. Mild bruising at needle site(s) may also occur. Patient wished to proceed w/DNT. Spouse present. Home Exercise Program:      Access Code: [email or web address]  URL: [email or web address] [email or web address]  Date: 10/25/2022  Prepared by: Axel Alvarez    Program Notes  NUBIA  - anterior approach      Exercises  Supine Gluteal Sets - 2 x daily - 7 x weekly - 2 sets - 10 reps - 5 hold  Supine Ankle Pumps - 2 x daily - 7 x weekly - 2 sets - 10 reps - 5 hold  Supine Quad Set - 2 x daily - 7 x weekly - 2 sets - 10 reps  Supine Heel Slide - 2 x daily - 7 x weekly - 2 sets - 10 reps  Supine Hip Abduction - 2 x daily - 7 x weekly - 2 sets - 10 reps  Seated Long Arc Quad - 2 x daily - 7 x weekly - 2 sets - 10 reps    Patient Education provided handouts  Going Up and Down Stairs with Walker  Car Transfer with Walker and Surgery Precautions  Tub Transfer with ROSARIO Ramirez  Standing Tub Transfer with [facility]    Manual Treatments:     Modalities:      Communication with other providers:    POC faxed 10/25/22, PN 11/22/22    Assessment:  (Response towards treatment session) (Pain Rating) End pain = 0/10    Patient has been seen x 6 physical therapy visits from 10/25/22 - 11/29/22. Diagnosis:  L hip OA s/p NUBIA  Date of Injury/Surgery: 10/19/22  Treatment Diagnosis:  gait impairment, left hip pain, impaired strength/ROM left hipL NUBIA/OA  STGs and LTGs partially met. LEFS unchanged, however initially tested w/RW and now w/SPC. Pt able to ambulate 250' w/spc, near gait normalization (has chronic in-toeing)  Pt able to ambulate 175' w/o AD, increased lateral sway, more in-toeing and more narrow KATHIE. Good rehab potential.  Continue post-hip rehabilitation to increase strength and function LLE, gait/balance/endurance safety and tolerance, stair training, pain management, progression of HEP. Assessment: Pt is a 65 y.o. female s/p L NUBIA related to OA. Pt presents today w/covered anterior hip surgical incision and 5/10 pain. Pt reports chronic L hip pain x 2 years. PLOF:  Ambulated w/o AD, did not use DME, Ind. homemaking, stairs, adls, drove, community mobility. CURRENTLY:  Using RW, using shower chair and toilet riser since surgery, sleeping on the sofa d/t inability to ascend stairs to BR. Pt is having significant discomfort left buttock and lateral leg to knee.   Patient will benefit from physical therapy to advance mobility and self-care skills post-NUBIA, w/ultimate goal of return to prior level of Independent function. Plan for Next Session:   NUBIA anterior approach protocol. Advance gait/balance/proprioception, eventual gait w/o AD. Stair training.   STM and DNT w/Makayla prn    Time In / Time Out: 1350/1440       Timed Code/Total Treatment Minutes:  50'/50'  TE 20' (1), GT 15' (1), NRE 15' (1)    Next Progress Note due:  12/22/22      Plan of Care Interventions:  [x] Therapeutic Exercise  [x] Modalities:  [x] Therapeutic Activity     [] Ultrasound  [] Estim  [x] Gait Training      [] Cervical Traction [] Lumbar Traction  [x] Neuromuscular Re-education    [x] Cold/hotpack [] Iontophoresis   [x] Instruction in HEP      [] Vasopneumatic   [x] Dry Needling    [x] Manual Therapy               [] Aquatic Therapy              Electronically signed by:  Rell Cadet, PT  11/29/2022, 2:16 PM  Luis Antonio Mackey, PT     11/29/2022,2:16 PM

## 2022-12-06 ENCOUNTER — HOSPITAL ENCOUNTER (OUTPATIENT)
Dept: PHYSICAL THERAPY | Age: 57
Setting detail: THERAPIES SERIES
Discharge: HOME | End: 2022-12-06
Payer: COMMERCIAL

## 2022-12-06 PROCEDURE — 97112 NEUROMUSCULAR REEDUCATION: CPT

## 2022-12-06 PROCEDURE — 97110 THERAPEUTIC EXERCISES: CPT

## 2022-12-06 PROCEDURE — 97530 THERAPEUTIC ACTIVITIES: CPT

## 2022-12-06 NOTE — FLOWSHEET NOTE
Outpatient Physical Therapy  [place]           [x] Phone: [phone]   Fax: [phone]  [place]           [] Phone: [phone]   Fax: [phone]        Physical Therapy Daily Treatment Note  Date:  2022    Patient Name:  Yandy Christy    :  1965  MRN: [id number]  Restrictions/Precautions: No data recorded   Position Activity Restriction  Hip Precautions: Anterior hip precautions  Other position/activity restrictions: 100% WBING Diagnosis:   Noel Case MD: L hip OA s/p NUBIA  Date of Injury/Surgery: 10/19/22  Treatment Diagnosis:  gait impairment, left hip pain, impaired strength/ROM left hipL NUBIA/OA  Insurance/Certification information: [facility]  Referring Physician:  Noel Case MD  PCP: Enrico Lopez MD  Next Doctor Visit:    Plan of care signed (Y/N): YES  Outcome Measure:   10/25/22:  LEFS 62% disability(using RW)  22:  LEFS 62.5% disability (using spc)  Visit# / total visits:     Pain level: 1-3/10 L thigh    Goals:     Patient goals: Return to prior Ind. LOF, be able to go upstairs to bed, walk w/o AD  Short term goals  Time Frame for Short term goals: In 4 weeks, patient will  demonstrate compliance and independence w/HEP. - INCONSISTENT D/T PAIN/UNMET 22  demonstrate compliance and independence w/anterior hip precautions to reduce risk of anterior subluxation/dislocation. - MET 22  score at <= 50% disability on LEFS as indication of improved function w/daily activities. - UNMET 22  ambulate >= 250 ft w/spc (once cleared by physician to be WBAT) to advance mobility skills and safety. - MET 22  ascend/descend 1 flight of stairs w/close supervision, safest method bearing wbing precautions in mind, AD prn - NT today 22  Long Term Goals  Time Frame for Long Term Goals: In 8 weeks, patient will  demonstrate compliance and indepenence w/HEP.   score at <= 30% disability of LEFS as indication of improved function w/daily activity. ambulate >= 500 ft w/spc, reciprocal pattern and normalized gait for advanced mobility in home and community. ambulate >= 250 ft no AD (as approved by surgeon), level surfaces, normalized gait for advancement toward Ind. gait/mobility in home/community. - PARTIALLY MET 11/22/22  ascend/descend 1 flight of stairs, utilizing (1) HR for light support, safest method based upon any residual hip precautions. Summary of Evaluation:  Assessment: Pt is a 65 y.o. female s/p L NUBIA related to OA. Pt presents today w/covered anterior hip surgical incision and 5/10 pain. Pt reports chronic L hip pain x 2 years. PLOF:  Ambulated w/o AD, did not use DME, Ind. homemaking, stairs, adls, drove, community mobility. CURRENTLY:  Using RW, using shower chair and toilet riser since surgery, sleeping on the sofa d/t inability to ascend stairs to BR. Pt is having significant discomfort left buttock and lateral leg to knee. Patient will benefit from physical therapy to advance mobility and self-care skills post-NUBIA, w/ultimate goal of return to prior level of Independent function. Subjective:  Pt states her left thigh has been sore since shopping all day on Friday without using her cane. States overall she is noticing improvement. .        Any changes in Ambulatory Summary Sheet? None        Objective:   COVID screening questions were asked and patient attested that there had been no contact or symptoms    MMT:  LLE  Hip flexion 4-/5  Hip abd 4-/5  Knee flex 4/5  Knee ext 4-/5    Ambulating w/o spc, fair shaq, in-toeing (chronic)  Left lateral sway w/LLE wbing when not using spc.   Now able to lift leg in and out of car and bed  Was able to amb up steps to therapy dept  Exercises: (No more than 4 columns)   Exercise/Equipment 11/15/22 #4 11/22/22 #5  PROGRESS NOTE/GOALS ASSESSED` 11/29/22  #6 12/6/22 #7            WARM UP         NuStep   LV 4, 8' LV 4, 5'          TABLE       Ankle pumps HEP HEP     Quad sets 10x2 5\" 10x2 5\" 10x2 5\" HEP   Hamstring set 10x2 5\" 10x2 5\" 10x2 5\" HEP   GS  1 x 10 1 x 10 HEP   SAQ 2x10 with assist 2 x 10 w/increased quad and lateral thigh pain. 2 x 10  Medium bolster 2 x 10  Medium bolster   Hip abd/add (30 deg aarom) 2x10 active 2x10 active  Sliding board 2 x 10 slide board  arom    Heel slides Sliding board 10x2 10x2  Sliding board 2 x 10  Slide board  arom 2 x 10  Slide board  arom   LAQ  10x2 5\" 1 x 5 w/inc quad and lateral thigh pain. 1x10   HC stretch   3x30 sec 3x30 sec 2 x 30 sec Standing today   STANDING              gait  Amb 250' w/spc, Mod Ind. consistent foot placement, toes inward going, KATHIE WFL  Mild increased lateral sway    Amb 175' w/o AD, close supervision  Increased in-toeing, increased L lateral sway and narrow KATHIE. Patient reported no LLE pain during gait. Ambulating w/o AD, consistent foot placement and equal step length  Mild increased left lateral sway when LLE wbing. Stairs:  Descended 11 stairs x 2 reps using L HR and R spc. Single stair at a time encouraged for safety. Ambulating w/o AD, consistent foot placement and equal step length  Mild increased left lateral sway when LLE wbing. Hip abd, flex    1x10 ea   march    1x10   HC stretch    Black wedge 30\" x 2   Step ups fwd    4\" 1x10  6\" 1x10               PROPRIOCEPTION              Static stand 3\" blue foam   Eyes open  Feet apart  1 x 30 sec    Eyes open  Left FF  1 x 30 sec    Eyes open  Right FF  1 x 30 sec    Eyes closed  Feet apart   1 x 30 sec    All above no UE support/no LOB/supervision for safety Hip width EO and EC 30\" ea    NBOS EO and EC 30\" ea       Floor,  stagger stance with NBOS 30\" R/L fwd                 MODALITIES                         Other Therapeutic Activities/Education:    POC and tx rationale/progression expected reviewed w/patient. While patient is 50% wbing, it has been decided to proceed at 1x/wk. Pt is performing exercises daily.   11/08/22:  DNT Acknowledgement form was provided, reviewed and signed. Pt notified that mild needle soreness x 1-2 days may be present and is an expected side effect. Mild bruising at needle site(s) may also occur. Patient wished to proceed w/DNT. Spouse present. Home Exercise Program:      Access Code: [email or web address]  URL: [email or web address]  Date: 10/25/2022  Prepared by: Milan Galeazzi    Program Notes  NUBIA  - anterior approach      Exercises  Supine Gluteal Sets - 2 x daily - 7 x weekly - 2 sets - 10 reps - 5 hold  Supine Ankle Pumps - 2 x daily - 7 x weekly - 2 sets - 10 reps - 5 hold  Supine Quad Set - 2 x daily - 7 x weekly - 2 sets - 10 reps  Supine Heel Slide - 2 x daily - 7 x weekly - 2 sets - 10 reps  Supine Hip Abduction - 2 x daily - 7 x weekly - 2 sets - 10 reps  Seated Long Arc Quad - 2 x daily - 7 x weekly - 2 sets - 10 reps    Patient Education provided handouts  Going Up and Down Stairs with Walker  Car Transfer with Walker and Surgery Precautions  Tub Transfer with ROSARIO Ramirez  Standing Tub Transfer with [facility]    Manual Treatments: STM to left quad ~ 5'      Modalities:      Communication with other providers:    POC faxed 10/25/22, PN 11/22/22    Assessment:  (Response towards treatment session) (Pain Rating) Pt demonstrated overall good tolerance to advanced exercises today. States she is doing well with her HEP. Continue with therapy progressing as tolerated. End pain = 1/10          Plan for Next Session:   NUBIA anterior approach protocol. Advance gait/balance/proprioception, eventual gait w/o AD. Stair training.   STM and DNT w/Makayla prn    Time In / Time Out: 1346/1431       Timed Code/Total Treatment Minutes:  45/45  (1) TE, (1) NR, (1) TA    Next Progress Note due:  12/22/22      Plan of Care Interventions:  [x] Therapeutic Exercise  [x] Modalities:  [x] Therapeutic Activity     [] Ultrasound  [] Estim  [x] Gait Training      [] Cervical Traction [] Lumbar Traction  [x] Neuromuscular Re-education    [x] Cold/hotpack [] Iontophoresis   [x] Instruction in HEP      [] Vasopneumatic   [x] Dry Needling    [x] Manual Therapy               [] Aquatic Therapy              Electronically signed by:  Vivek Alejo PTA 12/6/2022, 1:46 PM  Milan Galeazzi, PT     12/6/2022,1:46 PM

## 2022-12-09 ENCOUNTER — HOSPITAL ENCOUNTER (OUTPATIENT)
Dept: PHYSICAL THERAPY | Age: 57
Setting detail: THERAPIES SERIES
Discharge: HOME | End: 2022-12-09
Payer: COMMERCIAL

## 2022-12-09 PROCEDURE — 97116 GAIT TRAINING THERAPY: CPT

## 2022-12-09 PROCEDURE — 97110 THERAPEUTIC EXERCISES: CPT

## 2022-12-09 PROCEDURE — 97112 NEUROMUSCULAR REEDUCATION: CPT

## 2022-12-09 NOTE — FLOWSHEET NOTE
Outpatient Physical Therapy  [place]           [x] Phone: [phone]   Fax: [phone]  [place]           [] Phone: [phone]   Fax: [phone]        Physical Therapy Daily Treatment Note  Date:  2022    Patient Name:  Hong Sanchez    :  1965  MRN: [id number]  Restrictions/Precautions: No data recorded   Position Activity Restriction  Hip Precautions: Anterior hip precautions  Other position/activity restrictions: 100% WBING Diagnosis:   Zac Tillman MD: L hip OA s/p NUBIA  Date of Injury/Surgery: 10/19/22  Treatment Diagnosis:  gait impairment, left hip pain, impaired strength/ROM left hipL NUBIA/OA  Insurance/Certification information: [facility]  Referring Physician:  Zac Tillman MD  PCP: Ender Witt MD  Next Doctor Visit:    Plan of care signed (Y/N): YES  Outcome Measure:   10/25/22:  LEFS 62% disability(using RW)  22:  LEFS 62.5% disability (using spc)  Visit# / total visits:     Pain level: 0/10    Goals:     Patient goals: Return to prior Ind. LOF, be able to go upstairs to bed, walk w/o AD  Short term goals  Time Frame for Short term goals: In 4 weeks, patient will  demonstrate compliance and independence w/HEP. - INCONSISTENT D/T PAIN/UNMET 22  demonstrate compliance and independence w/anterior hip precautions to reduce risk of anterior subluxation/dislocation. - MET 22  score at <= 50% disability on LEFS as indication of improved function w/daily activities. - UNMET 22  ambulate >= 250 ft w/spc (once cleared by physician to be WBAT) to advance mobility skills and safety. - MET 22  ascend/descend 1 flight of stairs w/close supervision, safest method bearing wbing precautions in mind, AD prn - NT today 22  Long Term Goals  Time Frame for Long Term Goals: In 8 weeks, patient will  demonstrate compliance and indepenence w/HEP. score at <= 30% disability of LEFS as indication of improved function w/daily activity.   ambulate >= 500 ft w/spc, reciprocal pattern and normalized gait for advanced mobility in home and community. ambulate >= 250 ft no AD (as approved by surgeon), level surfaces, normalized gait for advancement toward Ind. gait/mobility in home/community. - PARTIALLY MET 11/22/22  ascend/descend 1 flight of stairs, utilizing (1) HR for light support, safest method based upon any residual hip precautions. Summary of Evaluation:  Assessment: Pt is a 65 y.o. female s/p L NUBIA related to OA. Pt presents today w/covered anterior hip surgical incision and 5/10 pain. Pt reports chronic L hip pain x 2 years. PLOF:  Ambulated w/o AD, did not use DME, Ind. homemaking, stairs, adls, drove, community mobility. CURRENTLY:  Using RW, using shower chair and toilet riser since surgery, sleeping on the sofa d/t inability to ascend stairs to BR. Pt is having significant discomfort left buttock and lateral leg to knee. Patient will benefit from physical therapy to advance mobility and self-care skills post-NUBIA, w/ultimate goal of return to prior level of Independent function. Subjective:    No pain today. Went shopping last Friday and walked all day w/o AD or using carts. Was a little sore, but no lingering pain. Most discomfort is in the morning at approx 3-5/10. Walking helps. Any changes in Ambulatory Summary Sheet? None        Objective:   COVID screening questions were asked and patient attested that there had been no contact or symptoms    MMT:  LLE  Hip flexion 4-/5  Hip abd 4-/5  Knee flex 4/5  Knee ext 4-/5    Ambulating w/o spc, fair shaq, in-toeing (chronic)  Left lateral sway w/LLE wbing when not using spc.   Now able to lift leg in and out of car and bed        Exercises: (No more than 4 columns)   Exercise/Equipment 11/22/22 #5  PROGRESS NOTE/GOALS ASSESSED` 11/29/22  #6 12/6/22 #7 12/09/22 #8            WARM UP         NuStep  LV 4, 8' LV 4, 5' LV 5, 10'          TABLE       Ankle pumps HEP      Quad sets 10x2 5\" 10x2 5\" HEP    Hamstring set 10x2 5\" 10x2 5\" HEP    GS 1 x 10 1 x 10 HEP    SAQ 2 x 10 w/increased quad and lateral thigh pain. 2 x 10  Medium bolster 2 x 10  Medium bolster 2 x 10  1# cuff wt  Mediium bolster   Hip abd/add (30 deg aarom) 2x10 active  Sliding board 2 x 10 slide board  arom  2 x 10 slide board  Arom  1# ankle wt   Heel slides 10x2  Sliding board 2 x 10  Slide board  arom 2 x 10  Slide board  arom 2 x 10  Slide board  Arom  1# ankle wt   LAQ  1 x 5 w/inc quad and lateral thigh pain. 1x10 2 x 10  1# cuff wt   HC stretch   3x30 sec 2 x 30 sec Standing today    Clamshell #1    1 x 10   YTB   STANDING              gait Amb 250' w/spc, Mod Ind. consistent foot placement, toes inward going, KATHIE WFL  Mild increased lateral sway    Amb 175' w/o AD, close supervision  Increased in-toeing, increased L lateral sway and narrow KATHIE. Patient reported no LLE pain during gait. Ambulating w/o AD, consistent foot placement and equal step length  Mild increased left lateral sway when LLE wbing. Stairs:  Descended 11 stairs x 2 reps using L HR and R spc. Single stair at a time encouraged for safety. Ambulating w/o AD, consistent foot placement and equal step length  Mild increased left lateral sway when LLE wbing. Ascended/descended 11 + 11 stairs w/supervision using (1) HR and spc.   Reciprocal pattern  Mild weakness noted LLE w/functional strength 4-/5   Hip abd, flex   1x10 ea    march   1x10    HC stretch   Black wedge 30\" x 2 Hold  Increased leg pain after last session   Step ups fwd   4\" 1x10  6\" 1x10                PROPRIOCEPTION              Static stand 3\" blue foam  Eyes open  Feet apart  1 x 30 sec    Eyes open  Left FF  1 x 30 sec    Eyes open  Right FF  1 x 30 sec    Eyes closed  Feet apart   1 x 30 sec    All above no UE support/no LOB/supervision for safety Hip width EO and EC 30\" ea    NBOS EO and EC 30\" ea       Floor,  stagger stance with NBOS 30\" R/L fwd                  MODALITIES Other Therapeutic Activities/Education:    POC and tx rationale/progression expected reviewed w/patient. While patient is 50% wbing, it has been decided to proceed at 1x/wk. Pt is performing exercises daily. 11/08/22:  DNT Acknowledgement form was provided, reviewed and signed. Pt notified that mild needle soreness x 1-2 days may be present and is an expected side effect. Mild bruising at needle site(s) may also occur. Patient wished to proceed w/DNT. Spouse present. Home Exercise Program:      Access Code: [email or web address]  URL: [employer]/  Date: 10/25/2022  Prepared by: Lana Villegas    Program Notes  NUBIA  - anterior approach      Exercises  Supine Gluteal Sets - 2 x daily - 7 x weekly - 2 sets - 10 reps - 5 hold  Supine Ankle Pumps - 2 x daily - 7 x weekly - 2 sets - 10 reps - 5 hold  Supine Quad Set - 2 x daily - 7 x weekly - 2 sets - 10 reps  Supine Heel Slide - 2 x daily - 7 x weekly - 2 sets - 10 reps  Supine Hip Abduction - 2 x daily - 7 x weekly - 2 sets - 10 reps  Seated Long Arc Quad - 2 x daily - 7 x weekly - 2 sets - 10 reps    Patient Education provided handouts  Going Up and Down Stairs with Walker  Car Transfer with Walker and Surgery Precautions  Tub Transfer with [facility] Ramirez  Standing Tub Transfer with [facility]    Manual Treatments: STM to left quad ~ 5'      Modalities:      Communication with other providers:    POC faxed 10/25/22, PN 11/22/22    Assessment:  (Response towards treatment session) (Pain Rating)   Pt demonstrated overall good tolerance to advanced exercises today. Mild functional weakness LLE/hip on stairs. States she is doing well with her HEP. Continue with therapy progressing as tolerated. End pain = 1/10          Plan for Next Session:   NUBIA anterior approach protocol. Advance gait/balance/proprioception, eventual gait w/o AD. Stair training.   STM and DNT w/Makayla brito    Time In / Time Out: 0903/1000       Timed Code/Total Treatment Minutes:  57'/57'  TE 32' (2), GT 15' (1), NRE 15' (1)    Next Progress Note due:  12/22/22      Plan of Care Interventions:  [x] Therapeutic Exercise  [x] Modalities:  [x] Therapeutic Activity     [] Ultrasound  [] Estim  [x] Gait Training      [] Cervical Traction [] Lumbar Traction  [x] Neuromuscular Re-education    [x] Cold/hotpack [] Iontophoresis   [x] Instruction in HEP      [] Vasopneumatic   [x] Dry Needling    [x] Manual Therapy               [] Aquatic Therapy              Electronically signed by:  Geoff Díaz, PT  12/9/2022, 9:06 AM  Laura Bowser, PT     12/9/2022,9:06 AM

## 2022-12-20 ENCOUNTER — HOSPITAL ENCOUNTER (OUTPATIENT)
Dept: PHYSICAL THERAPY | Age: 57
Setting detail: THERAPIES SERIES
Discharge: HOME | End: 2022-12-20
Payer: COMMERCIAL

## 2022-12-20 PROCEDURE — 97116 GAIT TRAINING THERAPY: CPT

## 2022-12-20 PROCEDURE — 97140 MANUAL THERAPY 1/> REGIONS: CPT

## 2022-12-20 PROCEDURE — 97110 THERAPEUTIC EXERCISES: CPT

## 2022-12-20 NOTE — FLOWSHEET NOTE
Outpatient Physical Therapy  [place]           [x] Phone: [phone]   Fax: [phone]  [place]           [] Phone: [phone]   Fax: [phone]        Physical Therapy Daily Treatment Note  Date:  2022    Patient Name:  Rodrigue Pena    :  1965  MRN: [id number]  Restrictions/Precautions: No data recorded   Position Activity Restriction  Hip Precautions: Anterior hip precautions  Other position/activity restrictions: 100% WBING Diagnosis:   Javan Reyes MD: L hip OA s/p NUBIA  Date of Injury/Surgery: 10/19/22  Treatment Diagnosis:  gait impairment, left hip pain, impaired strength/ROM left hipL NUBIA/OA  Insurance/Certification information: [facility]  Referring Physician:  Javan Reyes MD  PCP: Francisca Reese MD  Next Doctor Visit:    Plan of care signed (Y/N): YES  Outcome Measure:   10/25/22:  LEFS 62% disability(using RW)  22:  LEFS 62.5% disability (using spc)  22:  LEFS 31.5% disability w/o AD    Visit# / total visits:     Pain level: 0/10    Goals:     Patient goals: Return to prior Ind. LOF, be able to go upstairs to bed, walk w/o AD - MET 22  Short term goals  Time Frame for Short term goals: In 4 weeks, patient will  demonstrate compliance and independence w/HEP. - MET 22  demonstrate compliance and independence w/anterior hip precautions to reduce risk of anterior subluxation/dislocation. - MET 22  score at <= 50% disability on LEFS as indication of improved function w/daily activities. - MET 22  ambulate >= 250 ft w/spc (once cleared by physician to be WBAT) to advance mobility skills and safety. - MET 22  ascend/descend 1 flight of stairs w/close supervision, safest method bearing wbing precautions in mind, AD prn - MET 22  Long Term Goals  Time Frame for Long Term Goals:  In 8 weeks, patient will  demonstrate compliance and indepenence w/HEP. - MET 22  score at <= 30% disability of LEFS as indication of improved function w/daily activity. - NEARLY MET 12/20/22  ambulate >= 500 ft w/spc, reciprocal pattern and normalized gait for advanced mobility in home and community. - MET 12/20/22  ambulate >= 250 ft no AD (as approved by surgeon), level surfaces, normalized gait for advancement toward Ind. gait/mobility in home/community. - MET 12/20/22  ascend/descend 1 flight of stairs, utilizing (1) HR for light support, safest method based upon any residual hip precautions. - MET 12/20/22      Summary of Evaluation:  Assessment: Pt is a 65 y.o. female s/p L NUBIA related to OA. Pt presents today w/covered anterior hip surgical incision and 5/10 pain. Pt reports chronic L hip pain x 2 years. PLOF:  Ambulated w/o AD, did not use DME, Ind. homemaking, stairs, adls, drove, community mobility. CURRENTLY:  Using RW, using shower chair and toilet riser since surgery, sleeping on the sofa d/t inability to ascend stairs to BR. Pt is having significant discomfort left buttock and lateral leg to knee. Patient will benefit from physical therapy to advance mobility and self-care skills post-NUBIA, w/ultimate goal of return to prior level of Independent function. Subjective:    No pain today. Went shopping last Friday and walked all day w/o AD or using carts. Was a little sore, but no lingering pain. Most discomfort is in the morning at approx 3-5/10. Walking helps. Any changes in Ambulatory Summary Sheet? None        Objective:   COVID screening questions were asked and patient attested that there had been no contact or symptoms    MMT:  LLE  Hip flexion 4-/5  Hip abd 4-/5  Hip ext 4/5  Hip ER 4-/5  Hip IR 4/5  Knee flex 4/5  Knee ext 4/5    Ambulating 500 ft, no AD, fair shaq, in-toeing (chronic), and slight left lateral sway when LLE wbing. Able to correct w/vc temporarily.   Left lateral sway w/LLE wbing, no AD          Exercises: (No more than 4 columns)   Exercise/Equipment 12/6/22 #7 12/09/22 #8 12/20/22 #9 DISCHARGE  GOALS ASSESSED  HEP REVIEWED AND PROGRESSED   WARM UP        NuStep LV 4, 5' LV 5, 10'          TABLE      Ankle pumps      Quad sets HEP     Hamstring set HEP     GS HEP     SAQ 2 x 10  Medium bolster 2 x 10  1# cuff wt  Mediium bolster 2 x 10  1# cuff wt  Mediium bolster   Hip abd/add (30 deg aarom)  2 x 10 slide board  Arom  1# ankle wt    Heel slides 2 x 10  Slide board  arom 2 x 10  Slide board  Arom  1# ankle wt    LAQ  1x10 2 x 10  1# cuff wt    HC stretch   Standing today     Clamshell #1  1 x 10   YTB    STANDING            gait Ambulating w/o AD, consistent foot placement and equal step length  Mild increased left lateral sway when LLE wbing. Ascended/descended 11 + 11 stairs w/supervision using (1) HR and spc. Reciprocal pattern  Mild weakness noted LLE w/functional strength 4-/5 scended/descended 11 + 11 stairs w/supervision using (1) HR and spc. Reciprocal pattern  Mild weakness noted LLE w/functional strength 4-/5   Hip abd, flex 1x10 ea     march 1x10     HC stretch Black wedge 30\" x 2 Hold  Increased leg pain after last session    Step ups fwd 4\" 1x10  6\" 1x10                PROPRIOCEPTION            Static stand 3\" blue foam Hip width EO and EC 30\" ea    NBOS EO and EC 30\" ea      Floor,  stagger stance with NBOS 30\" R/L fwd                 MODALITIES                      Other Therapeutic Activities/Education:    POC and tx rationale/progression expected reviewed w/patient. While patient is 50% wbing, it has been decided to proceed at 1x/wk. Pt is performing exercises daily. 11/08/22:  DNT Acknowledgement form was provided, reviewed and signed. Pt notified that mild needle soreness x 1-2 days may be present and is an expected side effect. Mild bruising at needle site(s) may also occur. Patient wished to proceed w/DNT. Spouse present. Home Exercise Program:      Access Code: [email or web address]  URL: [email or web address]  Date: 10/25/2022  Prepared by: Vonda Sterling [address]    Program Notes  NUBIA  - anterior approach      Exercises  Supine Gluteal Sets - 2 x daily - 7 x weekly - 2 sets - 10 reps - 5 hold  Supine Ankle Pumps - 2 x daily - 7 x weekly - 2 sets - 10 reps - 5 hold  Supine Quad Set - 2 x daily - 7 x weekly - 2 sets - 10 reps  Supine Heel Slide - 2 x daily - 7 x weekly - 2 sets - 10 reps  Supine Hip Abduction - 2 x daily - 7 x weekly - 2 sets - 10 reps  Seated Long Arc Quad - 2 x daily - 7 x weekly - 2 sets - 10 reps    Patient Education provided handouts  Going Up and Down Stairs with Walker  Car Transfer with Walker and Surgery Precautions  Tub Transfer with [employer] [facility]    Access Code: [email or web address]  URL: [email or web address]  Date: 12/20/2022  Prepared by: Milan Galeazzi    Exercises  Clamshell - 1 x daily - 7 x weekly - 3 sets - 10 reps  Supine Active Straight Leg Raise - 1 x daily - 7 x weekly - 3 sets - 10 reps  Sidelying Hip Abduction - 1 x daily - 7 x weekly - 3 sets - 10 reps  Prone Hip Extension - 1 x daily - 7 x weekly - 3 sets - 10 reps  Seated Long Arc Quad - 1 x daily - 7 x weekly - 3 sets - 10 reps  Standing Knee Flexion AROM with Chair Support - 1 x daily - 7 x weekly - 3 sets - 10 reps  Access Code: [email or web address]  URL: [email or web address]  Date: 12/20/2022  Prepared by: Henrique Galeazzi    Exercises  Clamshell - 1 x daily - 7 x weekly - 3 sets - 10 reps  Supine Active Straight Leg Raise - 1 x daily - 7 x weekly - 3 sets - 10 reps  Sidelying Hip Abduction - 1 x daily - 7 x weekly - 3 sets - 10 reps  Prone Hip Extension - 1 x daily - 7 x weekly - 3 sets - 10 reps  Seated Long Arc Quad - 1 x daily - 7 x weekly - 3 sets - 10 reps  Standing Knee Flexion AROM with Chair Support - 1 x daily - 7 x weekly - 3 sets - 10 reps      Manual Treatments:   Modalities:      Communication with other providers:    POC faxed 10/25/22, PN 11/22/22, DC 12/20/22    Assessment:  (Response towards treatment session) (Pain Rating)   Pt has been seen x 9 physical therapy sessions from 10/25/22 - 12/20/22. Diagnosis:   Alina Bowman MD: L hip OA s/p NUBIA  Date of Injury/Surgery: 10/19/22  Treatment Diagnosis:  gait impairment, left hip pain, impaired strength/ROM left hipL NUBIA/OA  All goals met or nearly met. Pt feels she can progress on her own w/HEP and increasing activity.   DISCHARGE        Plan for Next Session:   DISCHARGED    Time In / Time Out: 0454/3974       Timed Code/Total Treatment Minutes:  47'/47'  TE 40' (2), GT 15' (1), Manual 15' (1)    Next Progress Note due:  DISCHARGED       Plan of Care Interventions:  [x] Therapeutic Exercise  [x] Modalities:  [x] Therapeutic Activity     [] Ultrasound  [] Estim  [x] Gait Training      [] Cervical Traction [] Lumbar Traction  [x] Neuromuscular Re-education    [x] Cold/hotpack [] Iontophoresis   [x] Instruction in HEP      [] Vasopneumatic   [x] Dry Needling    [x] Manual Therapy               [] Aquatic Therapy              Electronically signed by:  Mayela Hernandez, PT  12/20/2022, 1:48 PM  Cristian Storey, PT     12/20/2022,1:48 PM

## 2022-12-22 NOTE — DISCHARGE SUMMARY
Outpatient Physical Therapy           [place]           [] Phone: [phone]   Fax: [phone]  [facility]           [] Phone: [phone]   Fax: [phone]      To: Aaron Medina MD     From: Donald Graves PT, PT     Patient: Hollie Rangel                    : 1965  Diagnosis:  Coronary angioplasty status [Z98.61]        Treatment Diagnosis:    gait impairment, left hip pain, impaired strength/ROM left hipL NUBIA/OA   Date: 2022  []  Progress Note                [x]  Discharge Note    Evaluation Date:  10/25/22  Total Visits to date:   9 Cancels/No-shows to date:  1    Subjective:    No pain today. Went shopping last Friday and walked all day w/o AD or using carts. Was a little sore, but no lingering pain. Most discomfort is in the morning at approx 3-5/10. Walking helps. Plan of Care/Treatment to date:  [x] Therapeutic Exercise    [x] Modalities:  [x] Therapeutic Activity     [x] Ultrasound  [x] Electrical Stimulation  [x] Gait Training      [] Cervical Traction   [] Lumbar Traction  [x] Neuromuscular Re-education  [x] Cold/hotpack [] Iontophoresis  [x] Instruction in HEP      Other:  [x] Manual Therapy       []  Vasopneumatic  [] Aquatic Therapy       [x]   Dry Needle Therapy                      Objective/Significant Findings At Last Visit/Comments:       MMT:  LLE  Hip flexion 4-/5  Hip abd 4-/5  Hip ext 4/5  Hip ER 4-/5  Hip IR 4/5  Knee flex 4/5  Knee ext 4/5     Ambulating 500 ft, no AD, fair shaq, in-toeing (chronic), and slight left lateral sway when LLE wbing. Able to correct w/vc temporarily. Mod Ind. 11 stairs, (1) HR light support, reciprocal pattern, normalized pattern       Assessment:     Pt has been seen x 9 physical therapy sessions from 10/25/22 - 22.   Diagnosis:   Will Magallon MD: L hip OA s/p NUBIA  Date of Injury/Surgery: 10/19/22  Treatment Diagnosis:  gait impairment, left hip pain, impaired strength/ROM left hipL NUBIA/OA  All goals met or nearly met. Pt feels she can progress on her own w/HEP and increasing activity. DISCHARGE       Goal Status:  [x] Achieved [x] Partially Achieved  [] Not Achieved   Patient goals: Return to prior Ind. LOF, be able to go upstairs to bed, walk w/o AD - MET 12/20/22  Short term goals  Time Frame for Short term goals: In 4 weeks, patient will  demonstrate compliance and independence w/HEP. - MET 12/20/22  demonstrate compliance and independence w/anterior hip precautions to reduce risk of anterior subluxation/dislocation. - MET 11/22/22  score at <= 50% disability on LEFS as indication of improved function w/daily activities. - MET 12/20/22  ambulate >= 250 ft w/spc (once cleared by physician to be WBAT) to advance mobility skills and safety. - MET 11/22/22  ascend/descend 1 flight of stairs w/close supervision, safest method bearing wbing precautions in mind, AD prn - MET 12/20/22  Long Term Goals  Time Frame for Long Term Goals: In 8 weeks, patient will  demonstrate compliance and indepenence w/HEP. - MET 12/20/22  score at <= 30% disability of LEFS as indication of improved function w/daily activity. - NEARLY MET 12/20/22  ambulate >= 500 ft w/spc, reciprocal pattern and normalized gait for advanced mobility in home and community. - MET 12/20/22  ambulate >= 250 ft no AD (as approved by surgeon), level surfaces, normalized gait for advancement toward Ind. gait/mobility in home/community. - MET 12/20/22  ascend/descend 1 flight of stairs, utilizing (1) HR for light support, safest method based upon any residual hip precautions. - MET 12/20/22       Patient Status: [] Continue per initial plan of Care     [x] Patient now discharged     [] Additional visits requested, Please re-certify for additional visits: If we are requesting more visits, we fully anticipate the patient's condition is expected to improve within the treatment timeframe we are requesting.     Electronically signed by:  Aisha Molina PT 12/22/2022, 5:49 PM    If you have any questions or concerns, please don't hesitate to call.   Thank you for your referral.    Physician Signature:______________________ Date:______ Time: ________  By signing above, therapists plan is approved by physician

## 2023-01-03 ENCOUNTER — OFFICE VISIT (OUTPATIENT)
Dept: CARDIOLOGY CLINIC | Age: 58
End: 2023-01-03
Payer: COMMERCIAL

## 2023-01-03 VITALS
HEIGHT: 62 IN | DIASTOLIC BLOOD PRESSURE: 80 MMHG | WEIGHT: 143 LBS | HEART RATE: 93 BPM | BODY MASS INDEX: 26.31 KG/M2 | SYSTOLIC BLOOD PRESSURE: 124 MMHG

## 2023-01-03 DIAGNOSIS — I25.110 CORONARY ARTERY DISEASE INVOLVING NATIVE CORONARY ARTERY OF NATIVE HEART WITH UNSTABLE ANGINA PECTORIS (HCC): Primary | ICD-10-CM

## 2023-01-03 PROCEDURE — 99214 OFFICE O/P EST MOD 30 MIN: CPT | Performed by: INTERNAL MEDICINE

## 2023-01-03 PROCEDURE — 3074F SYST BP LT 130 MM HG: CPT | Performed by: INTERNAL MEDICINE

## 2023-01-03 PROCEDURE — 3079F DIAST BP 80-89 MM HG: CPT | Performed by: INTERNAL MEDICINE

## 2023-01-03 RX ORDER — ATORVASTATIN CALCIUM 40 MG/1
40 TABLET, FILM COATED ORAL DAILY
Qty: 30 TABLET | Refills: 3 | Status: SHIPPED | OUTPATIENT
Start: 2023-01-03

## 2023-01-03 NOTE — PROGRESS NOTES
Pat Daniels MD        OFFICE  FOLLOWUP NOTE    Chief complaints: patient is here for management of CAD,STEMI of RCA, GI bleeding,anal cancer DYSLPIDEMIA  Subjective: patient feels better, no chest pain, no shortness of breath, no dizziness, no palpitations    HPI Markus Phan is a 65 y. o.year old who  has a past medical history of Anal cancer ([facility] [address] Asthma, Broken ankle, CAD (coronary artery disease), H/O cardiovascular stress test, History of blood transfusion, NSTEMI (non-ST elevated myocardial infarction) ([facility] PONV (postoperative nausea and vomiting), Post PTCA, and Skin cancer. and presents for management of CAD,STEMI of RCA, GI bleeding,anal cancer DYSLPIDEMIAv which are well controlled    She is not taking praluent as her pharmacy was charging 250 dollars  Current Outpatient Medications   Medication Sig Dispense Refill    ferrous gluconate (FERGON) 324 (38 Fe) MG tablet Take 324 mg by mouth daily (with breakfast)      Cholecalciferol (VITAMIN D) 50 MCG (2000 UT) CAPS capsule Take by mouth      ondansetron (ZOFRAN-ODT) 4 MG disintegrating tablet Take 1 tablet by mouth every 8 hours as needed for Nausea or Vomiting 20 tablet 1    clopidogrel (PLAVIX) 75 MG tablet Take 1 tablet by mouth daily 30 tablet 5    cetirizine (ZYRTEC) 10 MG tablet Take 10 mg by mouth daily      nystatin (MYCOSTATIN) 828807 UNIT/GM ointment Apply to affected area twice daily. hydrocortisone 0.5 % ointment Apply topically 2 times daily      nitroGLYCERIN (NITROSTAT) 0.4 MG SL tablet up to max of 3 total doses.  If no relief after 1 dose, call 911. 25 tablet 3    ferrous sulfate (FE TABS 325) 325 (65 Fe) MG EC tablet Take 325 mg by mouth daily      albuterol-ipratropium (COMBIVENT RESPIMAT)  MCG/ACT AERS inhaler Inhale into the lungs       aspirin 81 MG chewable tablet Take 1 tablet by mouth in the morning and at bedtime (Patient not taking: No sig reported) 60 tablet 0    azithromycin (ZITHROMAX) 1 g powder Take 1 packet by mouth once (Patient not taking: No sig reported)      levoFLOXacin (LEVAQUIN) 500 MG/100ML SOLN Infuse 500 mg intravenously every 24 hours (Patient not taking: No sig reported)      alirocumab (PRALUENT) 75 MG/ML SOAJ injection pen Inject 1 mL into the skin every 14 days (Patient not taking: Reported on 1/3/2023) 2.24 mL 5    KLOR-CON M20 20 MEQ extended release tablet  (Patient not taking: No sig reported)      furosemide (LASIX) 20 MG tablet Take 1 tablet by mouth daily (Patient not taking: Reported on 1/3/2023) 60 tablet 3    olopatadine (PATANOL) 0.1 % ophthalmic solution Place 1 drop into both eyes 2 times daily (Patient not taking: No sig reported)      potassium chloride (KLOR-CON) 20 MEQ packet Take 20 mEq by mouth 2 times daily (Patient not taking: No sig reported)      tiZANidine (ZANAFLEX) 4 MG tablet Take 4 mg by mouth every 6 hours as needed  (Patient not taking: No sig reported)      loperamide (IMODIUM) 2 MG capsule Take 2 mg by mouth as needed for Diarrhea        No current facility-administered medications for this visit. 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     Skin cancer      Past Surgical History:   Procedure Laterality Date ANKLE SURGERY Left 10+ yrs ago    with hardware    COLON SURGERY      \"after had chemo removed some part of the cancer from the rectal area, then 7 months later had bowel blockage had to do surgery  to remove part of the bowel\"    COLONOSCOPY  2015    mild proctitis    COLONOSCOPY  03/15/2017    mild radia proc, hypertroph pailla    COLONOSCOPY N/A 2022    COLONOSCOPY CONTROL HEMORRHAGE WITH STRAIGHT FIRE APC WITH CLIP PLACEMENT X 2 performed by Gogo Brantley MD at [facility]      left ankle    OTHER SURGICAL HISTORY  2015    mediport removal    PTCA  2009    2009 had angioplasty with stent to LAD & another day in  stent x 2 to RCA done    PTCA  2022    RCA stented. TONSILLECTOMY      as a kid age 7    TOTAL HIP ARTHROPLASTY Left 10/19/2022    LEFT HIP TOTAL ARTHROPLASTY ANTERIOR APPROACH performed by Cristian Ozuna MD at [facility]  25 yrs ago    TUNNELED VENOUS PORT PLACEMENT      port inserted     UPPER GASTROINTESTINAL ENDOSCOPY N/A 2022    EGD DIAGNOSTIC ONLY performed by Gogo Brantley MD at [address] [place] [address] N/A 2022    ENTEROSCOPY PUSH DIAGNOSTIC performed by Gogo Brantley MD at [facility] ENDOSCOPY     Family History   Problem Relation Age of Onset    Cancer Mother         breast ca? ?    Cancer Father         prostate cancer- lung mets- bone mets    Stroke Father     High Cholesterol Father     Stroke Sister     Cancer Brother         neck cancer    No Known Problems Daughter      Social History     Tobacco Use    Smoking status: Every Day     Packs/day: 0.25     Years: 35.00     Pack years: 8.75     Types: Cigarettes     Start date:      Last attempt to quit: 10/2021     Years since quittin.2    Smokeless tobacco: Never    Tobacco comments:     6-7 cigarettes    Substance Use Topics    Alcohol use: No     Comment: \"quit 3/2015\"use to drink a couple of times per week before\"      [unfilled]  Review of Systems:   Constitutional: No Fever or Weight Loss   Eyes: No Decreased Vision  ENT: No Headaches, Hearing Loss or Vertigo  Cardiovascular: No chest pain, dyspnea on exertion, palpitations or loss of consciousness  Respiratory: No cough or wheezing    Gastrointestinal: No abdominal pain, appetite loss, blood in stools, constipation, diarrhea or heartburn  Genitourinary: No dysuria, trouble voiding, or hematuria  Musculoskeletal:  No gait disturbance, weakness or joint complaints  Integumentary: No rash or pruritis  Neurological: No TIA or stroke symptoms  Psychiatric: No anxiety or depression  Endocrine: No malaise, fatigue or temperature intolerance  Hematologic/Lymphatic: No bleeding problems, blood clots or swollen lymph nodes  Allergic/Immunologic: No nasal congestion or hives  All systems negative except as marked. Objective:  /80 (Site: Left Upper Arm, Position: Sitting, Cuff Size: Medium Adult)   Pulse 93   Ht 5' 2\" (1.575 m)   Wt 143 lb (64.9 kg)   BMI 26.16 kg/m²   Wt Readings from Last 3 Encounters:   01/03/23 143 lb (64.9 kg)   11/29/22 165 lb (74.8 kg)   11/08/22 165 lb (74.8 kg)     Body mass index is 26.16 kg/m². GENERAL - Alert, oriented, pleasant, in no apparent distress,normal grooming  HEENT - pupils are intact, cornea intact, conjunctive normal color, ears are normal in exam,  Neck - Supple. No jugular venous distention noted. No carotid bruits, no apical -carotid delay  Respiratory:  Normal breath sounds, No respiratory distress, No wheezing, No chest tenderness. ,no use of accessory muscles, diaphragm movement is normal  Cardiovascular: (PMI) apex non displaced,no lifts no thrills, no s3,no s4, Normal heart rate, Normal rhythm, No murmurs, No rubs, No gallops.  Carotid arteries pulse and amplitude are normal no bruit, no abdominal bruit noted ( normal abdominal aorta ausculation),   Extremities - No cyanosis, clubbing, or significant edema, no varicose veins    Abdomen - No masses, tenderness, or organomegaly, no hepato-splenomegally, no bruits  Musculoskeletal - No significant edema, no kyphosis or scoliosis, no deformity in any extremity noted, muscle strength and tone are normal  Skin: no ulcer,no scar,no stasis dermatitis   Neurologic - alert oriented times 3,Cranial nerves II through XII are grossly intact. There were no gross focal neurologic abnormalities. Psychiatric: normal mood and affect    Lab Results   Component Value Date/Time    TROPONINI 0.008 04/10/2012 11:05 AM     BNP:  No results found for: BNP  PT/INR:  No results found for: PTINR  No results found for: LABA1C  Lab Results   Component Value Date    CHOL 152 02/23/2022    TRIG 115 02/23/2022    HDL 68 02/23/2022    LDLCALC 61 02/23/2022    LDLDIRECT 105 (H) 03/09/2021     Lab Results   Component Value Date    ALT 12 10/11/2022    AST 15 10/11/2022     TSH:    Lab Results   Component Value Date/Time    TSH 2.420 02/11/2022 08:36 AM    TSH 2.420 02/11/2022 08:36 AM       Impression:  Tasha Gambino is a 65 y. o.year old who  has a past medical history of Anal cancer ([facility] [address] Asthma, Broken ankle, CAD (coronary artery disease), H/O cardiovascular stress test, History of blood transfusion, NSTEMI (non-ST elevated myocardial infarction) ([facility] [address] PONV (postoperative nausea and vomiting), Post PTCA, and Skin cancer. and presents with     Plan:  CAD: recent PCI of RCA with SOUMYA, Continue aspirin and plavix.  She is not on statins,a nd did not take praleunt  as per patient it was for 250 dollars, will call in prescription of lipitor ) ok stay off coregg as her blood pressure was low in 80s and 90s at rehab, she has done cardiac rehab, had l gigi discussion about statin side effects  PAD and leg pain with claudications: arterial doppler  Fluttering of chest: will get holter monitor if it gets worse  preop for hip sx recommend to hold off sx due to recent MI in februruary  Gi bleeding resolved, f/u with  Anna, had colon AVM, coagulated with APC and 2 clips  Dyslipidemia: continue statins  H/o anal cancer  All labs, medications and tests reviewed, continue all other medications of all above medical condition listed as is.

## 2023-01-03 NOTE — PATIENT INSTRUCTIONS
Thank you for allowing us to care for you today! We want to ensure we can follow your treatment plan and we strive to give you the best outcomes and experience possible. If you ever have a life threatening emergency and call 911 - for an ambulance (EMS)   Our providers can only care for you at:   [facility] or [facility]. Even if you have someone take you or you drive yourself we can only care for you in a [facility]. Our providers are not setup at the other healthcare locations! **It is YOUR responsibilty to bring medication bottles and/or updated medication list to [address] [place] [address]. This will allow us to better serve you and all your healthcare needs**  Please be informed that if you contact our office outside of normal business hours the physician on call cannot help with any scheduling or rescheduling issues, procedure instruction questions or any type of medication issue. We advise you for any urgent/emergency that you go to the nearest emergency room!     PLEASE CALL OUR OFFICE DURING NORMAL BUSINESS HOURS    Monday - Friday   8 am to 5 pm    [place] [facility]: [phone]    [place]:  [phone]

## 2023-01-09 ENCOUNTER — TELEPHONE (OUTPATIENT)
Dept: CARDIOLOGY CLINIC | Age: 58
End: 2023-01-09

## 2023-01-09 NOTE — TELEPHONE ENCOUNTER
Experiencing bleeding for 3 weeks since hip surgery. Wasn't sure if this is her blood thinner causing the issue. Please advise.

## 2023-01-09 NOTE — TELEPHONE ENCOUNTER
Pt had PAP Smear done and now is having vaginal bleeding, wants to know if this is OK or should we switch her blood thinner. Pt has already notified OB sates she has not had a menstrual cycle in years. PCI 1 yr, on plavix    Per Dr.Ashraf morris BT and f/u with GYN   Patient advised and voices understanding.

## 2023-01-10 ENCOUNTER — OFFICE VISIT (OUTPATIENT)
Dept: ORTHOPEDIC SURGERY | Age: 58
End: 2023-01-10

## 2023-01-10 VITALS
BODY MASS INDEX: 26.31 KG/M2 | WEIGHT: 143 LBS | SYSTOLIC BLOOD PRESSURE: 137 MMHG | DIASTOLIC BLOOD PRESSURE: 78 MMHG | HEART RATE: 83 BPM | HEIGHT: 62 IN

## 2023-01-10 DIAGNOSIS — M87.051: ICD-10-CM

## 2023-01-10 DIAGNOSIS — Z96.642 STATUS POST TOTAL HIP REPLACEMENT, LEFT: Primary | ICD-10-CM

## 2023-01-10 PROCEDURE — 99024 POSTOP FOLLOW-UP VISIT: CPT | Performed by: ORTHOPAEDIC SURGERY

## 2023-01-10 NOTE — PATIENT INSTRUCTIONS
Continue to weight bear as tolerated  Continue range of motion  Ice and elevate as needed  Tylenol or Motrin for pain  Follow up in 6 months    We are committed to providing you the best care possible. If you receive a survey after visiting one of our offices, please take time to share your experience concerning your physician office visit. These surveys are confidential and no health information about you is shared. We are eager to improve for you and we are counting on your feedback to help make that happen.

## 2023-01-10 NOTE — PROGRESS NOTES
Patient returns to office today for 12 wk post op L NUBIA, pain rated 0/10. Repeat xrays done in office today. Patient reports incision remains clean dry and intact, no signs of  infection.

## 2023-01-10 NOTE — PROGRESS NOTES
1/10/2023   Chief Complaint   Patient presents with    Follow-up     Post op 12 wk Lt NUBIA         History of Present Illness:                             Hong Sanchez is a 65 y.o. female who returns today for follow-up of her left total hip replacement. She is doing well advancing her activities. She has completed physical therapy. Her left hip is doing well and she is pleased with her pain relief following surgery. She has some occasional aches and soreness at the right hip but today is doing reasonably well. Patient returns to office today for 12 wk post op L NUBIA, pain rated 0/10. Repeat xrays done in office today. Patient reports incision remains clean dry and intact, no signs of  infection. Medical History  Patient's medications, allergies, past medical, surgical, social and family histories were reviewed and updated as appropriate. Review of Systems                                            Examination:  General Exam:  Vitals: /78 (Site: Left Upper Arm, Position: Sitting, Cuff Size: Medium Adult)   Pulse 83   Ht 5' 2\" (1.575 m)   Wt 143 lb (64.9 kg)   BMI 26.16 kg/m²    Physical Exam   Left lower extremity:  Well-healed surgical scar. No pain with rotational movements and weightbearing activities of the left hip. Strength is 5 out of 5 with hip flexion, extension, and abduction. Sensation and motor functions intact throughout the lower extremity. Diagnostic testing:  X-rays reviewed in office, I independently reviewed the films in the office today:   XR HIP 1 VW W PELVIS LEFT    Result Date: 1/10/2023  AP pelvis and frog-leg lateral view of the left hip show stable alignment of the left total hip replacement with evidence of healing at the previous trochanteric fracture with a stable alignment of the cerclage cables.  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 Impression: Stable left hip replacement and stable avascular necrosis femoral head right         Office Procedures:  No orders of the defined types were placed in this encounter. Assessment and Plan  1. Status post left total hip replacement    2. Right femoral head avascular necrosis    She has made excellent progress through the recovery process and her left hip is healing well. I have encouraged her to continue working with strengthening activities as a home exercise program and she can return to the gym to continue building up strength and endurance. Progress activities as tolerated. She will follow-up as needed regarding the left hip. We discussed the underlying avascular necrosis of the right hip. I have recommended we keep an eye on this area and repeat an x-ray in 6 months to evaluate for any potential worsening of the avascular necrosis or progression of arthritic changes at the right hip.     Follow-up in 6 months for repeat x-rays of the right        Electronically signed by Washington Newton MD on 1/10/2023 at 1:29 PM

## 2023-02-07 ENCOUNTER — OFFICE VISIT (OUTPATIENT)
Dept: CARDIOLOGY CLINIC | Age: 58
End: 2023-02-07
Payer: COMMERCIAL

## 2023-02-07 VITALS
HEART RATE: 88 BPM | HEIGHT: 62 IN | WEIGHT: 152 LBS | BODY MASS INDEX: 27.97 KG/M2 | DIASTOLIC BLOOD PRESSURE: 70 MMHG | SYSTOLIC BLOOD PRESSURE: 110 MMHG

## 2023-02-07 DIAGNOSIS — E78.5 DYSLIPIDEMIA: Primary | ICD-10-CM

## 2023-02-07 PROCEDURE — 99214 OFFICE O/P EST MOD 30 MIN: CPT | Performed by: INTERNAL MEDICINE

## 2023-02-07 PROCEDURE — 3078F DIAST BP <80 MM HG: CPT | Performed by: INTERNAL MEDICINE

## 2023-02-07 PROCEDURE — 3074F SYST BP LT 130 MM HG: CPT | Performed by: INTERNAL MEDICINE

## 2023-02-07 NOTE — PROGRESS NOTES
Marilou Ha MD        OFFICE  FOLLOWUP NOTE    Chief complaints: patient is here for management of CAD,STEMI of RCA, GI bleeding,anal cancer DYSLPIDEMIA    Subjective: patient feels better, no chest pain, no shortness of breath, no dizziness, no palpitations    HPI [employer] is a 65 y. o.year old who  has a past medical history of Anal cancer ([facility] [address] Asthma, Broken ankle, CAD (coronary artery disease), H/O cardiovascular stress test, History of blood transfusion, [facility]non[facility]) [facility] PONV (postoperative nausea and vomiting), Post PTCA, and Skin cancer.  and presents for management of CAD,STEMI of RCA, GI bleeding,anal cancer DYSLPIDEMIAwhich are well controlled      Current Outpatient Medications   Medication Sig Dispense Refill    atorvastatin (LIPITOR) 40 MG tablet Take 1 tablet by mouth daily 30 tablet 3    ferrous gluconate (FERGON) 324 (38 Fe) MG tablet Take 324 mg by mouth daily (with breakfast)      Cholecalciferol (VITAMIN D) 50 MCG (2000 UT) CAPS capsule Take by mouth      clopidogrel (PLAVIX) 75 MG tablet Take 1 tablet by mouth daily 30 tablet 5    hydrocortisone 0.5 % ointment Apply topically 2 times daily      loperamide (IMODIUM) 2 MG capsule Take 2 mg by mouth as needed for Diarrhea       aspirin 81 MG chewable tablet Take 1 tablet by mouth in the morning and at bedtime (Patient not taking: No sig reported) 60 tablet 0    ondansetron (ZOFRAN-ODT) 4 MG disintegrating tablet Take 1 tablet by mouth every 8 hours as needed for Nausea or Vomiting (Patient not taking: No sig reported) 20 tablet 1    azithromycin (ZITHROMAX) 1 g powder Take 1 packet by mouth once (Patient not taking: No sig reported)      levoFLOXacin (LEVAQUIN) 500 MG/100ML SOLN Infuse 500 mg intravenously every 24 hours (Patient not taking: No sig reported)      cetirizine (ZYRTEC) 10 MG tablet Take 10 mg by mouth daily (Patient not taking: No sig reported)      alirocumab (PRALUENT) 75 MG/ML SOAJ injection pen Inject 1 mL into the skin every 14 days (Patient not taking: No sig reported) 2.24 mL 5    KLOR-CON M20 20 MEQ extended release tablet  (Patient not taking: No sig reported)      nitroGLYCERIN (NITROSTAT) 0.4 MG SL tablet up to max of 3 total doses. If no relief after 1 dose, call 911. (Patient not taking: Reported on 2/7/2023) 25 tablet 3    ferrous sulfate (FE TABS 325) 325 (65 Fe) MG EC tablet Take 325 mg by mouth daily (Patient not taking: No sig reported)      furosemide (LASIX) 20 MG tablet Take 1 tablet by mouth daily (Patient not taking: No sig reported) 60 tablet 3    olopatadine (PATANOL) 0.1 % ophthalmic solution Place 1 drop into both eyes 2 times daily (Patient not taking: No sig reported)      potassium chloride (KLOR-CON) 20 MEQ packet Take 20 mEq by mouth 2 times daily (Patient not taking: No sig reported)      tiZANidine (ZANAFLEX) 4 MG tablet Take 4 mg by mouth every 6 hours as needed  (Patient not taking: No sig reported)      albuterol-ipratropium (COMBIVENT RESPIMAT)  MCG/ACT AERS inhaler Inhale into the lungs  (Patient not taking: Reported on 2/7/2023)       No current facility-administered medications for this visit.      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 Skin cancer      Past Surgical History:   Procedure Laterality Date    ANKLE SURGERY Left 10+ yrs ago    with hardware    COLON SURGERY      \"after had chemo removed some part of the cancer from the rectal area, then 7 months later had bowel blockage had to do surgery  to remove part of the bowel\"    COLONOSCOPY  2015    mild proctitis    COLONOSCOPY  03/15/2017    mild radia proc, hypertroph pailla    COLONOSCOPY N/A 2022    COLONOSCOPY CONTROL HEMORRHAGE WITH STRAIGHT FIRE APC WITH CLIP PLACEMENT X 2 performed by Scar Wooten MD at [facility]      left ankle    OTHER SURGICAL HISTORY  2015    mediport removal    PTCA  2009 had angioplasty with stent to LAD & another day in  stent x 2 to RCA done    PTCA  2022    RCA stented. TONSILLECTOMY      as a kid age 7    TOTAL HIP ARTHROPLASTY Left 10/19/2022    LEFT HIP TOTAL ARTHROPLASTY ANTERIOR APPROACH performed by Kanika Cronin MD at [facility]  25 yrs ago    TUNNELED VENOUS PORT PLACEMENT      port inserted     UPPER GASTROINTESTINAL ENDOSCOPY N/A 2022    EGD DIAGNOSTIC ONLY performed by Scar Wooten MD at [place] [address] N/A 2022    ENTEROSCOPY PUSH DIAGNOSTIC performed by Scar Wooten MD at [facility] ENDOSCOPY     Family History   Problem Relation Age of Onset    Cancer Mother         breast ca? ?    Cancer Father         prostate cancer- lung mets- bone mets    Stroke Father     High Cholesterol Father     Stroke Sister     Cancer Brother         neck cancer    No Known Problems Daughter      Social History     Tobacco Use    Smoking status: Former     Packs/day: 0.25     Years: 35.00     Pack years: 8.75     Types: Cigarettes     Start date:      Quit date: 10/2021     Years since quittin.3    Smokeless tobacco: Never    Tobacco comments:     6-7 cigarettes Substance Use Topics    Alcohol use: No     Comment: \"quit 3/2015\"use to drink a couple of times per week before\"      [unfilled]  Review of Systems:   Constitutional: No Fever or Weight Loss   Eyes: No Decreased Vision  ENT: No Headaches, Hearing Loss or Vertigo  Cardiovascular: No chest pain, dyspnea on exertion, palpitations or loss of consciousness  Respiratory: No cough or wheezing    Gastrointestinal: No abdominal pain, appetite loss, blood in stools, constipation, diarrhea or heartburn  Genitourinary: No dysuria, trouble voiding, or hematuria  Musculoskeletal:  No gait disturbance, weakness or joint complaints  Integumentary: No rash or pruritis  Neurological: No TIA or stroke symptoms  Psychiatric: No anxiety or depression  Endocrine: No malaise, fatigue or temperature intolerance  Hematologic/Lymphatic: No bleeding problems, blood clots or swollen lymph nodes  Allergic/Immunologic: No nasal congestion or hives  All systems negative except as marked. Objective:  /70   Pulse 88   Ht 5' 2\" (1.575 m)   Wt 152 lb (68.9 kg)   BMI 27.80 kg/m²   Wt Readings from Last 3 Encounters:   02/07/23 152 lb (68.9 kg)   01/10/23 143 lb (64.9 kg)   01/03/23 143 lb (64.9 kg)     Body mass index is 27.8 kg/m². GENERAL - Alert, oriented, pleasant, in no apparent distress,normal grooming  HEENT - pupils are intact, cornea intact, conjunctive normal color, ears are normal in exam,  Neck - Supple. No jugular venous distention noted. No carotid bruits, no apical -carotid delay  Respiratory:  Normal breath sounds, No respiratory distress, No wheezing, No chest tenderness. ,no use of accessory muscles, diaphragm movement is normal  Cardiovascular: (PMI) apex non displaced,no lifts no thrills, no s3,no s4, Normal heart rate, Normal rhythm, No murmurs, No rubs, No gallops.  Carotid arteries pulse and amplitude are normal no bruit, no abdominal bruit noted ( normal abdominal aorta ausculation),   Extremities - No cyanosis, clubbing, or significant edema, no varicose veins    Abdomen - No masses, tenderness, or organomegaly, no hepato-splenomegally, no bruits  Musculoskeletal - No significant edema, no kyphosis or scoliosis, no deformity in any extremity noted, muscle strength and tone are normal  Skin: no ulcer,no scar,no stasis dermatitis   Neurologic - alert oriented times 3,Cranial nerves II through XII are grossly intact. There were no gross focal neurologic abnormalities. Psychiatric: normal mood and affect    Lab Results   Component Value Date/Time    TROPONINI 0.008 04/10/2012 11:05 AM     BNP:  No results found for: BNP  PT/INR:  No results found for: PTINR  No results found for: LABA1C  Lab Results   Component Value Date    CHOL 152 02/23/2022    TRIG 115 02/23/2022    HDL 68 02/23/2022    LDLCALC 61 02/23/2022    LDLDIRECT 105 (H) 03/09/2021     Lab Results   Component Value Date    ALT 12 10/11/2022    AST 15 10/11/2022     TSH:    Lab Results   Component Value Date/Time    TSH 2.420 02/11/2022 08:36 AM    TSH 2.420 02/11/2022 08:36 AM       Impression:  Shane Marin is a 65 y. o.year old who  has a past medical history of Anal cancer ([facility] [address] Asthma, Broken ankle, CAD (coronary artery disease), H/O cardiovascular stress test, History of blood transfusion, NSTEMI (non-ST elevated myocardial infarction) ([facility] [address] PONV (postoperative nausea and vomiting), Post PTCA, and Skin cancer. and presents with     Plan:  CAD: recent PCI of RCA with SOUMYA, Continue aspirin and plavix.  She was started on statins,and did not take praleunt  as per patient it was for 250 dollars,, no side effect of statins noted, ok stay off coregg as her blood pressure was low in 80s and 90s at rehab, she has done cardiac rehab, had l gigi discussion about statin side effects  PAD and leg pain with claudications: arterial doppler  Fluttering of chest: will get holter monitor if it gets worse  preop for hip sx recommend to hold off sx due to recent MI in februruary  Gi bleeding resolved, f/u with Dr. Denisha Ramirez, had colon AVM, coagulated with APC and 2 clips  Dyslipidemia: continue statins, check lipids  H/o anal cancer  All labs, medications and tests reviewed, continue all other medications of all above medical condition listed as is.     [unfilled]

## 2023-02-12 ENCOUNTER — APPOINTMENT (OUTPATIENT)
Dept: GENERAL RADIOLOGY | Age: 58
End: 2023-02-12
Payer: COMMERCIAL

## 2023-02-12 ENCOUNTER — HOSPITAL ENCOUNTER (OUTPATIENT)
Age: 58
Setting detail: OBSERVATION
Discharge: HOME | End: 2023-02-15
Attending: EMERGENCY MEDICINE | Admitting: INTERNAL MEDICINE
Payer: COMMERCIAL

## 2023-02-12 DIAGNOSIS — R07.9 CHEST PAIN, UNSPECIFIED TYPE: Primary | ICD-10-CM

## 2023-02-12 LAB
ALBUMIN SERPL-MCNC: 4 GM/DL (ref 3.4–5)
ALP BLD-CCNC: 159 IU/L (ref 40–129)
ALT SERPL-CCNC: 25 U/L (ref 10–40)
ANION GAP SERPL CALCULATED.3IONS-SCNC: 10 MMOL/L (ref 4–16)
AST SERPL-CCNC: 22 IU/L (ref 15–37)
BASOPHILS ABSOLUTE: 0 K/CU MM
BASOPHILS RELATIVE PERCENT: 0.4 % (ref 0–1)
BILIRUB SERPL-MCNC: 0.2 MG/DL (ref 0–1)
BUN SERPL-MCNC: 12 MG/DL (ref 6–23)
CALCIUM SERPL-MCNC: 9 MG/DL (ref 8.3–10.6)
CHLORIDE BLD-SCNC: 104 MMOL/L (ref 99–110)
CO2: 26 MMOL/L (ref 21–32)
CREAT SERPL-MCNC: 0.6 MG/DL (ref 0.6–1.1)
DIFFERENTIAL TYPE: ABNORMAL
EOSINOPHILS ABSOLUTE: 0 K/CU MM
EOSINOPHILS RELATIVE PERCENT: 0.6 % (ref 0–3)
GFR SERPL CREATININE-BSD FRML MDRD: >60 ML/MIN/1.73M2
GLUCOSE SERPL-MCNC: 111 MG/DL (ref 70–99)
HCT VFR BLD CALC: 38.6 % (ref 37–47)
HEMOGLOBIN: 12.5 GM/DL (ref 12.5–16)
IMMATURE NEUTROPHIL %: 0.3 % (ref 0–0.43)
LYMPHOCYTES ABSOLUTE: 2.4 K/CU MM
LYMPHOCYTES RELATIVE PERCENT: 34.4 % (ref 24–44)
MCH RBC QN AUTO: 29.8 PG (ref 27–31)
MCHC RBC AUTO-ENTMCNC: 32.4 % (ref 32–36)
MCV RBC AUTO: 92.1 FL (ref 78–100)
MONOCYTES ABSOLUTE: 0.5 K/CU MM
MONOCYTES RELATIVE PERCENT: 7.3 % (ref 0–4)
NUCLEATED RBC %: 0 %
PDW BLD-RTO: 14 % (ref 11.7–14.9)
PLATELET # BLD: 298 K/CU MM (ref 140–440)
PMV BLD AUTO: 10 FL (ref 7.5–11.1)
POTASSIUM SERPL-SCNC: 4.6 MMOL/L (ref 3.5–5.1)
RBC # BLD: 4.19 M/CU MM (ref 4.2–5.4)
SEGMENTED NEUTROPHILS ABSOLUTE COUNT: 4 K/CU MM
SEGMENTED NEUTROPHILS RELATIVE PERCENT: 57 % (ref 36–66)
SODIUM BLD-SCNC: 140 MMOL/L (ref 135–145)
TOTAL IMMATURE NEUTOROPHIL: 0.02 K/CU MM
TOTAL NUCLEATED RBC: 0 K/CU MM
TOTAL PROTEIN: 6.9 GM/DL (ref 6.4–8.2)
TROPONIN T: <0.01 NG/ML
WBC # BLD: 7 K/CU MM (ref 4–10.5)

## 2023-02-12 PROCEDURE — 93005 ELECTROCARDIOGRAM TRACING: CPT | Performed by: EMERGENCY MEDICINE

## 2023-02-12 PROCEDURE — 99285 EMERGENCY DEPT VISIT HI MDM: CPT

## 2023-02-12 PROCEDURE — 85025 COMPLETE CBC W/AUTO DIFF WBC: CPT

## 2023-02-12 PROCEDURE — 84484 ASSAY OF TROPONIN QUANT: CPT

## 2023-02-12 PROCEDURE — G0378 HOSPITAL OBSERVATION PER HR: HCPCS

## 2023-02-12 PROCEDURE — 71045 X-RAY EXAM CHEST 1 VIEW: CPT

## 2023-02-12 PROCEDURE — 80053 COMPREHEN METABOLIC PANEL: CPT

## 2023-02-12 PROCEDURE — 99222 1ST HOSP IP/OBS MODERATE 55: CPT | Performed by: INTERNAL MEDICINE

## 2023-02-12 RX ORDER — ASPIRIN 81 MG/1
81 TABLET, CHEWABLE ORAL DAILY
Status: CANCELLED | OUTPATIENT
Start: 2023-02-13

## 2023-02-12 RX ORDER — ATORVASTATIN CALCIUM 40 MG/1
40 TABLET, FILM COATED ORAL DAILY
Status: DISCONTINUED | OUTPATIENT
Start: 2023-02-13 | End: 2023-02-14

## 2023-02-12 ASSESSMENT — PAIN DESCRIPTION - DESCRIPTORS: DESCRIPTORS: DISCOMFORT

## 2023-02-12 ASSESSMENT — PAIN DESCRIPTION - ORIENTATION: ORIENTATION: LEFT

## 2023-02-12 ASSESSMENT — PAIN DESCRIPTION - PAIN TYPE: TYPE: ACUTE PAIN

## 2023-02-12 ASSESSMENT — PAIN DESCRIPTION - LOCATION: LOCATION: CHEST

## 2023-02-12 ASSESSMENT — PAIN - FUNCTIONAL ASSESSMENT: PAIN_FUNCTIONAL_ASSESSMENT: 0-10

## 2023-02-12 ASSESSMENT — PAIN SCALES - GENERAL: PAINLEVEL_OUTOF10: 5

## 2023-02-12 NOTE — ED PROVIDER NOTES
EMERGENCY DEPARTMENT ENCOUNTER      CHIEF COMPLAINT:   Chest pain    HPI: Laurel Vincent is a 65 y.o. female who presents to the Emergency Department complaining of chest pain. The patient states that the pain started around 3:45 PM when she woke up after a nap today. It is located in the left chest.  It has been intermittent since onset. She rates it as 5 out of 10 on the pain scale. It feels achy and heavy. She denies any associated nausea, vomiting, shortness of breath or diaphoresis. There are no exacerbating or relieving factors. The patient has a known history of coronary artery disease. She has had 2 heart attacks in the past and states that this feels similar in nature. She has stents in place. She took aspirin and nitro prior to arrival without improvement. There is no known history of DVT, PE, or thoracic aortic dissection. The patient denies leg pain or leg swelling. The patient denies fevers, chills, neck pain, shortness of breath, cough, hemoptysis, abdominal pain, nausea, vomiting, numbness, tingling, weakness, or any other complaints. REVIEW OF SYSTEMS:  CONSTITUTIONAL:  Denies fever, chills, weight loss or weakness  EYES:  Denies photophobia or discharge  ENT:  Denies sore throat or ear pain  CARDIOVASCULAR: See HPI  RESPIRATORY:  Denies cough or shortness of breath  GI:  Denies abdominal pain, nausea, vomiting, or diarrhea  MUSCULOSKELETAL:  Denies back pain  SKIN:  No rash  NEUROLOGIC:  Denies headache, focal weakness or sensory changes  All systems negative except as marked. \"Remaining review of systems reviewed and negative. I have reviewed the nursing triage documentation and agree unless otherwise noted below. 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 Skin cancer        CURRENT MEDICATIONS:   Home medications reviewed. SURGICAL HISTORY:   Past Surgical History:   Procedure Laterality Date    ANKLE SURGERY Left 10+ yrs ago    with hardware    COLON SURGERY  2009    \"after had chemo removed some part of the cancer from the rectal area, then 7 months later had bowel blockage had to do surgery  to remove part of the bowel\"    COLONOSCOPY  03/04/2015    mild proctitis    COLONOSCOPY  03/15/2017    mild radia proc, hypertroph pailla    COLONOSCOPY N/A 03/03/2022    COLONOSCOPY CONTROL HEMORRHAGE WITH STRAIGHT FIRE APC WITH CLIP PLACEMENT X 2 performed by Alex Lee MD at [facility]      left ankle    OTHER SURGICAL HISTORY  04/23/2015    mediport removal    PTCA  2009 4/22/2009 had angioplasty with stent to LAD & another day in 2009 stent x 2 to RCA done    PTCA  02/22/2022    RCA stented. TONSILLECTOMY      as a kid age 7    TOTAL HIP ARTHROPLASTY Left 10/19/2022    LEFT HIP TOTAL ARTHROPLASTY ANTERIOR APPROACH performed by Elias Bradley MD at [address] [facility]  25 yrs ago    TUNNELED VENOUS PORT PLACEMENT  2009    port inserted     UPPER GASTROINTESTINAL ENDOSCOPY N/A 02/26/2022    EGD DIAGNOSTIC ONLY performed by Alex Lee MD at [address] [place] [address] N/A 03/03/2022    ENTEROSCOPY PUSH DIAGNOSTIC performed by Alex Lee MD at [facility] ENDOSCOPY       FAMILY HISTORY:   Family History   Problem Relation Age of Onset    Cancer Mother         breast ca? ?    Cancer Father         prostate cancer- lung mets- bone mets    Stroke Father High Cholesterol Father     Stroke Sister     Cancer Brother         neck cancer    No Known Problems Daughter        SOCIAL HISTORY:   Social History     Socioeconomic History    Marital status:      Spouse name: Not on file    Number of children: Not on file    Years of education: 11    Highest education level: Not on file   Occupational History    Occupation: unemployed   Tobacco Use    Smoking status: Former     Packs/day: 0.25     Years: 35.00     Pack years: 8.75     Types: Cigarettes     Start date:      Quit date: 10/2021     Years since quittin.3    Smokeless tobacco: Never    Tobacco comments:     6-7 cigarettes    Vaping Use    Vaping Use: Never used   Substance and Sexual Activity    Alcohol use: No     Comment: \"quit 3/2015\"use to drink a couple of times per week before\"    Drug use: No     Comment: 1-2 cups of coffee daily     Sexual activity: Yes   Other Topics Concern    Not on file   Social History Narrative    Not on file     Social Determinants of Health     Financial Resource Strain: Not on file   Food Insecurity: Not on file   Transportation Needs: Not on file   Physical Activity: Not on file   Stress: Not on file   Social Connections: Not on file   Intimate Partner Violence: Not on file   Housing Stability: Not on file       ALLERGIES: Brilinta [ticagrelor], Adhesive tape, Crestor [rosuvastatin calcium], Percocet [oxycodone-acetaminophen], Vicodin hp [hydrocodone-acetaminophen], and Tylenol with codeine #3 [acetaminophen-codeine]    PHYSICAL EXAM:  VITAL SIGNS:   ED Triage Vitals [23 1809]   Enc Vitals Group      BP (!) 150/83      Heart Rate 80      Resp 16      Temp 98.9 °F (37.2 °C)      Temp Source Oral      SpO2 99 %      Weight       Height       Head Circumference       Peak Flow       Pain Score       Pain Loc       Pain Edu? Excl. in [address]?       Constitutional:  Non-toxic appearance  HENT: Normocephalic, Atraumatic, Bilateral external ears normal, Oropharynx moist, No oral exudates, Nose normal.  Eyes:  PERRL, Conjunctiva normal, No discharge. Neck: Normal range of motion, No tenderness, Supple, No stridor, No lymphadenopathy  Cardiovascular:  Normal heart rate, Normal rhythm  Pulmonary/Chest:  Normal breath sounds, No respiratory distress, No wheezing, No chest tenderness  Abdomen: Bowel sounds normal, Soft, No tenderness, No masses, No pulsatile masses  Extremities:  Normal range of motion, Intact distal pulses, No edema, No tenderness  Skin:  Warm, Dry, No erythema, No rash    EKG Interpretation  Interpreted by me  Compared to 3/2/2022  Rhythm: normal sinus  Rate: normal 81  Axis: normal  Ectopy: none  Conduction: normal  ST Segments: no acute change  T Waves: no acute change  Clinical Impression: normal sinus rhythm, no acute change    Cardiac Monitor Strip Interpretation  Interpreted by me  Monitor strip interpreted for greater than 10 seconds  Rhythm: normal sinus  Rate: normal  Ectopy: none  ST Segments: normal      Radiology / Procedures:  XR CHEST PORTABLE (Final result)  Result time 02/12/23 18:58:30  Final result by Ovidio Kearney MD (02/12/23 18:58:30)                Impression:    Unremarkable portable chest radiograph. Narrative:    EXAMINATION:   ONE XRAY VIEW OF THE CHEST     2/12/2023 3:19 pm     COMPARISON:   03/02/2022     HISTORY:   ORDERING SYSTEM PROVIDED HISTORY: Chest pain   TECHNOLOGIST PROVIDED HISTORY:   Reason for exam:->Chest pain   Reason for Exam: chest pain     FINDINGS:   The cardial-pericardial silhouette is unremarkable in appearance. The lungs   are clear. No pneumothorax is found. No free air is seen. No acute bony   abnormality.                Labs Reviewed   CBC WITH AUTO DIFFERENTIAL - Abnormal; Notable for the following components:       Result Value    RBC 4.19 (*)     Monocytes % 7.3 (*)     All other components within normal limits   COMPREHENSIVE METABOLIC PANEL - Abnormal; Notable for the following components:    Glucose 111 (*)     Alkaline Phosphatase 159 (*)     All other components within normal limits   TROPONIN       ED COURSE & MEDICAL DECISION MAKING:  Juan M Fung is a 65 y.o. female  who presents to the Emergency Department complaining of chest pain. The patient states that the pain started around 3:45 PM when she woke up after a nap today. It is located in the left chest.  It has been intermittent since onset. She rates it as 5 out of 10 on the pain scale. It feels achy and heavy. She denies any associated nausea, vomiting, shortness of breath or diaphoresis. There are no exacerbating or relieving factors. The patient has a known history of coronary artery disease. She has had 2 heart attacks in the past and states that this feels similar in nature. She has stents in place. There is no known history of DVT, PE, or thoracic aortic dissection. The patient denies leg pain or leg swelling. The patient denies fevers, chills, neck pain, shortness of breath, cough, hemoptysis, abdominal pain, nausea, vomiting, numbness, tingling, weakness, or any other complaints. History from : Patient    Limitations to history : None    Chronic conditions affecting care: Coronary artery disease    Social Determinants : None    Records Reviewed : None    Consultations: Cardiology (Dr. Hatch More)    On exam, the patient is afebrile and nontoxic appearing. She is hypertensive with a known history of hypertension and is asymptomatic. She is otherwise hemodynamically stable and neurologically intact. Physical exam is unremarkable. EKG shows a normal sinus rhythm with no ST elevation or depression. Labs are obtained and there are no clinically significant lab abnormalities. Chest x-ray is negative. The patient's pain improved on its own while she was in the emergency department. She declined needing anything for pain.   She took aspirin and nitro prior to arrival.    Patient was given the following medications:  None    Diagnosis and Differential Diagnosis:  The etiology of the patient's chest pain is not entirely clear.  Cardiac etiology is not excluded.  Heart score is 4.  I have a low suspicion for STEMI, thoracic aortic dissection, pulmonary embolism, pericardial effusion or pneumothorax.     Disposition Considerations:  I recommended admission to the hospital, but the patient was unsure if she wanted to stay.  I spoke with her cardiologist, Dr. Zelaya, who recommended admission and the patient was then agreeable with admission. I discussed the case with the hospitalist who will admit the patient for further treatment and care. The patient is currently in stable condition awaiting admission.    I am the Primary Clinician of Record.    Clinical Impression:  1. Chest pain, unspecified type        Comment: Please note this report has been produced using speech recognition software and may contain errors related to that system including errors in grammar, punctuation, and spelling, as well as words and phrases that may be inappropriate. If there are any questions or concerns please feel free to contact the dictating provider for clarification.        Arlene Holliday MD  02/17/23 0760

## 2023-02-13 ENCOUNTER — APPOINTMENT (OUTPATIENT)
Dept: NUCLEAR MEDICINE | Age: 58
End: 2023-02-13
Payer: COMMERCIAL

## 2023-02-13 LAB
EKG ATRIAL RATE: 71 BPM
EKG ATRIAL RATE: 81 BPM
EKG DIAGNOSIS: NORMAL
EKG DIAGNOSIS: NORMAL
EKG P AXIS: 44 DEGREES
EKG P AXIS: 59 DEGREES
EKG P-R INTERVAL: 170 MS
EKG P-R INTERVAL: 182 MS
EKG Q-T INTERVAL: 376 MS
EKG Q-T INTERVAL: 380 MS
EKG QRS DURATION: 86 MS
EKG QRS DURATION: 86 MS
EKG QTC CALCULATION (BAZETT): 412 MS
EKG QTC CALCULATION (BAZETT): 436 MS
EKG R AXIS: 21 DEGREES
EKG R AXIS: 43 DEGREES
EKG T AXIS: 30 DEGREES
EKG T AXIS: 62 DEGREES
EKG VENTRICULAR RATE: 71 BPM
EKG VENTRICULAR RATE: 81 BPM
HCT VFR BLD CALC: 37.5 % (ref 37–47)
HEMOGLOBIN: 12 GM/DL (ref 12.5–16)
LV EF: 58 %
LV EF: 60 %
LVEF MODALITY: NORMAL
LVEF MODALITY: NORMAL
MCH RBC QN AUTO: 29.7 PG (ref 27–31)
MCHC RBC AUTO-ENTMCNC: 32 % (ref 32–36)
MCV RBC AUTO: 92.8 FL (ref 78–100)
PDW BLD-RTO: 14.1 % (ref 11.7–14.9)
PLATELET # BLD: 276 K/CU MM (ref 140–440)
PMV BLD AUTO: 10.1 FL (ref 7.5–11.1)
RBC # BLD: 4.04 M/CU MM (ref 4.2–5.4)
TROPONIN T: <0.01 NG/ML
TROPONIN T: <0.01 NG/ML
WBC # BLD: 6.4 K/CU MM (ref 4–10.5)

## 2023-02-13 PROCEDURE — 2580000003 HC RX 258: Performed by: INTERNAL MEDICINE

## 2023-02-13 PROCEDURE — 96372 THER/PROPH/DIAG INJ SC/IM: CPT

## 2023-02-13 PROCEDURE — 93010 ELECTROCARDIOGRAM REPORT: CPT | Performed by: INTERNAL MEDICINE

## 2023-02-13 PROCEDURE — G0378 HOSPITAL OBSERVATION PER HR: HCPCS

## 2023-02-13 PROCEDURE — 6370000000 HC RX 637 (ALT 250 FOR IP)

## 2023-02-13 PROCEDURE — 93005 ELECTROCARDIOGRAM TRACING: CPT | Performed by: INTERNAL MEDICINE

## 2023-02-13 PROCEDURE — 85027 COMPLETE CBC AUTOMATED: CPT

## 2023-02-13 PROCEDURE — 78452 HT MUSCLE IMAGE SPECT MULT: CPT

## 2023-02-13 PROCEDURE — 6370000000 HC RX 637 (ALT 250 FOR IP): Performed by: FAMILY MEDICINE

## 2023-02-13 PROCEDURE — 84484 ASSAY OF TROPONIN QUANT: CPT

## 2023-02-13 PROCEDURE — 36415 COLL VENOUS BLD VENIPUNCTURE: CPT

## 2023-02-13 PROCEDURE — 93017 CV STRESS TEST TRACING ONLY: CPT

## 2023-02-13 PROCEDURE — APPSS30 APP SPLIT SHARED TIME 16-30 MINUTES

## 2023-02-13 PROCEDURE — 94761 N-INVAS EAR/PLS OXIMETRY MLT: CPT

## 2023-02-13 PROCEDURE — 99233 SBSQ HOSP IP/OBS HIGH 50: CPT | Performed by: INTERNAL MEDICINE

## 2023-02-13 PROCEDURE — 6360000002 HC RX W HCPCS: Performed by: INTERNAL MEDICINE

## 2023-02-13 PROCEDURE — 3430000000 HC RX DIAGNOSTIC RADIOPHARMACEUTICAL: Performed by: INTERNAL MEDICINE

## 2023-02-13 PROCEDURE — 93306 TTE W/DOPPLER COMPLETE: CPT

## 2023-02-13 PROCEDURE — 6370000000 HC RX 637 (ALT 250 FOR IP): Performed by: INTERNAL MEDICINE

## 2023-02-13 PROCEDURE — A9500 TC99M SESTAMIBI: HCPCS | Performed by: INTERNAL MEDICINE

## 2023-02-13 RX ORDER — ACETAMINOPHEN 325 MG/1
650 TABLET ORAL EVERY 6 HOURS PRN
Status: DISCONTINUED | OUTPATIENT
Start: 2023-02-13 | End: 2023-02-15 | Stop reason: HOSPADM

## 2023-02-13 RX ORDER — TECHNETIUM TC-99M SESTAMIBI 1 MG/10ML
30 INJECTION INTRAVENOUS
Status: COMPLETED | OUTPATIENT
Start: 2023-02-13 | End: 2023-02-13

## 2023-02-13 RX ORDER — ENOXAPARIN SODIUM 100 MG/ML
40 INJECTION SUBCUTANEOUS DAILY
Status: DISCONTINUED | OUTPATIENT
Start: 2023-02-13 | End: 2023-02-15 | Stop reason: HOSPADM

## 2023-02-13 RX ORDER — VITAMIN B COMPLEX
2000 TABLET ORAL DAILY
Status: DISCONTINUED | OUTPATIENT
Start: 2023-02-14 | End: 2023-02-15 | Stop reason: HOSPADM

## 2023-02-13 RX ORDER — SODIUM CHLORIDE 0.9 % (FLUSH) 0.9 %
5-40 SYRINGE (ML) INJECTION PRN
Status: DISCONTINUED | OUTPATIENT
Start: 2023-02-13 | End: 2023-02-15 | Stop reason: HOSPADM

## 2023-02-13 RX ORDER — SODIUM CHLORIDE 0.9 % (FLUSH) 0.9 %
5-40 SYRINGE (ML) INJECTION EVERY 12 HOURS SCHEDULED
Status: DISCONTINUED | OUTPATIENT
Start: 2023-02-13 | End: 2023-02-15 | Stop reason: HOSPADM

## 2023-02-13 RX ORDER — SODIUM CHLORIDE 9 MG/ML
INJECTION, SOLUTION INTRAVENOUS PRN
Status: DISCONTINUED | OUTPATIENT
Start: 2023-02-13 | End: 2023-02-15 | Stop reason: HOSPADM

## 2023-02-13 RX ORDER — LOPERAMIDE HYDROCHLORIDE 2 MG/1
4 CAPSULE ORAL
Status: COMPLETED | OUTPATIENT
Start: 2023-02-13 | End: 2023-02-13

## 2023-02-13 RX ORDER — ONDANSETRON 2 MG/ML
4 INJECTION INTRAMUSCULAR; INTRAVENOUS EVERY 6 HOURS PRN
Status: DISCONTINUED | OUTPATIENT
Start: 2023-02-13 | End: 2023-02-15 | Stop reason: HOSPADM

## 2023-02-13 RX ORDER — ONDANSETRON 4 MG/1
4 TABLET, ORALLY DISINTEGRATING ORAL EVERY 8 HOURS PRN
Status: DISCONTINUED | OUTPATIENT
Start: 2023-02-13 | End: 2023-02-15 | Stop reason: HOSPADM

## 2023-02-13 RX ORDER — ACETAMINOPHEN 650 MG/1
650 SUPPOSITORY RECTAL EVERY 6 HOURS PRN
Status: DISCONTINUED | OUTPATIENT
Start: 2023-02-13 | End: 2023-02-15 | Stop reason: HOSPADM

## 2023-02-13 RX ORDER — POLYETHYLENE GLYCOL 3350 17 G/17G
17 POWDER, FOR SOLUTION ORAL DAILY PRN
Status: DISCONTINUED | OUTPATIENT
Start: 2023-02-13 | End: 2023-02-15 | Stop reason: HOSPADM

## 2023-02-13 RX ORDER — TECHNETIUM TC-99M SESTAMIBI 1 MG/10ML
10 INJECTION INTRAVENOUS
Status: COMPLETED | OUTPATIENT
Start: 2023-02-13 | End: 2023-02-13

## 2023-02-13 RX ORDER — CLOPIDOGREL BISULFATE 75 MG/1
75 TABLET ORAL DAILY
Status: DISCONTINUED | OUTPATIENT
Start: 2023-02-13 | End: 2023-02-15 | Stop reason: HOSPADM

## 2023-02-13 RX ORDER — FERROUS GLUCONATE 324(37.5)
324 TABLET ORAL
Status: DISCONTINUED | OUTPATIENT
Start: 2023-02-13 | End: 2023-02-15 | Stop reason: HOSPADM

## 2023-02-13 RX ADMIN — REGADENOSON 0.4 MG: 0.08 INJECTION, SOLUTION INTRAVENOUS at 10:07

## 2023-02-13 RX ADMIN — ACETAMINOPHEN 650 MG: 325 TABLET ORAL at 11:55

## 2023-02-13 RX ADMIN — ENOXAPARIN SODIUM 40 MG: 100 INJECTION SUBCUTANEOUS at 11:55

## 2023-02-13 RX ADMIN — KIT FOR THE PREPARATION OF TECHNETIUM TC99M SESTAMIBI 30 MILLICURIE: 1 INJECTION, POWDER, LYOPHILIZED, FOR SOLUTION PARENTERAL at 11:45

## 2023-02-13 RX ADMIN — ATORVASTATIN CALCIUM 40 MG: 40 TABLET, FILM COATED ORAL at 11:55

## 2023-02-13 RX ADMIN — METOPROLOL TARTRATE 12.5 MG: 25 TABLET, FILM COATED ORAL at 17:33

## 2023-02-13 RX ADMIN — METOPROLOL TARTRATE 12.5 MG: 25 TABLET, FILM COATED ORAL at 21:25

## 2023-02-13 RX ADMIN — SODIUM CHLORIDE, PRESERVATIVE FREE 10 ML: 5 INJECTION INTRAVENOUS at 21:26

## 2023-02-13 RX ADMIN — LOPERAMIDE HYDROCHLORIDE 4 MG: 2 CAPSULE ORAL at 21:25

## 2023-02-13 RX ADMIN — KIT FOR THE PREPARATION OF TECHNETIUM TC99M SESTAMIBI 10 MILLICURIE: 1 INJECTION, POWDER, LYOPHILIZED, FOR SOLUTION PARENTERAL at 11:45

## 2023-02-13 RX ADMIN — SODIUM CHLORIDE, PRESERVATIVE FREE 10 ML: 5 INJECTION INTRAVENOUS at 11:57

## 2023-02-13 RX ADMIN — FERROUS GLUCONATE TAB 324 MG (37.5 MG ELEMENTAL IRON) 324 MG: 324 (37.5 FE) TAB at 11:55

## 2023-02-13 RX ADMIN — CLOPIDOGREL BISULFATE 75 MG: 75 TABLET ORAL at 11:55

## 2023-02-13 ASSESSMENT — PAIN SCALES - GENERAL
PAINLEVEL_OUTOF10: 0
PAINLEVEL_OUTOF10: 5

## 2023-02-13 ASSESSMENT — LIFESTYLE VARIABLES
HOW MANY STANDARD DRINKS CONTAINING ALCOHOL DO YOU HAVE ON A TYPICAL DAY: PATIENT DOES NOT DRINK
HOW OFTEN DO YOU HAVE A DRINK CONTAINING ALCOHOL: NEVER

## 2023-02-13 ASSESSMENT — PAIN DESCRIPTION - ORIENTATION: ORIENTATION: LEFT

## 2023-02-13 ASSESSMENT — PAIN DESCRIPTION - LOCATION: LOCATION: CHEST

## 2023-02-13 NOTE — CARE COORDINATION
Reviewed chart/ pt admitted from home/ ind with ADLs/ has pcp/ active insurance. No CM needs id'd at this time- please consult CM if needs arise.

## 2023-02-13 NOTE — PROGRESS NOTES
V2.0  [facility] Hospitalist Progress Note      Name:  Neto Norman /Age/Sex: 1965  (65 y.o. female)   MRN & CSN:  [id number] & [id number] Encounter Date/Time: 2023 12:01 PM EST    Location:  [address] [place] [address]-A PCP: Domingo Grey MD       Hospital Day: 2    Assessment and Plan:   Neto Norman is a 65 y.o. female with pmh as noted below who presents with Chest pain      Plan:   Chest pain:   Evaluate for ACS  -Troponin x 3 negative  -EKG with no acute ST-T wave changes.  -Serial troponins, repeat EKG ordered.  -Cardiology consulted   --Stress test completed today, per Cardiology, stress test shows normal EF, however inferior wall ischemia. Patient to have [facility] today. --2d echo shows EF 55-60%     Coronary artery disease  -History of STEMI-SOUMYA to RCA (2022)  -Patient is on Plavix, reports that she is not on aspirin due to risk of bleeding, history of GI bleed.  -On atorvastatin. COPD  -Albuterol HFA as needed     Iron deficiency anemia   -Continue iron supplementation. History of recurrent GI bleeds     History of SBO s/p bowel resection      Left total hip replacement-10/2022    Uterine Ca- new diagnosis  --Follows with [facility]. Diet Diet NPO   DVT Prophylaxis [x] Lovenox, []  Heparin, [] SCDs, [] Ambulation,  [] Eliquis, [] Xarelto  [] Coumadin   Code Status Full Code   Disposition From: Home  Expected Disposition: Home  Estimated Date of Discharge: 1-2 days  Patient requires continued admission due to [facility]   Surrogate Decision Maker/ POA self     Subjective:     Chief Complaint: Chest Pain (Reports left sided chest pain that comes and goes. States the discomfort started today while laying down in bed.)     Patient seen at bedside this morning. Plan of care discussed as noted above. Patient reports of ongoing chest pressure, but tells me unable to rate it. Patient reports it as constant to left chest. Denies shortness of breathe, nausea, vomiting.      Objective:   No intake or output data in the 24 hours ending 02/13/23 1201     Vitals:   Vitals:    02/13/23 0740   BP: (!) 132/95   Pulse: 75   Resp: 20   Temp: 97.5 °F (36.4 °C)   SpO2: 94%       Physical Exam:     General: NAD  Eyes: EOMI  ENT: neck supple  Cardiovascular: Regular rate. Chest pressure as described above  Respiratory: Clear to auscultation  Gastrointestinal: Soft, non tender  Genitourinary: no suprapubic tenderness  Musculoskeletal: No edema  Skin: warm, dry  Neuro: Alert and Oriented x3  Psych: Mood appropriate.      Medications:   Medications:    sodium chloride flush  5-40 mL IntraVENous 2 times per day    enoxaparin  40 mg SubCUTAneous Daily    clopidogrel  75 mg Oral Daily    ferrous gluconate  324 mg Oral Daily with breakfast    [START ON 2/14/2023] Vitamin D  2,000 Units Oral Daily    atorvastatin  40 mg Oral Daily      Infusions:    sodium chloride       PRN Meds: sodium chloride flush, 5-40 mL, PRN  sodium chloride, , PRN  ondansetron, 4 mg, Q8H PRN   Or  ondansetron, 4 mg, Q6H PRN  acetaminophen, 650 mg, Q6H PRN   Or  acetaminophen, 650 mg, Q6H PRN  polyethylene glycol, 17 g, Daily PRN        Labs      Recent Results (from the past 24 hour(s))   EKG 12 Lead    Collection Time: 02/12/23  6:05 PM   Result Value Ref Range    Ventricular Rate 81 BPM    Atrial Rate 81 BPM    P-R Interval 170 ms    QRS Duration 86 ms    Q-T Interval 376 ms    QTc Calculation (Bazett) 436 ms    P Axis 59 degrees    R Axis 43 degrees    T Axis 62 degrees    Diagnosis       Normal sinus rhythm  Normal ECG  When compared with ECG of 02-MAR-2022 23:38,  Right bundle branch block is no longer present  Criteria for Inferior infarct are no longer present  Confirmed by [facility] HALL ([id number]) on 2/13/2023 9:47:31 AM     CBC with Auto Differential    Collection Time: 02/12/23  6:34 PM   Result Value Ref Range    WBC 7.0 4.0 - 10.5 K/CU MM    RBC 4.19 (L) 4.2 - 5.4 M/CU MM    Hemoglobin 12.5 12.5 - 16.0 GM/DL    Hematocrit 38.6 37 - 47 %    MCV 92.1 78 - 100 FL    MCH 29.8 27 - 31 PG    MCHC 32.4 32.0 - 36.0 %    RDW 14.0 11.7 - 14.9 %    Platelets 838 825 - 116 K/CU MM    MPV 10.0 7.5 - 11.1 FL    Differential Type AUTOMATED DIFFERENTIAL     Segs Relative 57.0 36 - 66 %    Lymphocytes % 34.4 24 - 44 %    Monocytes % 7.3 (H) 0 - 4 %    Eosinophils % 0.6 0 - 3 %    Basophils % 0.4 0 - 1 %    Segs Absolute 4.0 K/CU MM    Lymphocytes Absolute 2.4 K/CU MM    Monocytes Absolute 0.5 K/CU MM    Eosinophils Absolute 0.0 K/CU MM    Basophils Absolute 0.0 K/CU MM    Nucleated RBC % 0.0 %    Total Nucleated RBC 0.0 K/CU MM    Total Immature Neutrophil 0.02 K/CU MM    Immature Neutrophil % 0.3 0 - 0.43 %   Comprehensive Metabolic Panel    Collection Time: 02/12/23  6:34 PM   Result Value Ref Range    Sodium 140 135 - 145 MMOL/L    Potassium 4.6 3.5 - 5.1 MMOL/L    Chloride 104 99 - 110 mMol/L    CO2 26 21 - 32 MMOL/L    BUN 12 6 - 23 MG/DL    Creatinine 0.6 0.6 - 1.1 MG/DL    Est, Glom Filt Rate >60 >60 mL/min/1.73m2    Glucose 111 (H) 70 - 99 MG/DL    Calcium 9.0 8.3 - 10.6 MG/DL    Albumin 4.0 3.4 - 5.0 GM/DL    Total Protein 6.9 6.4 - 8.2 GM/DL    Total Bilirubin 0.2 0.0 - 1.0 MG/DL    ALT 25 10 - 40 U/L    AST 22 15 - 37 IU/L    Alkaline Phosphatase 159 (H) 40 - 129 IU/L    Anion Gap 10 4 - 16   Troponin    Collection Time: 02/12/23  6:34 PM   Result Value Ref Range    Troponin T <0.010 <0.01 NG/ML   Troponin    Collection Time: 02/13/23  2:47 AM   Result Value Ref Range    Troponin T <0.010 <0.01 NG/ML   CBC    Collection Time: 02/13/23  2:47 AM   Result Value Ref Range    WBC 6.4 4.0 - 10.5 K/CU MM    RBC 4.04 (L) 4.2 - 5.4 M/CU MM    Hemoglobin 12.0 (L) 12.5 - 16.0 GM/DL    Hematocrit 37.5 37 - 47 %    MCV 92.8 78 - 100 FL    MCH 29.7 27 - 31 PG    MCHC 32.0 32.0 - 36.0 %    RDW 14.1 11.7 - 14.9 %    Platelets 853 598 - 151 K/CU MM    MPV 10.1 7.5 - 11.1 FL   EKG 12 lead    Collection Time: 02/13/23  6:56 AM   Result Value Ref Range    Ventricular Rate 71 BPM    Atrial Rate 71 BPM    P-R Interval 182 ms    QRS Duration 86 ms    Q-T Interval 380 ms    QTc Calculation (Bazett) 412 ms    P Axis 44 degrees    R Axis 21 degrees    T Axis 30 degrees    Diagnosis       Normal sinus rhythm  Low voltage QRS  Borderline ECG  When compared with ECG of 12-FEB-2023 18:05,  No significant change was found  Confirmed by Jenna Kc MD, Yeyo Pollack ([id number]) on 2/13/2023 10:08:53 AM          Imaging/Diagnostics Last 24 Hours   XR CHEST PORTABLE    Result Date: 2/12/2023  EXAMINATION: ONE XRAY VIEW OF THE CHEST 2/12/2023 3:19 pm COMPARISON: 03/02/2022 HISTORY: ORDERING SYSTEM PROVIDED HISTORY: Chest pain TECHNOLOGIST PROVIDED HISTORY: Reason for exam:->Chest pain Reason for Exam: chest pain FINDINGS: The cardial-pericardial silhouette is unremarkable in appearance. The lungs are clear. No pneumothorax is found. No free air is seen. No acute bony abnormality. Unremarkable portable chest radiograph. NM MYOCARDIAL SPECT REST EXERCISE OR RX    Result Date: 2/13/2023  Radiology exam is complete. No Radiologist dictation. Please follow up with ordering provider.        Electronically signed by FLAVIO Cervantes CNP on 2/13/2023 at 12:01 PM

## 2023-02-13 NOTE — H&P
History and Physical      Name:  Neto Norman /Age/Sex: 1965  (65 y.o. female)   MRN & CSN:  [id number] & [id number] Encounter Date/Time: 2023 8:24 PM EST   Location:  ED22/ED-22 PCP: Domingo Grey MD       Hospital Day: 1    Assessment and Plan:     #. Chest pain: Evaluate for ACS  -Troponin x1 negative, EKG with no acute ST-T wave changes.  -Serial troponins, repeat EKG ordered.  -Cardiology consulted from ED. #.  Coronary artery disease  -History of STEMI-SOUMYA to RCA (2022)  -Patient is on Plavix, reports that she is not on aspirin due to risk of bleeding, history of GI bleed.  -On atorvastatin. #.  COPD  -Albuterol HFA as needed    #. Iron deficiency anemia   -Continue iron supplementation. #.  History of recurrent GI bleeds    #. History of SBO s/p bowel resection    #. Left total hip replacement-10/2022    Disposition:   Current Living situation: home    Diet No diet orders on file   DVT Prophylaxis [x] Lovenox, []  Heparin, [] SCDs, [] Ambulation,  [] Eliquis, [] Xarelto   Code Status FULL   Surrogate Decision Maker/ POA      History from:   EMR, patient. History of Present Illness:     Chief Complaint: Chest pain  Neto Norman is a 65 y.o. female with coronary artery disease (SOUMYA to RCA-2022), COPD, iron deficiency anemia, history of recurrent GI bleeds, remote history of anal cancer presented to ED with complaints of chest pain. Patient reported that she woke up after a nap in the evening around 3:45 PM.  Patient reported having left-sided chest pain, intermittent, 5/10 intensity, denied any nausea, vomiting, denied any shortness of breath, denied any diaphoresis. Patient denied any other complaints. Denied any fever, chills, cough, abdominal pain, denied any urinary complaints, denied any constipation or diarrhea. At presentation patient was noted to have /83, HR 80, RR 16, temp 98.9, saturating 99% on room air.   CBC, CMP within normal range, troponin negative, EKG with no acute ST-T wave changes. Chest x-ray-no acute process. Cardiology consulted from ED. Review of Systems: Need 10 Elements   10 point review of systems conducted and pertinent positives and negatives as per HPI. Objective:   No intake or output data in the 24 hours ending 02/12/23 2024   Vitals:   Vitals:    02/12/23 1819 02/12/23 1832 02/12/23 1902 02/12/23 1935   BP: (!) 151/83 (!) 125/93 (!) 124/57 138/84   Pulse: 79 83 84 80   Resp: 12 14 20 11   Temp:    98 °F (36.7 °C)   TempSrc:    Temporal   SpO2: 98% 94% 95% 97%       Medications Prior to Admission   Reviewed medications with patient    Prior to Admission medications    Medication Sig Start Date End Date Taking? Authorizing Provider   atorvastatin (LIPITOR) 40 MG tablet Take 1 tablet by mouth daily 1/3/23   Franco Mackey MD   ferrous gluconate (FERGON) 324 (38 Fe) MG tablet Take 324 mg by mouth daily (with breakfast)    Historical Provider, MD   Cholecalciferol (VITAMIN D) 50 MCG (2000 UT) CAPS capsule Take by mouth    Historical Provider, MD   aspirin 81 MG chewable tablet Take 1 tablet by mouth in the morning and at bedtime  Patient not taking: No sig reported 10/21/22   Kristen Barker MD   ondansetron (ZOFRAN-ODT) 4 MG disintegrating tablet Take 1 tablet by mouth every 8 hours as needed for Nausea or Vomiting  Patient not taking: No sig reported 10/21/22   Kristen Barker MD   clopidogrel (PLAVIX) 75 MG tablet Take 1 tablet by mouth daily 10/3/22   Alex Ni, APRN - CNP   azithromycin (ZITHROMAX) 1 g powder Take 1 packet by mouth once  Patient not taking: No sig reported    Historical Provider, MD   levoFLOXacin (LEVAQUIN) 500 MG/100ML SOLN Infuse 500 mg intravenously every 24 hours  Patient not taking: No sig reported    Historical Provider, MD   cetirizine (ZYRTEC) 10 MG tablet Take 10 mg by mouth daily  Patient not taking: No sig reported    Historical Provider, MD   alirocumab (PRALUENT) 75 MG/ML SOAJ injection pen Inject 1 mL into the skin every 14 days  Patient not taking: No sig reported 7/27/22   Alex Ni, APRN - CNP   KLOR-CON M20 20 MEQ extended release tablet  6/16/22   Historical Provider, MD   hydrocortisone 0.5 % ointment Apply topically 2 times daily 2/8/22   Historical Provider, MD   nitroGLYCERIN (NITROSTAT) 0.4 MG SL tablet up to max of 3 total doses. If no relief after 1 dose, call 911. Patient not taking: Reported on 2/7/2023 6/17/22   Cortes Foster MD   ferrous sulfate (FE TABS 325) 325 (65 Fe) MG EC tablet Take 325 mg by mouth daily  Patient not taking: No sig reported 3/8/22 3/8/23  Historical Provider, MD   furosemide (LASIX) 20 MG tablet Take 1 tablet by mouth daily  Patient not taking: No sig reported 2/27/22   Shree Taylor MD   olopatadine (PATANOL) 0.1 % ophthalmic solution Place 1 drop into both eyes 2 times daily  Patient not taking: No sig reported    Historical Provider, MD   potassium chloride (KLOR-CON) 20 MEQ packet Take 20 mEq by mouth 2 times daily  Patient not taking: No sig reported    Historical Provider, MD   tiZANidine (ZANAFLEX) 4 MG tablet Take 4 mg by mouth every 6 hours as needed   Patient not taking: No sig reported    Historical Provider, MD   loperamide (IMODIUM) 2 MG capsule Take 2 mg by mouth as needed for Diarrhea     Historical Provider, MD   albuterol-ipratropium (COMBIVENT RESPIMAT)  MCG/ACT AERS inhaler Inhale into the lungs   Patient not taking: Reported on 2/7/2023 5/15/17   Historical Provider, MD       Physical Exam: Need 8 Elements   Physical Exam     GEN  -Awake, alert, NAD.   EYES   -PERRL. HENT  -MM are moist.   RESP  -LS CTA equal bilat, no wheezes, rales or rhonchi. Symmetric chest movement. No respiratory distress noted. C/V  -S1/S2 auscultated. RRR without appreciable M/R/G. No JVD or carotid bruits. Peripheral pulses equal bilaterally and palpable. No peripheral edema. No reproducible chest wall tenderness.    GI  -Abdomen is soft, non-distended, no significant tenderness. No masses or guarding. + BS in all quadrants. Rectal exam deferred.     -No CVA tenderness. Albarran catheter is not present.  MS  -B/L extremities - No gross joint deformities. No swelling, intact sensation symmetrical.   SKIN  -Normal coloration, warm, dry.   NEURO  - Awake, alert, oriented x 3, no focal deficits.  PSYC  - Appropriate affect.       Past Medical History:   Reviewed patient's past medical, surgical, social, family history and allergies.      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    Skin cancer      PSHX:  has a past surgical history that includes Percutaneous Transluminal Coronary Angio (2009); Colon surgery (2009); Ankle surgery (Left, 10+ yrs ago); Tubal ligation (25 yrs ago); Tunneled venous port placement (2009); Tonsillectomy; other surgical history (04/23/2015); fracture surgery; Colonoscopy (03/04/2015); Colonoscopy (03/15/2017); Upper gastrointestinal endoscopy (N/A, 02/26/2022); Colonoscopy (N/A, 03/03/2022); Upper gastrointestinal endoscopy (N/A, 03/03/2022); Percutaneous Transluminal Coronary Angio (02/22/2022); and Total hip arthroplasty (Left, 10/19/2022).  Allergies:   Allergies   Allergen Reactions    Brilinta [Ticagrelor] Shortness Of Breath    Adhesive Tape Other (See Comments)    Crestor [Rosuvastatin Calcium]     Percocet [Oxycodone-Acetaminophen] Other (See Comments)     Dreams.  Nightmares    Vicodin Hp [Hydrocodone-Acetaminophen] Other (See Comments)     Keep me awake    Tylenol With Codeine #3 [Acetaminophen-Codeine] Nausea And Vomiting     Fam HX: family history includes Cancer in her brother, father, and mother; High Cholesterol in her father; No Known Problems in her daughter; Stroke in her father and sister.   Soc HX:   Social History     Socioeconomic History    Marital status:     Years of education: 6   Occupational History    Occupation: unemployed   Tobacco Use    Smoking status: Former     Packs/day: 0.25     Years: 35.00     Pack years: 8.75     Types: Cigarettes     Start date:      Quit date: 10/2021     Years since quittin.3    Smokeless tobacco: Never    Tobacco comments:     6-7 cigarettes    Vaping Use    Vaping Use: Never used   Substance and Sexual Activity    Alcohol use: No     Comment: \"quit 3/2015\"use to drink a couple of times per week before\"    Drug use: No     Comment: 1-2 cups of coffee daily     Sexual activity: Yes       Medications:   Medications:    Infusions:   PRN Meds:     Labs      CBC:   Recent Labs     23  1834   WBC 7.0   HGB 12.5        BMP:    Recent Labs     23  1834      K 4.6      CO2 26   BUN 12   CREATININE 0.6   GLUCOSE 111*     Hepatic:   Recent Labs     23  1834   AST 22   ALT 25   BILITOT 0.2   ALKPHOS 159*     Lipids:   Lab Results   Component Value Date/Time    CHOL 152 2022 04:02 AM    CHOL 152 2022 08:36 AM    CHOL 152 2022 08:36 AM    HDL 68 2022 04:02 AM    TRIG 115 2022 04:02 AM     Hemoglobin A1C: No results found for: LABA1C  TSH:   Lab Results   Component Value Date/Time    TSH 2.420 2022 08:36 AM    TSH 2.420 2022 08:36 AM     Troponin:   Lab Results   Component Value Date/Time    TROPONINT <0.010 2023 06:34 PM    TROPONINT 0.063 2022 02:35 AM    TROPONINT 0.103 2022 10:27 PM     Lactic Acid: No results for input(s): LACTA in the last 72 hours. BNP: No results for input(s): PROBNP in the last 72 hours. UA:  Lab Results   Component Value Date/Time    NITRU NEGATIVE 10/11/2022 09:58 AM    COLORU YELLOW 10/11/2022 09:58 AM    WBCUA 2 03/02/2022 11:45 PM    RBCUA NONE SEEN 03/02/2022 11:45 PM    MUCUS RARE 03/02/2022 11:45 PM    YEAST RARE 10/03/2015 04:10 PM    BACTERIA RARE 03/02/2022 11:45 PM    CLARITYU CLEAR 10/11/2022 09:58 AM    SPECGRAV <=1.005 10/11/2022 09:58 AM    LEUKOCYTESUR NEGATIVE 10/11/2022 09:58 AM    UROBILINOGEN 0.2 10/11/2022 09:58 AM    BILIRUBINUR NEGATIVE 10/11/2022 09:58 AM    BLOODU NEGATIVE 10/11/2022 09:58 AM    KETUA NEGATIVE 10/11/2022 09:58 AM     Urine Cultures: No results found for: LABURIN  Blood Cultures: No results found for: BC  No results found for: BLOODCULT2  Organism: No results found for: ORG    Imaging/Diagnostics Last 24 Hours   XR CHEST PORTABLE    Result Date: 2/12/2023  EXAMINATION: ONE XRAY VIEW OF THE CHEST 2/12/2023 3:19 pm COMPARISON: 03/02/2022 HISTORY: ORDERING SYSTEM PROVIDED HISTORY: Chest pain TECHNOLOGIST PROVIDED HISTORY: Reason for exam:->Chest pain Reason for Exam: chest pain FINDINGS: The cardial-pericardial silhouette is unremarkable in appearance. The lungs are clear. No pneumothorax is found. No free air is seen. No acute bony abnormality. Unremarkable portable chest radiograph. Personally reviewed Lab Studies, Imaging, and discussed case with ED physician.     Electronically signed by Sally Cheema MD on 2/12/2023 at 8:24 PM

## 2023-02-13 NOTE — CONSULTS
Discussed with ED patient had RCA and LAD stent now with chest pain will admit for assessment and possible stress test                      Name:  [facility] /Age/Sex: 1965  (65 y.o. female)   MRN & CSN:  [id number] & [id number] Admission Date/Time: 2023  6:12 PM   Location:  ED22/ED-22 PCP: Scott Merritt MD       Hospital Day: 1          Referring physician:  No admitting provider for patient encounter. Reason for consultation: Chest pain        Thanks for referral.    Information source: Patient    CC; chest pain      HPI:   Thank you for involving me in taking  care of [place] who  is a 65 y. o.year  Old female  Presents with history of PCI of the LAD, RCA had STEMI in the past history of hypertension hyperlipidemia now presents with complaints of midsternal chest pain                     Past medical history:    has a past medical history of Anal cancer ([address] Asthma, Broken ankle, CAD (coronary artery disease), H/O cardiovascular stress test, History of blood transfusion, NSTEMI (non-ST elevated myocardial infarction) ([address] PONV (postoperative nausea and vomiting), Post PTCA, and Skin cancer. Past surgical history:   has a past surgical history that includes Percutaneous Transluminal Coronary Angio (); Colon surgery (); Ankle surgery (Left, 10+ yrs ago); Tubal ligation (25 yrs ago); Tunneled venous port placement (); Tonsillectomy; other surgical history (2015); fracture surgery; Colonoscopy (2015); Colonoscopy (03/15/2017); Upper gastrointestinal endoscopy (N/A, 2022); Colonoscopy (N/A, 2022); Upper gastrointestinal endoscopy (N/A, 2022); Percutaneous Transluminal Coronary Angio (2022); and Total hip arthroplasty (Left, 10/19/2022). Social History:   reports that she quit smoking about 16 months ago. Her smoking use included cigarettes. She started smoking about 36 years ago. She has a 8.75 pack-year smoking history.  She has never used smokeless tobacco. She reports that she does not drink alcohol and does not use drugs. Family history:  family history includes Cancer in her brother, father, and mother; High Cholesterol in her father; No Known Problems in her daughter; Stroke in her father and sister. Allergies   Allergen Reactions    Brilinta [Ticagrelor] Shortness Of Breath    Adhesive Tape Other (See Comments)    Crestor [Rosuvastatin Calcium]     Percocet [Oxycodone-Acetaminophen] Other (See Comments)     Dreams. Nightmares    Vicodin Hp [Hydrocodone-Acetaminophen] Other (See Comments)     Keep me awake    Tylenol With Codeine #3 [Acetaminophen-Codeine] Nausea And Vomiting       No current facility-administered medications for this encounter. No current facility-administered medications for this encounter.      Current Outpatient Medications   Medication Sig Dispense Refill    atorvastatin (LIPITOR) 40 MG tablet Take 1 tablet by mouth daily 30 tablet 3    ferrous gluconate (FERGON) 324 (38 Fe) MG tablet Take 324 mg by mouth daily (with breakfast)      Cholecalciferol (VITAMIN D) 50 MCG (2000 UT) CAPS capsule Take by mouth      aspirin 81 MG chewable tablet Take 1 tablet by mouth in the morning and at bedtime (Patient not taking: No sig reported) 60 tablet 0    ondansetron (ZOFRAN-ODT) 4 MG disintegrating tablet Take 1 tablet by mouth every 8 hours as needed for Nausea or Vomiting (Patient not taking: No sig reported) 20 tablet 1    clopidogrel (PLAVIX) 75 MG tablet Take 1 tablet by mouth daily 30 tablet 5    azithromycin (ZITHROMAX) 1 g powder Take 1 packet by mouth once (Patient not taking: No sig reported)      levoFLOXacin (LEVAQUIN) 500 MG/100ML SOLN Infuse 500 mg intravenously every 24 hours (Patient not taking: No sig reported)      cetirizine (ZYRTEC) 10 MG tablet Take 10 mg by mouth daily (Patient not taking: No sig reported)      alirocumab (PRALUENT) 75 MG/ML SOAJ injection pen Inject 1 mL into the skin every 14 days (Patient not taking: No sig reported) 2.24 mL 5    KLOR-CON M20 20 MEQ extended release tablet  (Patient not taking: No sig reported)      hydrocortisone 0.5 % ointment Apply topically 2 times daily      nitroGLYCERIN (NITROSTAT) 0.4 MG SL tablet up to max of 3 total doses. If no relief after 1 dose, call 911. (Patient not taking: Reported on 2/7/2023) 25 tablet 3    ferrous sulfate (FE TABS 325) 325 (65 Fe) MG EC tablet Take 325 mg by mouth daily (Patient not taking: No sig reported)      furosemide (LASIX) 20 MG tablet Take 1 tablet by mouth daily (Patient not taking: No sig reported) 60 tablet 3    olopatadine (PATANOL) 0.1 % ophthalmic solution Place 1 drop into both eyes 2 times daily (Patient not taking: No sig reported)      potassium chloride (KLOR-CON) 20 MEQ packet Take 20 mEq by mouth 2 times daily (Patient not taking: No sig reported)      tiZANidine (ZANAFLEX) 4 MG tablet Take 4 mg by mouth every 6 hours as needed  (Patient not taking: No sig reported)      loperamide (IMODIUM) 2 MG capsule Take 2 mg by mouth as needed for Diarrhea       albuterol-ipratropium (COMBIVENT RESPIMAT)  MCG/ACT AERS inhaler Inhale into the lungs  (Patient not taking: Reported on 2/7/2023)       Review of Systems:  All 14 systems reviewed, all negative except for chest pain    Physical Examination:    BP (!) 150/83   Pulse 80   Temp 98.9 °F (37.2 °C) (Oral)   Resp 16   SpO2 99%      Wt Readings from Last 3 Encounters:   02/07/23 152 lb (68.9 kg)   01/10/23 143 lb (64.9 kg)   01/03/23 143 lb (64.9 kg)     There is no height or weight on file to calculate BMI.       General Appearance: Fair  Head: normocephalic     Eyes: normal, noninjected conjunctiva    ENT: normal mucosa, noninjected throat, normal     NECK: No JVP  No thyromegaly        Cardiovascular: No thrills palpated   Auscultation: Normal S1 and S2, no murmur   carotid bruit no   Abdominal Aorta no bruit    Respiratory:    Breath sounds clear    Extremities: No edema clubbing ,   no cyanosis    SKIN: Warm and well perfused, no pallor or cyanosis    Vascular exam:  Pedal Pulses: Palp bilaterally        Abdomen:  No masses or tenderness. No organomegaly noted. Neurological:  Oriented to time, place, and person   No focal neurological deficit noted.   Psychiatric:normal mood, no anxiety    Lab Review   Recent Results (from the past 24 hour(s))   EKG 12 Lead    Collection Time: 02/12/23  6:05 PM   Result Value Ref Range    Ventricular Rate 81 BPM    Atrial Rate 81 BPM    P-R Interval 170 ms    QRS Duration 86 ms    Q-T Interval 376 ms    QTc Calculation (Bazett) 436 ms    P Axis 59 degrees    R Axis 43 degrees    T Axis 62 degrees    Diagnosis       Normal sinus rhythm  Normal ECG  When compared with ECG of 02-MAR-2022 23:38,  Right bundle branch block is no longer present  Criteria for Inferior infarct are no longer present     CBC with Auto Differential    Collection Time: 02/12/23  6:34 PM   Result Value Ref Range    WBC 7.0 4.0 - 10.5 K/CU MM    RBC 4.19 (L) 4.2 - 5.4 M/CU MM    Hemoglobin 12.5 12.5 - 16.0 GM/DL    Hematocrit 38.6 37 - 47 %    MCV 92.1 78 - 100 FL    MCH 29.8 27 - 31 PG    MCHC 32.4 32.0 - 36.0 %    RDW 14.0 11.7 - 14.9 %    Platelets 570 431 - 784 K/CU MM    MPV 10.0 7.5 - 11.1 FL    Differential Type AUTOMATED DIFFERENTIAL     Segs Relative 57.0 36 - 66 %    Lymphocytes % 34.4 24 - 44 %    Monocytes % 7.3 (H) 0 - 4 %    Eosinophils % 0.6 0 - 3 %    Basophils % 0.4 0 - 1 %    Segs Absolute 4.0 K/CU MM    Lymphocytes Absolute 2.4 K/CU MM    Monocytes Absolute 0.5 K/CU MM    Eosinophils Absolute 0.0 K/CU MM    Basophils Absolute 0.0 K/CU MM    Nucleated RBC % 0.0 %    Total Nucleated RBC 0.0 K/CU MM    Total Immature Neutrophil 0.02 K/CU MM    Immature Neutrophil % 0.3 0 - 0.43 %   Comprehensive Metabolic Panel    Collection Time: 02/12/23  6:34 PM   Result Value Ref Range    Sodium 140 135 - 145 MMOL/L    Potassium 4.6 3.5 - 5.1 MMOL/L    Chloride 104 99 - 110 mMol/L    CO2 26 21 - 32 MMOL/L    BUN 12 6 - 23 MG/DL    Creatinine 0.6 0.6 - 1.1 MG/DL    Est, Glom Filt Rate >60 >60 mL/min/1.73m2    Glucose 111 (H) 70 - 99 MG/DL    Calcium 9.0 8.3 - 10.6 MG/DL    Albumin 4.0 3.4 - 5.0 GM/DL    Total Protein 6.9 6.4 - 8.2 GM/DL    Total Bilirubin 0.2 0.0 - 1.0 MG/DL    ALT 25 10 - 40 U/L    AST 22 15 - 37 IU/L    Alkaline Phosphatase 159 (H) 40 - 129 IU/L    Anion Gap 10 4 - 16   Troponin    Collection Time: 02/12/23  6:34 PM   Result Value Ref Range    Troponin T <0.010 <0.01 NG/ML           Assessment/Recommendations:   -Chest pain who in a patient who has known history of CAD we will check troponins and will obtain a Cardiolite stress test in the morning  -Hyperlipidemia on Lipitor 40 mg p.o. daily which will be continued  -Hyper tension well controlled           Yaneli Recinos MD, 2/12/2023 7:45 PM       Please note this report has been partially produced using speech recognition software and may contain errors related to that system including errors in grammar, punctuation, and spelling, as well as words and phrases that may be inappropriate. If there are any questions or concerns please feel free to contact the dictating provider for clarification.

## 2023-02-13 NOTE — ED NOTES
ED TO INPATIENT SBAR HANDOFF    Patient Name: Thuan Fernández   :  1965  65 y.o. MRN:  [id number]  Preferred Name  Kwasi Grigsby  ED Room #:  IF42/WB-52  Family/Caregiver Present yes   Restraints no   Sitter no   Sepsis Risk Score Sepsis Risk Score: 0.55    Situation  Code Status: Prior No additional code details. Allergies: Brilinta [ticagrelor], Adhesive tape, Crestor [rosuvastatin calcium], Percocet [oxycodone-acetaminophen], Vicodin hp [hydrocodone-acetaminophen], and Tylenol with codeine #3 [acetaminophen-codeine]  Weight: No data found. Arrived from: home  Chief Complaint:   Chief Complaint   Patient presents with    Chest Pain     Reports left sided chest pain that comes and goes. States the discomfort started today while laying down in bed. Hospital Problem/Diagnosis:  Principal Problem:    Chest pain  Active Problems:    Precordial chest pain  Resolved Problems:    * No resolved hospital problems. *    Imaging:   XR CHEST PORTABLE   Final Result   Unremarkable portable chest radiograph.          NM MYOCARDIAL SPECT REST EXERCISE OR RX    (Results Pending)     Abnormal labs:   Abnormal Labs Reviewed   CBC WITH AUTO DIFFERENTIAL - Abnormal; Notable for the following components:       Result Value    RBC 4.19 (*)     Monocytes % 7.3 (*)     All other components within normal limits   COMPREHENSIVE METABOLIC PANEL - Abnormal; Notable for the following components:    Glucose 111 (*)     Alkaline Phosphatase 159 (*)     All other components within normal limits     Critical values: no     Abnormal Assessment Findings: chest Pain    Background  History:   Past Medical History:   Diagnosis Date    Anal [facility] [address] [address]     per old chart 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  Skin cancer        Assessment    Vitals/MEWS: MEWS Score: 1  Level of Consciousness: Alert (0)   Vitals:    02/12/23 1832 02/12/23 1902 02/12/23 1935 02/12/23 2108   BP: (!) 125/93 (!) 124/57 138/84 (!) 123/96   Pulse: 83 84 80 83   Resp: 14 20 11 15   Temp:   98 °F (36.7 °C) 98 °F (36.7 °C)   TempSrc:   Temporal Temporal   SpO2: 94% 95% 97% 96%     FiO2 (%): n/a  O2 Flow Rate: O2 Device: None (Room air)    Cardiac Rhythm: Sinus Rhythm  Pain Assessment:  [x] Verbal [] Mari Sisi Scale  Pain Scale: Pain Assessment  Pain Assessment: 0-10  Pain Level: 5  Pain Location: Chest  Pain Orientation: Left  Pain Descriptors: Discomfort  Pain Type: Acute pain  Last documented pain score (0-10 scale) Pain Level: 5  Last documented pain medication administered: none  Mental Status: alert  NIH Score: NIH     C-SSRS: Risk of Suicide: No Risk  Bedside swallow:    Perdido Coma Scale (GCS): Alondra Coma Scale  Eye Opening: Spontaneous  Best Verbal Response: Oriented  Best Motor Response: Obeys commands  Perdido Coma Scale Score: 15  Active LDA's:   Peripheral IV 02/12/23 Right;Medial Antecubital (Active)   Site Assessment Clean, dry & intact; Positional;Other (Comment) 02/12/23 1841   Line Status Blood return noted;Brisk blood return;Flushed;Normal saline locked;Specimen collected 02/12/23 [address] checked and tightened 02/12/23 1841   Phlebitis Assessment No symptoms 02/12/23 1841   Infiltration Assessment 0 02/12/23 1841   Alcohol Cap Used No 02/12/23 1841   Dressing Status New dressing applied;Clean, dry & intact 02/12/23 1841   Dressing Type Transparent 02/12/23 1841   Dressing Intervention New 02/12/23 1841     PO Status: Regular  Pertinent or High Risk Medications/Drips: no   o If Yes, please provide details: n/a  Pending Blood Product Administration: no     You may also review the ED PT Care Timeline found under the Summary Nursing Index tab. Recommendation    Pending orders admission orders  Plan for Discharge (if known):    Additional Comments: n/a   If any further questions, please call Sending RN at [phone]    Electronically signed by: Electronically signed by Nicolás Low RN on 2/12/2023 at 9:09 PM      Chantelle Whitaker RN  02/12/23 [address], RN  02/12/23 2107       Nicolás Low RN  02/12/23 2109

## 2023-02-13 NOTE — PROGRESS NOTES
Jerry Ha  [place] [address] [facility],[address]  Phone: [phone]    Fax [phone]                  Ann Wiley MD, Fab Servin MD, Judi Snow MD, MD Tamiko Markham, MD Rose Marei Owen MD Dr. Albertina Chars MD Janalee Scarpa, APRELAINA Brar, APRELAINA Hoffman, APRN    Vonda Maria, APRN  Carroll Kimble, PA-C    CARDIOLOGY  NOTE      Name:  Israel Nava /Age/Sex: 1965  (65 y.o. female)   MRN & CSN:  [id number] & [id number] Admission Date/Time: 2023  6:12 PM   Location:  [address] [place] [address]-A PCP: Enoc Stratton MD       Hospital Day: 2    - Cardiology consult is for: Chest pain      ASSESSMENT/ PLAN:  Chest pain with history of coronary artery disease s/p PCI of LAD and RCA  -No chest pain at this time. Troponin negative  -Echo per below. Stress test concerning for inferior wall ischemia  -Plan for left heart cath 2023  -Continue Plavix and Lipitor. Start Lopressor 12.5 mg twice daily  History of iron deficiency anemia and GI bleed  -Hemoglobin of 12.0. Continue to monitor        Echo: 2023   Left ventricular systolic function is normal.   Ejection fraction is visually estimated at 55-60%. No significant valvular disease noted. No pericardial effusion present. Subjective:  Danitza Perez is a 65 y. o.year old     Informed of abnormal stress test.  Educated patient on need for left heart catheterization. Procedure was explained in detail as well as risks and benefits. Patient voiced understanding and was agreeable to undergoing procedure. Consent was obtained. Plan for left heart catheterization tomorrow      Objective: Temperature:  Current - Temp: 97.5 °F (36.4 °C);  Max - Temp  Av.2 °F (36.8 °C)  Min: 97.5 °F (36.4 °C)  Max: 98.9 °F (37.2 °C)    Respiratory Rate : Resp  Av.8  Min: 11  Max: 20    Pulse Range: Pulse  Avg: 82.7  Min: 76  Max: 89    Blood Presuure Range:  Systolic (16CQT), HOR:585 , Min:123 , MKX:185   ; Diastolic (37WKC), LGK:16, Min:57, Max:96      Pulse ox Range: SpO2  Av.9 %  Min: 94 %  Max: 99 %    24hr I & O:  No intake or output data in the 24 hours ending 23 1322      BP (!) 132/95   Pulse 75   Temp 97.5 °F (36.4 °C)   Resp 20   Ht 5' 2\" (1.575 m)   Wt 159 lb 9.6 oz (72.4 kg)   SpO2 94%   BMI 29.19 kg/m²         TELEMETRY: Sinus      has a past medical history of Anal cancer ([facility] [address] Asthma, Broken ankle, CAD (coronary artery disease), H/O cardiovascular stress test, History of blood transfusion, NSTEMI (non-ST elevated myocardial infarction) ([facility] [address] PONV (postoperative nausea and vomiting), Post PTCA, and Skin cancer. has a past surgical history that includes Percutaneous Transluminal Coronary Angio (); Colon surgery (); Ankle surgery (Left, 10+ yrs ago); Tubal ligation (25 yrs ago); Tunneled venous port placement (); Tonsillectomy; other surgical history (2015); fracture surgery; Colonoscopy (2015); Colonoscopy (03/15/2017); Upper gastrointestinal endoscopy (N/A, 2022); Colonoscopy (N/A, 2022); Upper gastrointestinal endoscopy (N/A, 2022); Percutaneous Transluminal Coronary Angio (2022); and Total hip arthroplasty (Left, 10/19/2022). Physical Exam  Constitutional:       General: She is not in acute distress. Appearance: She is not diaphoretic. HENT:      Head: Normocephalic and atraumatic. Right Ear: External ear normal.      Left Ear: External ear normal.      Nose: Nose normal.      Mouth/Throat:      Mouth: Mucous membranes are moist.   Eyes:      Extraocular Movements: Extraocular movements intact. Comments: Pupils equal and round   Neck:      Vascular: No carotid bruit. Cardiovascular:      Rate and Rhythm: Normal rate and regular rhythm. Pulses: Normal pulses.       Heart sounds: S1 normal and S2 normal. No murmur heard.    No friction rub. No gallop. Pulmonary:      Effort: Pulmonary effort is normal. No respiratory distress. Breath sounds: Normal breath sounds. No rales. Chest:      Chest wall: No tenderness. Abdominal:      Palpations: Abdomen is soft. Tenderness: There is no abdominal tenderness. Musculoskeletal:      Right lower leg: No edema. Left lower leg: No edema. Skin:     General: Skin is warm and dry. Capillary Refill: Capillary refill takes less than 2 seconds. Findings: No rash. Comments: Skin turgor brisk   Neurological:      Mental Status: She is alert and oriented to person, place, and time. Mental status is at baseline. Psychiatric:         Behavior: Behavior is cooperative. Thought Content: Thought content normal.         Judgment: Judgment normal.        Medications:    sodium chloride flush  5-40 mL IntraVENous 2 times per day    enoxaparin  40 mg SubCUTAneous Daily    clopidogrel  75 mg Oral Daily    ferrous gluconate  324 mg Oral Daily with breakfast    [START ON 2/14/2023] Vitamin D  2,000 Units Oral Daily    atorvastatin  40 mg Oral Daily      sodium chloride       sodium chloride flush, sodium chloride, ondansetron **OR** ondansetron, acetaminophen **OR** acetaminophen, polyethylene glycol    Lab Data:  CBC:   Recent Labs     02/12/23  1834 02/13/23  0247   WBC 7.0 6.4   HGB 12.5 12.0*   HCT 38.6 37.5   MCV 92.1 92.8    276     BMP:   Recent Labs     02/12/23  1834      K 4.6      CO2 26   BUN 12   CREATININE 0.6     LIVER PROFILE:   Recent Labs     02/12/23  1834   AST 22   ALT 25   BILITOT 0.2   ALKPHOS 159*     PT/INR: No results for input(s): PROTIME, INR in the last 72 hours. APTT: No results for input(s): APTT in the last 72 hours. BNP:  No results for input(s): BNP in the last 72 hours.   TROPONIN:   Recent Labs     02/12/23  1834 02/13/23  0247 02/13/23  1148   TROPONINT <0.010 <0.010 <0.010     Labs, consult, tests reviewed          Martínez Colon PA-C, 2023 1:22 PM     I have seen ,spoken to  and examined this patient personally, independently of the YENNY. I have reviewed the hospital care given to date and reviewed all pertinent labs and imaging. I have spoken with patient, nursing staff and provided written and verbal instructions . The above note has been reviewed     CARDIOLOGY ATTENDING ADDENDUM    HPI:  I have reviewed the above HPI  And agree with above     Pulse Range: Pulse  Av.1  Min: 75  Max: 89    Blood Presuure Range:  Systolic (72DCQ), AMA:855 , Min:123 , CZL:863   ; Diastolic (19LUE), BNH:50, Min:57, Max:96      Pulse ox Range: SpO2  Av.9 %  Min: 94 %  Max: 99 %    24hr I & O:  No intake or output data in the 24 hours ending 23 1546      /80   Pulse 87   Temp 97.9 °F (36.6 °C)   Resp 20   Ht 5' 2\" (1.575 m)   Wt 159 lb 9.6 oz (72.4 kg)   SpO2 96%   BMI 29.19 kg/m²       Physical Exam:  General:  Awake, alert, NAD  Head:normal  Eye:normal  Neck:  No JVD   Chest:  Clear to auscultation, respiration easy  Cardiovascular:  RRR S1S2  Abdomen:   nontender  Extremities:  tr edema  Pulses; palpable  Neuro: grossly normal      MEDICAL DECISION MAKING;    Pt assessed , chart reviewed, patient examined examined , all available data was reviewed, following is the plan which was discussed with YENNY as well:    -Chest pain who in a patient who has known history of CAD we will check troponins and will obtain a Cardiolite stress test showed that patient has inferior ischemia will need cardiac catheterization however has received Lovenox today hence will proceed with cardiac catheterization tomorrow in the meantime medical therapy will be continued as  Consent is obtained  -Hyperlipidemia on Lipitor 40 mg p.o. daily which will be continued  -Hyper tension well controlled          Dawit Becker MD [facility]

## 2023-02-13 NOTE — PROGRESS NOTES
4 Eyes Skin Assessment     NAME:  Renata Domínguez  YOB: 1965  MEDICAL RECORD NUMBER:  [id number]    The patient is being assessed for  Admission    I agree that One RN has performed a thorough Head to Toe Skin Assessment on the patient. ALL assessment sites listed below have been assessed. Areas assessed by both nurses:    Head, Face, Ears, Shoulders, Back, Chest, Arms, Elbows, Hands, Sacrum. Buttock, Coccyx, Ischium, and Legs. Feet and Heels        Does the Patient have a Wound?  No noted wound(s)       [place] Prevention initiated by RN: NA   Wound Care Orders initiated by RN: NA    Pressure Injury (Stage 3,4, Unstageable, DTI, NWPT, and Complex wounds) if present, place referral order by RN under : NA    New and Established Ostomies, if present place, referral order under : NA      Nurse 1 eSignature: Electronically signed by Camille Villalobos RN on 2/13/23 at 4:43 AM EST    **SHARE this note so that the co-signing nurse can place an eSignature**    Nurse 2 eSignature: Electronically signed by Kt Londono RN on 2/13/23 at 4:43 AM EST

## 2023-02-13 NOTE — ED NOTES
Report received from [facility]. Assumed care @ this time.       Inez Shields RN  02/12/23 3392

## 2023-02-14 LAB
ACTIVATED CLOTTING TIME, LOW RANGE: 357 SEC
ANION GAP SERPL CALCULATED.3IONS-SCNC: 9 MMOL/L (ref 4–16)
BASOPHILS ABSOLUTE: 0 K/CU MM
BASOPHILS RELATIVE PERCENT: 0.3 % (ref 0–1)
BUN SERPL-MCNC: 11 MG/DL (ref 6–23)
CALCIUM SERPL-MCNC: 8.8 MG/DL (ref 8.3–10.6)
CHLORIDE BLD-SCNC: 101 MMOL/L (ref 99–110)
CO2: 28 MMOL/L (ref 21–32)
CREAT SERPL-MCNC: 0.7 MG/DL (ref 0.6–1.1)
DIFFERENTIAL TYPE: ABNORMAL
EOSINOPHILS ABSOLUTE: 0.1 K/CU MM
EOSINOPHILS RELATIVE PERCENT: 1.3 % (ref 0–3)
GFR SERPL CREATININE-BSD FRML MDRD: >60 ML/MIN/1.73M2
GLUCOSE SERPL-MCNC: 103 MG/DL (ref 70–99)
HCT VFR BLD CALC: 39.1 % (ref 37–47)
HEMOGLOBIN: 12.6 GM/DL (ref 12.5–16)
IMMATURE NEUTROPHIL %: 0.5 % (ref 0–0.43)
LYMPHOCYTES ABSOLUTE: 2.1 K/CU MM
LYMPHOCYTES RELATIVE PERCENT: 34.5 % (ref 24–44)
MCH RBC QN AUTO: 29.3 PG (ref 27–31)
MCHC RBC AUTO-ENTMCNC: 32.2 % (ref 32–36)
MCV RBC AUTO: 90.9 FL (ref 78–100)
MONOCYTES ABSOLUTE: 0.5 K/CU MM
MONOCYTES RELATIVE PERCENT: 8.1 % (ref 0–4)
NUCLEATED RBC %: 0 %
PDW BLD-RTO: 14.3 % (ref 11.7–14.9)
PLATELET # BLD: 279 K/CU MM (ref 140–440)
PMV BLD AUTO: 9.7 FL (ref 7.5–11.1)
POTASSIUM SERPL-SCNC: 4 MMOL/L (ref 3.5–5.1)
RBC # BLD: 4.3 M/CU MM (ref 4.2–5.4)
SEGMENTED NEUTROPHILS ABSOLUTE COUNT: 3.4 K/CU MM
SEGMENTED NEUTROPHILS RELATIVE PERCENT: 55.3 % (ref 36–66)
SODIUM BLD-SCNC: 138 MMOL/L (ref 135–145)
TOTAL IMMATURE NEUTOROPHIL: 0.03 K/CU MM
TOTAL NUCLEATED RBC: 0 K/CU MM
WBC # BLD: 6.2 K/CU MM (ref 4–10.5)

## 2023-02-14 PROCEDURE — 2580000003 HC RX 258: Performed by: INTERNAL MEDICINE

## 2023-02-14 PROCEDURE — C1725 CATH, TRANSLUMIN NON-LASER: HCPCS

## 2023-02-14 PROCEDURE — C1769 GUIDE WIRE: HCPCS

## 2023-02-14 PROCEDURE — 93454 CORONARY ARTERY ANGIO S&I: CPT | Performed by: INTERNAL MEDICINE

## 2023-02-14 PROCEDURE — 92928 PRQ TCAT PLMT NTRAC ST 1 LES: CPT | Performed by: INTERNAL MEDICINE

## 2023-02-14 PROCEDURE — G0378 HOSPITAL OBSERVATION PER HR: HCPCS

## 2023-02-14 PROCEDURE — 2500000003 HC RX 250 WO HCPCS

## 2023-02-14 PROCEDURE — 93454 CORONARY ARTERY ANGIO S&I: CPT

## 2023-02-14 PROCEDURE — 85347 COAGULATION TIME ACTIVATED: CPT

## 2023-02-14 PROCEDURE — 2709999900 HC NON-CHARGEABLE SUPPLY

## 2023-02-14 PROCEDURE — 6360000004 HC RX CONTRAST MEDICATION

## 2023-02-14 PROCEDURE — 6370000000 HC RX 637 (ALT 250 FOR IP)

## 2023-02-14 PROCEDURE — C1887 CATHETER, GUIDING: HCPCS

## 2023-02-14 PROCEDURE — 85025 COMPLETE CBC W/AUTO DIFF WBC: CPT

## 2023-02-14 PROCEDURE — 6370000000 HC RX 637 (ALT 250 FOR IP): Performed by: INTERNAL MEDICINE

## 2023-02-14 PROCEDURE — 99232 SBSQ HOSP IP/OBS MODERATE 35: CPT | Performed by: INTERNAL MEDICINE

## 2023-02-14 PROCEDURE — 6360000002 HC RX W HCPCS

## 2023-02-14 PROCEDURE — 92978 ENDOLUMINL IVUS OCT C 1ST: CPT

## 2023-02-14 PROCEDURE — 80048 BASIC METABOLIC PNL TOTAL CA: CPT

## 2023-02-14 PROCEDURE — 92928 PRQ TCAT PLMT NTRAC ST 1 LES: CPT

## 2023-02-14 PROCEDURE — 92978 ENDOLUMINL IVUS OCT C 1ST: CPT | Performed by: INTERNAL MEDICINE

## 2023-02-14 PROCEDURE — C1874 STENT, COATED/COV W/DEL SYS: HCPCS

## 2023-02-14 PROCEDURE — 36415 COLL VENOUS BLD VENIPUNCTURE: CPT

## 2023-02-14 PROCEDURE — 94761 N-INVAS EAR/PLS OXIMETRY MLT: CPT

## 2023-02-14 RX ORDER — ATORVASTATIN CALCIUM 40 MG/1
80 TABLET, FILM COATED ORAL DAILY
Status: DISCONTINUED | OUTPATIENT
Start: 2023-02-15 | End: 2023-02-15 | Stop reason: HOSPADM

## 2023-02-14 RX ORDER — ASPIRIN 81 MG/1
81 TABLET ORAL ONCE
Status: COMPLETED | OUTPATIENT
Start: 2023-02-15 | End: 2023-02-14

## 2023-02-14 RX ORDER — PANTOPRAZOLE SODIUM 40 MG/1
40 TABLET, DELAYED RELEASE ORAL
Status: DISCONTINUED | OUTPATIENT
Start: 2023-02-15 | End: 2023-02-15 | Stop reason: HOSPADM

## 2023-02-14 RX ADMIN — METOPROLOL TARTRATE 12.5 MG: 25 TABLET, FILM COATED ORAL at 20:45

## 2023-02-14 RX ADMIN — SODIUM CHLORIDE, PRESERVATIVE FREE 10 ML: 5 INJECTION INTRAVENOUS at 20:46

## 2023-02-14 RX ADMIN — ASPIRIN 81 MG: 81 TABLET, COATED ORAL at 23:18

## 2023-02-14 ASSESSMENT — PAIN SCALES - GENERAL
PAINLEVEL_OUTOF10: 0
PAINLEVEL_OUTOF10: 0

## 2023-02-14 NOTE — CARE COORDINATION
- Room 4004--[facility] criteria for Chest Pain reviewed at this time, criteria supports the Initial OBSERVATION admission

## 2023-02-14 NOTE — PROGRESS NOTES
V2.0  [facility] Hospitalist Progress Note      Name:  John Skaggs /Age/Sex: 1965  (65 y.o. female)   MRN & CSN:  [id number] & [id number] Encounter Date/Time: 2023 12:01 PM EST    Location:  [address] [place] [address] PCP: Arlene Thomas MD       Hospital Day: 3    Assessment and Plan:   John Skaggs is a 65 y.o. female with pmh as noted below who presents with Chest pain      Plan:   Chest pain 2/2 Unstable Angina   R. O. Stent placed to proximal LAD(23)  -Troponin x 3 negative  -EKG with no acute ST-T wave changes.  -Serial troponins, repeat EKG ordered.  -Cardiology following  --2d echo shows EF 55-60%  --Stress test completed 23 per Cardiology, stress test shows normal EF, however inferior wall ischemia. Patient to have [address] today. - Cardiology accessed right radial artery, with PCI of proximal LAD. Site is covered with DSD. No s/s of hematoma on exam.        Coronary artery disease  -History of STEMI-SOUMYA to RCA (2022)  -Patient is on Plavix, and restarted on ASA per cardiology. --Patient reports that she is not on aspirin as outpatient due to risk of bleeding, history of GI bleed  -On high intensity atorvastatin. COPD  -Albuterol HFA as needed     Iron deficiency anemia   -Continue iron supplementation. History of recurrent GI bleeds     History of SBO s/p bowel resection      Left total hip replacement-10/2022    Uterine Ca- new diagnosis  --Follows with [facility]. Diet ADULT DIET; Regular; Low Fat/Low Chol/High Fiber/2 gm Na   DVT Prophylaxis [x] Lovenox, []  Heparin, [] SCDs, [] Ambulation,  [] Eliquis, [] Xarelto  [] Coumadin   Code Status Full Code   Disposition From: Home  Expected Disposition: Home  Estimated Date of Discharge: 1 days  Patient requires continued admission due to S/P Stent placement   Surrogate Decision Maker/ POA self     Subjective:     Chief Complaint: Chest Pain (Reports left sided chest pain that comes and goes.   States the discomfort started today while laying down in bed.)     Patient seen at bedside this afternoon s/p LHC. Denies shortness of breathe, nausea, vomiting. Patient denies, numbness/ tingling, vision/speech changes, dizziness, or unilateral weakness. Patient requesting to go home first thing in the morning. Patient endorses that she feels better. Plan of care discussed as noted above. Objective:   No intake or output data in the 24 hours ending 02/14/23 1312     Vitals:   Vitals:    02/14/23 1244   BP: 114/80   Pulse: 78   Resp: 16   Temp: 97.9 °F (36.6 °C)   SpO2: 97%       Physical Exam:     General: NAD  Eyes: EOMI  ENT: neck supple  Cardiovascular: Regular rate. Chest pressure as described above  Respiratory: Clear to auscultation  Gastrointestinal: Soft, non tender  Genitourinary: no suprapubic tenderness  Musculoskeletal: No edema  Skin: warm, dry  Neuro: Alert and Oriented x3  Psych: Mood appropriate.      Medications:   Medications:    [START ON 2/15/2023] atorvastatin  80 mg Oral Daily    [START ON 2/15/2023] aspirin  81 mg Oral Once    sodium chloride flush  5-40 mL IntraVENous 2 times per day    [Held by provider] enoxaparin  40 mg SubCUTAneous Daily    clopidogrel  75 mg Oral Daily    ferrous gluconate  324 mg Oral Daily with breakfast    Vitamin D  2,000 Units Oral Daily    metoprolol tartrate  12.5 mg Oral BID      Infusions:    sodium chloride       PRN Meds: sodium chloride flush, 5-40 mL, PRN  sodium chloride, , PRN  ondansetron, 4 mg, Q8H PRN   Or  ondansetron, 4 mg, Q6H PRN  acetaminophen, 650 mg, Q6H PRN   Or  acetaminophen, 650 mg, Q6H PRN  polyethylene glycol, 17 g, Daily PRN        Labs       02/13/23 1244  Troponin   Collected: 02/13/23 1148  Final result  Specimen: Blood    Troponin T <0.010 NG/ML             02/13/23 0727  CBC   Collected: 02/13/23 0247  Final result  Specimen: Blood    WBC 6.4 K/CU MM MCH 29.7 PG   RBC 4.04 Low  M/CU MM MCHC 32.0 %   Hemoglobin 12.0 Low  GM/DL RDW 14.1 %   Hematocrit 37.5 % Platelets 954 K/CU MM   MCV 92.8 FL MPV 10.1 FL          02/13/23 0335  Troponin   Collected: 02/13/23 0247  Final result  Specimen: Blood    Troponin T <0.010 NG/ML           Imaging/Diagnostics Last 24 Hours   XR CHEST PORTABLE    Result Date: 2/12/2023  EXAMINATION: ONE XRAY VIEW OF THE CHEST 2/12/2023 3:19 pm COMPARISON: 03/02/2022 HISTORY: ORDERING SYSTEM PROVIDED HISTORY: Chest pain TECHNOLOGIST PROVIDED HISTORY: Reason for exam:->Chest pain Reason for Exam: chest pain FINDINGS: The cardial-pericardial silhouette is unremarkable in appearance. The lungs are clear. No pneumothorax is found. No free air is seen. No acute bony abnormality. Unremarkable portable chest radiograph. NM MYOCARDIAL SPECT REST EXERCISE OR RX    Result Date: 2/13/2023  Cardiac Perfusion Imaging   Demographics   Patient Name      Jan Flowers     Date of study        02/13/2023   Date of Birth     1965         Gender               Female   Age               65 year(s)         Race                    Patient Number    [id number]         Room Number          3712   Visit Number      [id number]          Height               62 inches   Corporate ID      [id number]           Weight               159 pounds   Accession Number  [id number]                                        NM Technologist      Sonia Guillen MD        Cardiologist         Miguelito HALL   Conclusions   Summary  Medium sized defect of moderate severity which is reversible involving  inferior wall of myocardium. Abnormal Lexiscan nuclear scintigraphic study  suggestive of abnormal myocardial perfusion. Moderate degree of inferior  wall ischemia noted involving a medium sized area of left ventricular  myocardium. Gated images demonstrate normal left ventricular systolic  function with EF of 60 %.    Signatures ------------------------------------------------------------------  Electronically signed by Eitan Zelaya MD  (Interpreting cardiologist) on 02/13/2023 at 12:05  ------------------------------------------------------------------  Procedure Procedure Type:   Nuclear Stress Test:Pharmacological, Myocardial Perfusion Imaging with  Pharm, NM MYOCARDIAL SPECT REST EXERCISE OR RX  Indications: Chest pain.  Risk Factors   The patient risk factors include:prior PCI;former tobacco use, family  history of premature CAD and prior MI .  Stress Protocols   Resting ECG  Normal sinus rhythm.  PACs.   Resting HR:82 bpm  Resting BP:134/86 mmHg  Stress Protocol:Pharmacologic - Lexiscan  Peak HR:109 bpm                  HR/BP product:35133  Peak BP:134/86 mmHg  Predicted HR: 164 bpm  % of predicted HR: 66   Exercise duration: 01:01 min  Reason for termination:Completed   ECG Findings  Nonspecific ST segment changes.   Symptoms  ST  Procedure Medications   - Lexiscan I.V. bolus (over 15sec.) 0.4 mg admininstered @ 02/13/2023 10:05.  Imaging Protocols   Rest                             Stress   Isotope:Sestamibi 99mTc          Isotope: Sestamibi 99mTc  Isotope dose:10.3 mCi            Isotope dose:32.9 mCi  Administration route: I.V.       Administration route: I.V.  Injection Date:02/13/2023 08:35  Injection Date:02/13/2023 10:05  Scan Date:02/13/2023 09:20       Scan Date:02/13/2023 10:50   Technique:        SPECT          Technique:        Gated                                                     SPECT   Procedure Description   Upon patient arrival, the patient is identified using two identifiers and  the physician order is verified. An IV is established and 8-11mCi of 99mTc  Sestamibi is intravenously injected and followed with 10mL 0.9% Normal  Saline flush. A circulation period of 45 minutes occurs prior to resting  SPECT imaging. After imaging is complete the patient is escorted to the  stress lab. The patient is  connected to the ECG and blood pressure is  measured. The RN starts the stress portion of the exam and rapidly  intravenously injects Lexiscan (regadenosine) 0.4mg over a period of 10 to15  seconds and follows with 5mL 0.9% Normal Saline flush. Immediately following  the Nuclear Technologist intravenously injects 22-33mCi of 99mTc Sestamibi  and 5mL 0.9% Normal Saline flush. After completion, recovery, and removal of  the IV, the patient rests during the second circulation period of 45  minutes. Final stress SPECT gated imaging is performed. The patient may  return home or to their room after stress imaging. The images are processed  and final charting is completed and sent to the appropriate cardiologist for  interpretation and reporting. Perfusion Interpretation   Medium sized defect of moderate severity which is reversible involving  inferior wall of myocardium. Imaging Results    Summed scores     - Summed stress score: 16     - Summed rest score: 8     - Summed difference score:    8     Stress ejection    Ejection fraction:74 %    EDV :46 ml    ESV :12 ml    Stroke volume :34 ml  Medical History   Accession#:  [id number]  Admission Data Admission date: 02/12/2023 Admission Time: 18:12 Hospital Status: Outpatient.       Electronically signed by FLAVIO Huang CNP on 2/14/2023 at 1:12 PM

## 2023-02-14 NOTE — PROGRESS NOTES
[facility] [id number]  Phone: [phone]    Fax [phone]                  Ann Wiley MD, Fab Servin MD, Judi Snow MD, MD Tamiko Markham, MD Rose Marie Owen MD Dr. Albertina Chars MD Jennette Brick, APRN      Latoya Brar, APRELAINA Hoffman, APRELAINA Maria, APRN  Carroll Kimble, PA-C    CARDIOLOGY  NOTE      Name:  Israel Nava /Age/Sex: 1965  (65 y.o. female)   MRN & CSN:  [id number] & [id number] Admission Date/Time: 2023  6:12 PM   Location:  [facility] PCP: Enoc Stratton MD       Hospital Day: 3    - Cardiology consult is for: Chest pain      ASSESSMENT/ PLAN:  Chest pain with history of coronary artery disease s/p PCI of LAD and RCA  -No chest pain at this time. Troponin negative  -Echo per below. Stress test concerning for inferior wall ischemia  -Patient required PCI of LAD. LHC per below  -Patient to be on dual antiplatelet therapy with aspirin and Plavix for 6 months at least.  Will monitor for epistaxis due to history with aspirin therapy  -Continue Lopressor 12.5 mg twice daily and Lipitor 40 mg daily  Uterine Cancer  -New diagnosis, follows with [facility]  -Patient states that she may have need for hysterectomy. We will coordinate antiplatelet therapy with oncology team  History of iron deficiency anemia and GI bleed  -Hemoglobin of 12.0. Continue to monitor  -Started patient on Protonix due to GI upset with aspirin        LHC: 2023   Left main artery: Normal caliber vessel, free of significant   disease   Left anterior descending artery: Type IV LAD, mid LAD has a   pre-existing stent. There is mild to moderate in-stent   restenosis. Just before the stent there is 80% stenosis in   native LAD at its bifurcation with large first diagonal branch.    Left circumflex artery: Normal caliber vessel, free of   significant disease   Right coronary artery: Dominant right coronary artery, patent   RCA stents are noted   Successful PCI of proximal LAD utilizing a 4 x18 mm Resolute   [place] stent      Echo: 2023   Left ventricular systolic function is normal.   Ejection fraction is visually estimated at 55-60%. No significant valvular disease noted. No pericardial effusion present. Subjective:  Rachel Murray is a 65 y. o.year old     Patient feeling well overall following her heart cath. Explained to her in detail about need for dual antiplatelet therapy. Patient is hesitant to be on aspirin due to history of epistaxis with it. Patient also just does not want to be on very many medications. Patient says that she has concerns that she may need hysterectomy at some point due to new diagnosis of uterine cancer    There may be problems with compliance down the road. Continue to work with patient today and advised her on what is best practice specifically for her. Objective: Temperature:  Current - Temp: 97.9 °F (36.6 °C);  Max - Temp  Av.7 °F (36.5 °C)  Min: 97.3 °F (36.3 °C)  Max: 97.9 °F (36.6 °C)    Respiratory Rate : Resp  Av  Min: 11  Max: 22    Pulse Range: Pulse  Av.9  Min: 68  Max: 93    Blood Presuure Range:  Systolic (57VRD), MADALYN:908 , Min:96 , JBR:825   ; Diastolic (69CRE), VNV:76, Min:63, Max:91      Pulse ox Range: SpO2  Av.1 %  Min: 95 %  Max: 98 %    24hr I & O:  No intake or output data in the 24 hours ending 23 1831      BP 96/63   Pulse 93   Temp 97.9 °F (36.6 °C) (Oral)   Resp 16   Ht 5' 2\" (1.575 m)   Wt 159 lb 9.6 oz (72.4 kg)   SpO2 98%   BMI 29.19 kg/m²         TELEMETRY: Sinus      has a past medical history of Anal cancer ([facility] [address] Asthma, Broken ankle, CAD (coronary artery disease), H/O cardiovascular stress test, History of blood transfusion, NSTEMI (non-ST elevated myocardial infarction) ([facility] [address] PONV (postoperative nausea and vomiting), Post PTCA, and Skin cancer. has a past surgical history that includes Percutaneous Transluminal Coronary Angio (2009); Colon surgery (2009); Ankle surgery (Left, 10+ yrs ago); Tubal ligation (25 yrs ago); Tunneled venous port placement (2009); Tonsillectomy; other surgical history (04/23/2015); fracture surgery; Colonoscopy (03/04/2015); Colonoscopy (03/15/2017); Upper gastrointestinal endoscopy (N/A, 02/26/2022); Colonoscopy (N/A, 03/03/2022); Upper gastrointestinal endoscopy (N/A, 03/03/2022); Percutaneous Transluminal Coronary Angio (02/22/2022); and Total hip arthroplasty (Left, 10/19/2022). Physical Exam  Constitutional:       General: She is not in acute distress. Appearance: She is not diaphoretic. HENT:      Head: Normocephalic and atraumatic. Right Ear: External ear normal.      Left Ear: External ear normal.      Nose: Nose normal.      Mouth/Throat:      Mouth: Mucous membranes are moist.   Eyes:      Extraocular Movements: Extraocular movements intact. Comments: Pupils equal and round   Neck:      Vascular: No carotid bruit. Cardiovascular:      Rate and Rhythm: Normal rate and regular rhythm. Pulses: Normal pulses. Heart sounds: S1 normal and S2 normal. No murmur heard. No friction rub. No gallop. Comments: right  wrist site dressing is clean dry and intact, site is soft without hematoma, bruising or bleeding noted. Pulmonary:      Effort: Pulmonary effort is normal. No respiratory distress. Breath sounds: Normal breath sounds. No rales. Chest:      Chest wall: No tenderness. Abdominal:      Palpations: Abdomen is soft. Tenderness: There is no abdominal tenderness. Musculoskeletal:      Right lower leg: No edema. Left lower leg: No edema. Skin:     General: Skin is warm and dry. Capillary Refill: Capillary refill takes less than 2 seconds. Findings: No rash.       Comments: Skin turgor brisk   Neurological:      Mental Status: She is alert and oriented to person, place, and time. Mental status is at baseline. Psychiatric:         Behavior: Behavior is cooperative. Thought Content: Thought content normal.         Judgment: Judgment normal.        Medications:    [START ON 2/15/2023] atorvastatin  80 mg Oral Daily    [START ON 2/15/2023] aspirin  81 mg Oral Once    [START ON 2/15/2023] pantoprazole  40 mg Oral QAM AC    sodium chloride flush  5-40 mL IntraVENous 2 times per day    [Held by provider] enoxaparin  40 mg SubCUTAneous Daily    clopidogrel  75 mg Oral Daily    ferrous gluconate  324 mg Oral Daily with breakfast    Vitamin D  2,000 Units Oral Daily    metoprolol tartrate  12.5 mg Oral BID      sodium chloride       sodium chloride flush, sodium chloride, ondansetron **OR** ondansetron, acetaminophen **OR** acetaminophen, polyethylene glycol    Lab Data:  CBC:   Recent Labs     02/12/23 1834 02/13/23  0247   WBC 7.0 6.4   HGB 12.5 12.0*   HCT 38.6 37.5   MCV 92.1 92.8    276       BMP:   Recent Labs     02/12/23 1834      K 4.6      CO2 26   BUN 12   CREATININE 0.6       LIVER PROFILE:   Recent Labs     02/12/23 1834   AST 22   ALT 25   BILITOT 0.2   ALKPHOS 159*       PT/INR: No results for input(s): PROTIME, INR in the last 72 hours. APTT: No results for input(s): APTT in the last 72 hours. BNP:  No results for input(s): BNP in the last 72 hours. TROPONIN:   Recent Labs     02/12/23 1834 02/13/23  0247 02/13/23  1148   TROPONINT <0.010 <0.010 <0.010       Labs, consult, tests reviewed          Gila Chaney PA-C, 2/14/2023 6:31 PM    I have seen ,spoken to  and examined this patient personally, independently of the YENNY. I have reviewed the hospital care given to date and reviewed all pertinent labs and imaging. I have spoken with patient, nursing staff and provided written and verbal instructions . The above note has been reviewed     CARDIOLOGY ATTENDING ADDENDUM    HPI:  I have reviewed the above HPI  And agree with above     Pulse Range: Pulse  Av.4  Min: 68  Max: 93    Blood Presuure Range:  Systolic (83OQZ), AVJ:825 , Min:96 , CYK:569   ; Diastolic (91AVZ), LUR:27, Min:63, Max:92      Pulse ox Range: SpO2  Av.1 %  Min: 95 %  Max: 98 %    24hr I & O:  No intake or output data in the 24 hours ending 23 1903      BP 96/63   Pulse 93   Temp 98 °F (36.7 °C) (Oral)   Resp 16   Ht 5' 2\" (1.575 m)   Wt 159 lb 9.6 oz (72.4 kg)   SpO2 98%   BMI 29.19 kg/m²       Physical Exam:  General:  Awake, alert, NAD  Head:normal  Eye:normal  Neck:  No JVD   Chest:  Clear to auscultation, respiration easy  Cardiovascular:  RRR S1S2  Abdomen:   nontender  Extremities:  tr edema  Pulses; palpable  Neuro: grossly normal      MEDICAL DECISION MAKING;    Pt assessed , chart reviewed, patient examined examined , all available data was reviewed, following is the plan which was discussed with YENNY as well:    -Patient underwent cardiac catheterization today which showed critical stenosis of the LAD which was stented patient will need DAPT for 6 months  -Hyperlipidemia on Lipitor 40 mg p.o. daily which will be continued  -Hyper tension well controlled  -Also has CA uterus will be monitored          Bernard Menard MD [facility]

## 2023-02-14 NOTE — PROGRESS NOTES
4 Eyes Skin Assessment     NAME:  Renata Domínguez  YOB: 1965  MEDICAL RECORD NUMBER:  [id number]    The patient is being assessed for  Transfer to New Unit    I agree that One RN has performed a thorough Head to Toe Skin Assessment on the patient. ALL assessment sites listed below have been assessed. Areas assessed by both nurses:    Head, Face, Ears, Shoulders, Back, Chest, Arms, Elbows, Hands, Sacrum. Buttock, Coccyx, Ischium, and Legs. Feet and Heels        Does the Patient have a Wound?  No noted wound(s)       Vernon Prevention initiated by RN: NA   Wound Care Orders initiated by RN: NA    Pressure Injury (Stage 3,4, Unstageable, DTI, NWPT, and Complex wounds) if present, place referral order by RN under : NA    New and Established Ostomies, if present place, referral order under : NA      Nurse 1 eSignature: Electronically signed by Kandi Frazier RN on 2/14/23 at 12:48 PM EST      Nurse 2 eSignature: Electronically signed by Glendy Daniel RN on 2/14/23 at 1:08 PM EST

## 2023-02-14 NOTE — PROGRESS NOTES
[facility] [employer] [facility] [address] [facility] [facility] per RN and Tech on cardiac monitor.

## 2023-02-14 NOTE — PROGRESS NOTES
Received from cath lab. Monitor and alarms on. Call Light in reach. Bed in the lowest position. Procedure site assessment completed per Neto Burk RN and Chio Screen. No hematoma or bleeding noted.

## 2023-02-14 NOTE — PROCEDURES
Indication: 81-year-old female with prior history of RCA and LAD stents who is here with chest pain. Her stress test is positive for ischemia. Sedation: Versed and fentanyl    Estimated blood loss: 10 cc    ASA: 3    Mallampati score: 3    Access: Right radial artery using modified Seldinger technique. Left coronary artery was engaged using a [address] cath. Right coronary artery was engaged using a [address] cath. Left ventriculography was not performed. Coronary angiogram findings:    Dominance: Right dominant system  Left main artery: Normal caliber vessel, free of significant disease  Left anterior descending artery: Type IV LAD, mid LAD has a pre-existing stent. There is mild to moderate in-stent restenosis. Just before the stent there is 80% stenosis in native LAD at its bifurcation with large first diagonal branch. Left circumflex artery: Normal caliber vessel, free of significant disease  Right coronary artery: Dominant right coronary artery, patent RCA stents are noted    Left ventriculography not performed    Coronary intervention details: 6 [address] XB 3 oh guide was used. Both LAD and diagonal were wired using a run-through and  wires. Intravascular ultrasonography was performed. Stent underexpansion was noted. The lesion before the stent was more fibrocalcific. This lesion was predilated using a 3 x 12 noncompliant balloon. Afterwards a 4 mm stent was deployed overlapping with previous stent at 18 [address]rew. The same balloon was used to post dilate the previous stent at 16 ATMs. Po stenting excellent flow was noted. Diagonal was widely patent. Arteriotomy closure: TR band    Complications: None    Conclusion:  Successful PCI of proximal LAD utilizing a 4 x18 mm Resolute Minor stent      Recommendations:    TR band per protocol  Continue with aspirin and Plavix. Both doses of Plavix 600 mg and aspirin 325 mg were given in the lab.   Continue with high-dose statin as hanh.      Дмитрий Almonte MD

## 2023-02-15 VITALS
RESPIRATION RATE: 13 BRPM | TEMPERATURE: 98.3 F | DIASTOLIC BLOOD PRESSURE: 67 MMHG | BODY MASS INDEX: 28.87 KG/M2 | OXYGEN SATURATION: 96 % | WEIGHT: 156.9 LBS | HEART RATE: 74 BPM | HEIGHT: 62 IN | SYSTOLIC BLOOD PRESSURE: 92 MMHG

## 2023-02-15 PROCEDURE — 6370000000 HC RX 637 (ALT 250 FOR IP)

## 2023-02-15 PROCEDURE — 99232 SBSQ HOSP IP/OBS MODERATE 35: CPT | Performed by: INTERNAL MEDICINE

## 2023-02-15 PROCEDURE — 6370000000 HC RX 637 (ALT 250 FOR IP): Performed by: INTERNAL MEDICINE

## 2023-02-15 PROCEDURE — G0378 HOSPITAL OBSERVATION PER HR: HCPCS

## 2023-02-15 PROCEDURE — APPSS30 APP SPLIT SHARED TIME 16-30 MINUTES

## 2023-02-15 RX ORDER — PANTOPRAZOLE SODIUM 40 MG/1
40 TABLET, DELAYED RELEASE ORAL
Qty: 90 TABLET | Refills: 1 | Status: SHIPPED | OUTPATIENT
Start: 2023-02-15

## 2023-02-15 RX ORDER — ATORVASTATIN CALCIUM 80 MG/1
80 TABLET, FILM COATED ORAL DAILY
Qty: 30 TABLET | Refills: 2 | Status: SHIPPED | OUTPATIENT
Start: 2023-02-15

## 2023-02-15 RX ORDER — ATORVASTATIN CALCIUM 80 MG/1
80 TABLET, FILM COATED ORAL DAILY
Qty: 30 TABLET | Refills: 3 | Status: SHIPPED | OUTPATIENT
Start: 2023-02-15 | End: 2023-02-15 | Stop reason: SDUPTHER

## 2023-02-15 RX ORDER — PANTOPRAZOLE SODIUM 40 MG/1
40 TABLET, DELAYED RELEASE ORAL
Qty: 90 TABLET | Refills: 1 | Status: SHIPPED | OUTPATIENT
Start: 2023-02-15 | End: 2023-02-15 | Stop reason: SDUPTHER

## 2023-02-15 RX ORDER — ASPIRIN 81 MG/1
81 TABLET, CHEWABLE ORAL 2 TIMES DAILY
Qty: 60 TABLET | Refills: 0 | Status: SHIPPED | OUTPATIENT
Start: 2023-02-15

## 2023-02-15 RX ADMIN — METOPROLOL TARTRATE 12.5 MG: 25 TABLET, FILM COATED ORAL at 09:37

## 2023-02-15 RX ADMIN — PANTOPRAZOLE SODIUM 40 MG: 40 TABLET, DELAYED RELEASE ORAL at 06:34

## 2023-02-15 RX ADMIN — CLOPIDOGREL BISULFATE 75 MG: 75 TABLET ORAL at 09:37

## 2023-02-15 RX ADMIN — ATORVASTATIN CALCIUM 80 MG: 40 TABLET, FILM COATED ORAL at 09:37

## 2023-02-15 RX ADMIN — Medication 2000 UNITS: at 09:37

## 2023-02-15 NOTE — PROGRESS NOTES
Outpatient Pharmacy Progress Note for Meds-to-Beds    Total number of Prescriptions Filled: 4  The following medications were dispensed to the patient during the discharge process:  Aspirin  Atorvastatin  Metoprolol tartrate  Pantoprazole     Additional Documentation:  Patient picked-up the medication(s) in the OP Pharmacy    Thank you for letting us serve your patients.   [address]    [address], [address] [facility]    Phone: [phone]    Fax: [phone]

## 2023-02-15 NOTE — PLAN OF CARE
Problem: Discharge Planning  Goal: Discharge to home or other facility with appropriate resources  Outcome: Progressing  Flowsheets (Taken 2/14/2023 1945)  Discharge to home or other facility with appropriate resources: Identify barriers to discharge with patient and caregiver     Problem: Pain  Goal: Verbalizes/displays adequate comfort level or baseline comfort level  Outcome: Progressing  Flowsheets (Taken 2/14/2023 1945)  Verbalizes/displays adequate comfort level or baseline comfort level: Encourage patient to monitor pain and request assistance

## 2023-02-15 NOTE — PROGRESS NOTES
Alina Castillo [address]  Phone: [phone]    Fax [phone]                  Almaz Bell MD, Michael Lara MD, Casey Saucedo MD, MD Edwin Griffith MD Marc Jubilee, MD Arlis Friedlander, MD Dr. Earlyne Plane MD Delford Both, APRN      Jackson Pathak, APRN  Ryan George, APRN    Matteo Yan, APRN  Betty Gomez PA-EDER    CARDIOLOGY  NOTE      Name:  [place] Room /Age/Sex: 1965  (65 y.o. female)   MRN & CSN:  [id number] & [id number] Admission Date/Time: 2023  6:12 PM   Location:  [facility] PCP: Scott Merritt MD       Hospital Day: 4    - Cardiology consult is for: Chest pain      ASSESSMENT/ PLAN:  Chest pain with history of coronary artery disease s/p PCI of LAD and RCA  -No chest pain at this time. Troponin negative  -Echo per below. Stress test concerning for inferior wall ischemia  -Patient required PCI of LAD. LHC per below  -Patient to be on dual antiplatelet therapy with aspirin and Plavix for 6 months at least.  Will monitor for epistaxis due to history with aspirin therapy  -Continue Lopressor 12.5 mg twice daily and Lipitor 40 mg daily  Uterine Cancer  -New diagnosis, follows with [facility]  -Patient states that she may have need for hysterectomy. We will coordinate antiplatelet therapy with oncology team  History of iron deficiency anemia and GI bleed  -Hemoglobin of 12.0. Continue to monitor  -Started patient on Protonix due to GI upset with aspirin      She is safe for discharge from cardiology perspective. Patient will follow up with Dr. Samm Heath in one week. LHC: 2023   Left main artery: Normal caliber vessel, free of significant   disease   Left anterior descending artery: Type IV LAD, mid LAD has a   pre-existing stent. There is mild to moderate in-stent   restenosis.  Just before the stent there is 80% stenosis in   native LAD at its bifurcation with large first diagonal branch. Left circumflex artery: Normal caliber vessel, free of   significant disease   Right coronary artery: Dominant right coronary artery, patent   RCA stents are noted   Successful PCI of proximal LAD utilizing a 4 x18 mm Resolute   [place] stent      Echo: 2023   Left ventricular systolic function is normal.   Ejection fraction is visually estimated at 55-60%. No significant valvular disease noted. No pericardial effusion present. Subjective:  Hien Martin is a 65 y. o.year old     Patient is very irritated and is wanting to go home. No chest pain or shortness of breath. Objective: Temperature:  Current - Temp: 98.3 °F (36.8 °C); Max - Temp  Av.2 °F (36.8 °C)  Min: 97.9 °F (36.6 °C)  Max: 98.7 °F (37.1 °C)    Respiratory Rate : Resp  Av.1  Min: 11  Max: 20    Pulse Range: Pulse  Av.3  Min: 68  Max: 93    Blood Presuure Range:  Systolic (12VMH), JJT:470 , Min:92 , TTQ:855   ; Diastolic (58XXY), HDE:73, Min:63, Max:92      Pulse ox Range: SpO2  Av.2 %  Min: 95 %  Max: 98 %    24hr I & O:  No intake or output data in the 24 hours ending 02/15/23 0924      BP 92/67   Pulse 74   Temp 98.3 °F (36.8 °C) (Oral)   Resp 13   Ht 5' 2\" (1.575 m)   Wt 156 lb 14.4 oz (71.2 kg)   SpO2 96%   BMI 28.70 kg/m²         TELEMETRY: Elvi      has a past medical history of Anal cancer ([facility] [address] Asthma, Broken ankle, CAD (coronary artery disease), H/O cardiovascular stress test, History of blood transfusion, NSTEMI (non-ST elevated myocardial infarction) ([facility] [address] PONV (postoperative nausea and vomiting), Post PTCA, and Skin cancer. has a past surgical history that includes Percutaneous Transluminal Coronary Angio (); Colon surgery (); Ankle surgery (Left, 10+ yrs ago); Tubal ligation (25 yrs ago); Tunneled venous port placement (); Tonsillectomy; other surgical history (2015); fracture surgery;  Colonoscopy (2015); Colonoscopy (03/15/2017); Upper gastrointestinal endoscopy (N/A, 02/26/2022); Colonoscopy (N/A, 03/03/2022); Upper gastrointestinal endoscopy (N/A, 03/03/2022); Percutaneous Transluminal Coronary Angio (02/22/2022); and Total hip arthroplasty (Left, 10/19/2022). Physical Exam  Constitutional:       General: She is not in acute distress. Appearance: She is not diaphoretic. HENT:      Head: Normocephalic and atraumatic. Right Ear: External ear normal.      Left Ear: External ear normal.      Nose: Nose normal.      Mouth/Throat:      Mouth: Mucous membranes are moist.   Eyes:      Extraocular Movements: Extraocular movements intact. Comments: Pupils equal and round   Neck:      Vascular: No carotid bruit. Cardiovascular:      Rate and Rhythm: Normal rate and regular rhythm. Pulses: Normal pulses. Heart sounds: S1 normal and S2 normal. No murmur heard. No friction rub. No gallop. Comments: right  wrist site dressing is clean dry and intact, site is soft without hematoma, bruising or bleeding noted. Pulmonary:      Effort: Pulmonary effort is normal. No respiratory distress. Breath sounds: Normal breath sounds. No rales. Chest:      Chest wall: No tenderness. Abdominal:      Palpations: Abdomen is soft. Tenderness: There is no abdominal tenderness. Musculoskeletal:      Right lower leg: No edema. Left lower leg: No edema. Skin:     General: Skin is warm and dry. Capillary Refill: Capillary refill takes less than 2 seconds. Findings: No rash. Comments: Skin turgor brisk   Neurological:      Mental Status: She is alert and oriented to person, place, and time. Mental status is at baseline. Psychiatric:         Behavior: Behavior is cooperative. Thought Content:  Thought content normal.         Judgment: Judgment normal.        Medications:    atorvastatin  80 mg Oral Daily    pantoprazole  40 mg Oral QAM AC    sodium chloride flush 5-40 mL IntraVENous 2 times per day    [Held by provider] enoxaparin  40 mg SubCUTAneous Daily    clopidogrel  75 mg Oral Daily    ferrous gluconate  324 mg Oral Daily with breakfast    Vitamin D  2,000 Units Oral Daily    metoprolol tartrate  12.5 mg Oral BID      sodium chloride       sodium chloride flush, sodium chloride, ondansetron **OR** ondansetron, acetaminophen **OR** acetaminophen, polyethylene glycol    Lab Data:  CBC:   Recent Labs     23   WBC 7.0 6.4 6.2   HGB 12.5 12.0* 12.6   HCT 38.6 37.5 39.1   MCV 92.1 92.8 90.9    276 279       BMP:   Recent Labs     23    138   K 4.6 4.0    101   CO2 26 28   BUN 12 11   CREATININE 0.6 0.7       LIVER PROFILE:   Recent Labs     23   AST 22   ALT 25   BILITOT 0.2   ALKPHOS 159*       PT/INR: No results for input(s): PROTIME, INR in the last 72 hours. APTT: No results for input(s): APTT in the last 72 hours. BNP:  No results for input(s): BNP in the last 72 hours. TROPONIN:   Recent Labs     23  1148   TROPONINT <0.010 <0.010 <0.010       Labs, consult, tests reviewed          Sasha Carr PA-C, 2/15/2023 9:24 AM    I have seen ,spoken to  and examined this patient personally, independently of the YENNY. I have reviewed the hospital care given to date and reviewed all pertinent labs and imaging. I have spoken with patient, nursing staff and provided written and verbal instructions . The above note has been reviewed     CARDIOLOGY ATTENDING ADDENDUM    HPI:  I have reviewed the above HPI  And agree with above     Pulse Range: Pulse  Av.7  Min: 72  Max: 93    Blood Presuure Range:  Systolic (18LDN), JLZ:147 , Min:92 , FFF:312   ; Diastolic (15SQS), SGZ:38, Min:63, Max:92      Pulse ox Range: SpO2  Av.3 %  Min: 95 %  Max: 98 %    24hr I & O:  No intake or output data in the 24 hours ending 02/15/23 1018      BP 92/67 Pulse 74   Temp 98.3 °F (36.8 °C) (Oral)   Resp 13   Ht 5' 2\" (1.575 m)   Wt 156 lb 14.4 oz (71.2 kg)   SpO2 96%   BMI 28.70 kg/m²       Physical Exam:  General:  Awake, alert, NAD  Head:normal  Eye:normal  Neck:  No JVD   Chest:  Clear to auscultation, respiration easy  Cardiovascular:  RRR S1S2  Abdomen:   nontender  Extremities: No edema  Pulses; palpable  Neuro: grossly normal      MEDICAL DECISION MAKING;    Pt assessed , chart reviewed, patient examined examined , all available data was reviewed, following is the plan which was discussed with YENNY as well:    -Patient underwent cardiac catheterization today which showed critical stenosis of the LAD which was stented patient will need DAPT for 6 months as remained stable can be discharged today  -Hyperlipidemia on Lipitor 40 mg p.o. daily which will be continued  -Hyper tension well controlled  -Also has CA uterus will be monitored   -Also has history of anemia being monitored stable now we will follow-up as outpatient          Bhavik Dhillon MD [facility]

## 2023-02-15 NOTE — PROGRESS NOTES
02/15/23 1024   Encounter Summary   Encounter Overview/Reason  Initial Encounter   Service Provided For: Patient and family together   Referral/Consult From: Rounding   Support System Spouse   Last Encounter  02/15/23  (Offered prayer and spiritual support.  family in the room and leaving today.)   Complexity of Encounter Low   Begin Time 1020   End Time  1025   Total Time Calculated 5 min   Encounter    Type Initial Screen/Assessment   Spiritual/Emotional needs   Type Spiritual Support   Assessment/Intervention/Outcome   Assessment Hopeful;Peaceful   Intervention Active listening;Discussed belief system/Amish practices/ranjit;Discussed meaning/purpose;Discussed relationship with God;Empowerment;Life review/Legacy;Prayer (assurance of)/[place];Sustaining Presence/Ministry of presence   Outcome Engaged in conversation;Expressed Gratitude   Plan and Referrals   Plan/Referrals No future visits requested  (going home)

## 2023-02-15 NOTE — DISCHARGE INSTRUCTIONS
Metoprolol Instructions Take 0.5 tablets by mouth 2 times daily Do no take if SBP(top number) is 100 or less or if HR is 60 or less

## 2023-02-15 NOTE — PLAN OF CARE
Problem: Discharge Planning  Goal: Discharge to home or other facility with appropriate resources  2/15/2023 1000 by Aleyda Dan. LILIANA [place]  Outcome: Completed  2/14/2023 2348 by Carlotta Courtney RN  Outcome: Progressing  Flowsheets (Taken 2/14/2023 1945)  Discharge to home or other facility with appropriate resources: Identify barriers to discharge with patient and caregiver     Problem: Pain  Goal: Verbalizes/displays adequate comfort level or baseline comfort level  2/15/2023 1000 by Aleyda Dan.  LILIANA [place]  Outcome: Completed  2/14/2023 2348 by Carlotta Courtney RN  Outcome: Progressing  Flowsheets (Taken 2/14/2023 1945)  Verbalizes/displays adequate comfort level or baseline comfort level: Encourage patient to monitor pain and request assistance       Discharged

## 2023-02-15 NOTE — PROGRESS NOTES
Went over discharge instructions, IV's out, patient waiting until her medications are ready at the OP then will be leaving

## 2023-02-17 ASSESSMENT — HEART SCORE: ECG: 0

## 2023-02-23 ENCOUNTER — OFFICE VISIT (OUTPATIENT)
Dept: CARDIOLOGY CLINIC | Age: 58
End: 2023-02-23
Payer: COMMERCIAL

## 2023-02-23 VITALS
DIASTOLIC BLOOD PRESSURE: 62 MMHG | BODY MASS INDEX: 29.26 KG/M2 | HEART RATE: 75 BPM | SYSTOLIC BLOOD PRESSURE: 100 MMHG | WEIGHT: 159 LBS | HEIGHT: 62 IN

## 2023-02-23 DIAGNOSIS — I25.110 CORONARY ARTERY DISEASE INVOLVING NATIVE CORONARY ARTERY OF NATIVE HEART WITH UNSTABLE ANGINA PECTORIS (HCC): Primary | ICD-10-CM

## 2023-02-23 PROCEDURE — 3074F SYST BP LT 130 MM HG: CPT | Performed by: INTERNAL MEDICINE

## 2023-02-23 PROCEDURE — 3078F DIAST BP <80 MM HG: CPT | Performed by: INTERNAL MEDICINE

## 2023-02-23 PROCEDURE — 93000 ELECTROCARDIOGRAM COMPLETE: CPT | Performed by: INTERNAL MEDICINE

## 2023-02-23 PROCEDURE — 99214 OFFICE O/P EST MOD 30 MIN: CPT | Performed by: INTERNAL MEDICINE

## 2023-02-23 NOTE — PROGRESS NOTES
Keshav Heredia MD        OFFICE  FOLLOWUP NOTE    Chief complaints: patient is here for management of CAD,STEMI of RCA, GI bleeding,anal cancer DYSLPIDEMIA    Subjective: patient had LHC and LAD stent proximal to her mid LAD stentm no chest pain, no shortness of breath, no dizziness, no palpitations    HPI Renata is a 65 y. o.year old who  has a past medical history of Anal cancer ([facility] [address] Asthma, Broken ankle, CAD (coronary artery disease), H/O cardiovascular stress test, History of blood transfusion, [facility] PONV (postoperative nausea and vomiting), Post PTCA, and Skin cancer.  and presents for management of CAD,STEMI of RCA, GI bleeding,anal cancer DYSLPIDEMIA, which are well controlled      Current Outpatient Medications   Medication Sig Dispense Refill    aspirin 81 MG chewable tablet Take 1 tablet by mouth in the morning and at bedtime 60 tablet 0    pantoprazole (PROTONIX) 40 MG tablet Take 1 tablet by mouth every morning (before breakfast) 90 tablet 1    atorvastatin (LIPITOR) 80 MG tablet Take 1 tablet by mouth daily 30 tablet 2    metoprolol tartrate (LOPRESSOR) 25 MG tablet Take 0.5 tablets by mouth 2 times daily Do no take if SBP(top number) is 100 or less or if HR is 60 or less 60 tablet 1    ferrous gluconate (FERGON) 324 (38 Fe) MG tablet Take 324 mg by mouth daily (with breakfast)      Cholecalciferol (VITAMIN D) 50 MCG (2000 UT) CAPS capsule Take by mouth      clopidogrel (PLAVIX) 75 MG tablet Take 1 tablet by mouth daily 30 tablet 5    hydrocortisone 0.5 % ointment Apply topically 2 times daily      tiZANidine (ZANAFLEX) 4 MG tablet Take 4 mg by mouth every 6 hours as needed      ondansetron (ZOFRAN-ODT) 4 MG disintegrating tablet Take 1 tablet by mouth every 8 hours as needed for Nausea or Vomiting (Patient not taking: No sig reported) 20 tablet 1    cetirizine (ZYRTEC) 10 MG tablet Take 10 mg by mouth daily (Patient not taking: No sig reported)      alirocumab (PRALUENT) 75 MG/ML SOAJ injection pen Inject 1 mL into the skin every 14 days (Patient not taking: No sig reported) 2.24 mL 5    KLOR-CON M20 20 MEQ extended release tablet  (Patient not taking: No sig reported)      nitroGLYCERIN (NITROSTAT) 0.4 MG SL tablet up to max of 3 total doses. If no relief after 1 dose, call 911. (Patient not taking: No sig reported) 25 tablet 3    furosemide (LASIX) 20 MG tablet Take 1 tablet by mouth daily (Patient not taking: No sig reported) 60 tablet 3    potassium chloride (KLOR-CON) 20 MEQ packet Take 20 mEq by mouth 2 times daily (Patient not taking: No sig reported)      loperamide (IMODIUM) 2 MG capsule Take 2 mg by mouth as needed for Diarrhea  (Patient not taking: Reported on 2/23/2023)      albuterol-ipratropium (COMBIVENT RESPIMAT)  MCG/ACT AERS inhaler Inhale into the lungs  (Patient not taking: No sig reported)       No current facility-administered medications for this visit. Allergies: Brilinta [ticagrelor], Adhesive tape, Crestor [rosuvastatin calcium], Percocet [oxycodone-acetaminophen], Vicodin hp [hydrocodone-acetaminophen], and Tylenol with codeine #3 [acetaminophen-codeine]  Past Medical History:   Diagnosis Date    Anal cancer (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 [address]     pt states she has had two NSTEMI    PONV (postoperative nausea and vomiting)     hx motion sickness    Post PTCA 02/22/2022    RCA stented.     Skin cancer      Past Surgical History:   Procedure Laterality Date    ANKLE SURGERY Left 10+ yrs ago    with hardware    COLON SURGERY  2009    \"after had chemo removed some part of the cancer from the rectal area, then 7 months later had bowel blockage had to do surgery  to remove part of the bowel\"    COLONOSCOPY  2015    mild proctitis    COLONOSCOPY  03/15/2017    mild radia proc, hypertroph pailla    COLONOSCOPY N/A 2022    COLONOSCOPY CONTROL HEMORRHAGE WITH STRAIGHT FIRE APC WITH CLIP PLACEMENT X 2 performed by Mounika Monroy MD at [address] [place] [address]      left ankle    OTHER SURGICAL HISTORY  2015    mediport removal    PTCA  2009 had angioplasty with stent to LAD & another day in  stent x 2 to RCA done    PTCA  2022    RCA stented. TONSILLECTOMY      as a kid age 7    TOTAL HIP ARTHROPLASTY Left 10/19/2022    LEFT HIP TOTAL ARTHROPLASTY ANTERIOR APPROACH performed by Magy Vazquez MD at [facility]  25 yrs ago    TUNNELED VENOUS PORT PLACEMENT      port inserted     UPPER GASTROINTESTINAL ENDOSCOPY N/A 2022    EGD DIAGNOSTIC ONLY performed by Mounika Monroy MD at [place] [address] N/A 2022    ENTEROSCOPY PUSH DIAGNOSTIC performed by Mounika Monroy MD at [facility] ENDOSCOPY     Family History   Problem Relation Age of Onset    Cancer Mother         breast ca? ?    Cancer Father         prostate cancer- lung mets- bone mets    Stroke Father     High Cholesterol Father     Stroke Sister     Cancer Brother         neck cancer    No Known Problems Daughter      Social History     Tobacco Use    Smoking status: Former     Packs/day: 0.25     Years: 35.00     Pack years: 8.75     Types: Cigarettes     Start date:      Quit date: 10/2021     Years since quittin.3    Smokeless tobacco: Never    Tobacco comments:     6-7 cigarettes    Substance Use Topics    Alcohol use: No     Comment: \"quit 3/2015\"use to drink a couple of times per week before\"      [unfilled]  Review of Systems:   Constitutional: No Fever or Weight Loss   Eyes: No Decreased Vision  ENT: No Headaches, Hearing Loss or Vertigo  Cardiovascular: + chest pain, dyspnea on exertion, palpitations or loss of consciousness  Respiratory: No cough or wheezing    Gastrointestinal: No abdominal pain, appetite loss, blood in stools, constipation, diarrhea or heartburn  Genitourinary: No dysuria, trouble voiding, or hematuria  Musculoskeletal:  No gait disturbance, weakness or joint complaints  Integumentary: No rash or pruritis  Neurological: No TIA or stroke symptoms  Psychiatric: No anxiety or depression  Endocrine: No malaise, fatigue or temperature intolerance  Hematologic/Lymphatic: No bleeding problems, blood clots or swollen lymph nodes  Allergic/Immunologic: No nasal congestion or hives  All systems negative except as marked. Objective:  /62   Pulse 75   Ht 5' 2\" (1.575 m)   Wt 159 lb (72.1 kg)   BMI 29.08 kg/m²   Wt Readings from Last 3 Encounters:   02/23/23 159 lb (72.1 kg)   02/15/23 156 lb 14.4 oz (71.2 kg)   02/07/23 152 lb (68.9 kg)     Body mass index is 29.08 kg/m². GENERAL - Alert, oriented, pleasant, in no apparent distress,normal grooming  HEENT - pupils are intact, cornea intact, conjunctive normal color, ears are normal in exam,  Neck - Supple. No jugular venous distention noted. No carotid bruits, no apical -carotid delay  Respiratory:  Normal breath sounds, No respiratory distress, No wheezing, No chest tenderness. ,no use of accessory muscles, diaphragm movement is normal  Cardiovascular: (PMI) apex non displaced,no lifts no thrills, no s3,no s4, Normal heart rate, Normal rhythm, No murmurs, No rubs, No gallops.  Carotid arteries pulse and amplitude are normal no bruit, no abdominal bruit noted ( normal abdominal aorta ausculation),   Extremities - No cyanosis, clubbing, or significant edema, no varicose veins    Abdomen - No masses, tenderness, or organomegaly, no hepato-splenomegally, no bruits  Musculoskeletal - No significant edema, no kyphosis or scoliosis, no deformity in any extremity noted, muscle strength and tone are normal  Skin: no ulcer,no scar,no stasis dermatitis   Neurologic - alert oriented times 3,Cranial nerves II through XII are grossly intact. There were no gross focal neurologic abnormalities. Psychiatric: normal mood and affect    Lab Results   Component Value Date/Time    TROPONINI 0.008 04/10/2012 11:05 AM     BNP:  No results found for: BNP  PT/INR:  No results found for: PTINR  No results found for: LABA1C  Lab Results   Component Value Date    CHOL 152 02/23/2022    TRIG 115 02/23/2022    HDL 68 02/23/2022    LDLCALC 61 02/23/2022    LDLDIRECT 105 (H) 03/09/2021     Lab Results   Component Value Date    ALT 25 02/12/2023    AST 22 02/12/2023     TSH:    Lab Results   Component Value Date/Time    TSH 2.420 02/11/2022 08:36 AM    TSH 2.420 02/11/2022 08:36 AM       Impression:  Evens Elizondo is a 65 y. o.year old who  has a past medical history of Anal cancer ([facility] [address] Asthma, Broken ankle, CAD (coronary artery disease), H/O cardiovascular stress test, History of blood transfusion, NSTEMI (non-ST elevated myocardial infarction) ([facility] [address] PONV (postoperative nausea and vomiting), Post PTCA, and Skin cancer. and presents with     Plan:  CAD and some chest discomfort: recent PCI of LAD and has h/o RCA and LAD stent before with [facility], Continue aspirin and plavix.  She was started on statins,and did not take praleunt  as per patient it was for 250 dollars,, no side effect of statins noted, ok stay off coregg as her blood pressure was low in 80s and 90s at rehab, she does not want to do cardiac rehab, had l gigi discussion about statin side effects, she is on statins  Uterine cancer: she may need surgery,unfortunately, she will need to hold plavix and start IV Integrilin for sx because of recent LAD stent in 12/14/23  PAD and leg pain with claudications: arterial doppler  Fluttering of chest: will get holter monitor if it gets worse  H/o  hip sx   Gi bleeding resolved, f/u with Dr. Castillo, had colon AVM, coagulated with APC and 2 clips  Dyslipidemia: continue statins, check lipids  H/o anal cancer  All labs, medications and tests reviewed, continue all other medications of all above medical condition listed as is.    [unfilled]

## 2023-02-28 ENCOUNTER — OFFICE VISIT (OUTPATIENT)
Dept: ORTHOPEDIC SURGERY | Age: 58
End: 2023-02-28
Payer: COMMERCIAL

## 2023-02-28 VITALS
HEIGHT: 62 IN | WEIGHT: 159 LBS | DIASTOLIC BLOOD PRESSURE: 74 MMHG | RESPIRATION RATE: 15 BRPM | SYSTOLIC BLOOD PRESSURE: 110 MMHG | BODY MASS INDEX: 29.26 KG/M2

## 2023-02-28 DIAGNOSIS — Z96.642 STATUS POST TOTAL HIP REPLACEMENT, LEFT: Primary | ICD-10-CM

## 2023-02-28 DIAGNOSIS — M79.652 LEFT THIGH PAIN: ICD-10-CM

## 2023-02-28 PROCEDURE — 3074F SYST BP LT 130 MM HG: CPT | Performed by: ORTHOPAEDIC SURGERY

## 2023-02-28 PROCEDURE — 99213 OFFICE O/P EST LOW 20 MIN: CPT | Performed by: ORTHOPAEDIC SURGERY

## 2023-02-28 PROCEDURE — 3078F DIAST BP <80 MM HG: CPT | Performed by: ORTHOPAEDIC SURGERY

## 2023-02-28 NOTE — PROGRESS NOTES
Patient presents to the office today for evaluation of the left knee pain. Pt states pain has been ongoing for a few months now. Pain is located along the quad tendon and will travel into the upper leg. Pt states she does get sharp stabbing pains that will make her knee give out of her.

## 2023-02-28 NOTE — PATIENT INSTRUCTIONS
Continue weight-bearing as tolerated. Continue range of motion exercises as instructed. Ice and elevate as needed. Tylenol or Motrin for pain.    Follow up in 3 months

## 2023-03-02 ENCOUNTER — TELEPHONE (OUTPATIENT)
Dept: CARDIOLOGY CLINIC | Age: 58
End: 2023-03-02

## 2023-03-06 ENCOUNTER — TELEPHONE (OUTPATIENT)
Dept: CARDIOLOGY CLINIC | Age: 58
End: 2023-03-06

## 2023-03-06 ASSESSMENT — ENCOUNTER SYMPTOMS
SHORTNESS OF BREATH: 0
EYE REDNESS: 0
COLOR CHANGE: 0
EYE PAIN: 0
WHEEZING: 0
VOMITING: 0
CHEST TIGHTNESS: 0

## 2023-03-06 NOTE — PROGRESS NOTES
2/28/2023   Chief Complaint   Patient presents with    Knee Pain     Left knee pain    Left thigh pain    History of Present Illness:                             Bety Alaniz is a 63 y.o. female who returns today for follow-up of her left hip and also evaluation of left thigh and knee pain. She denies any severe instability issues or severe pain. Overall she is noticing improvement with the function of her hip and with her ability to ambulate and be active. She does still have some mild discomfort through the thigh region which occasionally is worse with prolonged standing or walking. Overall her pain level is improving with time    She underwent left total hip replacement on 10/19/2022. Patient presents to the office today for evaluation of the left knee pain. Pt states pain has been ongoing for a few months now. Pain is located along the quad tendon and will travel into the upper leg. Pt states she does get sharp stabbing pains that will make her knee give out of her. Medical History  Patient's medications, allergies, past medical, surgical, social and family histories were reviewed and updated as appropriate. Review of Systems   Constitutional:  Negative for chills and fever. HENT:  Negative for congestion and sneezing. Eyes:  Negative for pain and redness. Respiratory:  Negative for chest tightness, shortness of breath and wheezing. Cardiovascular:  Negative for chest pain and palpitations. Gastrointestinal:  Negative for vomiting. Musculoskeletal:  Positive for arthralgias. Skin:  Negative for color change and rash. Neurological:  Negative for weakness and numbness. Psychiatric/Behavioral:  Negative for agitation. The patient is not nervous/anxious.                                               Examination:  General Exam:  Vitals: /74 (Site: Right Upper Arm, Position: Sitting, Cuff Size: Large Adult)   Resp 15   Ht 5' 2\" (1.575 m)   Wt 159 lb (72.1 kg)   BMI 29.08 kg/m²    Physical Exam  Vitals and nursing note reviewed. Constitutional:       Appearance: Normal appearance. HENT:      Head: Normocephalic and atraumatic. Eyes:      Conjunctiva/sclera: Conjunctivae normal.      Pupils: Pupils are equal, round, and reactive to light. Pulmonary:      Effort: Pulmonary effort is normal.   Musculoskeletal:      Cervical back: Normal range of motion. Right hip: Normal.      Left hip: Tenderness present. No deformity, bony tenderness or crepitus. Normal range of motion. Normal strength. Right knee: No swelling, deformity, effusion, erythema, ecchymosis or bony tenderness. Normal range of motion. No medial joint line or lateral joint line tenderness. No LCL laxity or MCL laxity. Normal alignment and normal patellar mobility. Comments: Left lower extremity:  Well-healed surgical scar over the anterior lateral aspect of the hip. Mild tenderness in the proximal femur and thigh region. No specific point tenderness overlying the greater trochanter or knee joint. No pain with active or passive range of motion of the hip or knee. Strength is 5 out of 5 with hip flexion, extension, and abduction as well as knee flexion and extension. No pain or instability with passive range of motion of the hip. Knee is stable to stress examination during varus and valgus stress and anterior and posterior drawer testing. Range of motion of the knee is full from 0 to 130 degrees of flexion. Skin is intact no open wounds or lesions. No soft tissue swelling or edema in the thigh or leg. Sensation and motor function is intact distally at the foot and ankle. No soft tissue swelling or joint effusion at the knee. Skin:     General: Skin is warm and dry. Neurological:      Mental Status: She is alert and oriented to person, place, and time.    Psychiatric:         Mood and Affect: Mood normal.         Behavior: Behavior normal.            Diagnostic testing:  X-rays reviewed in office, I independently reviewed the films in the office today:     Narrative   4 views of the Left Knee: There is no evidence of fracture or degenerative joint disease. There is    normal alignment of the tibiofemoral and patellofemoral joints. No soft    tissue swelling. Diffuse decreased bone density. No loose body. Impression:   Normal left knee           Office Procedures:  No orders of the defined types were placed in this encounter. Assessment and Plan  1. Left thigh pain    2. History of left total hip replacement, 10/19/2022    I reviewed the x-rays of her knee and we discussed the normal findings. Her symptomatology is most consistent with referred pain in the thigh and knee likely coming from her proximal femur related to her previous hip replacement. We discussed her decreased bone mineral density and the likelihood that her bone is still healing and growing stronger around the femoral prosthesis. I have encouraged her to continue activities as tolerated and continue advancement of her weightbearing and exercise activities. I explained that she may continue to improve with her function and pain relief for a whole year after surgery. I would like her to follow-up in 3 months for repeat x-rays of the left hip and check of her progress with the healing process.             Electronically signed by Rommel Zarate MD on 3/6/2023 at 7:15 AM

## 2023-04-04 LAB
ALBUMIN SERPL-MCNC: 4.1 G/DL (ref 3.8–4.9)
ALP BLD-CCNC: 156 IU/L (ref 44–121)
ALT SERPL-CCNC: 13 IU/L (ref 0–32)
AST SERPL-CCNC: 16 IU/L (ref 0–40)
BILIRUB SERPL-MCNC: 0.5 MG/DL (ref 0–1.2)
BILIRUBIN DIRECT: 0.19 MG/DL (ref 0–0.4)
CHOLESTEROL, TOTAL: 144 MG/DL (ref 100–199)
COMMENT: NORMAL
HBA1C MFR BLD: 5.7 % (ref 4.8–5.6)
HBA1C MFR BLD: 5.7 % (ref 4.8–5.6)
HCT VFR BLD CALC: 32.5 % (ref 34–46.6)
HDLC SERPL-MCNC: 76 MG/DL
HEMOGLOBIN: 10.8 G/DL (ref 11.1–15.9)
LDL CHOLESTEROL CALCULATED: 54 MG/DL (ref 0–99)
MCH RBC QN AUTO: 29.8 PG (ref 26.6–33)
MCHC RBC AUTO-ENTMCNC: 33.2 G/DL (ref 31.5–35.7)
MCV RBC AUTO: 90 FL (ref 79–97)
PDW BLD-RTO: 14.3 % (ref 11.7–15.4)
PLATELET # BLD: 255 X10E3/UL (ref 150–450)
RBC # BLD: 3.62 X10E6/UL (ref 3.77–5.28)
TOTAL PROTEIN: 6.3 G/DL (ref 6–8.5)
TRIGL SERPL-MCNC: 72 MG/DL (ref 0–149)
VLDLC SERPL CALC-MCNC: 14 MG/DL (ref 5–40)
WBC # BLD: 5.4 X10E3/UL (ref 3.4–10.8)

## 2023-05-04 ENCOUNTER — HOSPITAL ENCOUNTER (EMERGENCY)
Age: 58
Discharge: HOME | End: 2023-05-05
Payer: COMMERCIAL

## 2023-05-04 VITALS
HEART RATE: 77 BPM | RESPIRATION RATE: 18 BRPM | DIASTOLIC BLOOD PRESSURE: 77 MMHG | BODY MASS INDEX: 31.1 KG/M2 | TEMPERATURE: 97.9 F | OXYGEN SATURATION: 97 % | HEIGHT: 62 IN | SYSTOLIC BLOOD PRESSURE: 111 MMHG | WEIGHT: 169 LBS

## 2023-05-04 DIAGNOSIS — R19.7 DIARRHEA, UNSPECIFIED TYPE: ICD-10-CM

## 2023-05-04 DIAGNOSIS — R10.32 LEFT GROIN PAIN: Primary | ICD-10-CM

## 2023-05-04 PROCEDURE — 99284 EMERGENCY DEPT VISIT MOD MDM: CPT

## 2023-05-04 RX ORDER — ACETAMINOPHEN 500 MG
1000 TABLET ORAL ONCE
Status: COMPLETED | OUTPATIENT
Start: 2023-05-05 | End: 2023-05-05

## 2023-05-05 ENCOUNTER — APPOINTMENT (OUTPATIENT)
Dept: ULTRASOUND IMAGING | Age: 58
End: 2023-05-05
Payer: COMMERCIAL

## 2023-05-05 LAB
ALBUMIN SERPL-MCNC: 3.9 GM/DL (ref 3.4–5)
ALP BLD-CCNC: 166 IU/L (ref 40–129)
ALT SERPL-CCNC: 18 U/L (ref 10–40)
ANION GAP SERPL CALCULATED.3IONS-SCNC: 8 MMOL/L (ref 4–16)
AST SERPL-CCNC: 14 IU/L (ref 15–37)
BACTERIA: ABNORMAL /HPF
BASOPHILS ABSOLUTE: 0 K/CU MM
BASOPHILS RELATIVE PERCENT: 0.3 % (ref 0–1)
BILIRUB SERPL-MCNC: 0.2 MG/DL (ref 0–1)
BILIRUBIN URINE: NEGATIVE MG/DL
BLOOD, URINE: NEGATIVE
BUN SERPL-MCNC: 17 MG/DL (ref 6–23)
CALCIUM SERPL-MCNC: 8.5 MG/DL (ref 8.3–10.6)
CHLORIDE BLD-SCNC: 102 MMOL/L (ref 99–110)
CLARITY: CLEAR
CO2: 31 MMOL/L (ref 21–32)
COLOR: YELLOW
CREAT SERPL-MCNC: 0.8 MG/DL (ref 0.6–1.1)
DIFFERENTIAL TYPE: ABNORMAL
EOSINOPHILS ABSOLUTE: 0.1 K/CU MM
EOSINOPHILS RELATIVE PERCENT: 1.4 % (ref 0–3)
GFR SERPL CREATININE-BSD FRML MDRD: >60 ML/MIN/1.73M2
GLUCOSE SERPL-MCNC: 104 MG/DL (ref 70–99)
GLUCOSE, URINE: NEGATIVE MG/DL
HCT VFR BLD CALC: 34.6 % (ref 37–47)
HEMOGLOBIN: 11.2 GM/DL (ref 12.5–16)
IMMATURE NEUTROPHIL %: 2 % (ref 0–0.43)
KETONES, URINE: NEGATIVE MG/DL
LEUKOCYTE ESTERASE, URINE: ABNORMAL
LYMPHOCYTES ABSOLUTE: 1.9 K/CU MM
LYMPHOCYTES RELATIVE PERCENT: 27.4 % (ref 24–44)
MCH RBC QN AUTO: 30 PG (ref 27–31)
MCHC RBC AUTO-ENTMCNC: 32.4 % (ref 32–36)
MCV RBC AUTO: 92.8 FL (ref 78–100)
MONOCYTES ABSOLUTE: 0.6 K/CU MM
MONOCYTES RELATIVE PERCENT: 8.5 % (ref 0–4)
MUCUS: ABNORMAL HPF
NITRITE URINE, QUANTITATIVE: NEGATIVE
NUCLEATED RBC %: 0 %
PDW BLD-RTO: 15.1 % (ref 11.7–14.9)
PH, URINE: 6.5 (ref 5–8)
PLATELET # BLD: 281 K/CU MM (ref 140–440)
PMV BLD AUTO: 9.4 FL (ref 7.5–11.1)
POTASSIUM SERPL-SCNC: 4.6 MMOL/L (ref 3.5–5.1)
PROTEIN UA: NEGATIVE MG/DL
RBC # BLD: 3.73 M/CU MM (ref 4.2–5.4)
RBC URINE: <1 /HPF (ref 0–6)
SEGMENTED NEUTROPHILS ABSOLUTE COUNT: 4.3 K/CU MM
SEGMENTED NEUTROPHILS RELATIVE PERCENT: 60.4 % (ref 36–66)
SODIUM BLD-SCNC: 141 MMOL/L (ref 135–145)
SPECIFIC GRAVITY UA: <1.005 (ref 1–1.03)
SQUAMOUS EPITHELIAL: <1 /HPF
TOTAL IMMATURE NEUTOROPHIL: 0.14 K/CU MM
TOTAL NUCLEATED RBC: 0 K/CU MM
TOTAL PROTEIN: 6.4 GM/DL (ref 6.4–8.2)
TRICHOMONAS: ABNORMAL /HPF
UROBILINOGEN, URINE: 0.2 MG/DL (ref 0.2–1)
WBC # BLD: 7.1 K/CU MM (ref 4–10.5)
WBC UA: 1 /HPF (ref 0–5)

## 2023-05-05 PROCEDURE — 85025 COMPLETE CBC W/AUTO DIFF WBC: CPT

## 2023-05-05 PROCEDURE — 80053 COMPREHEN METABOLIC PANEL: CPT

## 2023-05-05 PROCEDURE — 6370000000 HC RX 637 (ALT 250 FOR IP): Performed by: PHYSICIAN ASSISTANT

## 2023-05-05 PROCEDURE — 81001 URINALYSIS AUTO W/SCOPE: CPT

## 2023-05-05 PROCEDURE — 93971 EXTREMITY STUDY: CPT

## 2023-05-05 RX ORDER — DICYCLOMINE HYDROCHLORIDE 10 MG/1
10 CAPSULE ORAL 4 TIMES DAILY
Qty: 20 CAPSULE | Refills: 0 | Status: SHIPPED | OUTPATIENT
Start: 2023-05-05 | End: 2023-05-10

## 2023-05-05 RX ADMIN — ACETAMINOPHEN 1000 MG: 500 TABLET ORAL at 00:03

## 2023-05-05 ASSESSMENT — PAIN SCALES - GENERAL
PAINLEVEL_OUTOF10: 7
PAINLEVEL_OUTOF10: 7

## 2023-05-05 ASSESSMENT — PAIN DESCRIPTION - LOCATION
LOCATION: GROIN
LOCATION: GROIN

## 2023-05-05 ASSESSMENT — PAIN DESCRIPTION - DESCRIPTORS: DESCRIPTORS: SHARP

## 2023-05-05 NOTE — DISCHARGE INSTRUCTIONS
As we discussed, return emergency department if you develop any fever, leg swelling, leg weakness, abdominal pain, difficulty urinating, worsening symptoms or any new concerns. Otherwise please contact your primary care provider discuss your visit to the emergency department, schedule appointment next week for reevaluation and if needed any further outpatient testing or evaluation.

## 2023-05-05 NOTE — ED NOTES
Pt discharged from ED at this time with all necessary paperwork. All questions answered at this time and discharge instructions reviewed. Pt ambulates to waiting room.        Candance Sender, RN  05/05/23 [id number]

## 2023-05-06 ASSESSMENT — ENCOUNTER SYMPTOMS
RECTAL PAIN: 0
DIARRHEA: 1
VOMITING: 0

## 2023-05-18 RX ORDER — CLOPIDOGREL BISULFATE 75 MG/1
75 TABLET ORAL DAILY
Qty: 30 TABLET | Refills: 5 | Status: CANCELLED | OUTPATIENT
Start: 2023-05-18

## 2023-05-18 RX ORDER — ATORVASTATIN CALCIUM 80 MG/1
80 TABLET, FILM COATED ORAL DAILY
Qty: 30 TABLET | Refills: 5 | Status: CANCELLED | OUTPATIENT
Start: 2023-05-18

## 2023-05-19 ENCOUNTER — TELEPHONE (OUTPATIENT)
Dept: CARDIOLOGY CLINIC | Age: 58
End: 2023-05-19

## 2023-05-19 RX ORDER — ATORVASTATIN CALCIUM 80 MG/1
80 TABLET, FILM COATED ORAL DAILY
Qty: 30 TABLET | Refills: 2 | Status: SHIPPED | OUTPATIENT
Start: 2023-05-19

## 2023-05-19 RX ORDER — CLOPIDOGREL BISULFATE 75 MG/1
75 TABLET ORAL DAILY
Qty: 30 TABLET | Refills: 5 | Status: SHIPPED | OUTPATIENT
Start: 2023-05-19

## 2023-05-19 RX ORDER — ATORVASTATIN CALCIUM 80 MG/1
80 TABLET, FILM COATED ORAL DAILY
Qty: 30 TABLET | Refills: 2 | Status: CANCELLED | OUTPATIENT
Start: 2023-05-19

## 2023-05-19 RX ORDER — CLOPIDOGREL BISULFATE 75 MG/1
75 TABLET ORAL DAILY
Qty: 30 TABLET | Refills: 5 | Status: CANCELLED | OUTPATIENT
Start: 2023-05-19

## 2023-05-21 RX ORDER — ALIROCUMAB 75 MG/ML
75 INJECTION, SOLUTION SUBCUTANEOUS
Qty: 2.24 ML | Refills: 5 | Status: SHIPPED | OUTPATIENT
Start: 2023-05-21

## 2023-05-21 RX ORDER — ASPIRIN 81 MG/1
81 TABLET, CHEWABLE ORAL 2 TIMES DAILY
Qty: 60 TABLET | Refills: 5 | Status: SHIPPED | OUTPATIENT
Start: 2023-05-21

## 2023-05-21 RX ORDER — PANTOPRAZOLE SODIUM 40 MG/1
40 TABLET, DELAYED RELEASE ORAL
Qty: 30 TABLET | Refills: 5 | Status: SHIPPED | OUTPATIENT
Start: 2023-05-21

## 2023-05-22 LAB
ABO GROUPING: NORMAL
ALBUMIN SERPL-MCNC: 4 G/DL (ref 3.5–5.7)
ANION GAP 4: 9 MMOL/L (ref 7–16)
ANTIBODY SCREEN: NEGATIVE
BUN BLDV-MCNC: 12 MG/DL (ref 7–25)
CALCIUM SERPL-MCNC: 9 MG/DL (ref 8.6–10.2)
CHLORIDE BLD-SCNC: 104 MMOL/L (ref 98–107)
CO2: 28 MMOL/L (ref 21–31)
CREAT SERPL-MCNC: 0.74 MG/DL (ref 0.6–1.2)
EGFR FEMALE: > 90 ML/MIN/1.73M2
GLUCOSE: 86 MG/DL (ref 74–109)
PHOSPHORUS: 4 MG/DL (ref 2.5–5)
POTASSIUM SERPL-SCNC: 3.7 MMOL/L (ref 3.5–5.1)
RH FACTOR: POSITIVE
SODIUM BLD-SCNC: 141 MMOL/L (ref 136–145)

## 2023-05-23 ENCOUNTER — OFFICE VISIT (OUTPATIENT)
Dept: CARDIOLOGY CLINIC | Age: 58
End: 2023-05-23
Payer: COMMERCIAL

## 2023-05-23 ENCOUNTER — TELEPHONE (OUTPATIENT)
Dept: CARDIOLOGY CLINIC | Age: 58
End: 2023-05-23

## 2023-05-23 VITALS
HEIGHT: 62 IN | WEIGHT: 173 LBS | HEART RATE: 80 BPM | DIASTOLIC BLOOD PRESSURE: 60 MMHG | BODY MASS INDEX: 31.83 KG/M2 | SYSTOLIC BLOOD PRESSURE: 118 MMHG

## 2023-05-23 DIAGNOSIS — Z98.61 POST PTCA: ICD-10-CM

## 2023-05-23 DIAGNOSIS — E78.5 DYSLIPIDEMIA: ICD-10-CM

## 2023-05-23 DIAGNOSIS — R00.2 PALPITATIONS: ICD-10-CM

## 2023-05-23 DIAGNOSIS — I10 PRIMARY HYPERTENSION: ICD-10-CM

## 2023-05-23 DIAGNOSIS — I25.10 CORONARY ARTERY DISEASE INVOLVING NATIVE CORONARY ARTERY OF NATIVE HEART WITHOUT ANGINA PECTORIS: ICD-10-CM

## 2023-05-23 DIAGNOSIS — I73.9 INTERMITTENT CLAUDICATION (HCC): ICD-10-CM

## 2023-05-23 DIAGNOSIS — I21.11 ST ELEVATION MYOCARDIAL INFARCTION INVOLVING RIGHT CORONARY ARTERY (HCC): ICD-10-CM

## 2023-05-23 DIAGNOSIS — I21.4 NSTEMI (NON-ST ELEVATED MYOCARDIAL INFARCTION) (HCC): ICD-10-CM

## 2023-05-23 DIAGNOSIS — I25.110 CORONARY ARTERY DISEASE INVOLVING NATIVE CORONARY ARTERY OF NATIVE HEART WITH UNSTABLE ANGINA PECTORIS (HCC): Primary | ICD-10-CM

## 2023-05-23 PROCEDURE — 99214 OFFICE O/P EST MOD 30 MIN: CPT | Performed by: INTERNAL MEDICINE

## 2023-05-23 PROCEDURE — 3078F DIAST BP <80 MM HG: CPT | Performed by: INTERNAL MEDICINE

## 2023-05-23 PROCEDURE — 3074F SYST BP LT 130 MM HG: CPT | Performed by: INTERNAL MEDICINE

## 2023-05-23 RX ORDER — EVOLOCUMAB 140 MG/ML
140 INJECTION, SOLUTION SUBCUTANEOUS
Qty: 2 EACH | Refills: 5 | Status: SHIPPED | OUTPATIENT
Start: 2023-05-23 | End: 2023-06-01 | Stop reason: ALTCHOICE

## 2023-05-23 NOTE — PROGRESS NOTES
Manohar Flower MD        OFFICE  FOLLOWUP NOTE    Chief complaints: patient is here for management of CAD,STEMI of RCA, GI bleeding,anal cancer DYSLPIDEMIA, preop  Subjective: patient feels better, no chest pain, no shortness of breath, no dizziness, no palpitations    JOSE Talavera is a 63 y. o.year old who  has a past medical history of Anal cancer ([facility] Asthma, Broken ankle, CAD (coronary artery disease), H/O cardiovascular stress test, History of blood transfusion, NSTEMI (non-ST elevated [facility] infarction) [facility] PONV (postoperative nausea and vomiting), Post PTCA, and Skin cancer.  and presents for management of CAD,STEMI of RCA, GI bleeding,anal cancer DYSLPIDEMIA which are well controlled    Patient has uterine cance and will need hyserecomy which is urgent, she had LAD stent in february  Current Outpatient Medications   Medication Sig Dispense Refill    pantoprazole (PROTONIX) 40 MG tablet Take 1 tablet by mouth every morning (before breakfast) 30 tablet 5    aspirin 81 MG chewable tablet Take 1 tablet by mouth in the morning and at bedtime 60 tablet 5    atorvastatin (LIPITOR) 80 MG tablet Take 1 tablet by mouth daily 30 tablet 2    clopidogrel (PLAVIX) 75 MG tablet Take 1 tablet by mouth daily 30 tablet 5    metoprolol tartrate (LOPRESSOR) 25 MG tablet Take 0.5 tablets by mouth 2 times daily Do no take if SBP(top number) is 100 or less or if HR is 60 or less 60 tablet 1    ferrous gluconate (FERGON) 324 (38 Fe) MG tablet Take 1 tablet by mouth daily (with breakfast)      cetirizine (ZYRTEC) 10 MG tablet Take 1 tablet by mouth daily      furosemide (LASIX) 20 MG tablet Take 1 tablet by mouth daily 60 tablet 3    potassium chloride (KLOR-CON) 20 MEQ packet Take 20 mEq by mouth 2 times daily      Evolocumab (REPATHA) SOSY syringe Inject 1 mL into the skin every 14 days 2 each 5    alirocumab (PRALUENT) 75 MG/ML SOAJ injection pen Inject 1 mL into the skin every 14 days (Patient not taking:

## 2023-05-25 ENCOUNTER — TELEPHONE (OUTPATIENT)
Dept: CARDIOLOGY CLINIC | Age: 58
End: 2023-05-25

## 2023-05-25 NOTE — TELEPHONE ENCOUNTER
Per office note Plan:  Preop for hysterecomy with cancer: her last LAD stent was in feb 4 months, because its urgent and she already had atleast 3 months of DAPT, will clear her for sx, howver, she will remain at risk for stent thrombosis, will recomemnd to restart plavix as soon as possible,recommend to continue aspirin and stop plavix 5 ays before sx    Called Chencho at surgeon office, states pt was under the assumtion to decrease ASA to qd instead of BID. Perfect serve sent to  to clarify dose mccarty for surg.    Per  take ASA once a day   Chencho notified

## 2023-05-30 ENCOUNTER — OFFICE VISIT (OUTPATIENT)
Dept: ORTHOPEDIC SURGERY | Age: 58
End: 2023-05-30

## 2023-05-30 VITALS — WEIGHT: 173 LBS | RESPIRATION RATE: 15 BRPM | BODY MASS INDEX: 31.83 KG/M2 | HEIGHT: 62 IN

## 2023-05-30 DIAGNOSIS — Z96.642 STATUS POST TOTAL HIP REPLACEMENT, LEFT: Primary | ICD-10-CM

## 2023-05-30 DIAGNOSIS — M87.051: ICD-10-CM

## 2023-05-30 NOTE — PROGRESS NOTES
Patient returns to the office today for FU of the left NUBIA. Pt states she has started to notice increase pain in the bilateral thighs. Pt states she is weak in both legs. She has been taking tramadol which does not help with her pain. She does have some back pain. Pt states she is very stiff in the morning.

## 2023-05-31 RX ORDER — EZETIMIBE 10 MG/1
10 TABLET ORAL DAILY
Qty: 90 TABLET | Refills: 1 | Status: SHIPPED
Start: 2023-05-31 | End: 2023-06-08 | Stop reason: CLARIF

## 2023-06-02 LAB — POTASSIUM SERPL-SCNC: 4.3 MMOL/L (ref 3.5–5.1)

## 2023-06-02 NOTE — PROGRESS NOTES
5/30/2023   Chief Complaint   Patient presents with    Hip Pain     Bilateral hip pain        History of Present Illness:                             Edwin Gumaan is a 63 y.o. female who returns today for follow-up of her left total hip replacement and also discussion of her ongoing right hip pain. She has responded well to the hip replacement and is functioning well with no complaints today. Her left hip has good strength and mobility and no pain. Her biggest complaint is ongoing back pain and right hip pain. These seem to be increased with repetitive activities or prolonged weightbearing but better with rest and stretching exercises. She feels some weakness in her legs which she attributes to chronic low back issues    Patient returns to the office today for FU of the left NUBIA. Pt states she has started to notice increase pain in the bilateral thighs. Pt states she is weak in both legs. She has been taking tramadol which does not help with her pain. She does have some back pain. Pt states she is very stiff in the morning. Medical History  Patient's medications, allergies, past medical, surgical, social and family histories were reviewed and updated as appropriate. Review of Systems                                            Examination:  General Exam:  Vitals: Resp 15   Ht 5' 2\" (1.575 m)   Wt 173 lb (78.5 kg)   BMI 31.64 kg/m²    Physical Exam       Left lower extremity:  No tenderness to palpation at the left hip. Well-healed surgical scar of the left hip. Normal alignment of the left leg and hip. Strength is 5 out of 5 with hip flexion, extension, and abduction. No pain with rotational movements of the hip or weightbearing of the left leg. Right lower extremity:  Mild tenderness to palpation throughout the soft tissues of the right hip. Mildly restricted range of motion with pain at the extremes referred to the hip and groin.   No severe pain with weightbearing or strength

## 2023-06-03 LAB
ANION GAP 4: 11 MMOL/L (ref 7–16)
BASOPHILS # BLD: 0.1 %
BASOPHILS ABSOLUTE: 0 K/UL (ref 0–0.1)
BUN BLDV-MCNC: 15 MG/DL (ref 7–25)
CALCIUM SERPL-MCNC: 9.2 MG/DL (ref 8.6–10.2)
CHLORIDE BLD-SCNC: 103 MMOL/L (ref 98–107)
CO2: 26 MMOL/L (ref 21–31)
CREAT SERPL-MCNC: 0.95 MG/DL (ref 0.6–1.2)
EGFR FEMALE: 70 ML/MIN/1.73M2
EOSINOPHILS ABSOLUTE: 0 K/UL (ref 0–0.4)
EOSINOPHILS RELATIVE PERCENT: 0 %
GLUCOSE: 205 MG/DL (ref 74–109)
HEMOGLOBIN URINE, QUAL: 10.6 G/DL (ref 12.1–15.8)
LYMPHOCYTES # BLD: 9.2 %
LYMPHOCYTES ABSOLUTE: 1.1 K/UL (ref 0.8–3.6)
MCH RBC QN AUTO: 30.3 PG (ref 28.4–33.4)
MCHC RBC AUTO-ENTMCNC: 33.2 G/DL (ref 31.1–37)
MCV RBC AUTO: 91.3 FL (ref 85–99)
MONOCYTES # BLD: 4.2 %
MONOCYTES ABSOLUTE: 0.5 K/UL (ref 0.3–0.9)
NEUTROPHILS ABSOLUTE: 10.1 K/UL (ref 2–7.3)
NEUTROPHILS/100 LEUKOCYTES: 86.5 %
PDW BLD-RTO: 14.6 % (ref 11.7–15.2)
PLATELET COUNT MANUAL: 251 K/UL (ref 154–393)
POTASSIUM SERPL-SCNC: 4.3 MMOL/L (ref 3.5–5.1)
RBC # BLD: 3.49 M/UL (ref 3.86–5.17)
SODIUM BLD-SCNC: 140 MMOL/L (ref 136–145)
SR HEMATOCRIT: 31.9 % (ref 35.8–46.5)
WBC BLOOD, MANUAL: 11.6 K/UL (ref 4–10.5)

## 2023-06-06 LAB
INTRAOPERATIVE CONSULTATION: NORMAL
INTRAOPERATIVE CONSULTATION: NORMAL
LAB AP CASE REPORT: NORMAL
LAB AP CLINICAL DIAGNOSIS: NORMAL
Lab: NORMAL
PATHOLOGY REPORT FINAL DIAGNOSIS: NORMAL
SYNOPTIC CHECKLIST: NORMAL

## 2023-06-15 ENCOUNTER — TELEPHONE (OUTPATIENT)
Dept: CARDIOLOGY CLINIC | Age: 58
End: 2023-06-15

## 2023-06-15 NOTE — TELEPHONE ENCOUNTER
Patient called she is starting chemo 6/22   For Endometriosis cancer she wanted Dr Siobhan Mcgraw to know

## 2023-06-20 LAB
A/G RATIO: 1.4 (ref 1–2)
ALBUMIN SERPL-MCNC: 3.9 G/DL (ref 3.5–5.7)
ALBUMIN/PROTEIN TOTAL, SER/PL: 6.7 G/DL (ref 6–8.3)
ALP BLD-CCNC: 161 U/L (ref 34–104)
ALT SERPL-CCNC: 24 U/L (ref 7–52)
ANION GAP 4: 7 MMOL/L (ref 7–16)
AST W/O P-5'-P: 21 U/L (ref 13–39)
BASOPHILS # BLD: 1.1 %
BASOPHILS ABSOLUTE: 0.1 K/UL (ref 0–0.1)
BILIRUB SERPL-MCNC: 0.4 MG/DL (ref 0.3–1)
BUN BLDV-MCNC: 15 MG/DL (ref 7–25)
CA 125: 54.8 U/ML (ref 0–35)
CALCIUM SERPL-MCNC: 9.2 MG/DL (ref 8.6–10.2)
CHLORIDE BLD-SCNC: 107 MMOL/L (ref 98–107)
CO2: 30 MMOL/L (ref 21–31)
CREAT SERPL-MCNC: 0.87 MG/DL (ref 0.6–1.2)
EGFR FEMALE: 78 ML/MIN/1.73M2
EOSINOPHILS ABSOLUTE: 0.2 K/UL (ref 0–0.4)
EOSINOPHILS RELATIVE PERCENT: 2.8 %
GLOBULIN: 2.8 G/DL (ref 2.6–4.2)
GLUCOSE: 78 MG/DL (ref 74–109)
HEMOGLOBIN URINE, QUAL: 11.7 G/DL (ref 12.1–15.8)
LYMPHOCYTES # BLD: 31.3 %
LYMPHOCYTES ABSOLUTE: 1.8 K/UL (ref 0.8–3.6)
MAGNESIUM: 1.9 MG/DL (ref 1.6–2.4)
MCH RBC QN AUTO: 30 PG (ref 28.4–33.4)
MCHC RBC AUTO-ENTMCNC: 33.3 G/DL (ref 31.1–37)
MCV RBC AUTO: 90.3 FL (ref 85–99)
MONOCYTES # BLD: 7.9 %
MONOCYTES ABSOLUTE: 0.5 K/UL (ref 0.3–0.9)
NEUTROPHILS ABSOLUTE: 3.3 K/UL (ref 2–7.3)
NEUTROPHILS/100 LEUKOCYTES: 56.9 %
PDW BLD-RTO: 14.7 % (ref 11.7–15.2)
PLATELET COUNT MANUAL: 270 K/UL (ref 154–393)
POTASSIUM SERPL-SCNC: 3.7 MMOL/L (ref 3.5–5.1)
RBC # BLD: 3.89 M/UL (ref 3.86–5.17)
SODIUM BLD-SCNC: 144 MMOL/L (ref 136–145)
SR HEMATOCRIT: 35.1 % (ref 35.8–46.5)
WBC BLOOD, MANUAL: 5.8 K/UL (ref 4–10.5)

## 2023-06-26 ENCOUNTER — TELEPHONE (OUTPATIENT)
Dept: CARDIOLOGY CLINIC | Age: 58
End: 2023-06-26

## 2023-06-26 ENCOUNTER — OFFICE VISIT (OUTPATIENT)
Dept: CARDIOLOGY CLINIC | Age: 58
End: 2023-06-26
Payer: COMMERCIAL

## 2023-06-26 VITALS
HEIGHT: 62 IN | BODY MASS INDEX: 32.57 KG/M2 | WEIGHT: 177 LBS | HEART RATE: 87 BPM | SYSTOLIC BLOOD PRESSURE: 124 MMHG | DIASTOLIC BLOOD PRESSURE: 82 MMHG

## 2023-06-26 DIAGNOSIS — I21.11 ST ELEVATION MYOCARDIAL INFARCTION INVOLVING RIGHT CORONARY ARTERY (HCC): ICD-10-CM

## 2023-06-26 DIAGNOSIS — I25.10 CORONARY ARTERY DISEASE INVOLVING NATIVE CORONARY ARTERY OF NATIVE HEART WITHOUT ANGINA PECTORIS: ICD-10-CM

## 2023-06-26 DIAGNOSIS — I25.110 CORONARY ARTERY DISEASE INVOLVING NATIVE CORONARY ARTERY OF NATIVE HEART WITH UNSTABLE ANGINA PECTORIS (HCC): Primary | ICD-10-CM

## 2023-06-26 DIAGNOSIS — Z98.61 POST PTCA: ICD-10-CM

## 2023-06-26 DIAGNOSIS — I21.4 NSTEMI (NON-ST ELEVATED MYOCARDIAL INFARCTION) (HCC): ICD-10-CM

## 2023-06-26 DIAGNOSIS — R00.2 PALPITATIONS: ICD-10-CM

## 2023-06-26 DIAGNOSIS — E78.5 DYSLIPIDEMIA: ICD-10-CM

## 2023-06-26 DIAGNOSIS — I10 PRIMARY HYPERTENSION: ICD-10-CM

## 2023-06-26 PROCEDURE — 3079F DIAST BP 80-89 MM HG: CPT | Performed by: INTERNAL MEDICINE

## 2023-06-26 PROCEDURE — 3074F SYST BP LT 130 MM HG: CPT | Performed by: INTERNAL MEDICINE

## 2023-06-26 PROCEDURE — 99214 OFFICE O/P EST MOD 30 MIN: CPT | Performed by: INTERNAL MEDICINE

## 2023-06-26 RX ORDER — OLANZAPINE 5 MG/1
5 TABLET ORAL NIGHTLY
COMMUNITY
Start: 2023-06-15

## 2023-06-26 RX ORDER — LIDOCAINE AND PRILOCAINE 25; 25 MG/G; MG/G
CREAM TOPICAL
COMMUNITY
Start: 2023-06-15

## 2023-06-26 RX ORDER — PROCHLORPERAZINE MALEATE 10 MG
10 TABLET ORAL EVERY 6 HOURS PRN
COMMUNITY
Start: 2023-06-15

## 2023-07-03 LAB
COAGULATION TISSUE FACTOR INDUCED BLD TIME PT. COAG: 10.4 SECONDS (ref 10–12.8)
INR BLD: 0.9 (ref 0.9–1.1)
PLATELET COUNT MANUAL: 242 K/UL (ref 154–393)

## 2023-08-29 RX ORDER — ATORVASTATIN CALCIUM 80 MG/1
80 TABLET, FILM COATED ORAL DAILY
Qty: 30 TABLET | Refills: 2 | Status: SHIPPED | OUTPATIENT
Start: 2023-08-29

## 2023-08-29 RX ORDER — ATORVASTATIN CALCIUM 80 MG/1
80 TABLET, FILM COATED ORAL DAILY
Qty: 30 TABLET | Refills: 2 | OUTPATIENT
Start: 2023-08-29

## 2023-09-28 ENCOUNTER — OFFICE VISIT (OUTPATIENT)
Dept: CARDIOLOGY CLINIC | Age: 58
End: 2023-09-28
Payer: COMMERCIAL

## 2023-09-28 VITALS
OXYGEN SATURATION: 98 % | DIASTOLIC BLOOD PRESSURE: 76 MMHG | WEIGHT: 176 LBS | BODY MASS INDEX: 32.39 KG/M2 | HEIGHT: 62 IN | SYSTOLIC BLOOD PRESSURE: 130 MMHG | HEART RATE: 87 BPM

## 2023-09-28 DIAGNOSIS — E78.5 DYSLIPIDEMIA: ICD-10-CM

## 2023-09-28 DIAGNOSIS — I25.110 CORONARY ARTERY DISEASE INVOLVING NATIVE CORONARY ARTERY OF NATIVE HEART WITH UNSTABLE ANGINA PECTORIS (HCC): Primary | ICD-10-CM

## 2023-09-28 DIAGNOSIS — I21.4 NSTEMI (NON-ST ELEVATED MYOCARDIAL INFARCTION) (HCC): ICD-10-CM

## 2023-09-28 DIAGNOSIS — I10 PRIMARY HYPERTENSION: ICD-10-CM

## 2023-09-28 DIAGNOSIS — R00.2 PALPITATIONS: ICD-10-CM

## 2023-09-28 DIAGNOSIS — I25.10 CORONARY ARTERY DISEASE INVOLVING NATIVE CORONARY ARTERY OF NATIVE HEART WITHOUT ANGINA PECTORIS: ICD-10-CM

## 2023-09-28 PROCEDURE — 3078F DIAST BP <80 MM HG: CPT | Performed by: INTERNAL MEDICINE

## 2023-09-28 PROCEDURE — 3075F SYST BP GE 130 - 139MM HG: CPT | Performed by: INTERNAL MEDICINE

## 2023-09-28 PROCEDURE — 99214 OFFICE O/P EST MOD 30 MIN: CPT | Performed by: INTERNAL MEDICINE

## 2023-09-28 RX ORDER — METHOCARBAMOL 500 MG/1
500 TABLET, FILM COATED ORAL 3 TIMES DAILY
COMMUNITY
Start: 2023-06-03

## 2023-09-28 RX ORDER — FLUTICASONE PROPIONATE 50 MCG
SPRAY, SUSPENSION (ML) NASAL
COMMUNITY
Start: 2023-09-25

## 2023-09-28 RX ORDER — OXYCODONE HYDROCHLORIDE 5 MG/1
5 TABLET ORAL EVERY 6 HOURS PRN
COMMUNITY
Start: 2023-09-19

## 2023-09-28 NOTE — PROGRESS NOTES
normal  Cardiovascular: (PMI) apex non displaced,no lifts no thrills, no s3,no s4, Normal heart rate, Normal rhythm, No murmurs, No rubs, No gallops. Carotid arteries pulse and amplitude are normal no bruit, no abdominal bruit noted ( normal abdominal aorta ausculation),   Extremities - No cyanosis, clubbing, or significant edema, no varicose veins    Abdomen - No masses, tenderness, or organomegaly, no hepato-splenomegally, no bruits  Musculoskeletal - No significant edema, no kyphosis or scoliosis, no deformity in any extremity noted, muscle strength and tone are normal  Skin: no ulcer,no scar,no stasis dermatitis   Neurologic - alert oriented times 3,Cranial nerves II through XII are grossly intact. There were no gross focal neurologic abnormalities. Psychiatric: normal mood and affect    Lab Results   Component Value Date/Time    TROPONINI 0.008 04/10/2012 11:05 AM     BNP:  No results found for: \"BNP\"  PT/INR:  No results found for: \"PTINR\"  Lab Results   Component Value Date    LABA1C 5.7 (H) 04/03/2023    LABA1C 5.7 (H) 04/03/2023     Lab Results   Component Value Date    CHOL 144 04/03/2023    TRIG 72 04/03/2023    HDL 76 04/03/2023    LDLCALC 54 04/03/2023    LDLDIRECT 105 (H) 03/09/2021     Lab Results   Component Value Date    ALT 24 09/19/2023    AST 22 09/19/2023     TSH:    Lab Results   Component Value Date/Time    TSH 2.420 02/11/2022 08:36 AM    TSH 2.420 02/11/2022 08:36 AM       Impression:  Caren Portillo is a 63 y. o.year old who  has a past medical history of Anal cancer ([address]), Asthma, Broken ankle, CAD (coronary artery disease), H/O cardiovascular stress test, History of blood transfusion, NSTEMI (non-ST elevated myocardial infarction) ([address]), PONV (postoperative nausea and vomiting), Post PTCA, and Skin cancer.  and presents with     Plan:   Covid 19: resolved, still coughs, ok to use OTC cough syrups  anal cancer:she already had hysterecetomy, she had stopped aspirin, plavix to 5 day, she was cleared

## 2023-09-28 NOTE — PATIENT INSTRUCTIONS
**It is YOUR responsibilty to bring medication bottles and/or updated medication list to [address] [place] [address]. This will allow us to better serve you and all your healthcare needs**  [facility] Locations - No appointment necessary. Sites open Monday to Friday. Call your preferred location for test preparation, business   hours and other information you need. [employer]. [address] [place] [address]. [address] [place], [address] [facility]  Phone: [phone]     Please be informed that if you contact our office outside of normal business hours the physician on call cannot help with any scheduling or rescheduling issues, procedure instruction questions or any type of medication issue. We advise you for any urgent/emergency that you go to the nearest emergency room! PLEASE CALL OUR OFFICE DURING NORMAL BUSINESS HOURS    Monday - Friday   8 am to 5 pm    [place]: [address]: [phone]    [place]:  [phone]  Thank you for allowing us to care for you today! We want to ensure we can follow your treatment plan and we strive to give you the best outcomes and experience possible. If you ever have a life threatening emergency and call 911 - for an ambulance (EMS)   Our providers can only care for you at:   [facility] or [facility]. Even if you have someone take you or you drive yourself we can only care for you in a [facility] facility. Our providers are not setup at the other healthcare locations! We are committed to providing you the best care possible. If you receive a survey after visiting one of our offices, please take time to share your experience concerning your physician office visit. These surveys are confidential and no health information about you is shared. We are eager to improve for you and we are counting on your feedback to help make that happen.

## 2023-10-05 ENCOUNTER — OFFICE VISIT (OUTPATIENT)
Dept: GASTROENTEROLOGY | Age: 58
End: 2023-10-05
Payer: COMMERCIAL

## 2023-10-05 VITALS
DIASTOLIC BLOOD PRESSURE: 78 MMHG | HEART RATE: 77 BPM | TEMPERATURE: 96.8 F | HEIGHT: 62 IN | SYSTOLIC BLOOD PRESSURE: 118 MMHG | OXYGEN SATURATION: 95 % | BODY MASS INDEX: 32.02 KG/M2 | WEIGHT: 174 LBS

## 2023-10-05 DIAGNOSIS — R19.7 DIARRHEA, UNSPECIFIED TYPE: Primary | ICD-10-CM

## 2023-10-05 PROCEDURE — 3078F DIAST BP <80 MM HG: CPT | Performed by: SPECIALIST

## 2023-10-05 PROCEDURE — 3074F SYST BP LT 130 MM HG: CPT | Performed by: SPECIALIST

## 2023-10-05 PROCEDURE — 99214 OFFICE O/P EST MOD 30 MIN: CPT | Performed by: SPECIALIST

## 2023-10-05 RX ORDER — ELUXADOLINE 100 MG/1
100 TABLET, FILM COATED ORAL 2 TIMES DAILY
Qty: 60 TABLET | Refills: 2 | Status: SHIPPED | OUTPATIENT
Start: 2023-10-05 | End: 2024-01-03

## 2023-10-05 RX ORDER — PYRIDOXINE HCL (VITAMIN B6) 100 MG
100 TABLET ORAL DAILY
COMMUNITY
Start: 2023-06-15

## 2023-10-05 NOTE — PROGRESS NOTES
Gastroenterology  Note  Candido Ibanez. Steph Vazquez MD      Subjective:      Patient ID:      Fang Lua                 1965    CC: 6  month follow up for GERD    HPI:     The patient is currently receiving chemo for uterine cancer. She is currently having up to 5 loose stools per day-- with some blood if > 5 stools in a day. No abd pain. Has not had a prior savage. ROS: see HPI for positives and pertinent negatives. All other systems reviewed and are negative    Objective:     PHYSICAL EXAM:    Vitals: There were no vitals taken for this visit. CONSTITUTIONAL: alert, cooperative, no apparent distress,   EYES:  pupils equal, round and reactive to light and sclera clear  ENT:  normocepalic, without obvious abnormality  NECK:  supple, symmetrical, trachea midline  HEMATOLOGIC/LYMPHATICS:  no cervical lymphadenopathy and no supraclavicular lymphadenopathy  LUNGS:  clear to auscultation  CARDIOVASCULAR:  regular rate and rhythm and no murmur noted  ABDOMEN:  normal bowel sounds, soft, non-distended, non-tender with no masses or hepatomegaly palpated  NEUROLOGIC: no focal deficit detected  SKIN:  no lesions  EXTREMITIES: no clubbing, cyanosis, or edema     Assessment:     1) GERD  2) diarrhea- likely due to chemo      Plan:     1) try Viberzi 100 mg BID  2) if diarrhea persists > 2 weeks after finishing chemo next week, call and will investigate        Salas Castillo M.D.

## 2023-11-27 ENCOUNTER — TELEPHONE (OUTPATIENT)
Dept: CARDIOLOGY CLINIC | Age: 58
End: 2023-11-27

## 2023-11-27 NOTE — TELEPHONE ENCOUNTER
Having Dental work done at [facility].  She will need an antiobotic. Dentist will not prescribe this. Please advise.

## 2023-11-28 ENCOUNTER — TELEPHONE (OUTPATIENT)
Dept: CARDIOLOGY CLINIC | Age: 58
End: 2023-11-28

## 2023-11-28 NOTE — TELEPHONE ENCOUNTER
Cardiologist: Dr. Ailin Cha  Surgeon: Dr. Padmini Villa  Surgery: Extractions  Anesthesia: Local  Date: Pending  FAX# [phone]  # [phone]    Last OV 9/28/2023 w/Nica Wilkinsid 19: resolved, still coughs, ok to use OTC cough syrups  anal cancer:she already had hysterecetomy, she had stopped aspirin, plavix to 5 day, she was cleared for doing it again for chemotherapy, her last LAD stent was in feb  CAD and some chest discomfort: recent PCI of LAD and has h/o RCA and LAD stent before with [facility], Continue aspirin and ok to hold plavix for sx. She was started on statins,and did not take praleunt  as per patient it was for 250 dollars,, no side effect of statins noted, ok stay off coregg as her blood pressure was low in 80s and 90s at rehab, she does not want to do cardiac rehab, had l gigi discussion about statin side effects, she is on statins  PAD and leg pain with claudications: arterial doppler  Fluttering of chest: will get holter monitor if it gets worse  H/o  hip sx   Gi bleeding resolved, f/u with Dr. Jason Starr, had colon AVM, coagulated with APC and 2 clips  Dyslipidemia: continue statins, check lipids  H/o anal cancer  All labs, medications and tests reviewed, continue all other medications of all above medical condition listed as is. All labs, medications and tests reviewed,        Last EKG- 2/23/2023      NM- 2/12/2023   Medium sized defect of moderate severity which is reversible involving   inferior wall of myocardium. Abnormal Lexiscan nuclear scintigraphic study   suggestive of abnormal myocardial perfusion. Moderate degree of inferior   wall ischemia noted involving a medium sized area of left ventricular   myocardium. Gated images demonstrate normal left ventricular systolic   function with EF of 60 %. Echo- 2/12/2023   Left ventricular systolic function is normal.   Ejection fraction is visually estimated at 55-60%. No significant valvular disease noted.    No pericardial effusion

## 2023-11-30 NOTE — TELEPHONE ENCOUNTER
Attempted to return call no answer. Spoke with patient whom denies thoughts of harming self or others.  Pt agreeable to behavioral health referral and will check with insurance prior to scheduling.  Pt will call office if new referral is needed to alternative therapist.

## 2023-12-01 RX ORDER — CLOPIDOGREL BISULFATE 75 MG/1
75 TABLET ORAL DAILY
Qty: 30 TABLET | Refills: 5 | Status: CANCELLED | OUTPATIENT
Start: 2023-12-01

## 2023-12-01 RX ORDER — ATORVASTATIN CALCIUM 80 MG/1
80 TABLET, FILM COATED ORAL DAILY
Qty: 30 TABLET | Refills: 5 | Status: SHIPPED | OUTPATIENT
Start: 2023-12-01

## 2023-12-01 RX ORDER — ATORVASTATIN CALCIUM 80 MG/1
80 TABLET, FILM COATED ORAL DAILY
Qty: 30 TABLET | Refills: 2 | OUTPATIENT
Start: 2023-12-01

## 2023-12-01 RX ORDER — CLOPIDOGREL BISULFATE 75 MG/1
75 TABLET ORAL DAILY
Qty: 30 TABLET | Refills: 5 | Status: SHIPPED | OUTPATIENT
Start: 2023-12-01

## 2023-12-13 ENCOUNTER — TELEPHONE (OUTPATIENT)
Dept: CARDIOLOGY CLINIC | Age: 58
End: 2023-12-13

## 2023-12-13 NOTE — TELEPHONE ENCOUNTER
Dentist is requesting us to send over a cardiac risk assessment. Kimberly Santamaria is Dentist.  Phone: [phone], Fax number: [phone]  Surgery is Jan. 8th. Please call patient to let her know what needs to be done.

## 2023-12-13 NOTE — TELEPHONE ENCOUNTER
Low risk for dental procedure. Patient does not require preantibiotic. Patient can hold Plavix and aspirin in 48 hours prior to procedure if necessary.   Patient considered low risk

## 2023-12-13 NOTE — TELEPHONE ENCOUNTER
Can hold Plavix for 2 days, if nose bleed returns or bleeding continues to be uncontrolled, see ENT.

## 2023-12-13 NOTE — TELEPHONE ENCOUNTER
Patient has had a nose bleed for 4 days, she is concerned because she takes blood thinner and an aspirin.  Patient would like a call back, ph. [phone]

## 2024-01-19 ENCOUNTER — HOSPITAL ENCOUNTER (EMERGENCY)
Age: 59
Discharge: HOME | End: 2024-01-19
Attending: STUDENT IN AN ORGANIZED HEALTH CARE EDUCATION/TRAINING PROGRAM
Payer: COMMERCIAL

## 2024-01-19 ENCOUNTER — TELEPHONE (OUTPATIENT)
Dept: CARDIOLOGY CLINIC | Age: 59
End: 2024-01-19

## 2024-01-19 ENCOUNTER — APPOINTMENT (OUTPATIENT)
Dept: GENERAL RADIOLOGY | Age: 59
End: 2024-01-19
Payer: COMMERCIAL

## 2024-01-19 VITALS
OXYGEN SATURATION: 98 % | HEART RATE: 87 BPM | DIASTOLIC BLOOD PRESSURE: 77 MMHG | RESPIRATION RATE: 16 BRPM | TEMPERATURE: 98.1 F | SYSTOLIC BLOOD PRESSURE: 102 MMHG

## 2024-01-19 DIAGNOSIS — R07.9 CHEST PAIN, UNSPECIFIED TYPE: Primary | ICD-10-CM

## 2024-01-19 LAB
ALBUMIN SERPL-MCNC: 4.3 GM/DL (ref 3.4–5)
ALP BLD-CCNC: 174 IU/L (ref 40–128)
ALT SERPL-CCNC: 25 U/L (ref 10–40)
ANION GAP SERPL CALCULATED.3IONS-SCNC: 11 MMOL/L (ref 7–16)
AST SERPL-CCNC: 22 IU/L (ref 15–37)
BASOPHILS ABSOLUTE: 0 K/CU MM
BASOPHILS RELATIVE PERCENT: 0.4 % (ref 0–1)
BILIRUB SERPL-MCNC: 0.3 MG/DL (ref 0–1)
BUN SERPL-MCNC: 13 MG/DL (ref 6–23)
CALCIUM SERPL-MCNC: 8.8 MG/DL (ref 8.3–10.6)
CHLORIDE BLD-SCNC: 104 MMOL/L (ref 99–110)
CO2: 25 MMOL/L (ref 21–32)
CREAT SERPL-MCNC: 0.9 MG/DL (ref 0.6–1.1)
DIFFERENTIAL TYPE: ABNORMAL
EOSINOPHILS ABSOLUTE: 0.1 K/CU MM
EOSINOPHILS RELATIVE PERCENT: 1.6 % (ref 0–3)
GFR SERPL CREATININE-BSD FRML MDRD: >60 ML/MIN/1.73M2
GLUCOSE SERPL-MCNC: 119 MG/DL (ref 70–99)
HCT VFR BLD CALC: 34.5 % (ref 37–47)
HEMOGLOBIN: 11.1 GM/DL (ref 12.5–16)
IMMATURE NEUTROPHIL %: 0.2 % (ref 0–0.43)
LYMPHOCYTES ABSOLUTE: 1.8 K/CU MM
LYMPHOCYTES RELATIVE PERCENT: 31.8 % (ref 24–44)
MCH RBC QN AUTO: 31.8 PG (ref 27–31)
MCHC RBC AUTO-ENTMCNC: 32.2 % (ref 32–36)
MCV RBC AUTO: 98.9 FL (ref 78–100)
MONOCYTES ABSOLUTE: 0.4 K/CU MM
MONOCYTES RELATIVE PERCENT: 7.4 % (ref 0–4)
NUCLEATED RBC %: 0 %
PDW BLD-RTO: 13.2 % (ref 11.7–14.9)
PLATELET # BLD: 233 K/CU MM (ref 140–440)
PMV BLD AUTO: 9.7 FL (ref 7.5–11.1)
POTASSIUM SERPL-SCNC: 4 MMOL/L (ref 3.5–5.1)
PRO-BNP: 383 PG/ML
RBC # BLD: 3.49 M/CU MM (ref 4.2–5.4)
SEGMENTED NEUTROPHILS ABSOLUTE COUNT: 3.3 K/CU MM
SEGMENTED NEUTROPHILS RELATIVE PERCENT: 58.6 % (ref 36–66)
SODIUM BLD-SCNC: 140 MMOL/L (ref 135–145)
TOTAL IMMATURE NEUTOROPHIL: 0.01 K/CU MM
TOTAL NUCLEATED RBC: 0 K/CU MM
TOTAL PROTEIN: 6.7 GM/DL (ref 6.4–8.2)
TROPONIN, HIGH SENSITIVITY: 8 NG/L (ref 0–14)
TROPONIN, HIGH SENSITIVITY: 9 NG/L (ref 0–14)
WBC # BLD: 5.6 K/CU MM (ref 4–10.5)

## 2024-01-19 PROCEDURE — 6370000000 HC RX 637 (ALT 250 FOR IP): Performed by: STUDENT IN AN ORGANIZED HEALTH CARE EDUCATION/TRAINING PROGRAM

## 2024-01-19 PROCEDURE — 83880 ASSAY OF NATRIURETIC PEPTIDE: CPT

## 2024-01-19 PROCEDURE — 84484 ASSAY OF TROPONIN QUANT: CPT

## 2024-01-19 PROCEDURE — 80053 COMPREHEN METABOLIC PANEL: CPT

## 2024-01-19 PROCEDURE — 71045 X-RAY EXAM CHEST 1 VIEW: CPT

## 2024-01-19 PROCEDURE — 93005 ELECTROCARDIOGRAM TRACING: CPT | Performed by: PHYSICIAN ASSISTANT

## 2024-01-19 PROCEDURE — 85025 COMPLETE CBC W/AUTO DIFF WBC: CPT

## 2024-01-19 PROCEDURE — 99285 EMERGENCY DEPT VISIT HI MDM: CPT

## 2024-01-19 RX ORDER — ASPIRIN 81 MG/1
324 TABLET, CHEWABLE ORAL ONCE
Status: COMPLETED | OUTPATIENT
Start: 2024-01-19 | End: 2024-01-19

## 2024-01-19 RX ADMIN — ASPIRIN 324 MG: 81 TABLET, CHEWABLE ORAL at 19:35

## 2024-01-19 ASSESSMENT — PAIN SCALES - GENERAL: PAINLEVEL_OUTOF10: 4

## 2024-01-19 ASSESSMENT — PAIN DESCRIPTION - FREQUENCY: FREQUENCY: CONTINUOUS

## 2024-01-19 ASSESSMENT — PAIN DESCRIPTION - ORIENTATION: ORIENTATION: LEFT;MID

## 2024-01-19 ASSESSMENT — PAIN DESCRIPTION - LOCATION: LOCATION: CHEST

## 2024-01-19 ASSESSMENT — PAIN DESCRIPTION - PAIN TYPE: TYPE: ACUTE PAIN

## 2024-01-19 ASSESSMENT — PAIN DESCRIPTION - DESCRIPTORS: DESCRIPTORS: PRESSURE

## 2024-01-19 ASSESSMENT — PAIN DESCRIPTION - ONSET: ONSET: AWAKENED FROM SLEEP

## 2024-01-19 NOTE — TELEPHONE ENCOUNTER
Patient said yesterday she started having chest pains that would come and go. Today in the AM she took a Ntiro.  Nitro did relieve her pain.  No pain since then but some pressure.

## 2024-01-19 NOTE — ED PROVIDER NOTES
Result Value Ref Range    Sodium 140 135 - 145 MMOL/L    Potassium 4.0 3.5 - 5.1 MMOL/L    Chloride 104 99 - 110 mMol/L    CO2 25 21 - 32 MMOL/L    Anion Gap 11 7 - 16    Glucose 119 (H) 70 - 99 MG/DL    BUN 13 6 - 23 MG/DL    Creatinine 0.9 0.6 - 1.1 MG/DL    Est, Glom Filt Rate >60 >60 mL/min/1.73m2    Calcium 8.8 8.3 - 10.6 MG/DL    Total Protein 6.7 6.4 - 8.2 GM/DL    Albumin 4.3 3.4 - 5.0 GM/DL    Total Bilirubin 0.3 0.0 - 1.0 MG/DL    Alkaline Phosphatase 174 (H) 40 - 128 IU/L    ALT 25 10 - 40 U/L    AST 22 15 - 37 IU/L   Troponin   Result Value Ref Range    Troponin, High Sensitivity 9 0 - 14 ng/L   Troponin   Result Value Ref Range    Troponin, High Sensitivity 8 0 - 14 ng/L   Brain Natriuretic Peptide   Result Value Ref Range    Pro-.0 (H) <300 PG/ML   EKG 12 Lead   Result Value Ref Range    Ventricular Rate 83 BPM    Atrial Rate 83 BPM    P-R Interval 172 ms    QRS Duration 90 ms    Q-T Interval 368 ms    QTc Calculation (Bazett) 432 ms    P Axis 44 degrees    R Axis 30 degrees    T Axis 57 degrees    Diagnosis       Sinus rhythm with occasional premature ventricular complexes  When compared with ECG of 13-FEB-2023 06:56,  premature ventricular complexes are now present        Radiographs (if obtained):  Radiologist's Report Reviewed:  XR CHEST PORTABLE    Result Date: 1/19/2024  EXAMINATION: ONE XRAY VIEW OF THE CHEST 1/19/2024 5:05 pm COMPARISON: 03/08/2021 HISTORY: ORDERING SYSTEM PROVIDED HISTORY: Chest pain TECHNOLOGIST PROVIDED HISTORY: Reason for exam:->Chest pain Reason for Exam: CP FINDINGS: Right IJ Port-A-Cath tip terminates over the cavoatrial junction.  There is no focal consolidation.  There has been interval development of a 5 mm nodular density within the left perihilar region.  There is no effusion or pneumothorax. The cardiomediastinal silhouette is stable. The osseous structures are stable.     Indeterminate left midlung nodular density.  Nonemergent CT of the chest recommended  None    Social Determinants : None    Records Reviewed : Other    last cardiology note      Disposition Considerations (tests considered but not done, Shared Decision Making, Pt Expectation of Test or Tx.): Discharged with Tylenol, cardiology follow-up, return precautions  Appropriate for outpatient management      I am the Primary Clinician of Record.          Clinical Impression:  1. Chest pain, unspecified type      Disposition referral (if applicable):  Mert Rea MD  [address]    [address]  [place] [id number]  [phone]    Schedule an appointment as soon as possible for a visit   As needed    Neha Yousif MD  [address] [place] [id number]  [phone]    Schedule an appointment as soon as possible for a visit       Disposition medications (if applicable):  Discharge Medication List as of 1/19/2024  8:05 PM        ED Provider Disposition Time  DISPOSITION Decision To Discharge 01/19/2024 07:59:28 PM      Discharged on stable condition    Comment: Please note this report has been produced using speech recognition software and may contain errors related to that system including errors in grammar, punctuation, and spelling, as well as words and phrases that may be inappropriate.  Efforts were made to edit the dictations.        Shon Camacho MD  01/19/24 [id number]

## 2024-01-19 NOTE — TELEPHONE ENCOUNTER
Spoke with patient. She is feeling better today. OV scheduled. Patient states if she has chest pains again, she will go to the ER.

## 2024-01-20 NOTE — DISCHARGE INSTRUCTIONS
-Take Tylenol (1000 mg, every 6-8 hours) and/or ibuprofen (600 mg, every 6-8 hours) as needed for pain  -Follow-up with your cardiologist in the next 2 to 3 days, sooner if symptoms get worse  -Follow-up with your primary care doctor as needed  -Come back to the emergency department if you develop severe chest pain, severe shortness of breath, if you pass out, or if you are concerned    Your laboratory evaluation, EKG, chest x-ray were all reassuring.  I do not believe that you are having a heart attack right now, however you still need to check with the cardiologist to look for other courses of your chest discomfort

## 2024-01-21 LAB
EKG ATRIAL RATE: 83 BPM
EKG DIAGNOSIS: NORMAL
EKG P AXIS: 44 DEGREES
EKG P-R INTERVAL: 172 MS
EKG Q-T INTERVAL: 368 MS
EKG QRS DURATION: 90 MS
EKG QTC CALCULATION (BAZETT): 432 MS
EKG R AXIS: 30 DEGREES
EKG T AXIS: 57 DEGREES
EKG VENTRICULAR RATE: 83 BPM

## 2024-01-21 PROCEDURE — 93010 ELECTROCARDIOGRAM REPORT: CPT | Performed by: INTERNAL MEDICINE

## 2024-01-22 ENCOUNTER — OFFICE VISIT (OUTPATIENT)
Dept: CARDIOLOGY CLINIC | Age: 59
End: 2024-01-22
Payer: COMMERCIAL

## 2024-01-22 VITALS
HEART RATE: 86 BPM | WEIGHT: 180 LBS | DIASTOLIC BLOOD PRESSURE: 66 MMHG | BODY MASS INDEX: 33.13 KG/M2 | SYSTOLIC BLOOD PRESSURE: 106 MMHG | HEIGHT: 62 IN

## 2024-01-22 DIAGNOSIS — I10 PRIMARY HYPERTENSION: ICD-10-CM

## 2024-01-22 DIAGNOSIS — I25.110 CORONARY ARTERY DISEASE INVOLVING NATIVE CORONARY ARTERY OF NATIVE HEART WITH UNSTABLE ANGINA PECTORIS (HCC): Primary | ICD-10-CM

## 2024-01-22 DIAGNOSIS — E78.5 DYSLIPIDEMIA: ICD-10-CM

## 2024-01-22 DIAGNOSIS — R07.9 CHEST PAIN, UNSPECIFIED TYPE: ICD-10-CM

## 2024-01-22 PROCEDURE — 3078F DIAST BP <80 MM HG: CPT | Performed by: NURSE PRACTITIONER

## 2024-01-22 PROCEDURE — 99214 OFFICE O/P EST MOD 30 MIN: CPT | Performed by: NURSE PRACTITIONER

## 2024-01-22 PROCEDURE — 3074F SYST BP LT 130 MM HG: CPT | Performed by: NURSE PRACTITIONER

## 2024-01-22 RX ORDER — TIOTROPIUM BROMIDE 18 UG/1
18 CAPSULE ORAL; RESPIRATORY (INHALATION) DAILY
COMMUNITY

## 2024-01-22 RX ORDER — TRAZODONE HYDROCHLORIDE 50 MG/1
50 TABLET ORAL NIGHTLY
COMMUNITY

## 2024-01-22 ASSESSMENT — ENCOUNTER SYMPTOMS: SHORTNESS OF BREATH: 0

## 2024-01-22 NOTE — ASSESSMENT & PLAN NOTE
-STEMI in 2022 with PCI to RCA. PCI of LAD in 2023. She has remained on Plavix and ASA. Unable to tolerate higher doses of BB do to hypotension.

## 2024-01-22 NOTE — PROGRESS NOTES
HDL 76 04/03/2023    LDLCALC 54 04/03/2023    LDLDIRECT 105 (H) 03/09/2021     Lab Results   Component Value Date    ALT 25 01/19/2024    AST 22 01/19/2024     TSH:    Lab Results   Component Value Date/Time    TSH 2.420 02/11/2022 08:36 AM    TSH 2.420 02/11/2022 08:36 AM       Vitals:    01/22/24 1402   BP: 106/66   Pulse: 86       Echo:2/13/23   Left ventricular systolic function is normal.   Ejection fraction is visually estimated at 55-60%.   No significant valvular disease noted.   No pericardial effusion present.    Stress test:2/13/23  Medium sized defect of moderate severity which is reversible involving   inferior wall of myocardium. Abnormal Lexiscan nuclear scintigraphic study suggestive of abnormal myocardial perfusion. Moderate degree of inferior wall ischemia noted involving a medium sized area of left ventricular myocardium. Gated images demonstrate normal left ventricular systolic function with EF of 60 %.    Cath:2/14/23  Successful PCI of proximal LAD utilizing a 4 x18 mm resolute   [place] stent    ISABELL: 7/29/22 normal     The ASCVD Risk score (Tomeka DK, et al., 2019) failed to calculate for the following reasons:    The patient has a prior MI or stroke diagnosis      Assessment/ Plan:     Coronary artery disease involving native coronary artery of native heart with unstable angina pectoris (HCC)   -Multiple stents per Dr. Spencer. She was a STEMI in 2022 with PCI to RCA. Then one year later required PCI of LAD in 2023. She has remained on Plavix and ASA. She is unable to tolerate higher doses of BB do to hypotension. She was taking ASA 81 mg BID and C/O epitaxis. Verified with korina Marshall to decrease dose to daily.     Dyslipidemia   -At or near goal Yes LDL- 54  -She is to continue current medications (Lipitor 80 mg) Hepatic function panel WNL. No abdominal pain. No myalgias.     -The nature of cardiac risk has been fully discussed with this patient. I have made her aware of her LDL target

## 2024-01-31 PROBLEM — R06.02 SOB (SHORTNESS OF BREATH): Status: ACTIVE | Noted: 2024-01-31

## 2024-01-31 PROBLEM — Z82.49 FAMILY HISTORY OF CORONARY ARTERY DISEASE: Status: ACTIVE | Noted: 2024-01-31

## 2024-01-31 PROBLEM — I49.3 PVC'S (PREMATURE VENTRICULAR CONTRACTIONS): Status: ACTIVE | Noted: 2024-01-31

## 2024-01-31 PROBLEM — R94.31 ABNORMAL EKG: Status: ACTIVE | Noted: 2024-01-31

## 2024-02-05 ENCOUNTER — TELEPHONE (OUTPATIENT)
Dept: CARDIOLOGY CLINIC | Age: 59
End: 2024-02-05

## 2024-02-05 NOTE — TELEPHONE ENCOUNTER
Called pt for NM stress test result.    Stress Combined Conclusion: Normal pharmacological myocardial perfusion study. Findings suggest a low risk of cardiac events.    Stress Function: Left ventricular function post-stress is normal.    Perfusion Comments: LV perfusion is normal. There is no evidence of inducible ischemia.    Perfusion Conclusion: There is no evidence of transient ischemic dilation (TID).    Image quality is good.    ECG: The ECG was negative for ischemia.    Stress Test: A pharmacological stress test was performed using lexiscan. Hemodynamics are adequate for diagnosis. Blood pressure demonstrated a normal response and heart rate demonstrated a normal response to stress. The patient's heart rate recovery was normal.     Normal stress test, optimize medications    Pt verbalized understanding and will keep f/u appt.

## 2024-02-27 ENCOUNTER — OFFICE VISIT (OUTPATIENT)
Dept: CARDIOLOGY CLINIC | Age: 59
End: 2024-02-27
Payer: COMMERCIAL

## 2024-02-27 VITALS
HEART RATE: 89 BPM | WEIGHT: 178.6 LBS | DIASTOLIC BLOOD PRESSURE: 84 MMHG | BODY MASS INDEX: 32.87 KG/M2 | OXYGEN SATURATION: 95 % | HEIGHT: 62 IN | SYSTOLIC BLOOD PRESSURE: 122 MMHG

## 2024-02-27 DIAGNOSIS — I21.4 NSTEMI (NON-ST ELEVATED MYOCARDIAL INFARCTION) (HCC): ICD-10-CM

## 2024-02-27 DIAGNOSIS — E78.5 DYSLIPIDEMIA: Primary | ICD-10-CM

## 2024-02-27 DIAGNOSIS — R00.2 PALPITATIONS: ICD-10-CM

## 2024-02-27 DIAGNOSIS — Z98.61 POST PTCA: ICD-10-CM

## 2024-02-27 DIAGNOSIS — R07.9 CHEST PAIN, UNSPECIFIED TYPE: ICD-10-CM

## 2024-02-27 DIAGNOSIS — I21.11 ST ELEVATION MYOCARDIAL INFARCTION INVOLVING RIGHT CORONARY ARTERY (HCC): ICD-10-CM

## 2024-02-27 DIAGNOSIS — I10 PRIMARY HYPERTENSION: ICD-10-CM

## 2024-02-27 PROCEDURE — 99214 OFFICE O/P EST MOD 30 MIN: CPT | Performed by: INTERNAL MEDICINE

## 2024-02-27 PROCEDURE — 3074F SYST BP LT 130 MM HG: CPT | Performed by: INTERNAL MEDICINE

## 2024-02-27 PROCEDURE — 3079F DIAST BP 80-89 MM HG: CPT | Performed by: INTERNAL MEDICINE

## 2024-02-27 RX ORDER — NITROGLYCERIN 0.4 MG/1
TABLET SUBLINGUAL
Qty: 25 TABLET | Refills: 3 | Status: SHIPPED | OUTPATIENT
Start: 2024-02-27

## 2024-02-27 NOTE — PROGRESS NOTES
Neha Yousif MD        OFFICE  FOLLOWUP NOTE    Chief complaints: patient is here for management of CAD,STEMI of RCA, GI bleeding,anal cancer DYSLPIDEMIA, covid     Subjective: patient feels better, no chest pain, no shortness of breath, no dizziness, no palpitations    HPI Renata is a 58 y.o.year old who  has a past medical history of Abnormal EKG, Anal cancer (HCC), Asthma, Broken ankle, CAD (coronary artery disease), Chest pain, Family history of coronary artery disease, H/O cardiovascular stress test, History of blood transfusion, NSTEMI (non-ST elevated myocardial infarction) ([facility]), Obese, PONV (postoperative nausea and vomiting), Post PTCA, PVC's (premature ventricular contractions), Skin cancer, and SOB (shortness of breath). and presents for management of CAD,STEMI of RCA, GI bleeding,anal cancer DYSLPIDEMIA, covid , which are well controlled      Current Outpatient Medications   Medication Sig Dispense Refill    tiotropium (SPIRIVA) 18 MCG inhalation capsule Inhale 1 capsule into the lungs daily      traZODone (DESYREL) 50 MG tablet Take 1 tablet by mouth nightly      atorvastatin (LIPITOR) 80 MG tablet Take 1 tablet by mouth daily 30 tablet 5    clopidogrel (PLAVIX) 75 MG tablet Take 1 tablet by mouth daily 30 tablet 5    pyridoxine (B-6) 100 MG tablet Take 1 tablet by mouth daily      oxyCODONE (ROXICODONE) 5 MG immediate release tablet Take 1 tablet by mouth every 6 hours as needed.      methocarbamol (ROBAXIN) 500 MG tablet Take 1 tablet by mouth 3 times daily      lidocaine-prilocaine (EMLA) 2.5-2.5 % cream       pantoprazole (PROTONIX) 40 MG tablet Take 1 tablet by mouth every morning (before breakfast) 30 tablet 5    aspirin 81 MG chewable tablet Take 1 tablet by mouth in the morning and at bedtime 60 tablet 5    metoprolol tartrate (LOPRESSOR) 25 MG tablet Take 0.5 tablets by mouth 2 times daily Do no take if SBP(top number) is 100 or less or if HR is 60 or less 60 tablet 1

## 2024-04-09 ENCOUNTER — OFFICE VISIT (OUTPATIENT)
Dept: CARDIOLOGY CLINIC | Age: 59
End: 2024-04-09
Payer: COMMERCIAL

## 2024-04-09 VITALS
SYSTOLIC BLOOD PRESSURE: 100 MMHG | HEIGHT: 62 IN | BODY MASS INDEX: 32.9 KG/M2 | OXYGEN SATURATION: 95 % | HEART RATE: 86 BPM | DIASTOLIC BLOOD PRESSURE: 60 MMHG | WEIGHT: 178.8 LBS

## 2024-04-09 DIAGNOSIS — I25.10 CORONARY ARTERY DISEASE INVOLVING NATIVE CORONARY ARTERY OF NATIVE HEART WITHOUT ANGINA PECTORIS: ICD-10-CM

## 2024-04-09 DIAGNOSIS — I21.11 ST ELEVATION MYOCARDIAL INFARCTION INVOLVING RIGHT CORONARY ARTERY (HCC): ICD-10-CM

## 2024-04-09 DIAGNOSIS — I10 PRIMARY HYPERTENSION: ICD-10-CM

## 2024-04-09 DIAGNOSIS — I25.110 CORONARY ARTERY DISEASE INVOLVING NATIVE CORONARY ARTERY OF NATIVE HEART WITH UNSTABLE ANGINA PECTORIS (HCC): ICD-10-CM

## 2024-04-09 DIAGNOSIS — I21.4 NSTEMI (NON-ST ELEVATED MYOCARDIAL INFARCTION) (HCC): Primary | ICD-10-CM

## 2024-04-09 DIAGNOSIS — Z98.61 POST PTCA: ICD-10-CM

## 2024-04-09 DIAGNOSIS — R00.2 PALPITATIONS: ICD-10-CM

## 2024-04-09 DIAGNOSIS — R07.9 CHEST PAIN, UNSPECIFIED TYPE: ICD-10-CM

## 2024-04-09 DIAGNOSIS — E78.5 DYSLIPIDEMIA: ICD-10-CM

## 2024-04-09 PROCEDURE — 3074F SYST BP LT 130 MM HG: CPT | Performed by: INTERNAL MEDICINE

## 2024-04-09 PROCEDURE — 3078F DIAST BP <80 MM HG: CPT | Performed by: INTERNAL MEDICINE

## 2024-04-09 PROCEDURE — 99214 OFFICE O/P EST MOD 30 MIN: CPT | Performed by: INTERNAL MEDICINE

## 2024-04-09 RX ORDER — DEXTROMETHORPHAN HYDROBROMIDE AND PROMETHAZINE HYDROCHLORIDE 15; 6.25 MG/5ML; MG/5ML
SYRUP ORAL
COMMUNITY
Start: 2024-03-08

## 2024-04-09 NOTE — PROGRESS NOTES
Family history of coronary artery disease, H/O cardiovascular stress test, History of blood transfusion, NSTEMI (non-ST elevated myocardial infarction) (HCC), Obese, PONV (postoperative nausea and vomiting), Post PTCA, PVC's (premature ventricular contractions), Skin cancer, and SOB (shortness of breath). and presents with     Plan:  Low blood pressure: decrease lopressor to 12.5mg daily for now and follow up in one month  S/p bronchoscopy and biopsy of lymnode, sent for pathology, she will start her aspirin and plvix,   Covid 19: resolved, still coughs, ok to use OTC cough syrups  anal cancer:she already had hysterecetomy, she had stopped aspirin, plavix to 5 day, she was cleared for doing it again for chemotherapy, her last LAD stent was in feb  CAD : stress test is normal,: she had chest pain in jan before stress test, she had  2/23 PCI of LAD and has h/o RCA and LAD stent before with SOUMYA, Continue aspirin and ok to hold plavix for sx. She was started on statins,and did not take praleunt  as per patient it was for 250 dollars,, no side effect of statins noted, ok stay off coregg as her blood pressure was low in 80s and 90s at rehab, she does not want to do cardiac rehab, had l gigi discussion about statin side effects, she is on statins  PAD and leg pain with claudications: arterial doppler  Fluttering of chest: will get holter monitor if it gets worse  H/o  hip sx   Gi bleeding resolved, f/u with Dr. Csatillo, had colon AVM, coagulated with APC and 2 clips  Dyslipidemia: continue statins, check lipids  H/o anal cancer  All labs, medications and tests reviewed, continue all other medications of all above medical condition listed as is.  All labs, medications and tests reviewed,  All labs, medications and tests reviewed, continue all other medications of all above medical condition listed as is.  All labs, medications and tests reviewed, continue all other medications of all above medical condition listed as

## 2024-04-09 NOTE — PATIENT INSTRUCTIONS
**It is YOUR responsibilty to bring medication bottles and/or updated medication list to EACH APPOINTMENT. This will allow us to better serve you and all your healthcare needs**   Thank you for allowing us to care for you today!   We want to ensure we can follow your treatment plan and we strive to give you the best outcomes and experience possible.   If you ever have a life threatening emergency and call 911 - for an ambulance (EMS)   Our providers can only care for you at:   [facility] or [facility].   Even if you have someone take you or you drive yourself we can only care for you in a [facility]. Our providers are not setup at the other healthcare locations!   Please be informed that if you contact our office outside of normal business hours the physician on call cannot help with any scheduling or rescheduling issues, procedure instruction questions or any type of medication issue.    We advise you for any urgent/emergency that you go to the nearest emergency room!    PLEASE CALL OUR OFFICE DURING NORMAL BUSINESS HOURS    Monday - Friday   8 am to 5 pm    [place]: [phone]    [place]: [phone]    [place]:  [phone]  We are committed to providing you the best care possible.    If you receive a survey after visiting one of our offices, please take time to share your experience concerning your physician office visit.  These surveys are confidential and no health information about you is shared.    We are eager to improve for you and we are counting on your feedback to help make that happen.

## 2024-05-02 RX ORDER — ATORVASTATIN CALCIUM 80 MG/1
80 TABLET, FILM COATED ORAL DAILY
Qty: 30 TABLET | Refills: 5 | Status: SHIPPED | OUTPATIENT
Start: 2024-05-02

## 2024-05-02 RX ORDER — CLOPIDOGREL BISULFATE 75 MG/1
75 TABLET ORAL DAILY
Qty: 30 TABLET | Refills: 5 | Status: SHIPPED | OUTPATIENT
Start: 2024-05-02

## 2024-05-17 ENCOUNTER — OFFICE VISIT (OUTPATIENT)
Dept: CARDIOLOGY CLINIC | Age: 59
End: 2024-05-17
Payer: COMMERCIAL

## 2024-05-17 VITALS
HEART RATE: 86 BPM | SYSTOLIC BLOOD PRESSURE: 138 MMHG | BODY MASS INDEX: 32.31 KG/M2 | HEIGHT: 62 IN | WEIGHT: 175.6 LBS | DIASTOLIC BLOOD PRESSURE: 88 MMHG | OXYGEN SATURATION: 96 %

## 2024-05-17 DIAGNOSIS — R00.2 PALPITATIONS: ICD-10-CM

## 2024-05-17 DIAGNOSIS — I25.10 CORONARY ARTERY DISEASE INVOLVING NATIVE CORONARY ARTERY OF NATIVE HEART WITHOUT ANGINA PECTORIS: ICD-10-CM

## 2024-05-17 DIAGNOSIS — R07.9 CHEST PAIN, UNSPECIFIED TYPE: Primary | ICD-10-CM

## 2024-05-17 DIAGNOSIS — I10 PRIMARY HYPERTENSION: ICD-10-CM

## 2024-05-17 DIAGNOSIS — Z98.61 POST PTCA: ICD-10-CM

## 2024-05-17 PROCEDURE — 99214 OFFICE O/P EST MOD 30 MIN: CPT | Performed by: INTERNAL MEDICINE

## 2024-05-17 PROCEDURE — 3075F SYST BP GE 130 - 139MM HG: CPT | Performed by: INTERNAL MEDICINE

## 2024-05-17 PROCEDURE — 3079F DIAST BP 80-89 MM HG: CPT | Performed by: INTERNAL MEDICINE

## 2024-05-17 RX ORDER — CARVEDILOL 6.25 MG/1
6.25 TABLET ORAL
COMMUNITY

## 2024-05-17 RX ORDER — ONDANSETRON HYDROCHLORIDE 8 MG/1
8 TABLET, FILM COATED ORAL EVERY 8 HOURS PRN
COMMUNITY
Start: 2024-05-15

## 2024-05-17 RX ORDER — PROCHLORPERAZINE MALEATE 10 MG
10 TABLET ORAL EVERY 8 HOURS PRN
COMMUNITY
Start: 2024-05-15

## 2024-05-17 RX ORDER — NITROFURANTOIN 25; 75 MG/1; MG/1
CAPSULE ORAL PRN
COMMUNITY
Start: 2024-04-25

## 2024-05-17 NOTE — PROGRESS NOTES
(B-6) 100 MG tablet Take 1 tablet by mouth daily      lidocaine-prilocaine (EMLA) 2.5-2.5 % cream       pantoprazole (PROTONIX) 40 MG tablet Take 1 tablet by mouth every morning (before breakfast) 30 tablet 5    aspirin 81 MG chewable tablet Take 1 tablet by mouth in the morning and at bedtime 60 tablet 5    metoprolol tartrate (LOPRESSOR) 25 MG tablet Take 0.5 tablets by mouth 2 times daily Do no take if SBP(top number) is 100 or less or if HR is 60 or less 60 tablet 1    cetirizine (ZYRTEC) 10 MG tablet Take 1 tablet by mouth daily      hydrocortisone 0.5 % ointment Apply topically 2 times daily      furosemide (LASIX) 20 MG tablet Take 1 tablet by mouth daily (Patient taking differently: Take 1 tablet by mouth as needed) 60 tablet 3    potassium chloride (KLOR-CON) 20 MEQ packet Take 20 mEq by mouth 2 times daily      loperamide (IMODIUM) 2 MG capsule Take 1 capsule by mouth as needed for Diarrhea      albuterol-ipratropium (COMBIVENT RESPIMAT)  MCG/ACT AERS inhaler Inhale into the lungs      oxyCODONE (ROXICODONE) 5 MG immediate release tablet Take 1 tablet by mouth every 6 hours as needed. (Patient not taking: Reported on 4/9/2024)      methocarbamol (ROBAXIN) 500 MG tablet Take 1 tablet by mouth 3 times daily (Patient not taking: Reported on 4/9/2024)      dicyclomine (BENTYL) 10 MG capsule Take 1 capsule by mouth 4 times daily for 5 days 20 capsule 0    ferrous gluconate (FERGON) 324 (38 Fe) MG tablet Take 1 tablet by mouth daily (with breakfast) (Patient not taking: Reported on 5/17/2024)      Cholecalciferol (VITAMIN D) 50 MCG (2000 UT) CAPS capsule Take by mouth (Patient not taking: Reported on 5/17/2024)      tiZANidine (ZANAFLEX) 4 MG tablet Take 1 tablet by mouth every 6 hours as needed (Patient not taking: Reported on 4/9/2024)       No current facility-administered medications for this visit.     Allergies: Brilinta [ticagrelor], Adhesive tape, Crestor [rosuvastatin calcium], Percocet

## 2024-05-22 RX ORDER — CARVEDILOL 6.25 MG/1
6.25 TABLET ORAL DAILY
Qty: 90 TABLET | Refills: 1 | Status: SHIPPED | OUTPATIENT
Start: 2024-05-22

## 2024-05-30 ENCOUNTER — NURSE ONLY (OUTPATIENT)
Dept: CARDIOLOGY CLINIC | Age: 59
End: 2024-05-30
Payer: COMMERCIAL

## 2024-05-30 VITALS — SYSTOLIC BLOOD PRESSURE: 112 MMHG | DIASTOLIC BLOOD PRESSURE: 66 MMHG

## 2024-05-30 DIAGNOSIS — I10 PRIMARY HYPERTENSION: Primary | ICD-10-CM

## 2024-05-30 PROCEDURE — 99213 OFFICE O/P EST LOW 20 MIN: CPT | Performed by: NURSE PRACTITIONER

## 2024-05-30 PROCEDURE — 3078F DIAST BP <80 MM HG: CPT | Performed by: NURSE PRACTITIONER

## 2024-05-30 PROCEDURE — 3074F SYST BP LT 130 MM HG: CPT | Performed by: NURSE PRACTITIONER

## 2024-05-30 RX ORDER — CARVEDILOL 3.12 MG/1
3.12 TABLET ORAL 2 TIMES DAILY WITH MEALS
Qty: 60 TABLET | Refills: 3 | Status: SHIPPED | OUTPATIENT
Start: 2024-05-30

## 2024-05-30 NOTE — PROGRESS NOTES
[place] [address]  Phone: [phone]    Fax [phone]                  Eitan Zelaya MD, [facility]       Kevin Farley MD, [facility]  Miguelito Perez MD, [facility]    MD Neha Bolaños MD,[facility]    Mahendra Ellis MD, [facility]  Kassy Alamo MD, [facility]  Sandrine Deras, FLAVIO Mcdaniels, FLAVIO Vera, FLAVIO Monge          HPI:Pt is here for a 2 week BP check.  Recent office visit with hypotension, Lopressor decreased to 12.5 mg twice daily then D/C.     Results:  Vitals:    05/30/24 1016   BP: 112/66   Site: Left Upper Arm   Position: Sitting   Cuff Size: Large Adult        Wt Readings from Last 3 Encounters:   05/17/24 79.7 kg (175 lb 9.6 oz)   04/09/24 81.1 kg (178 lb 12.8 oz)   02/27/24 81 kg (178 lb 9.6 oz)        Assessment/Plan    Hypertension: Reports evening BPs and 140s.  She is only taking carvedilol daily.  Soft BP, recommend Coreg 3.125 mg BID. Bring B/P cuff to next appt.     Follow up in 1 month    Pt is to report any dizziness or syncope to the office.        Electronically signed by FLAVIO Hdez - CNP on 5/30/2024 at 10:34 AM

## 2024-05-30 NOTE — PATIENT INSTRUCTIONS
**It is YOUR responsibilty to bring medication bottles and/or updated medication list to EACH APPOINTMENT. This will allow us to better serve you and all your healthcare needs**   Thank you for allowing us to care for you today!   We want to ensure we can follow your treatment plan and we strive to give you the best outcomes and experience possible.   If you ever have a life threatening emergency and call 911 - for an ambulance (EMS)   Our providers can only care for you at:   [facility] or [facility].   Even if you have someone take you or you drive yourself we can only care for you in a [facility]. Our providers are not setup at the other healthcare locations!   Please be informed that if you contact our office outside of normal business hours the physician on call cannot help with any scheduling or rescheduling issues, procedure instruction questions or any type of medication issue.    We advise you for any urgent/emergency that you go to the nearest emergency room!    PLEASE CALL OUR OFFICE DURING NORMAL BUSINESS HOURS    Monday - Friday   8 am to 5 pm    [place]: [phone]    [place]: [phone]    [place]:  [phone]  We are committed to providing you the best care possible.    If you receive a survey after visiting one of our offices, please take time to share your experience concerning your physician office visit.  These surveys are confidential and no health information about you is shared.    We are eager to improve for you and we are counting on your feedback to help make that happen.

## 2024-06-03 RX ORDER — CARVEDILOL 3.12 MG/1
3.12 TABLET ORAL 2 TIMES DAILY WITH MEALS
Qty: 60 TABLET | Refills: 3 | Status: CANCELLED | OUTPATIENT
Start: 2024-06-03

## 2024-06-03 NOTE — TELEPHONE ENCOUNTER
Spoke with patient. She can not find her medication that she just refilled. She is calling the pharmacy to let them know

## 2024-06-13 ENCOUNTER — HOSPITAL ENCOUNTER (OUTPATIENT)
Age: 59
Setting detail: SPECIMEN
Discharge: HOME | End: 2024-06-13
Payer: COMMERCIAL

## 2024-06-13 LAB
ALBUMIN SERPL-MCNC: 4.1 GM/DL (ref 3.4–5)
ALP BLD-CCNC: 145 IU/L (ref 40–129)
ALT SERPL-CCNC: 25 U/L (ref 10–40)
ANION GAP SERPL CALCULATED.3IONS-SCNC: 14 MMOL/L (ref 7–16)
AST SERPL-CCNC: 26 IU/L (ref 15–37)
BASOPHILS ABSOLUTE: 0 K/CU MM
BASOPHILS RELATIVE PERCENT: 0.2 % (ref 0–1)
BILIRUB SERPL-MCNC: 0.3 MG/DL (ref 0–1)
BUN SERPL-MCNC: 13 MG/DL (ref 6–23)
CALCIUM SERPL-MCNC: 8.9 MG/DL (ref 8.3–10.6)
CHLORIDE BLD-SCNC: 103 MMOL/L (ref 99–110)
CO2: 25 MMOL/L (ref 21–32)
CREAT SERPL-MCNC: 0.9 MG/DL (ref 0.6–1.1)
DIFFERENTIAL TYPE: ABNORMAL
EOSINOPHILS ABSOLUTE: 0.1 K/CU MM
EOSINOPHILS RELATIVE PERCENT: 2 % (ref 0–3)
GFR, ESTIMATED: 74 ML/MIN/1.73M2
GLUCOSE SERPL-MCNC: 84 MG/DL (ref 70–99)
HCT VFR BLD CALC: 36.2 % (ref 37–47)
HEMOGLOBIN: 11.7 GM/DL (ref 12.5–16)
IMMATURE NEUTROPHIL %: 0.2 % (ref 0–0.43)
LYMPHOCYTES ABSOLUTE: 2 K/CU MM
LYMPHOCYTES RELATIVE PERCENT: 39.2 % (ref 24–44)
MCH RBC QN AUTO: 30.4 PG (ref 27–31)
MCHC RBC AUTO-ENTMCNC: 32.3 % (ref 32–36)
MCV RBC AUTO: 94 FL (ref 78–100)
MONOCYTES ABSOLUTE: 0.3 K/CU MM
MONOCYTES RELATIVE PERCENT: 6.8 % (ref 0–4)
NEUTROPHILS ABSOLUTE: 2.6 K/CU MM
NEUTROPHILS RELATIVE PERCENT: 51.6 % (ref 36–66)
NUCLEATED RBC %: 0 %
PDW BLD-RTO: 16.3 % (ref 11.7–14.9)
PLATELET # BLD: 211 K/CU MM (ref 140–440)
PMV BLD AUTO: 9.9 FL (ref 7.5–11.1)
POTASSIUM SERPL-SCNC: 4.1 MMOL/L (ref 3.5–5.1)
RBC # BLD: 3.85 M/CU MM (ref 4.2–5.4)
SODIUM BLD-SCNC: 142 MMOL/L (ref 135–145)
TOTAL IMMATURE NEUTOROPHIL: 0.01 K/CU MM
TOTAL NUCLEATED RBC: 0 K/CU MM
TOTAL PROTEIN: 6.2 GM/DL (ref 6.4–8.2)
WBC # BLD: 5 K/CU MM (ref 4–10.5)

## 2024-06-13 PROCEDURE — 80053 COMPREHEN METABOLIC PANEL: CPT

## 2024-06-13 PROCEDURE — 85025 COMPLETE CBC W/AUTO DIFF WBC: CPT

## 2024-06-17 ENCOUNTER — TELEPHONE (OUTPATIENT)
Dept: CARDIOLOGY CLINIC | Age: 59
End: 2024-06-17

## 2024-06-17 NOTE — TELEPHONE ENCOUNTER
Patient called since her mediations were changed   Her bp has been running low today 101/78 HR 97  6/15 99/71  please advise.

## 2024-06-17 NOTE — TELEPHONE ENCOUNTER
Spoke with patient she stated she was told to report any low BP.  This mornings /78 HR 97.  Denies any cardiac systems says she feels good, just worried about her blood pressure. Please advise

## 2024-07-02 ENCOUNTER — OFFICE VISIT (OUTPATIENT)
Dept: CARDIOLOGY CLINIC | Age: 59
End: 2024-07-02
Payer: COMMERCIAL

## 2024-07-02 VITALS
BODY MASS INDEX: 31.98 KG/M2 | HEART RATE: 75 BPM | SYSTOLIC BLOOD PRESSURE: 116 MMHG | WEIGHT: 173.8 LBS | OXYGEN SATURATION: 96 % | DIASTOLIC BLOOD PRESSURE: 62 MMHG | HEIGHT: 62 IN

## 2024-07-02 DIAGNOSIS — I10 PRIMARY HYPERTENSION: ICD-10-CM

## 2024-07-02 DIAGNOSIS — I25.110 CORONARY ARTERY DISEASE INVOLVING NATIVE CORONARY ARTERY OF NATIVE HEART WITH UNSTABLE ANGINA PECTORIS (HCC): ICD-10-CM

## 2024-07-02 DIAGNOSIS — E78.5 DYSLIPIDEMIA: Primary | ICD-10-CM

## 2024-07-02 PROCEDURE — 3078F DIAST BP <80 MM HG: CPT | Performed by: NURSE PRACTITIONER

## 2024-07-02 PROCEDURE — 3074F SYST BP LT 130 MM HG: CPT | Performed by: NURSE PRACTITIONER

## 2024-07-02 PROCEDURE — 99214 OFFICE O/P EST MOD 30 MIN: CPT | Performed by: NURSE PRACTITIONER

## 2024-07-02 ASSESSMENT — ENCOUNTER SYMPTOMS: SHORTNESS OF BREATH: 0

## 2024-07-02 NOTE — PROGRESS NOTES
significant valvular disease noted.   No pericardial effusion present.    Stress test:2/13/23  Medium sized defect of moderate severity which is reversible involving   inferior wall of myocardium. Abnormal Lexiscan nuclear scintigraphic study suggestive of abnormal myocardial perfusion. Moderate degree of inferior wall ischemia noted involving a medium sized area of left ventricular myocardium. Gated images demonstrate normal left ventricular systolic function with EF of 60 %.    Cath:2/14/23  Successful PCI of proximal LAD utilizing a 4 x18 mm resolute   [place] stent    ISABELL: 7/29/22 normal     Stress test:2/2/24  Stress Combined Conclusion: Normal pharmacological myocardial perfusion study. Findings suggest a low risk of cardiac events.    Stress Function: Left ventricular function post-stress is normal.    Perfusion Comments: LV perfusion is normal. There is no evidence of inducible ischemia.    Perfusion Conclusion: There is no evidence of transient ischemic dilation (TID).    Image quality is good.    ECG: The ECG was negative for ischemia.    Stress Test: A pharmacological stress test was performed using lexiscan. Hemodynamics are adequate for diagnosis. Blood pressure demonstrated a normal response and heart rate demonstrated a normal response to stress. The patient's heart rate recovery was normal.     Normal stress test, optimize medications    The ASCVD Risk score (Tomeka DK, et al., 2019) failed to calculate for the following reasons:    The patient has a prior MI or stroke diagnosis      Assessment/ Plan:     Coronary artery disease involving native coronary artery of native heart with unstable angina pectoris (HCC)   -Multiple stents per Dr. Spencer. She was a STEMI in 2022 with PCI to RCA. Then one year later required PCI of LAD in 2023. She has remained on Plavix and ASA. She is unable to tolerate higher doses of BB do to hypotension. Stress test 2/2/24 did not show any ischemia.     Dyslipidemia   -At or

## 2024-07-02 NOTE — PATIENT INSTRUCTIONS
**It is YOUR responsibilty to bring medication bottles and/or updated medication list to EACH APPOINTMENT. This will allow us to better serve you and all your healthcare needs**   Thank you for allowing us to care for you today!   We want to ensure we can follow your treatment plan and we strive to give you the best outcomes and experience possible.   If you ever have a life threatening emergency and call 911 - for an ambulance (EMS)   Our providers can only care for you at:   [facility] or [facility].   Even if you have someone take you or you drive yourself we can only care for you in a [facility]. Our providers are not setup at the other healthcare locations!   Please be informed that if you contact our office outside of normal business hours the physician on call cannot help with any scheduling or rescheduling issues, procedure instruction questions or any type of medication issue.    We advise you for any urgent/emergency that you go to the nearest emergency room!    PLEASE CALL OUR OFFICE DURING NORMAL BUSINESS HOURS    Monday - Friday   8 am to 5 pm    [place]: [phone]    [place]: [phone]    [place]:  [phone]  We are committed to providing you the best care possible.    If you receive a survey after visiting one of our offices, please take time to share your experience concerning your physician office visit.  These surveys are confidential and no health information about you is shared.    We are eager to improve for you and we are counting on your feedback to help make that happen.

## 2024-07-16 ENCOUNTER — OFFICE VISIT (OUTPATIENT)
Dept: CARDIOLOGY CLINIC | Age: 59
End: 2024-07-16
Payer: COMMERCIAL

## 2024-07-16 VITALS
HEART RATE: 88 BPM | WEIGHT: 175 LBS | OXYGEN SATURATION: 95 % | DIASTOLIC BLOOD PRESSURE: 80 MMHG | BODY MASS INDEX: 32.01 KG/M2 | SYSTOLIC BLOOD PRESSURE: 130 MMHG

## 2024-07-16 DIAGNOSIS — Z01.30 BLOOD PRESSURE CHECK: Primary | ICD-10-CM

## 2024-07-16 PROCEDURE — 3079F DIAST BP 80-89 MM HG: CPT | Performed by: INTERNAL MEDICINE

## 2024-07-16 PROCEDURE — 3075F SYST BP GE 130 - 139MM HG: CPT | Performed by: INTERNAL MEDICINE

## 2024-07-16 PROCEDURE — 99213 OFFICE O/P EST LOW 20 MIN: CPT | Performed by: INTERNAL MEDICINE

## 2024-08-20 ENCOUNTER — OFFICE VISIT (OUTPATIENT)
Dept: CARDIOLOGY CLINIC | Age: 59
End: 2024-08-20
Payer: COMMERCIAL

## 2024-08-20 VITALS
HEIGHT: 62 IN | WEIGHT: 174.2 LBS | HEART RATE: 89 BPM | OXYGEN SATURATION: 99 % | DIASTOLIC BLOOD PRESSURE: 74 MMHG | BODY MASS INDEX: 32.06 KG/M2 | SYSTOLIC BLOOD PRESSURE: 138 MMHG

## 2024-08-20 DIAGNOSIS — E78.5 DYSLIPIDEMIA: Primary | ICD-10-CM

## 2024-08-20 PROCEDURE — 99214 OFFICE O/P EST MOD 30 MIN: CPT | Performed by: INTERNAL MEDICINE

## 2024-08-20 PROCEDURE — 3075F SYST BP GE 130 - 139MM HG: CPT | Performed by: INTERNAL MEDICINE

## 2024-08-20 PROCEDURE — 3078F DIAST BP <80 MM HG: CPT | Performed by: INTERNAL MEDICINE

## 2024-08-20 NOTE — PROGRESS NOTES
area, then 7 months later had bowel blockage had to do surgery  to remove part of the bowel\"    COLONOSCOPY  2015    mild proctitis    COLONOSCOPY  03/15/2017    mild radia proc, hypertroph pailla    COLONOSCOPY N/A 2022    COLONOSCOPY CONTROL HEMORRHAGE WITH STRAIGHT FIRE APC WITH CLIP PLACEMENT X 2 performed by Salas Castillo MD at [facility] ENDOSCOPY    FRACTURE SURGERY      left ankle    OTHER SURGICAL HISTORY  2015    mediport removal    PTCA  2009 had angioplasty with stent to LAD & another day in  stent x 2 to RCA done    PTCA  2022    RCA stented.    TONSILLECTOMY      as a kid age 12    TOTAL HIP ARTHROPLASTY Left 10/19/2022    LEFT HIP TOTAL ARTHROPLASTY ANTERIOR APPROACH performed by Kiko Vaughan MD at [facility] OR    TUBAL LIGATION  25 yrs ago    TUNNELED VENOUS PORT PLACEMENT      port inserted     UPPER GASTROINTESTINAL ENDOSCOPY N/A 2022    EGD DIAGNOSTIC ONLY performed by Salas Castillo MD at [facility] ENDOSCOPY    UPPER GASTROINTESTINAL ENDOSCOPY N/A 2022    ENTEROSCOPY PUSH DIAGNOSTIC performed by Salas Castillo MD at [facility] ENDOSCOPY     Family History   Problem Relation Age of Onset    Cancer Mother         breast ca??    Cancer Father         prostate cancer- lung mets- bone mets    Stroke Father     High Cholesterol Father     Stroke Sister     Cancer Brother         neck cancer    No Known Problems Daughter      Social History     Tobacco Use    Smoking status: Former     Current packs/day: 0.00     Average packs/day: 0.3 packs/day for 35.0 years (8.8 ttl pk-yrs)     Types: Cigarettes     Start date:      Quit date: 10/2021     Years since quittin.8    Smokeless tobacco: Never    Tobacco comments:     6-7 cigarettes    Substance Use Topics    Alcohol use: No     Comment: quit alcohol years ago// caffeine Mountain Dew every day if possible, iced tea      [unfilled]  Review of Systems:   Constitutional: No Fever or Weight Loss   Eyes: No

## 2024-08-30 ENCOUNTER — HOSPITAL ENCOUNTER (OUTPATIENT)
Age: 59
Discharge: HOME | End: 2024-08-30
Payer: COMMERCIAL

## 2024-08-30 ENCOUNTER — TELEPHONE (OUTPATIENT)
Dept: CARDIOLOGY CLINIC | Age: 59
End: 2024-08-30

## 2024-08-30 DIAGNOSIS — E78.5 DYSLIPIDEMIA: ICD-10-CM

## 2024-08-30 LAB
ALBUMIN SERPL-MCNC: 4.2 GM/DL (ref 3.4–5)
ALP BLD-CCNC: 120 IU/L (ref 40–129)
ALT SERPL-CCNC: 33 U/L (ref 10–40)
AST SERPL-CCNC: 19 IU/L (ref 15–37)
BILIRUB SERPL-MCNC: 0.6 MG/DL (ref 0–1)
BILIRUBIN DIRECT: 0.2 MG/DL (ref 0–0.3)
BILIRUBIN, INDIRECT: 0.4 MG/DL (ref 0–0.7)
CHOLEST SERPL-MCNC: 188 MG/DL
HDLC SERPL-MCNC: 97 MG/DL
LDLC SERPL CALC-MCNC: 74 MG/DL
TOTAL PROTEIN: 6.2 GM/DL (ref 6.4–8.2)
TRIGL SERPL-MCNC: 86 MG/DL

## 2024-08-30 PROCEDURE — 80061 LIPID PANEL: CPT

## 2024-08-30 PROCEDURE — 80076 HEPATIC FUNCTION PANEL: CPT

## 2024-08-30 PROCEDURE — 36415 COLL VENOUS BLD VENIPUNCTURE: CPT

## 2024-10-30 ENCOUNTER — TELEPHONE (OUTPATIENT)
Dept: CARDIOLOGY CLINIC | Age: 59
End: 2024-10-30

## 2024-10-30 NOTE — TELEPHONE ENCOUNTER
Leqvio denied. Must try and fail repatha or praluent first.    Note: attempted to get approval last year for praluent. Insurance required zetia trial. Zetia ordered but never taken

## 2024-11-11 ENCOUNTER — TELEPHONE (OUTPATIENT)
Dept: CARDIOLOGY CLINIC | Age: 59
End: 2024-11-11

## 2024-11-11 NOTE — TELEPHONE ENCOUNTER
Cardiologist: Dr. Yousif  Surgeon: Dr. Tyson Gonzáles   Surgery: Colonoscopy   Anesthesia: Propofol  Date: 11/26/2024  FAX# [phone]    # [phone]    Last OV 8/20/2024 mario/Nica     Last EKG 1/19/2024    Stress 1/31/2024    Interpretation Summary  Show Result Comparison     Stress Combined Conclusion: Normal pharmacological myocardial perfusion study. Findings suggest a low risk of cardiac events.    Stress Function: Left ventricular function post-stress is normal.    Perfusion Comments: LV perfusion is normal. There is no evidence of inducible ischemia.    Perfusion Conclusion: There is no evidence of transient ischemic dilation (TID).    Image quality is good.    ECG: The ECG was negative for ischemia.    Stress Test: A pharmacological stress test was performed using lexiscan. Hemodynamics are adequate for diagnosis. Blood pressure demonstrated a normal response and heart rate demonstrated a normal response to stress. The patient's heart rate recovery was normal.     Normal stress test, optimize medications

## 2024-11-15 ENCOUNTER — OFFICE VISIT (OUTPATIENT)
Dept: CARDIOLOGY CLINIC | Age: 59
End: 2024-11-15
Payer: COMMERCIAL

## 2024-11-15 VITALS
BODY MASS INDEX: 30.22 KG/M2 | WEIGHT: 164.2 LBS | HEIGHT: 62 IN | SYSTOLIC BLOOD PRESSURE: 116 MMHG | HEART RATE: 84 BPM | OXYGEN SATURATION: 98 % | DIASTOLIC BLOOD PRESSURE: 80 MMHG

## 2024-11-15 DIAGNOSIS — E78.5 DYSLIPIDEMIA: Primary | ICD-10-CM

## 2024-11-15 PROCEDURE — 3079F DIAST BP 80-89 MM HG: CPT | Performed by: INTERNAL MEDICINE

## 2024-11-15 PROCEDURE — 99214 OFFICE O/P EST MOD 30 MIN: CPT | Performed by: INTERNAL MEDICINE

## 2024-11-15 PROCEDURE — 3074F SYST BP LT 130 MM HG: CPT | Performed by: INTERNAL MEDICINE

## 2024-11-15 RX ORDER — EZETIMIBE 10 MG/1
10 TABLET ORAL DAILY
Qty: 90 TABLET | Refills: 1 | Status: SHIPPED | OUTPATIENT
Start: 2024-11-15

## 2024-11-15 NOTE — PROGRESS NOTES
Neha Yousif MD        OFFICE  FOLLOWUP NOTE    Chief complaints: patient is here for management of CAD,STEMI of RCA, GI bleeding,anal cancer DYSLPIDEMIA, covid     Subjective: patient feels better, no chest pain, no shortness of breath, no dizziness, no palpitations    HPI Renata is a 59 y.o.year old who  has a past medical history of Abnormal EKG, Anal cancer (HCC), Asthma, Broken ankle, CAD (coronary artery disease), Chest pain, Family history of coronary artery disease, H/O cardiovascular stress test, History of blood transfusion, NSTEMI (non-ST elevated myocardial infarction) ([facility]), Obese, PONV (postoperative nausea and vomiting), Post PTCA, PVC's (premature ventricular contractions), Skin cancer, and SOB (shortness of breath). and presents for management of CAD,STEMI of RCA, GI bleeding,anal cancer DYSLPIDEMIA, covid which are well controlled      Current Outpatient Medications   Medication Sig Dispense Refill    nitrofurantoin, macrocrystal-monohydrate, (MACROBID) 100 MG capsule as needed      prochlorperazine (COMPAZINE) 10 MG tablet Take 1 tablet by mouth every 8 hours as needed      cyanocobalamin 500 MCG tablet Take 1 tablet by mouth daily      ondansetron (ZOFRAN) 8 MG tablet Take 1 tablet by mouth every 8 hours as needed      atorvastatin (LIPITOR) 80 MG tablet Take 1 tablet by mouth daily 30 tablet 5    clopidogrel (PLAVIX) 75 MG tablet Take 1 tablet by mouth daily 30 tablet 5    promethazine-dextromethorphan (PROMETHAZINE-DM) 6.25-15 MG/5ML syrup       nitroGLYCERIN (NITROSTAT) 0.4 MG SL tablet up to max of 3 total doses. If no relief after 1 dose, call 911. 25 tablet 3    traZODone (DESYREL) 50 MG tablet Take 1 tablet by mouth nightly      pyridoxine (B-6) 100 MG tablet Take 1 tablet by mouth daily      lidocaine-prilocaine (EMLA) 2.5-2.5 % cream       pantoprazole (PROTONIX) 40 MG tablet Take 1 tablet by mouth every morning (before breakfast) 30 tablet 5    aspirin 81 MG chewable

## 2024-11-20 ENCOUNTER — TELEPHONE (OUTPATIENT)
Dept: CARDIOLOGY CLINIC | Age: 59
End: 2024-11-20

## 2024-11-20 NOTE — TELEPHONE ENCOUNTER
Pt daughter Elizabeth called in stating she is having cramping in her legs and back from Zetia 10mg. They were told to call if she had any problems. Wanting to get injectable.

## 2024-11-26 ENCOUNTER — TELEPHONE (OUTPATIENT)
Dept: CARDIOLOGY CLINIC | Age: 59
End: 2024-11-26

## 2024-11-26 NOTE — TELEPHONE ENCOUNTER
Maranda with [facility] called regarding cardiac clearance. She states Dr Yousif did not mention if it was ok for pt stop taking the Plavix for 5 days prior to surgery on 12/4. She is wanting Dr. Yousif to advise if this would be ok for pt and will need to have his signature as well. Maranda is asking for this to be faxed to [phone]. She wanted to see if we could get this to her today. She will be off starting tomorrow and will not return till 12/2. Any questions you may reach Maranda at [phone] ext [phone].

## 2024-11-26 NOTE — TELEPHONE ENCOUNTER
Spoke with Maranda   Antiplatelet/anticoagulant therapy can be held prior to the surgery or procedure at the discretion of the surgeon to be resumed as soon as possible if held.   She is calling surgeon to let him know

## 2024-12-16 ENCOUNTER — OFFICE (OUTPATIENT)
Dept: URBAN - METROPOLITAN AREA CLINIC 18 | Age: 59
End: 2024-12-16
Payer: COMMERCIAL

## 2024-12-16 VITALS
DIASTOLIC BLOOD PRESSURE: 82 MMHG | OXYGEN SATURATION: 97 % | HEART RATE: 102 BPM | HEIGHT: 62 IN | WEIGHT: 155 LBS | SYSTOLIC BLOOD PRESSURE: 136 MMHG

## 2024-12-16 DIAGNOSIS — C55 MALIGNANT NEOPLASM OF UTERUS, PART UNSPECIFIED: ICD-10-CM

## 2024-12-16 DIAGNOSIS — K52.838 OTHER MICROSCOPIC COLITIS: ICD-10-CM

## 2024-12-16 DIAGNOSIS — K86.81 EXOCRINE PANCREATIC INSUFFICIENCY: ICD-10-CM

## 2024-12-16 DIAGNOSIS — Z90.49 ACQUIRED ABSENCE OF OTHER SPECIFIED PARTS OF DIGESTIVE TRACT: ICD-10-CM

## 2024-12-16 DIAGNOSIS — Z92.3 PERSONAL HISTORY OF IRRADIATION: ICD-10-CM

## 2024-12-16 PROCEDURE — 99215 OFFICE O/P EST HI 40 MIN: CPT | Performed by: INTERNAL MEDICINE

## 2025-01-11 ENCOUNTER — APPOINTMENT (OUTPATIENT)
Dept: GENERAL RADIOLOGY | Age: 60
End: 2025-01-11
Payer: COMMERCIAL

## 2025-01-11 ENCOUNTER — HOSPITAL ENCOUNTER (EMERGENCY)
Age: 60
Discharge: HOME | End: 2025-01-11
Payer: COMMERCIAL

## 2025-01-11 VITALS
OXYGEN SATURATION: 93 % | HEART RATE: 94 BPM | TEMPERATURE: 98.8 F | DIASTOLIC BLOOD PRESSURE: 84 MMHG | SYSTOLIC BLOOD PRESSURE: 123 MMHG | RESPIRATION RATE: 16 BRPM

## 2025-01-11 DIAGNOSIS — M15.0 PRIMARY OSTEOARTHRITIS INVOLVING MULTIPLE JOINTS: Primary | ICD-10-CM

## 2025-01-11 PROCEDURE — 73564 X-RAY EXAM KNEE 4 OR MORE: CPT

## 2025-01-11 PROCEDURE — 99283 EMERGENCY DEPT VISIT LOW MDM: CPT

## 2025-01-11 PROCEDURE — 73610 X-RAY EXAM OF ANKLE: CPT

## 2025-01-11 PROCEDURE — 6370000000 HC RX 637 (ALT 250 FOR IP)

## 2025-01-11 PROCEDURE — 73562 X-RAY EXAM OF KNEE 3: CPT

## 2025-01-11 RX ORDER — HYDROCODONE BITARTRATE AND ACETAMINOPHEN 5; 325 MG/1; MG/1
1 TABLET ORAL EVERY 6 HOURS PRN
Qty: 12 TABLET | Refills: 0 | Status: SHIPPED | OUTPATIENT
Start: 2025-01-11 | End: 2025-01-14

## 2025-01-11 RX ORDER — HYDROCODONE BITARTRATE AND ACETAMINOPHEN 5; 325 MG/1; MG/1
1 TABLET ORAL ONCE
Status: COMPLETED | OUTPATIENT
Start: 2025-01-11 | End: 2025-01-11

## 2025-01-11 RX ORDER — CYCLOBENZAPRINE HCL 10 MG
10 TABLET ORAL ONCE
Status: DISCONTINUED | OUTPATIENT
Start: 2025-01-11 | End: 2025-01-11

## 2025-01-11 RX ADMIN — HYDROCODONE BITARTRATE AND ACETAMINOPHEN 1 TABLET: 5; 325 TABLET ORAL at 11:55

## 2025-01-11 ASSESSMENT — PAIN DESCRIPTION - LOCATION: LOCATION: ANKLE;KNEE

## 2025-01-11 ASSESSMENT — PAIN SCALES - GENERAL
PAINLEVEL_OUTOF10: 10
PAINLEVEL_OUTOF10: 10
PAINLEVEL_OUTOF10: 6

## 2025-01-11 ASSESSMENT — PAIN - FUNCTIONAL ASSESSMENT
PAIN_FUNCTIONAL_ASSESSMENT: 0-10
PAIN_FUNCTIONAL_ASSESSMENT: 0-10

## 2025-01-11 ASSESSMENT — PAIN DESCRIPTION - ORIENTATION: ORIENTATION: RIGHT;LEFT

## 2025-01-11 NOTE — DISCHARGE INSTRUCTIONS
It was my pleasure taking care of you in the emergency department today.  If we prescribed any medications, please be sure to take them as prescribed. Continue taking medications as prescribed by your PCP unless we discussed otherwise.   Please be sure to follow-up with your PCP within the next 1-2 weeks.  Please don't hesitate to return to the emergency department in case of any worsening symptoms.  Wish you a speedy recovery.  Most sincerely,    Samreen Lynn CNP

## 2025-01-11 NOTE — ED PROVIDER NOTES
[facility] EMERGENCY DEPARTMENT  EMERGENCY DEPARTMENT ENCOUNTER        Pt Name: Renata Domínguez  MRN: [id number]  Birthdate 1965  Date of evaluation: 1/11/2025  Provider: FLAVIO Ramirez - DOM  PCP: Mert Rea MD  Note Started: 10:53 AM EST 1/11/25      YENNY. I have evaluated this patient.        CHIEF COMPLAINT       Chief Complaint   Patient presents with    Knee Injury     bilateral    Ankle Injury     bilateral       HISTORY OF PRESENT ILLNESS: 1 or more Elements     History From:  at bedside and patient    Limitations to history : None    Social Determinants Significantly Affecting Health : None    Chief Complaint: Bilateral knee and ankle pain    Renata Domínguez is a 59 y.o. female history of lower extremity weakness falls osteoarthritis HLD HTN who presents to ED stating she could not take the pain anymore from her chronic knee and ankle pain.  States she has not had pain in her knees and ankles for months.  Stated she was evaluated with the last week for physical therapy for her knee and ankle pain.    at bedside stated they are also being evaluated for home health care needs at this time.  Declined need for case management for discussion on this at this time.  Patient stated she has been taking Tylenol without relief of symptoms.  Denies any new pain or new weakness.  States after her left hip was replaced she began to have left knee pain.  States she also has a history of ankle fracture and since that fracture has had pain in her ankles.  Denies any recent fevers illnesses or infections.  Denies any chest pain or abdominal pain.  States she is still able to ambulate with her walker.  States they do have an appointment with physical therapy on the beginning of this next week.    Nursing Notes were all reviewed and agreed with or any disagreements were addressed in the HPI.    REVIEW OF SYSTEMS :      Review of Systems    Positives and Pertinent negatives as

## 2025-01-14 RX ORDER — CLOPIDOGREL BISULFATE 75 MG/1
75 TABLET ORAL DAILY
Qty: 30 TABLET | Refills: 5 | Status: SHIPPED | OUTPATIENT
Start: 2025-01-14

## 2025-01-31 ENCOUNTER — OFFICE VISIT (OUTPATIENT)
Dept: ORTHOPEDIC SURGERY | Age: 60
End: 2025-01-31
Payer: COMMERCIAL

## 2025-01-31 VITALS — BODY MASS INDEX: 30.03 KG/M2 | HEART RATE: 100 BPM | RESPIRATION RATE: 12 BRPM | OXYGEN SATURATION: 98 % | HEIGHT: 62 IN

## 2025-01-31 DIAGNOSIS — R29.898 WEAKNESS OF BOTH LOWER EXTREMITIES: ICD-10-CM

## 2025-01-31 DIAGNOSIS — M17.0 BILATERAL PRIMARY OSTEOARTHRITIS OF KNEE: Primary | ICD-10-CM

## 2025-01-31 DIAGNOSIS — Z96.642 STATUS POST TOTAL HIP REPLACEMENT, LEFT: ICD-10-CM

## 2025-01-31 PROCEDURE — 1036F TOBACCO NON-USER: CPT | Performed by: ORTHOPAEDIC SURGERY

## 2025-01-31 PROCEDURE — 3017F COLORECTAL CA SCREEN DOC REV: CPT | Performed by: ORTHOPAEDIC SURGERY

## 2025-01-31 PROCEDURE — G8427 DOCREV CUR MEDS BY ELIG CLIN: HCPCS | Performed by: ORTHOPAEDIC SURGERY

## 2025-01-31 PROCEDURE — G8417 CALC BMI ABV UP PARAM F/U: HCPCS | Performed by: ORTHOPAEDIC SURGERY

## 2025-01-31 PROCEDURE — 99214 OFFICE O/P EST MOD 30 MIN: CPT | Performed by: ORTHOPAEDIC SURGERY

## 2025-01-31 RX ORDER — BUDESONIDE 3 MG/1
CAPSULE, COATED PELLETS ORAL
COMMUNITY
Start: 2025-01-28

## 2025-01-31 NOTE — PROGRESS NOTES
Patient returns to the office with  bilateral knee pain. Pt stated that her knees have been bothering her for a long time and she fell recently because of it. Pt stated that her knee pain is worse when going to stand and feels unsteady with walking.

## 2025-02-03 ASSESSMENT — ENCOUNTER SYMPTOMS
COLOR CHANGE: 0
CHEST TIGHTNESS: 0
SHORTNESS OF BREATH: 0
VOMITING: 0
WHEEZING: 0
EYE REDNESS: 0
EYE PAIN: 0

## 2025-02-03 NOTE — PROGRESS NOTES
1/31/2025   Chief Complaint   Patient presents with    Knee Pain     Bilateral         History of Present Illness:                             Renata Domínguez is a 59 y.o. female who presents today for evaluation of bilateral knee pain and lower extremity weakness.  She has feelings of unsteadiness and weakness in her legs with ambulation.  She complains of knee pain but the discomfort does extend throughout her legs including her knees and feet.  She has difficulty with movements.  She has feelings of weakness and unsteadiness when trying to walk even short distances.  She requires use of a walker for balance.  She has trouble getting up from a seated position and requires assistance from family member to get up out of a chair.          Patient returns to the office with  bilateral knee pain. Pt stated that her knees have been bothering her for a long time and she fell recently because of it. Pt stated that her knee pain is worse when going to stand and feels unsteady with walking.          Medical History  Patient's medications, allergies, past medical, surgical, social and family histories were reviewed and updated as appropriate.    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    H/O cardiovascular stress test 01/06/2016    treadmill    History of blood transfusion     3    NSTEMI (non-ST elevated myocardial infarction) (HCC)     pt states she has had two NSTEMI    Obese     PONV (postoperative nausea and vomiting)     hx motion sickness    Post PTCA 02/22/2022    RCA stented.    PVC's (premature ventricular

## 2025-02-04 RX ORDER — CLOPIDOGREL BISULFATE 75 MG/1
75 TABLET ORAL DAILY
Qty: 30 TABLET | Refills: 5 | Status: SHIPPED | OUTPATIENT
Start: 2025-02-04

## 2025-02-18 ENCOUNTER — OFFICE VISIT (OUTPATIENT)
Dept: CARDIOLOGY CLINIC | Age: 60
End: 2025-02-18
Payer: MEDICAID

## 2025-02-18 VITALS
WEIGHT: 143.8 LBS | HEIGHT: 62 IN | SYSTOLIC BLOOD PRESSURE: 124 MMHG | HEART RATE: 107 BPM | DIASTOLIC BLOOD PRESSURE: 82 MMHG | BODY MASS INDEX: 26.46 KG/M2

## 2025-02-18 DIAGNOSIS — Z00.00 EXAMINATION: Primary | ICD-10-CM

## 2025-02-18 DIAGNOSIS — E78.5 DYSLIPIDEMIA: ICD-10-CM

## 2025-02-18 PROCEDURE — 93000 ELECTROCARDIOGRAM COMPLETE: CPT | Performed by: INTERNAL MEDICINE

## 2025-02-18 PROCEDURE — 3079F DIAST BP 80-89 MM HG: CPT | Performed by: INTERNAL MEDICINE

## 2025-02-18 PROCEDURE — 3074F SYST BP LT 130 MM HG: CPT | Performed by: INTERNAL MEDICINE

## 2025-02-18 PROCEDURE — 99214 OFFICE O/P EST MOD 30 MIN: CPT | Performed by: INTERNAL MEDICINE

## 2025-02-18 NOTE — PROGRESS NOTES
CLINICAL STAFF DOCUMENTATION      Renata PRAJAPATI Domínguez  1965  [id number]    Have you had any Chest Pain recently? - No    Have you had any Shortness of Breath - No    Have you had any dizziness - No    Have you had any palpitations recently? - No    Do you have any edema - swelling in left legs    When did you have your last labs drawn 8/30/2024  What doctor ordered daron crowell md   Do we have the labs in their chart Yes        Do you have a surgery or procedure scheduled in the near future - No

## 2025-02-18 NOTE — PATIENT INSTRUCTIONS
Thank you for allowing us to care for you today!   We want to ensure we can follow your treatment plan and we strive to give you the best outcomes and experience possible.   If you ever have a life threatening emergency and call 911 - for an ambulance (EMS)  REMEMBER  Our providers can only care for you at:   [facility] or [facility]   Even if you have someone take you or you drive yourself we can only care for you in a [facility] facility. Our providers are not setup at the other healthcare locations!    PLEASE CALL OUR OFFICE DURING NORMAL BUSINESS HOURS  Monday through Friday 8 am to 5 pm  AFTER HOURS the physician on-call cannot help with scheduling, rescheduling, procedure instruction questions or any type of medication need or issue.  [facility] P:[phone] - [facility] P:[phone] - [facility] P:[phone]      If you receive a survey:  We would appreciate you taking the time to share your experience concerning your provider visit in the office.    These surveys are confidential!  We are eager to improve and are counting on you to share your feedback so we can ensure you get the best care possible.

## 2025-02-18 NOTE — PROGRESS NOTES
Neha Yousif MD        OFFICE  FOLLOWUP NOTE    Chief complaints: patient is here for management of CAD,STEMI of RCA, GI bleeding,anal cancer DYSLPIDEMIA, covid   Subjective: patient feels better, no chest pain, no shortness of breath, no dizziness, no palpitations    HPI Renata is a 59 y.o.year old who  has a past medical history of Abnormal EKG, Anal cancer (HCC), Asthma, Broken ankle, CAD (coronary artery disease), Chest pain, Family history of coronary artery disease, H/O cardiovascular stress test, History of blood transfusion, NSTEMI (non-ST elevated myocardial infarction) ([facility]), Obese, PONV (postoperative nausea and vomiting), Post PTCA, PVC's (premature ventricular contractions), Skin cancer, and SOB (shortness of breath). and presents for management of CAD,STEMI of RCA, GI bleeding,anal cancer DYSLPIDEMIA, covid  which are well controlled      Current Outpatient Medications   Medication Sig Dispense Refill    Polyethyl Glycol-Propyl Glycol (LUBRICATING EYE DROPS OP) Apply to eye      clopidogrel (PLAVIX) 75 MG tablet Take 1 tablet by mouth daily 30 tablet 5    budesonide (ENTOCORT EC) 3 MG delayed release capsule       ezetimibe (ZETIA) 10 MG tablet Take 1 tablet by mouth daily 90 tablet 1    nitrofurantoin, macrocrystal-monohydrate, (MACROBID) 100 MG capsule as needed      prochlorperazine (COMPAZINE) 10 MG tablet Take 1 tablet by mouth every 8 hours as needed      cyanocobalamin 500 MCG tablet Take 1 tablet by mouth daily      ondansetron (ZOFRAN) 8 MG tablet Take 1 tablet by mouth every 8 hours as needed      atorvastatin (LIPITOR) 80 MG tablet Take 1 tablet by mouth daily 30 tablet 5    nitroGLYCERIN (NITROSTAT) 0.4 MG SL tablet up to max of 3 total doses. If no relief after 1 dose, call 911. 25 tablet 3    traZODone (DESYREL) 50 MG tablet Take 1 tablet by mouth nightly      pyridoxine (B-6) 100 MG tablet Take 1 tablet by mouth daily      lidocaine-prilocaine (EMLA) 2.5-2.5 % cream

## 2025-02-24 ENCOUNTER — TELEPHONE (OUTPATIENT)
Dept: CARDIOLOGY CLINIC | Age: 60
End: 2025-02-24

## 2025-02-24 NOTE — TELEPHONE ENCOUNTER
PA submitted via CMM.     Approved today by [employer] 2017  CaseId:[id number];Status:Approved;Review Type:Prior Auth;Coverage Start Date:01/25/2025;Coverage End Date:02/24/2026;  Effective Date: 1/24/2025  Authorization Expiration Date: 2/23/2026

## 2025-02-28 ENCOUNTER — HOSPITAL ENCOUNTER (OUTPATIENT)
Age: 60
Discharge: HOME | End: 2025-02-28
Payer: MEDICAID

## 2025-02-28 DIAGNOSIS — E78.5 DYSLIPIDEMIA: ICD-10-CM

## 2025-02-28 LAB
ALBUMIN SERPL-MCNC: 3.5 G/DL (ref 3.4–5)
ALBUMIN/GLOB SERPL: 0.9 {RATIO} (ref 1.1–2.2)
ALP SERPL-CCNC: 216 U/L (ref 40–129)
ALT SERPL-CCNC: 21 U/L (ref 10–40)
AST SERPL-CCNC: 20 U/L (ref 15–37)
BILIRUB DIRECT SERPL-MCNC: 0.2 MG/DL (ref 0–0.3)
BILIRUB INDIRECT SERPL-MCNC: 0.2 MG/DL (ref 0–0.7)
BILIRUB SERPL-MCNC: 0.4 MG/DL (ref 0–1)
CHOLEST SERPL-MCNC: 128 MG/DL (ref 125–199)
HDLC SERPL-MCNC: 51 MG/DL
LDLC SERPL CALC-MCNC: 55 MG/DL
PROT SERPL-MCNC: 7.4 G/DL (ref 6.4–8.2)
TRIGL SERPL-MCNC: 110 MG/DL

## 2025-02-28 PROCEDURE — 80076 HEPATIC FUNCTION PANEL: CPT

## 2025-02-28 PROCEDURE — 80061 LIPID PANEL: CPT

## 2025-03-27 ENCOUNTER — OFFICE (OUTPATIENT)
Dept: URBAN - METROPOLITAN AREA CLINIC 18 | Age: 60
End: 2025-03-27

## 2025-03-27 VITALS
OXYGEN SATURATION: 97 % | WEIGHT: 147 LBS | HEART RATE: 103 BPM | DIASTOLIC BLOOD PRESSURE: 84 MMHG | HEIGHT: 62 IN | SYSTOLIC BLOOD PRESSURE: 122 MMHG | RESPIRATION RATE: 16 BRPM

## 2025-03-27 DIAGNOSIS — C55 MALIGNANT NEOPLASM OF UTERUS, PART UNSPECIFIED: ICD-10-CM

## 2025-03-27 DIAGNOSIS — Z92.3 PERSONAL HISTORY OF IRRADIATION: ICD-10-CM

## 2025-03-27 DIAGNOSIS — Z85.048 PERSONAL HISTORY OF OTHER MALIGNANT NEOPLASM OF RECTUM, RECT: ICD-10-CM

## 2025-03-27 DIAGNOSIS — K86.81 EXOCRINE PANCREATIC INSUFFICIENCY: ICD-10-CM

## 2025-03-27 DIAGNOSIS — Z92.89 PERSONAL HISTORY OF OTHER MEDICAL TREATMENT: ICD-10-CM

## 2025-03-27 PROCEDURE — 99213 OFFICE O/P EST LOW 20 MIN: CPT | Performed by: PHYSICIAN ASSISTANT

## 2025-04-01 ENCOUNTER — TELEPHONE (OUTPATIENT)
Dept: CARDIOLOGY CLINIC | Age: 60
End: 2025-04-01

## 2025-04-01 NOTE — TELEPHONE ENCOUNTER
Cardiologist: Dr. Yousif  Surgeon: Dr. Patino  Surgery: Colonoscopy  Surgery/Procedure should be done in the hospital setting    _____ yes    _____  no    Anesthesia: Propofol  Date: 5/14/2025  Fax# [phone]  Ph# [phone]    Last OV 2/18/2025 w/Nica      Vision: recommend to see ophthalmologist. She has dry eyes  Cramps in body, it could be related to liptior, will add leqvio if it gets approved,.  Low blood pressure:off coreg, home blood pressure is stable, reviewed, 106-149/ last time. Recommend to stay off coreg for now  S/p bronchoscopy and biopsy of lymnode, sent for pathology, she will start her aspirin and plvix,   Covid 19: resolved, still coughs, ok to use OTC cough syrups  anal cancer:she already had hysterecetomy, she had stopped aspirin, plavix to 5 day, she was cleared for doing it again for chemotherapy, her last LAD stent was in feb  CAD : stress test is normal,: she had chest pain in jan before stress test, she had  2/23 PCI of LAD and has h/o RCA and LAD stent before with SOUMYA, Continue aspirin and  plavix for sx. She was started on statins,and did not take praleunt  as per patient it was for 250 dollars,, no side effect of statins noted, ok stay off coregg as her blood pressure was low in 80s and 90s at rehab, she does not want to do cardiac rehab, had long discussion about statin side effects, she is off statins because of severe cramps, add repatha as her lequvio was not approved =, will repeat lipids, will coninue zetia and  , LDL is 74 in 8/30/24,  PAD and leg pain with claudications: arterial doppler  Fluttering of chest: will get holter monitor if it gets worse  H/o  hip sx   Gi bleeding resolved, f/u with Dr. Castillo, had colon AVM, coagulated with APC and 2 clips  Dyslipidemia: continue statins, check lipids  H/o anal cancer      Last EKG- 2/18/2025      NM- 1/31/2024    Stress Combined Conclusion: Normal pharmacological myocardial perfusion study. Findings suggest a low

## 2025-04-04 ENCOUNTER — TELEPHONE (OUTPATIENT)
Dept: CARDIOLOGY CLINIC | Age: 60
End: 2025-04-04

## 2025-04-04 NOTE — TELEPHONE ENCOUNTER
Cardiologist: Dr. Yousif  Surgeon: Dr. Guerin  Surgery: Cervical/Lumbar Epidural Injection  Surgery/Procedure should be done in the hospital setting    _____ yes    _____  no    Anesthesia: Local  Date: Pending  Fax# [phone]  Ph# [phone]    Last OV 2/18/2025 w/Nica        Vision: recommend to see ophthalmologist. She has dry eyes  Cramps in body, it could be related to liptior, will add leqvio if it gets approved,.  Low blood pressure:off coreg, home blood pressure is stable, reviewed, 106-149/ last time. Recommend to stay off coreg for now  S/p bronchoscopy and biopsy of lymnode, sent for pathology, she will start her aspirin and plvix,   Covid 19: resolved, still coughs, ok to use OTC cough syrups  anal cancer:she already had hysterecetomy, she had stopped aspirin, plavix to 5 day, she was cleared for doing it again for chemotherapy, her last LAD stent was in feb  CAD : stress test is normal,: she had chest pain in jan before stress test, she had  2/23 PCI of LAD and has h/o RCA and LAD stent before with SOUMYA, Continue aspirin and  plavix for sx. She was started on statins,and did not take praleunt  as per patient it was for 250 dollars,, no side effect of statins noted, ok stay off coregg as her blood pressure was low in 80s and 90s at rehab, she does not want to do cardiac rehab, had long discussion about statin side effects, she is off statins because of severe cramps, add repatha as her lequvio was not approved =, will repeat lipids, will coninue zetia and  , LDL is 74 in 8/30/24,  PAD and leg pain with claudications: arterial doppler  Fluttering of chest: will get holter monitor if it gets worse  H/o  hip sx   Gi bleeding resolved, f/u with Dr. Castillo, had colon AVM, coagulated with APC and 2 clips  Dyslipidemia: continue statins, check lipids  H/o anal cancer        Last EKG- 2/18/2025        NM- 1/31/2024    Stress Combined Conclusion: Normal pharmacological myocardial perfusion study.

## 2025-04-22 ENCOUNTER — TELEPHONE (OUTPATIENT)
Dept: CARDIOLOGY CLINIC | Age: 60
End: 2025-04-22

## 2025-04-22 NOTE — TELEPHONE ENCOUNTER
Pt hemoglobin is at a 10.6. She has had some rectal bleeding only when she wipes. Pt daughter Elizabeth said home health nurse to her to ask if pt should take blood thinner every other day.

## 2025-04-22 NOTE — TELEPHONE ENCOUNTER
Pt hemoglobin is at a 10.6.  She has had some rectal bleeding only when she wipes. Pt daughter Elizabeth said home health nurse to her to ask if pt should take blood thinner every other day. Call pt phone first [phone] if no answer call [phone].

## 2025-04-24 ENCOUNTER — HOSPITAL ENCOUNTER (INPATIENT)
Age: 60
LOS: 2 days | Discharge: HOME | End: 2025-04-26
Attending: EMERGENCY MEDICINE | Admitting: STUDENT IN AN ORGANIZED HEALTH CARE EDUCATION/TRAINING PROGRAM
Payer: MEDICAID

## 2025-04-24 ENCOUNTER — APPOINTMENT (OUTPATIENT)
Dept: CT IMAGING | Age: 60
End: 2025-04-24
Payer: MEDICAID

## 2025-04-24 ENCOUNTER — APPOINTMENT (OUTPATIENT)
Dept: MRI IMAGING | Age: 60
End: 2025-04-24
Payer: MEDICAID

## 2025-04-24 ENCOUNTER — APPOINTMENT (OUTPATIENT)
Dept: GENERAL RADIOLOGY | Age: 60
End: 2025-04-24
Payer: MEDICAID

## 2025-04-24 DIAGNOSIS — R79.89 ELEVATED TROPONIN LEVEL: ICD-10-CM

## 2025-04-24 DIAGNOSIS — R41.82 ALTERED MENTAL STATUS, UNSPECIFIED ALTERED MENTAL STATUS TYPE: Primary | ICD-10-CM

## 2025-04-24 DIAGNOSIS — R00.0 TACHYCARDIA: ICD-10-CM

## 2025-04-24 PROBLEM — G20.A1 PARKINSON DISEASE, SYMPTOMATIC (HCC): Status: ACTIVE | Noted: 2025-04-24

## 2025-04-24 LAB
ABO + RH BLD: NORMAL
ALBUMIN SERPL-MCNC: 3.3 G/DL (ref 3.4–5)
ALBUMIN/GLOB SERPL: 1.1 {RATIO} (ref 1.1–2.2)
ALP SERPL-CCNC: 201 U/L (ref 40–129)
ALT SERPL-CCNC: 25 U/L (ref 10–40)
ANION GAP SERPL CALCULATED.3IONS-SCNC: 13 MMOL/L (ref 9–17)
AST SERPL-CCNC: 25 U/L (ref 15–37)
BASOPHILS # BLD: 0.03 K/UL
BASOPHILS NFR BLD: 0 % (ref 0–1)
BILIRUB SERPL-MCNC: 0.3 MG/DL (ref 0–1)
BILIRUB UR QL STRIP: NEGATIVE
BLOOD BANK SAMPLE EXPIRATION: NORMAL
BLOOD GROUP ANTIBODIES SERPL: NEGATIVE
BNP SERPL-MCNC: 135 PG/ML (ref 0–125)
BUN SERPL-MCNC: 13 MG/DL (ref 7–20)
CALCIUM SERPL-MCNC: 8.9 MG/DL (ref 8.3–10.6)
CHLORIDE SERPL-SCNC: 100 MMOL/L (ref 99–110)
CLARITY UR: CLEAR
CO2 SERPL-SCNC: 23 MMOL/L (ref 21–32)
COLOR UR: YELLOW
CREAT SERPL-MCNC: 0.7 MG/DL (ref 0.6–1.1)
EKG ATRIAL RATE: 101 BPM
EKG ATRIAL RATE: 104 BPM
EKG DIAGNOSIS: NORMAL
EKG DIAGNOSIS: NORMAL
EKG P AXIS: 39 DEGREES
EKG P AXIS: 7 DEGREES
EKG P-R INTERVAL: 124 MS
EKG P-R INTERVAL: 146 MS
EKG Q-T INTERVAL: 306 MS
EKG Q-T INTERVAL: 342 MS
EKG QRS DURATION: 100 MS
EKG QRS DURATION: 108 MS
EKG QTC CALCULATION (BAZETT): 449 MS
EKG QTC CALCULATION (BAZETT): 500 MS
EKG R AXIS: 31 DEGREES
EKG R AXIS: 56 DEGREES
EKG T AXIS: 17 DEGREES
EKG T AXIS: 26 DEGREES
EKG VENTRICULAR RATE: 104 BPM
EKG VENTRICULAR RATE: 161 BPM
EOSINOPHIL # BLD: 0.04 K/UL
EOSINOPHILS RELATIVE PERCENT: 0 % (ref 0–3)
EPI CELLS #/AREA URNS HPF: <1 /HPF
ERYTHROCYTE [DISTWIDTH] IN BLOOD BY AUTOMATED COUNT: 17.2 % (ref 11.7–14.9)
GFR, ESTIMATED: 81 ML/MIN/1.73M2
GLUCOSE BLD-MCNC: 112 MG/DL (ref 74–99)
GLUCOSE SERPL-MCNC: 113 MG/DL (ref 74–99)
GLUCOSE UR STRIP-MCNC: NEGATIVE MG/DL
HCT VFR BLD AUTO: 32.6 % (ref 37–47)
HGB BLD-MCNC: 10 G/DL (ref 12.5–16)
HGB UR QL STRIP.AUTO: ABNORMAL
IMM GRANULOCYTES # BLD AUTO: 0.08 K/UL
IMM GRANULOCYTES NFR BLD: 1 %
INR PPP: 0.9
KETONES UR STRIP-MCNC: NEGATIVE MG/DL
LACTATE BLDV-SCNC: 1.3 MMOL/L (ref 0.4–2)
LEUKOCYTE ESTERASE UR QL STRIP: ABNORMAL
LYMPHOCYTES NFR BLD: 3.28 K/UL
LYMPHOCYTES RELATIVE PERCENT: 34 % (ref 24–44)
MCH RBC QN AUTO: 24.7 PG (ref 27–31)
MCHC RBC AUTO-ENTMCNC: 30.7 G/DL (ref 32–36)
MCV RBC AUTO: 80.5 FL (ref 78–100)
MONOCYTES NFR BLD: 0.81 K/UL
MONOCYTES NFR BLD: 8 % (ref 0–5)
NEUTROPHILS NFR BLD: 56 % (ref 36–66)
NEUTS SEG NFR BLD: 5.41 K/UL
NITRITE UR QL STRIP: NEGATIVE
PH UR STRIP: 6.5 [PH] (ref 5–8)
PLATELET # BLD AUTO: 466 K/UL (ref 140–440)
PMV BLD AUTO: 8.9 FL (ref 7.5–11.1)
POTASSIUM SERPL-SCNC: 5.1 MMOL/L (ref 3.5–5.1)
PROT SERPL-MCNC: 6.2 G/DL (ref 6.4–8.2)
PROT UR STRIP-MCNC: NEGATIVE MG/DL
PROTHROMBIN TIME: 12.8 SEC (ref 11.7–14.5)
RBC # BLD AUTO: 4.05 M/UL (ref 4.2–5.4)
RBC #/AREA URNS HPF: 0 /HPF (ref 0–2)
SODIUM SERPL-SCNC: 136 MMOL/L (ref 136–145)
SP GR UR STRIP: <1.005 (ref 1–1.03)
TROPONIN I SERPL HS-MCNC: 20 NG/L (ref 0–14)
TROPONIN I SERPL HS-MCNC: 21 NG/L (ref 0–14)
TROPONIN I SERPL HS-MCNC: 24 NG/L (ref 0–14)
TROPONIN I SERPL HS-MCNC: 27 NG/L (ref 0–14)
TSH SERPL DL<=0.05 MIU/L-ACNC: 3.09 UIU/ML (ref 0.27–4.2)
UROBILINOGEN UR STRIP-ACNC: 0.2 EU/DL (ref 0–1)
WBC #/AREA URNS HPF: 2 /HPF (ref 0–5)
WBC OTHER # BLD: 9.7 K/UL (ref 4–10.5)

## 2025-04-24 PROCEDURE — 85025 COMPLETE CBC W/AUTO DIFF WBC: CPT

## 2025-04-24 PROCEDURE — 6360000004 HC RX CONTRAST MEDICATION: Performed by: STUDENT IN AN ORGANIZED HEALTH CARE EDUCATION/TRAINING PROGRAM

## 2025-04-24 PROCEDURE — 87040 BLOOD CULTURE FOR BACTERIA: CPT

## 2025-04-24 PROCEDURE — 6360000004 HC RX CONTRAST MEDICATION: Performed by: EMERGENCY MEDICINE

## 2025-04-24 PROCEDURE — 84484 ASSAY OF TROPONIN QUANT: CPT

## 2025-04-24 PROCEDURE — 86900 BLOOD TYPING SEROLOGIC ABO: CPT

## 2025-04-24 PROCEDURE — 72128 CT CHEST SPINE W/O DYE: CPT

## 2025-04-24 PROCEDURE — 36415 COLL VENOUS BLD VENIPUNCTURE: CPT

## 2025-04-24 PROCEDURE — 72125 CT NECK SPINE W/O DYE: CPT

## 2025-04-24 PROCEDURE — 86850 RBC ANTIBODY SCREEN: CPT

## 2025-04-24 PROCEDURE — 2500000003 HC RX 250 WO HCPCS: Performed by: STUDENT IN AN ORGANIZED HEALTH CARE EDUCATION/TRAINING PROGRAM

## 2025-04-24 PROCEDURE — 84443 ASSAY THYROID STIM HORMONE: CPT

## 2025-04-24 PROCEDURE — 70498 CT ANGIOGRAPHY NECK: CPT

## 2025-04-24 PROCEDURE — 83605 ASSAY OF LACTIC ACID: CPT

## 2025-04-24 PROCEDURE — 93005 ELECTROCARDIOGRAM TRACING: CPT | Performed by: EMERGENCY MEDICINE

## 2025-04-24 PROCEDURE — 70450 CT HEAD/BRAIN W/O DYE: CPT

## 2025-04-24 PROCEDURE — 81001 URINALYSIS AUTO W/SCOPE: CPT

## 2025-04-24 PROCEDURE — 86901 BLOOD TYPING SEROLOGIC RH(D): CPT

## 2025-04-24 PROCEDURE — 70553 MRI BRAIN STEM W/O & W/DYE: CPT

## 2025-04-24 PROCEDURE — 71045 X-RAY EXAM CHEST 1 VIEW: CPT

## 2025-04-24 PROCEDURE — A9579 GAD-BASE MR CONTRAST NOS,1ML: HCPCS | Performed by: STUDENT IN AN ORGANIZED HEALTH CARE EDUCATION/TRAINING PROGRAM

## 2025-04-24 PROCEDURE — 1200000000 HC SEMI PRIVATE

## 2025-04-24 PROCEDURE — 6360000002 HC RX W HCPCS: Performed by: STUDENT IN AN ORGANIZED HEALTH CARE EDUCATION/TRAINING PROGRAM

## 2025-04-24 PROCEDURE — 72131 CT LUMBAR SPINE W/O DYE: CPT

## 2025-04-24 PROCEDURE — 80053 COMPREHEN METABOLIC PANEL: CPT

## 2025-04-24 PROCEDURE — 94761 N-INVAS EAR/PLS OXIMETRY MLT: CPT

## 2025-04-24 PROCEDURE — 85610 PROTHROMBIN TIME: CPT

## 2025-04-24 PROCEDURE — 6370000000 HC RX 637 (ALT 250 FOR IP): Performed by: STUDENT IN AN ORGANIZED HEALTH CARE EDUCATION/TRAINING PROGRAM

## 2025-04-24 PROCEDURE — 93010 ELECTROCARDIOGRAM REPORT: CPT | Performed by: INTERNAL MEDICINE

## 2025-04-24 PROCEDURE — 87086 URINE CULTURE/COLONY COUNT: CPT

## 2025-04-24 PROCEDURE — 83880 ASSAY OF NATRIURETIC PEPTIDE: CPT

## 2025-04-24 PROCEDURE — 99285 EMERGENCY DEPT VISIT HI MDM: CPT

## 2025-04-24 PROCEDURE — 82962 GLUCOSE BLOOD TEST: CPT

## 2025-04-24 RX ORDER — METOPROLOL TARTRATE 25 MG/1
12.5 TABLET, FILM COATED ORAL 2 TIMES DAILY
Status: DISCONTINUED | OUTPATIENT
Start: 2025-04-24 | End: 2025-04-26 | Stop reason: HOSPADM

## 2025-04-24 RX ORDER — POLYETHYLENE GLYCOL 3350 17 G/17G
17 POWDER, FOR SOLUTION ORAL DAILY PRN
Status: DISCONTINUED | OUTPATIENT
Start: 2025-04-24 | End: 2025-04-26 | Stop reason: HOSPADM

## 2025-04-24 RX ORDER — IPRATROPIUM BROMIDE AND ALBUTEROL SULFATE 2.5; .5 MG/3ML; MG/3ML
1 SOLUTION RESPIRATORY (INHALATION) EVERY 4 HOURS PRN
Status: DISCONTINUED | OUTPATIENT
Start: 2025-04-24 | End: 2025-04-26 | Stop reason: HOSPADM

## 2025-04-24 RX ORDER — LANOLIN ALCOHOL/MO/W.PET/CERES
100 CREAM (GRAM) TOPICAL DAILY
Status: DISCONTINUED | OUTPATIENT
Start: 2025-04-25 | End: 2025-04-26 | Stop reason: HOSPADM

## 2025-04-24 RX ORDER — MIRTAZAPINE 15 MG/1
15 TABLET, FILM COATED ORAL NIGHTLY
Status: ON HOLD | COMMUNITY
Start: 2025-03-17 | End: 2025-04-26

## 2025-04-24 RX ORDER — PANCRELIPASE LIPASE, PANCRELIPASE PROTEASE, PANCRELIPASE AMYLASE 25000; 79000; 105000 [USP'U]/1; [USP'U]/1; [USP'U]/1
2 CAPSULE, DELAYED RELEASE ORAL
COMMUNITY
Start: 2025-03-31

## 2025-04-24 RX ORDER — MAGNESIUM SULFATE IN WATER 40 MG/ML
2000 INJECTION, SOLUTION INTRAVENOUS PRN
Status: DISCONTINUED | OUTPATIENT
Start: 2025-04-24 | End: 2025-04-26 | Stop reason: HOSPADM

## 2025-04-24 RX ORDER — FUROSEMIDE 40 MG/1
40 TABLET ORAL DAILY
Status: DISCONTINUED | OUTPATIENT
Start: 2025-04-24 | End: 2025-04-26 | Stop reason: HOSPADM

## 2025-04-24 RX ORDER — DOCUSATE SODIUM 100 MG/1
100 CAPSULE, LIQUID FILLED ORAL DAILY
COMMUNITY

## 2025-04-24 RX ORDER — LOPERAMIDE HYDROCHLORIDE 2 MG/1
2 CAPSULE ORAL PRN
Status: DISCONTINUED | OUTPATIENT
Start: 2025-04-24 | End: 2025-04-26 | Stop reason: HOSPADM

## 2025-04-24 RX ORDER — SODIUM CHLORIDE 9 MG/ML
500 INJECTION, SOLUTION INTRAVENOUS PRN
Status: DISCONTINUED | OUTPATIENT
Start: 2025-04-24 | End: 2025-04-26 | Stop reason: HOSPADM

## 2025-04-24 RX ORDER — PANTOPRAZOLE SODIUM 40 MG/1
40 TABLET, DELAYED RELEASE ORAL
Status: DISCONTINUED | OUTPATIENT
Start: 2025-04-25 | End: 2025-04-26 | Stop reason: HOSPADM

## 2025-04-24 RX ORDER — ENOXAPARIN SODIUM 100 MG/ML
40 INJECTION SUBCUTANEOUS
Status: DISCONTINUED | OUTPATIENT
Start: 2025-04-24 | End: 2025-04-26 | Stop reason: HOSPADM

## 2025-04-24 RX ORDER — POTASSIUM CHLORIDE 7.45 MG/ML
10 INJECTION INTRAVENOUS PRN
Status: DISCONTINUED | OUTPATIENT
Start: 2025-04-24 | End: 2025-04-26 | Stop reason: HOSPADM

## 2025-04-24 RX ORDER — BUDESONIDE 3 MG/1
3 CAPSULE, COATED PELLETS ORAL DAILY
Status: DISCONTINUED | OUTPATIENT
Start: 2025-04-24 | End: 2025-04-26 | Stop reason: HOSPADM

## 2025-04-24 RX ORDER — CLOPIDOGREL BISULFATE 75 MG/1
75 TABLET ORAL DAILY
Status: DISCONTINUED | OUTPATIENT
Start: 2025-04-25 | End: 2025-04-26 | Stop reason: HOSPADM

## 2025-04-24 RX ORDER — EZETIMIBE 10 MG/1
10 TABLET ORAL DAILY
Status: DISCONTINUED | OUTPATIENT
Start: 2025-04-25 | End: 2025-04-26 | Stop reason: HOSPADM

## 2025-04-24 RX ORDER — IOPAMIDOL 755 MG/ML
75 INJECTION, SOLUTION INTRAVASCULAR
Status: COMPLETED | OUTPATIENT
Start: 2025-04-24 | End: 2025-04-24

## 2025-04-24 RX ORDER — POTASSIUM CHLORIDE 1500 MG/1
40 TABLET, EXTENDED RELEASE ORAL PRN
Status: DISCONTINUED | OUTPATIENT
Start: 2025-04-24 | End: 2025-04-26 | Stop reason: HOSPADM

## 2025-04-24 RX ORDER — ONDANSETRON 2 MG/ML
4 INJECTION INTRAMUSCULAR; INTRAVENOUS EVERY 6 HOURS PRN
Status: DISCONTINUED | OUTPATIENT
Start: 2025-04-24 | End: 2025-04-26 | Stop reason: HOSPADM

## 2025-04-24 RX ORDER — ONDANSETRON 4 MG/1
4 TABLET, ORALLY DISINTEGRATING ORAL EVERY 8 HOURS PRN
Status: DISCONTINUED | OUTPATIENT
Start: 2025-04-24 | End: 2025-04-26 | Stop reason: HOSPADM

## 2025-04-24 RX ORDER — SODIUM CHLORIDE 0.9 % (FLUSH) 0.9 %
5-40 SYRINGE (ML) INJECTION PRN
Status: DISCONTINUED | OUTPATIENT
Start: 2025-04-24 | End: 2025-04-26 | Stop reason: HOSPADM

## 2025-04-24 RX ORDER — ATORVASTATIN CALCIUM 40 MG/1
80 TABLET, FILM COATED ORAL NIGHTLY
Status: DISCONTINUED | OUTPATIENT
Start: 2025-04-24 | End: 2025-04-26 | Stop reason: HOSPADM

## 2025-04-24 RX ORDER — MIRTAZAPINE 15 MG/1
15 TABLET, FILM COATED ORAL NIGHTLY
Status: DISCONTINUED | OUTPATIENT
Start: 2025-04-24 | End: 2025-04-26 | Stop reason: HOSPADM

## 2025-04-24 RX ORDER — POTASSIUM CHLORIDE 1500 MG/1
20 TABLET, EXTENDED RELEASE ORAL DAILY PRN
COMMUNITY
Start: 2025-04-08

## 2025-04-24 RX ORDER — ACETAMINOPHEN 325 MG/1
650 TABLET ORAL EVERY 6 HOURS PRN
Status: DISCONTINUED | OUTPATIENT
Start: 2025-04-24 | End: 2025-04-26 | Stop reason: HOSPADM

## 2025-04-24 RX ORDER — FERROUS SULFATE 325(65) MG
325 TABLET ORAL DAILY
Status: DISCONTINUED | OUTPATIENT
Start: 2025-04-25 | End: 2025-04-26 | Stop reason: HOSPADM

## 2025-04-24 RX ORDER — SODIUM CHLORIDE 0.9 % (FLUSH) 0.9 %
5-40 SYRINGE (ML) INJECTION EVERY 12 HOURS SCHEDULED
Status: DISCONTINUED | OUTPATIENT
Start: 2025-04-24 | End: 2025-04-26 | Stop reason: HOSPADM

## 2025-04-24 RX ORDER — FUROSEMIDE 40 MG/1
40 TABLET ORAL DAILY PRN
COMMUNITY

## 2025-04-24 RX ORDER — ACETAMINOPHEN 650 MG/1
650 SUPPOSITORY RECTAL EVERY 6 HOURS PRN
Status: DISCONTINUED | OUTPATIENT
Start: 2025-04-24 | End: 2025-04-26 | Stop reason: HOSPADM

## 2025-04-24 RX ADMIN — PANCRELIPASE LIPASE, PANCRELIPASE PROTEASE, PANCRELIPASE AMYLASE 50000 UNITS: 5000; 17000; 24000 CAPSULE, DELAYED RELEASE ORAL at 21:22

## 2025-04-24 RX ADMIN — GADOTERIDOL 13 ML: 279.3 INJECTION, SOLUTION INTRAVENOUS at 18:00

## 2025-04-24 RX ADMIN — SODIUM CHLORIDE, PRESERVATIVE FREE 10 ML: 5 INJECTION INTRAVENOUS at 21:22

## 2025-04-24 RX ADMIN — IOPAMIDOL 75 ML: 755 INJECTION, SOLUTION INTRAVENOUS at 15:19

## 2025-04-24 RX ADMIN — ENOXAPARIN SODIUM 40 MG: 100 INJECTION SUBCUTANEOUS at 21:21

## 2025-04-24 RX ADMIN — BUDESONIDE 3 MG: 3 CAPSULE, COATED PELLETS ORAL at 21:21

## 2025-04-24 RX ADMIN — MIRTAZAPINE 15 MG: 15 TABLET, FILM COATED ORAL at 21:21

## 2025-04-24 RX ADMIN — ATORVASTATIN CALCIUM 80 MG: 40 TABLET, FILM COATED ORAL at 21:21

## 2025-04-24 ASSESSMENT — PAIN - FUNCTIONAL ASSESSMENT: PAIN_FUNCTIONAL_ASSESSMENT: NONE - DENIES PAIN

## 2025-04-24 NOTE — ED PROVIDER NOTES
ventricular contractions)     Skin cancer     SOB (shortness of breath)      Past Surgical History:   Procedure Laterality Date    ANKLE SURGERY Left 10+ yrs ago    with hardware    COLON SURGERY  2009    \"after had chemo removed some part of the cancer from the rectal area, then 7 months later had bowel blockage had to do surgery  to remove part of the bowel\"    COLONOSCOPY  03/04/2015    mild proctitis    COLONOSCOPY  03/15/2017    mild radia proc, hypertroph pailla    COLONOSCOPY N/A 03/03/2022    COLONOSCOPY CONTROL HEMORRHAGE WITH STRAIGHT FIRE APC WITH CLIP PLACEMENT X 2 performed by Salas Castillo MD at [facility] ENDOSCOPY    FRACTURE SURGERY      left ankle    OTHER SURGICAL HISTORY  04/23/2015    mediport removal    PTCA  2009 4/22/2009 had angioplasty with stent to LAD & another day in 2009 stent x 2 to RCA done    PTCA  02/22/2022    RCA stented.    TONSILLECTOMY      as a kid age 12    TOTAL HIP ARTHROPLASTY Left 10/19/2022    LEFT HIP TOTAL ARTHROPLASTY ANTERIOR APPROACH performed by Kiko Vaughan MD at [facility] OR    TUBAL LIGATION  25 yrs ago    TUNNELED VENOUS PORT PLACEMENT  2009    port inserted     UPPER GASTROINTESTINAL ENDOSCOPY N/A 02/26/2022    EGD DIAGNOSTIC ONLY performed by Salas Castillo MD at [facility] ENDOSCOPY    UPPER GASTROINTESTINAL ENDOSCOPY N/A 03/03/2022    ENTEROSCOPY PUSH DIAGNOSTIC performed by Salas Castillo MD at [facility] ENDOSCOPY     Family History   Problem Relation Age of Onset    Cancer Mother         breast ca??    Cancer Father         prostate cancer- lung mets- bone mets    Stroke Father     High Cholesterol Father     Stroke Sister     Cancer Brother         neck cancer    No Known Problems Daughter      Social History     Socioeconomic History    Marital status:      Spouse name: Not on file    Number of children: Not on file    Years of education: 11    Highest education level: Not on file   Occupational History    Occupation: unemployed   Tobacco Use    Smoking                    Comment: Please note this report has been produced using speech recognition software and may contain errors related to that system including errors in grammar, punctuation, and spelling, as well as words and phrases that may be inappropriate.  Efforts were made to edit the dictations.        Noemí Crooks DO  04/25/25 1943

## 2025-04-24 NOTE — ED NOTES
ED TO INPATIENT SBAR HANDOFF    Patient Name: Renata Domínguez   :  1965  60 y.o.   Preferred Name  Renata   Family/Caregiver Present yes   Restraints no   C-SSRS: Risk of Suicide: No Risk  Sitter no   Sepsis Risk Score Sepsis V2 Risk Score: 22.7      Situation  Chief Complaint   Patient presents with    Aphasia    Altered Mental Status     Brief Description of Patient's Condition:  Pt arrived to ED with c/o slurred speech for a few months, AMS since  and left arm pain.  at bedside stating pt extensive medical hx. On room air. From home. Pure wick in place.   Mental Status: alert  Arrived from: home    Imaging:   XR CHEST PORTABLE   Final Result      CT HEAD WO CONTRAST    (Results Pending)   CTA HEAD NECK W CONTRAST    (Results Pending)   CT CERVICAL SPINE WO CONTRAST    (Results Pending)   CT LUMBAR SPINE WO CONTRAST    (Results Pending)   CT THORACIC SPINE WO CONTRAST    (Results Pending)     Abnormal labs:   Abnormal Labs Reviewed   CBC WITH AUTO DIFFERENTIAL - Abnormal; Notable for the following components:       Result Value    RBC 4.05 (*)     Hemoglobin 10.0 (*)     Hematocrit 32.6 (*)     MCH 24.7 (*)     MCHC 30.7 (*)     RDW 17.2 (*)     Platelets 466 (*)     Monocytes % 8 (*)     Immature Granulocytes % 1 (*)     All other components within normal limits   COMPREHENSIVE METABOLIC PANEL W/ REFLEX TO MG FOR LOW K - Abnormal; Notable for the following components:    Glucose 113 (*)     Total Protein 6.2 (*)     Albumin 3.3 (*)     Alkaline Phosphatase 201 (*)     All other components within normal limits   TROPONIN - Abnormal; Notable for the following components:    Troponin, High Sensitivity 27 (*)     All other components within normal limits   BRAIN NATRIURETIC PEPTIDE - Abnormal; Notable for the following components:    NT Pro- (*)     All other components within normal limits   POCT GLUCOSE - Abnormal; Notable for the following components:    POC Glucose 112 (*)     All other

## 2025-04-24 NOTE — ED NOTES
1702 perfect serve message sent to Dr Alamo on in pt consult from hospitalist.    1703 Dr Alamo acknowledged perfect serve message. Added to treatment team.

## 2025-04-24 NOTE — ED NOTES
Medication History  [facility]    Patient Name: Renata Domínguez 1965     Medication history has been completed by: Sandi Solano CPhT    Source(s) of information: medication list provided by patient/;     Primary Care Physician: Mert Rea MD     Pharmacy: [facility]    Allergies as of 04/24/2025 - Fully Reviewed 04/24/2025   Allergen Reaction Noted    Brilinta [ticagrelor] Shortness Of Breath 05/04/2022    Adhesive tape Other (See Comments) 03/05/2022    Crestor [rosuvastatin calcium]  07/22/2022    Percocet [oxycodone-acetaminophen] Other (See Comments) 11/08/2022    Vicodin hp [hydrocodone-acetaminophen] Other (See Comments) 02/22/2022    Tylenol with codeine #3 [acetaminophen-codeine] Nausea And Vomiting 02/23/2016        Prior to Admission medications    Medication Sig Start Date End Date Taking? Authorizing Provider   potassium chloride (KLOR-CON M) 20 MEQ extended release tablet Take 1 tablet by mouth daily as needed 04/24/25 Only takes with furosemide as needed. 4/8/25  Yes ProviderJeffery MD   ZENPEP 09818-62460 units CPEP Take 2 capsules by mouth 3 times daily (with meals) 04/24/25 Takes an additional capsule with snacks 3/31/25  Yes Provider, MD Jeffery   mirtazapine (REMERON) 15 MG tablet Take 1 tablet by mouth nightly 3/17/25  Yes ProviderJeffery MD   furosemide (LASIX) 40 MG tablet Take 1 tablet by mouth daily as needed 04/24/25 Patient reports only takes as needed.   Yes ProviderJeffery MD   FERROUS SULFATE PO Take 1 tablet by mouth daily OTC   Yes Provider, MD Jeffery   docusate sodium (COLACE) 100 MG capsule Take 1 capsule by mouth daily OTC   Yes ProviderJeffery MD   clopidogrel (PLAVIX) 75 MG tablet Take 1 tablet by mouth daily 2/4/25  Yes Alex Ni, APRN - CNP   ezetimibe (ZETIA) 10 MG tablet Take 1 tablet by mouth daily 11/15/24  Yes Neha Yousif MD   cyanocobalamin 500 MCG tablet Take 1 tablet by mouth daily  chloride (KLOR-CON) 20 MEQ packet Take 20 mEq by mouth 2 times daily  4/24/25  Jeffery Daigle MD   loperamide (IMODIUM) 2 MG capsule Take 1 capsule by mouth as needed for Diarrhea    Jeffery Daigle MD   albuterol-ipratropium (COMBIVENT RESPIMAT)  MCG/ACT AERS inhaler Inhale into the lungs 5/15/17   Jeffery Daigle MD     Medications added or changed (ex. new medication, dosage change, interval change, formulation change):  Mirtazapine (added)  Zenpep (added)  Ferrous sulfate (added)  Docusate (added)    Medications removed from list (include reason, ex. noncompliance, medication cost, therapy complete etc.):   Combivent inhaler Not on med list provided by patient, last fill date 06/12/2018  Trazodone Not on med list provided by patient, no claims noted.  Atorvastatin Not on med list provided by patient, last fill date 01/03/2024    Comments:  Medication list provided by patient/, do not feel med list complete as patient has several medications not on list provided but has active claims noted.  When asked patient/ certain questions regarding med list they both seemed unsure of medications in question.  Those medications marked unknown for last taken.    To my knowledge the above medication history is accurate as of 4/24/2025 4:27 PM.   Sandi Solano CPhT   4/24/2025 4:27 PM

## 2025-04-24 NOTE — PROGRESS NOTES
4 Eyes Skin Assessment     NAME:  Renata Domínguez  YOB: 1965  MEDICAL RECORD NUMBER:  [id number]    The patient is being assessed for  Admission    I agree that at least one RN has performed a thorough Head to Toe Skin Assessment on the patient. ALL assessment sites listed below have been assessed.      Areas assessed by both nurses:    Head, Face, Ears, Shoulders, Back, Chest, Arms, Elbows, Hands, Sacrum. Buttock, Coccyx, Ischium, Legs. Feet and Heels, and Under Medical Devices         Does the Patient have a Wound? Yes wound(s) were present on assessment. LDA wound assessment was Initiated and completed by RN       Vernon Prevention initiated by RN: Yes  Wound Care Orders initiated by RN: No    Pressure Injury (Stage 3,4, Unstageable, DTI, NWPT, and Complex wounds) if present, place Wound referral order by RN under : No    New Ostomies, if present place, Ostomy referral order under : No     Nurse 1 eSignature: Electronically signed by Irene Limon RN on 4/24/25 at 6:57 PM EDT    **SHARE this note so that the co-signing nurse can place an eSignature**    Nurse 2 eSignature: Electronically signed by Pham Adams RN on 4/24/25 at 6:57 PM EDT

## 2025-04-24 NOTE — H&P
INDICATION: Stroke Symptoms TECHNIQUE: CT HEAD WO CONTRAST COMPARISON: No existing relevant imaging study corresponding to the same anatomical region is available. FINDINGS:  The calvarium is intact. Sinuses are clear. There is no evidence of an acute intracranial hemorrhage. Brain parenchyma appears within normal limits. There is  mild atherosclerotic calcification of the intracranial carotid arteries. IMPRESSION:  1.  No acute intracranial finding. This dictation was created with voice recognition software.  While attempts have  been made to review the dictation as it is transcribed, on occasion the spoken word can be misinterpreted by the technology leading to omissions or inappropriate words, phrases or sentences.  Dictated and Electronically Signed By: Pepe Flores MD 4/24/2025 15:39        XR CHEST PORTABLE  Result Date: 4/24/2025  PROCEDURE: XR CHEST PORTABLE, 4/24/2025 13:51 DEMOGRAPHICS: 60 years old Female INDICATION: altered mental status COMPARISON: None available. FINDINGS: Single AP portable view of the chest. Cardiomediastinal contours are within normal limits. There is atherosclerotic calcification of the aorta. The right-sided chest port catheter tip overlies the  mid to low SVC. The lungs are well inflated and clear. Bony structures demonstrate no acute finding. There is probable external material overlying the left shoulder. IMPRESSION: No acute cardiopulmonary finding.  Dictated and Electronically Signed By: Angela Gonda 4/24/2025 14:13            Electronically signed by Elliot Perez MD on 4/24/2025 at 5:12 PM

## 2025-04-24 NOTE — ED NOTES
1710 perfect serve message sent to Dr Jerrica Jett on in patient consult from hospitalist     1710 Dr Jett acknowledged perfect serve message. Added to treatment team.

## 2025-04-24 NOTE — ED TRIAGE NOTES
Pt arrived to ED via wheelchair with c/o slurred speech (for past few months), left arm pain, and AMS since Sunday. Takes Plavix. Lives at home.  at bedside. On room air.  reports pt to \"stare into space\". Recent falls.

## 2025-04-25 ENCOUNTER — APPOINTMENT (OUTPATIENT)
Dept: NON INVASIVE DIAGNOSTICS | Age: 60
End: 2025-04-25
Attending: STUDENT IN AN ORGANIZED HEALTH CARE EDUCATION/TRAINING PROGRAM
Payer: MEDICAID

## 2025-04-25 PROBLEM — R56.9 SEIZURE-LIKE ACTIVITY (HCC): Status: ACTIVE | Noted: 2025-04-25

## 2025-04-25 PROBLEM — G20.C PARKINSONISM (HCC): Status: ACTIVE | Noted: 2025-04-25

## 2025-04-25 LAB
ALBUMIN SERPL-MCNC: 3 G/DL (ref 3.4–5)
ALBUMIN/GLOB SERPL: 0.8 {RATIO} (ref 1.1–2.2)
ALP SERPL-CCNC: 185 U/L (ref 40–129)
ALT SERPL-CCNC: 21 U/L (ref 10–40)
ANION GAP SERPL CALCULATED.3IONS-SCNC: 13 MMOL/L (ref 9–17)
AST SERPL-CCNC: 23 U/L (ref 15–37)
BASOPHILS # BLD: 0.03 K/UL
BASOPHILS NFR BLD: 0 % (ref 0–1)
BILIRUB SERPL-MCNC: 0.3 MG/DL (ref 0–1)
BUN SERPL-MCNC: 12 MG/DL (ref 7–20)
CALCIUM SERPL-MCNC: 9.1 MG/DL (ref 8.3–10.6)
CHLORIDE SERPL-SCNC: 104 MMOL/L (ref 99–110)
CO2 SERPL-SCNC: 23 MMOL/L (ref 21–32)
CREAT SERPL-MCNC: 0.6 MG/DL (ref 0.6–1.1)
CRP SERPL HS-MCNC: 125 MG/L (ref 0–5)
ECHO AO ROOT DIAM: 3.4 CM
ECHO AO ROOT INDEX: 2.05 CM/M2
ECHO AV AREA PEAK VELOCITY: 2.7 CM2
ECHO AV AREA VTI: 2.8 CM2
ECHO AV AREA/BSA PEAK VELOCITY: 1.6 CM2/M2
ECHO AV AREA/BSA VTI: 1.7 CM2/M2
ECHO AV MEAN GRADIENT: 5 MMHG
ECHO AV MEAN VELOCITY: 1.1 M/S
ECHO AV PEAK GRADIENT: 10 MMHG
ECHO AV PEAK VELOCITY: 1.6 M/S
ECHO AV VELOCITY RATIO: 1
ECHO AV VTI: 21.2 CM
ECHO BSA: 1.69 M2
ECHO IVC PROX: 0.9 CM
ECHO LA AREA 4C: 5.6 CM2
ECHO LA DIAMETER INDEX: 1.14 CM/M2
ECHO LA DIAMETER: 1.9 CM
ECHO LA MAJOR AXIS: 3.8 CM
ECHO LA TO AORTIC ROOT RATIO: 0.56
ECHO LA VOL MOD A4C: 7 ML (ref 22–52)
ECHO LA VOLUME INDEX MOD A4C: 4 ML/M2 (ref 16–34)
ECHO LV E' LATERAL VELOCITY: 8.5 CM/S
ECHO LV E' SEPTAL VELOCITY: 6.8 CM/S
ECHO LV EDV A4C: 47 ML
ECHO LV EDV INDEX A4C: 28 ML/M2
ECHO LV EF PHYSICIAN: 70 %
ECHO LV EJECTION FRACTION A4C: 75 %
ECHO LV ESV A4C: 12 ML
ECHO LV ESV INDEX A4C: 7 ML/M2
ECHO LV FRACTIONAL SHORTENING: 31 % (ref 28–44)
ECHO LV INTERNAL DIMENSION DIASTOLE INDEX: 2.17 CM/M2
ECHO LV INTERNAL DIMENSION DIASTOLIC: 3.6 CM (ref 3.9–5.3)
ECHO LV INTERNAL DIMENSION SYSTOLIC INDEX: 1.51 CM/M2
ECHO LV INTERNAL DIMENSION SYSTOLIC: 2.5 CM
ECHO LV IVSD: 0.8 CM (ref 0.6–0.9)
ECHO LV MASS 2D: 78.8 G (ref 67–162)
ECHO LV MASS INDEX 2D: 47.5 G/M2 (ref 43–95)
ECHO LV POSTERIOR WALL DIASTOLIC: 0.8 CM (ref 0.6–0.9)
ECHO LV RELATIVE WALL THICKNESS RATIO: 0.44
ECHO LVOT AREA: 2.5 CM2
ECHO LVOT AV VTI INDEX: 1.08
ECHO LVOT DIAM: 1.8 CM
ECHO LVOT MEAN GRADIENT: 5 MMHG
ECHO LVOT PEAK GRADIENT: 10 MMHG
ECHO LVOT PEAK VELOCITY: 1.6 M/S
ECHO LVOT STROKE VOLUME INDEX: 35.1 ML/M2
ECHO LVOT SV: 58.2 ML
ECHO LVOT VTI: 22.9 CM
ECHO MV A VELOCITY: 0.88 M/S
ECHO MV E VELOCITY: 0.69 M/S
ECHO MV E/A RATIO: 0.78
ECHO MV E/E' LATERAL: 8.12
ECHO MV E/E' RATIO (AVERAGED): 9.13
ECHO MV E/E' SEPTAL: 10.15
ECHO RV MID DIMENSION: 2.5 CM
EOSINOPHIL # BLD: 0.06 K/UL
EOSINOPHILS RELATIVE PERCENT: 1 % (ref 0–3)
ERYTHROCYTE [DISTWIDTH] IN BLOOD BY AUTOMATED COUNT: 17 % (ref 11.7–14.9)
EST. AVERAGE GLUCOSE BLD GHB EST-MCNC: 138 MG/DL
GFR, ESTIMATED: >90 ML/MIN/1.73M2
GLUCOSE SERPL-MCNC: 104 MG/DL (ref 74–99)
HBA1C MFR BLD: 6.4 % (ref 4.2–6.3)
HCT VFR BLD AUTO: 31.8 % (ref 37–47)
HGB BLD-MCNC: 9.9 G/DL (ref 12.5–16)
IMM GRANULOCYTES # BLD AUTO: 0.06 K/UL
IMM GRANULOCYTES NFR BLD: 1 %
LACTATE BLDV-SCNC: 1.1 MMOL/L (ref 0.4–2)
LYMPHOCYTES NFR BLD: 2.46 K/UL
LYMPHOCYTES RELATIVE PERCENT: 29 % (ref 24–44)
MAGNESIUM SERPL-MCNC: 2.2 MG/DL (ref 1.8–2.4)
MCH RBC QN AUTO: 25.2 PG (ref 27–31)
MCHC RBC AUTO-ENTMCNC: 31.1 G/DL (ref 32–36)
MCV RBC AUTO: 80.9 FL (ref 78–100)
MICROORGANISM SPEC CULT: NORMAL
MONOCYTES NFR BLD: 0.64 K/UL
MONOCYTES NFR BLD: 8 % (ref 0–5)
NEUTROPHILS NFR BLD: 61 % (ref 36–66)
NEUTS SEG NFR BLD: 5.11 K/UL
PHOSPHATE SERPL-MCNC: 3.7 MG/DL (ref 2.5–4.9)
PLATELET # BLD AUTO: 428 K/UL (ref 140–440)
PMV BLD AUTO: 8.8 FL (ref 7.5–11.1)
POTASSIUM SERPL-SCNC: 4.4 MMOL/L (ref 3.5–5.1)
PROCALCITONIN SERPL-MCNC: 0.07 NG/ML
PROT SERPL-MCNC: 6.7 G/DL (ref 6.4–8.2)
RBC # BLD AUTO: 3.93 M/UL (ref 4.2–5.4)
SERVICE CMNT-IMP: NORMAL
SODIUM SERPL-SCNC: 139 MMOL/L (ref 136–145)
SPECIMEN DESCRIPTION: NORMAL
WBC OTHER # BLD: 8.4 K/UL (ref 4–10.5)

## 2025-04-25 PROCEDURE — 94761 N-INVAS EAR/PLS OXIMETRY MLT: CPT

## 2025-04-25 PROCEDURE — 6370000000 HC RX 637 (ALT 250 FOR IP): Performed by: STUDENT IN AN ORGANIZED HEALTH CARE EDUCATION/TRAINING PROGRAM

## 2025-04-25 PROCEDURE — 99223 1ST HOSP IP/OBS HIGH 75: CPT | Performed by: STUDENT IN AN ORGANIZED HEALTH CARE EDUCATION/TRAINING PROGRAM

## 2025-04-25 PROCEDURE — 84100 ASSAY OF PHOSPHORUS: CPT

## 2025-04-25 PROCEDURE — 80053 COMPREHEN METABOLIC PANEL: CPT

## 2025-04-25 PROCEDURE — 83735 ASSAY OF MAGNESIUM: CPT

## 2025-04-25 PROCEDURE — 2500000003 HC RX 250 WO HCPCS: Performed by: STUDENT IN AN ORGANIZED HEALTH CARE EDUCATION/TRAINING PROGRAM

## 2025-04-25 PROCEDURE — 93306 TTE W/DOPPLER COMPLETE: CPT

## 2025-04-25 PROCEDURE — 6360000002 HC RX W HCPCS: Performed by: STUDENT IN AN ORGANIZED HEALTH CARE EDUCATION/TRAINING PROGRAM

## 2025-04-25 PROCEDURE — 93306 TTE W/DOPPLER COMPLETE: CPT | Performed by: INTERNAL MEDICINE

## 2025-04-25 PROCEDURE — 36415 COLL VENOUS BLD VENIPUNCTURE: CPT

## 2025-04-25 PROCEDURE — 95819 EEG AWAKE AND ASLEEP: CPT

## 2025-04-25 PROCEDURE — 83036 HEMOGLOBIN GLYCOSYLATED A1C: CPT

## 2025-04-25 PROCEDURE — 85025 COMPLETE CBC W/AUTO DIFF WBC: CPT

## 2025-04-25 PROCEDURE — 99254 IP/OBS CNSLTJ NEW/EST MOD 60: CPT | Performed by: INTERNAL MEDICINE

## 2025-04-25 PROCEDURE — 84145 PROCALCITONIN (PCT): CPT

## 2025-04-25 PROCEDURE — 83605 ASSAY OF LACTIC ACID: CPT

## 2025-04-25 PROCEDURE — 1200000000 HC SEMI PRIVATE

## 2025-04-25 PROCEDURE — 86140 C-REACTIVE PROTEIN: CPT

## 2025-04-25 PROCEDURE — 92610 EVALUATE SWALLOWING FUNCTION: CPT

## 2025-04-25 RX ORDER — POTASSIUM CHLORIDE 1500 MG/1
20 TABLET, EXTENDED RELEASE ORAL DAILY
Status: DISCONTINUED | OUTPATIENT
Start: 2025-04-25 | End: 2025-04-26 | Stop reason: HOSPADM

## 2025-04-25 RX ADMIN — METOPROLOL TARTRATE 12.5 MG: 25 TABLET, FILM COATED ORAL at 10:32

## 2025-04-25 RX ADMIN — METOPROLOL TARTRATE 12.5 MG: 25 TABLET, FILM COATED ORAL at 00:06

## 2025-04-25 RX ADMIN — SODIUM CHLORIDE, PRESERVATIVE FREE 10 ML: 5 INJECTION INTRAVENOUS at 10:32

## 2025-04-25 RX ADMIN — ATORVASTATIN CALCIUM 80 MG: 40 TABLET, FILM COATED ORAL at 21:42

## 2025-04-25 RX ADMIN — EZETIMIBE 10 MG: 10 TABLET ORAL at 10:31

## 2025-04-25 RX ADMIN — CLOPIDOGREL BISULFATE 75 MG: 75 TABLET, FILM COATED ORAL at 10:31

## 2025-04-25 RX ADMIN — Medication 100 MG: at 10:31

## 2025-04-25 RX ADMIN — SODIUM CHLORIDE, PRESERVATIVE FREE 10 ML: 5 INJECTION INTRAVENOUS at 21:44

## 2025-04-25 RX ADMIN — PANTOPRAZOLE SODIUM 40 MG: 40 TABLET, DELAYED RELEASE ORAL at 10:31

## 2025-04-25 RX ADMIN — FERROUS SULFATE TAB 325 MG (65 MG ELEMENTAL FE) 325 MG: 325 (65 FE) TAB at 21:43

## 2025-04-25 RX ADMIN — METOPROLOL TARTRATE 12.5 MG: 25 TABLET, FILM COATED ORAL at 21:43

## 2025-04-25 RX ADMIN — BUDESONIDE 3 MG: 3 CAPSULE, COATED PELLETS ORAL at 10:32

## 2025-04-25 RX ADMIN — PANCRELIPASE LIPASE, PANCRELIPASE PROTEASE, PANCRELIPASE AMYLASE 50000 UNITS: 5000; 17000; 24000 CAPSULE, DELAYED RELEASE ORAL at 17:06

## 2025-04-25 RX ADMIN — PANCRELIPASE LIPASE, PANCRELIPASE PROTEASE, PANCRELIPASE AMYLASE 50000 UNITS: 5000; 17000; 24000 CAPSULE, DELAYED RELEASE ORAL at 12:58

## 2025-04-25 RX ADMIN — PANCRELIPASE LIPASE, PANCRELIPASE PROTEASE, PANCRELIPASE AMYLASE 50000 UNITS: 5000; 17000; 24000 CAPSULE, DELAYED RELEASE ORAL at 10:31

## 2025-04-25 RX ADMIN — POTASSIUM CHLORIDE 20 MEQ: 1500 TABLET, EXTENDED RELEASE ORAL at 12:58

## 2025-04-25 RX ADMIN — MIRTAZAPINE 15 MG: 15 TABLET, FILM COATED ORAL at 21:43

## 2025-04-25 RX ADMIN — FUROSEMIDE 40 MG: 40 TABLET ORAL at 10:31

## 2025-04-25 RX ADMIN — ENOXAPARIN SODIUM 40 MG: 100 INJECTION SUBCUTANEOUS at 21:42

## 2025-04-25 NOTE — CONSULTS
HOSPITAL CONSULT: 2025 [facility]  PATIENT: Renata Domínguez   MRN: [id number]   : 1965   NEUROSURGEON:  Jorgito Sheffield DO  ATTENDING: Elliot Perez MD  PCP: Mert Rea MD     HISTORY  Renata Domínguez is a 60 y.o. right handed female who presents with gait slowing.   She is  ambulatory and complains of slowing of ambulation.  The patient lives in the community and is  functional.    Neurosurgery consult was requested  for help with management and possible intervention.    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    H/O cardiovascular stress test 2016    treadmill    History of blood transfusion     3    NSTEMI (non-ST elevated myocardial infarction) (HCC)     pt states she has had two NSTEMI    Obese     PONV (postoperative nausea and vomiting)     hx motion sickness    Post PTCA 2022    RCA stented.    PVC's (premature ventricular contractions)     Skin cancer     SOB (shortness of breath)        CURRENT OUTPATIENT MEDICATIONS:   No current facility-administered medications on file prior to encounter.     Current Outpatient Medications on File Prior to Encounter   Medication Sig Dispense Refill    potassium chloride (KLOR-CON M) 20 MEQ extended release tablet Take 1 tablet by mouth daily as needed 25 Only takes with furosemide as needed.      ZENPEP 88426-82904 units CPEP Take 2 capsules by mouth 3 times daily (with meals) 25 Takes an additional capsule with snacks      mirtazapine (REMERON) 15 MG tablet Take 1 tablet by mouth nightly       vomiting, diarrhea, constipation, shortness of breath, chest pain, cough, wheezing, loss of bowels or bladder, change in vision, weight change, dizziness, or pain in the chest or abdomen. All other systems are discussed and reviewed as normal.     PHYSICAL EXAMINATION  Renata is in 3012/3012-A and accompanied by her family.      VITAL SIGNS  @FLOWDT(6:last)@ @FLOWSTATM(6:24)@ @FLOWDT(5:last)@ @FLOWDT(8:last)@ @FLOWDT(9:last)@ @FLOWDT(10:last)@   [unfilled]  @FLOWDT(14:last)@      Renata is oriented to person, place, time, and situation and provides fair3 history.  General: Appearance awake mask sarthak facies he is slow to speak microphone he and   Body habitus: Mild obesity  Affect: Flat  Cranial Nerves: 2-12 intact   Speech:microphonia   Motor:     Left Right   Deltoid 5 5   Biceps 5 5   Triceps 5 5   Wrist Flexion 5 5   Wrist Extension 5 5    5 5      Left Right   Hip Flexion 5 5   Knee Extension 5 5   Knee Flexion 5 5   Dorsiflexion 5 5   EHL Flexion 5 5   Plantarflexion 5 5     Sensory: intact to light touch   DTR: 1+  Cho: negative   Special Tests negative   Gait: deferred  Assist devices: deferred  Balance:  deferred    Musculoskeletal:               SI joints: negative     BRITNEY: negative      Spurling: negative      Cardiovascular: Regular rate and rhythm, no murmurs or rubs, extremities warm, pulses intact   Pulmonary: Clear   Abdomen: Soft, nontender, bowel sounds present     IMAGING     All radiographic imaging below has been .     CT scan of the lumbar spine was reviewed shows multiple level lumbar spondylosis there is facet arthropathy at L4-5 with lateral recess stenosis.      LABS   Recent Labs     04/24/25  1315 04/25/25  0304   WBC 9.7 8.4    139    104   CO2 23 23   BUN 13 12   CREATININE 0.7 0.6   GLUCOSE 113* 104*   INR 0.9  --    CRP  --  125.0*         MEDICAL /  SURGICAL DECISION MAKING    1.  Parkinsonism  2.  Lumbar stenosis    PLAN:    1.  No need for acute neurosurgical

## 2025-04-25 NOTE — PROGRESS NOTES
V2.0    [facility] Progress Note      Name:  Renata Domínguez /Age/Sex: 1965  (60 y.o. female)   MRN & CSN:  [id number] & [id number] Encounter Date/Time: 2025 8:55 AM EDT   Location:  [address] [place] [address] PCP: Mert Rea MD     Attending:Elliot Perez MD       Hospital Day: 2    Assessment and Recommendations   Renata Domínguez is a 60 y.o. female with pmh of asthma, endometrial cancer, CAD who presents with Parkinson disease, symptomatic (HCC)      Plan:     Slowed movements, possible new onset Parkinson's disease:  - Patient having masked facies, soft speech, trouble initiating movements and having slow rate of alternate movements, slowed movements, walking with short steps at a slower rate and multiple falls with no significant memory loss concerning for early Parkinson's disease. Patient having symptoms roughly for 1 year and she has been on ketruda treatment since 2024    - [facility]  - Telemetry  - CT head/CTA unremarkable  - MRI brain with contrast unremarkablefor any metastatic disease or stroke  - Fall precautions  - PT/OT  - SLP  - Neurology consulted  - Will discuss paraneoplastic panel with neurology     Lumbar Anterolisthesis:  -Will d/w Nsx    HLD-patient on statin, Repatha and fibrate  History of diarrhea-Imodium as needed  Depression-patient on Remeron  Hypothyroidism-patient on Synthyroid  COPD/centrilobular emphysema-patient in no acute exacerbation continue home inhalers  History of rectal bleed-colonoscopy showed colitis of the sigmoid and rectal area  Iron deficiency anemia-continue ferrous sulfate  History of endometrial cancer status post hysterectomy with mets to lungs.  Patient receiving palliative therapy. Patient having neuro symptoms roughly for 1 year and she has been on ketruda treatment since 2024 with some medication related side effects including rash and diarrhea. Possible drud induced Parkinson's?  Coronary artery disease status post PCI  Elevated  evidence for acute traumatic injury or acute pathology in the thoracic spine. This dictation was created with voice recognition software.  While attempts have  been made to review the dictation as it is transcribed, on occasion the spoken word can be misinterpreted by the technology leading to omissions or inappropriate words, phrases or sentences.  Dictated and Electronically Signed By: Janak Emery MD 4/24/2025 15:49        CT CERVICAL SPINE WO CONTRAST  Result Date: 4/24/2025  PROCEDURE: CT CERVICAL SPINE WO CONTRAST DATE OF EXAM:  4/24/2025 14:50 DEMOGRAPHICS: 60 years old Female INDICATION: pain injection Friday, staring spells TECHNIQUE: CT CERVICAL SPINE WO CONTRAST COMPARISON: No existing relevant imaging study corresponding to the same anatomical region is available. FINDINGS:  No acute fracture or malalignment of the cervical spine. The soft tissues are within normal limits. There is contrast noted within the vasculature. Thyroid is  intact. Partially visualized right-sided central venous catheter. IMPRESSION:  1.  No acute fracture or malalignment of the cervical spine.  Dictated and Electronically Signed By: Pepe Flores MD 4/24/2025 15:43        CT HEAD WO CONTRAST  Result Date: 4/24/2025  PROCEDURE: CT HEAD WO CONTRAST DATE OF EXAM:  4/24/2025 14:50 DEMOGRAPHICS: 60 years old Female INDICATION: Stroke Symptoms TECHNIQUE: CT HEAD WO CONTRAST COMPARISON: No existing relevant imaging study corresponding to the same anatomical region is available. FINDINGS:  The calvarium is intact. Sinuses are clear. There is no evidence of an acute intracranial hemorrhage. Brain parenchyma appears within normal limits. There is  mild atherosclerotic calcification of the intracranial carotid arteries. IMPRESSION:  1.  No acute intracranial finding. This dictation was created with voice recognition software.  While attempts have  been made to review the dictation as it is transcribed, on occasion the spoken word can be

## 2025-04-25 NOTE — CARE COORDINATION
CM reviewed pt’s medical record and discussed pt in IDR. CM introduced self and explained the role of case management. CM in to see pt to initiate D/C planning. Pt is alert and oriented and  Miah is at bedside. PTA pt required assistance with ADLs and mobility--able to walk short distances. Currently pt needs moderate assistance with ADLs. Pt has insurance and a supportive family. Pt has PT at home.  believes PT is from [facility]. Pt was cooperative with CM assessment. CM discussed PT/OT process/recommendations. Plan for discharge is undetermined. Pt will need PT/OT evals to determine safe discharge plan. Whiteboard placed.     04/25/25 3172   Service Assessment   Patient Orientation Alert and Oriented   Cognition Alert   History Provided By Patient;Spouse;Medical Record   Primary Caregiver Family   Accompanied By/Relationship Miah   Support Systems Spouse/Significant Other;Family Members;Children   Patient's Healthcare Decision Maker is: Legal Next of Kin   PCP Verified by CM Yes   Last Visit to PCP Within last 3 months   Prior Functional Level Assistance with the following:;Bathing;Dressing;Toileting;Cooking;Housework;Shopping;Mobility   Current Functional Level Assistance with the following:;Bathing;Toileting;Mobility   Can patient return to prior living arrangement Unknown at present   Ability to make needs known: Good   Family able to assist with home care needs: Yes   Would you like for me to discuss the discharge plan with any other family members/significant others, and if so, who? Yes  (LNOK or family as needed)   Financial Resources Medicaid   Community Resources ECF/Home Care   CM/SW Referral Other (see comment)  (case management discharge assessment)   Social/Functional History   Lives With Spouse   Type of Home House   Home Layout Two level;Able to Live on Main level with bedroom/bathroom   Home Access Stairs to enter with rails   Entrance Stairs - Number of Steps 3-CLEO

## 2025-04-25 NOTE — PROGRESS NOTES
Facility/Department: [facility] 3E   CLINICAL BEDSIDE SWALLOW EVALUATION    NAME: Renata Domínguez  : 1965  MRN: [id number]    ADMISSION DATE: 2025  ADMITTING DIAGNOSIS: has Precordial chest pain; NSTEMI (non-ST elevated myocardial infarction) (HCC); Coronary artery disease involving native coronary artery of native heart with unstable angina pectoris (HCC); Mild intermittent asthma without complication; Pain in both lower extremities; Dizziness; Palpitations; Unilateral primary osteoarthritis, left hip; Avascular necrosis of left femur (HCC); Unstable angina (HCC); Dyslipidemia; Primary hypertension; STEMI (ST elevation myocardial infarction) ([facility]); Gastrointestinal hemorrhage; Anemia; Pain of upper abdomen; Anticoagulated; UGIB (upper gastrointestinal bleed); Rectal bleeding; AVM (arteriovenous malformation) of colon; Post PTCA; Primary osteoarthritis of right knee; Avascular necrosis of proximal end of right femur (HCC); Primary osteoarthritis of left hip; Status post total hip replacement, left; Chest pain; Abnormal EKG; PVC's (premature ventricular contractions); SOB (shortness of breath); Family history of coronary artery disease; and Parkinson disease, symptomatic ([facility]) on their problem list.      Impressions: Renata Domínguez was seen for a bedside swallowing evaluation after being admitted to [facility] with possible new onset Parkinson's disease.  Pt was alert and cooperative throughout assessment.  Relevant medical hx includes COPD, asthma and CAD.  Pt's  at bedside reports pt has occasional difficulty swallowing at baseline.  For today's assessment pt was positioned upright in bed and presented with a weak vocal quality with significantly reduced vocal intensity and a strong volitional cough.  Oral mechanism examination indicated reduced labial/lingual strength with adequate coordination and symmetry.  PO trials of puree, regular solids and thin liquids by cup/straw sips were given.  Mild oral

## 2025-04-25 NOTE — PROGRESS NOTES
Routine inpatient EEG completed.  Epileptologist, Arlene Yip, notified via [employer].       Electronically signed by Juan Aparicio on 4/25/2025 at 12:35 PM

## 2025-04-25 NOTE — CONSULTS
CARDIOLOGY CONSULT NOTE    MD Cally     Name: Renata Domínguez    /Age/Sex: 1965 (60 y.o. female)  MRN & CSN: [id number] & [id number]    Admission Date/Time: 2025  1:10 PM  Location: [address] [place] [address]    PCP: Mert Rea MD  Admit Date: 2025  Hospital Day: 2          Reason for Consultation:   elevated troponin    History of present illness:Rneata is a 60 y.o.year old who is admitted with   Chief Complaint   Patient presents with    Aphasia    Altered Mental Status        Patient is a 60-year-old female was admitted to the hospital with Parkinson-like features, masked facies soft speech difficult speaking   Cardiology consulted to evaluate patient for elevated troponin.  Patient denies any chest pain shortness of breath or palpitation    Patient with primary care history significant for essential hypertension hyperlipidemia coronary disease status post PCI and anal cancer.    Coronary angiogram was in 2023, IVUS guided PCI to proximal LAD with 4.0/18 mm stent  As mentioned patient denies any cardiovascular active symptoms.    Cardiac troponin 27, 24, 21, 20  EKG shows sinus rhythm with right bundle branch block      ECHO today:        Left Ventricle: Hyperdynamic left ventricular systolic function with a visually estimated EF of >65%. Left ventricle size is normal. Normal wall thickness.    Image quality is adequate. Technically difficult exam due to limited mobility.    Left Ventricle: Normal wall motion.    No significant valvular disease noted.    Pericardium: There is evidence of epicardial fat. No pericardial effusion.    Pertinent Lab Personally Review     Recent Labs     25  0304   WBC 8.4   HGB 9.9*   HCT 31.8*         Recent Labs     25  0304      K 4.4      CO2 23   PHOS 3.7   BUN 12   CREATININE 0.6     Recent Labs     25  0304   AST 23   ALT 21   BILITOT 0.3   ALKPHOS 185*     Lab Results   Component Value Date    PROBNP 135 (H) 2025

## 2025-04-25 NOTE — PLAN OF CARE
Problem: Discharge Planning  Goal: Discharge to home or other facility with appropriate resources  Outcome: Progressing     Problem: ABCDS Injury Assessment  Goal: Absence of physical injury  Outcome: Progressing     Problem: Skin/Tissue Integrity  Goal: Skin integrity remains intact  Description: 1.  Monitor for areas of redness and/or skin breakdown2.  Assess vascular access sites hourly3.  Every 4-6 hours minimum:  Change oxygen saturation probe site4.  Every 4-6 hours:  If on nasal continuous positive airway pressure, respiratory therapy assess nares and determine need for appliance change or resting period  Outcome: Progressing     Problem: Safety - Adult  Goal: Free from fall injury  Outcome: Progressing

## 2025-04-25 NOTE — PROCEDURES
ROUTINE ELECTROENCEPHALOGRAM    Identifying Information:  Name: Renata Domínguez  MRN: [id number]  : 1965  Interpreting Physician: Arlene Yip DO  Referring Provider: Jazlyn Gomez APRN - CNP  Date of EE25  Procedure Location: Inpatient     Clinical History:  Renata Domínguez is a 60 y.o. female with concerns for seizure like activity.     Current Medications:    potassium chloride  20 mEq Oral Daily    atorvastatin  80 mg Oral Nightly    budesonide  3 mg Oral Daily    clopidogrel  75 mg Oral Daily    ezetimibe  10 mg Oral Daily    furosemide  40 mg Oral Daily    ferrous sulfate  325 mg Oral Daily    mirtazapine  15 mg Oral Nightly    pantoprazole  40 mg Oral QAM AC    pyridoxine  100 mg Oral Daily    Pancrelipase (Lip-Prot-Amyl)  50,000 Units Oral TID WC    sodium chloride flush  5-40 mL IntraVENous 2 times per day    enoxaparin  40 mg SubCUTAneous QHS    metoprolol tartrate  12.5 mg Oral BID        Indication:  Rule out seizure/seizure disorder     Technical Summary:  28 channels of EEG were recorded in a digital format on a patient who was reported to be awake and drowsy during the recording. The patient was not sleep deprived prior to the EEG.     The PDR consisted of well-developed, well-regulated 8-9 Hz alpha activity, maximal over the posterior head regions and reactive to eye opening and closure.     Photic stimulation was performed and did not produce any abnormalities. During the recording stage II sleep was not seen, but drowsiness did occur.     The EKG lead revealed no rhythm abnormalities.       EEG Interpretation:   This EEG was within normal limits for a patient of this age in the awake and drowsy states. No focal, lateralizing, or epileptiform features were seen during the recording.       Clinical correlation is recommended.    Arlene Yip DO  Epileptologist  2025 1:59 PM

## 2025-04-25 NOTE — PLAN OF CARE
Problem: Discharge Planning  Goal: Discharge to home or other facility with appropriate resources  4/25/2025 0104 by Pineda Burns, RN  Outcome: Progressing  Flowsheets  Taken 4/25/2025 0104  Discharge to home or other facility with appropriate resources:   Identify discharge learning needs (meds, wound care, etc)   Identify barriers to discharge with patient and caregiver  Taken 4/24/2025 2100  Discharge to home or other facility with appropriate resources:   Arrange for needed discharge resources and transportation as appropriate   Identify discharge learning needs (meds, wound care, etc)  4/24/2025 1859 by Irene Limon, RN  Outcome: Progressing     Problem: ABCDS Injury Assessment  Goal: Absence of physical injury  4/25/2025 0104 by Pineda Burns, RN  Outcome: Progressing  Flowsheets (Taken 4/25/2025 0104)  Absence of Physical Injury: Implement safety measures based on patient assessment  4/24/2025 1859 by Irene Limon RN  Outcome: Progressing     Problem: Skin/Tissue Integrity  Goal: Skin integrity remains intact  Description: 1.  Monitor for areas of redness and/or skin breakdown2.  Assess vascular access sites hourly3.  Every 4-6 hours minimum:  Change oxygen saturation probe site4.  Every 4-6 hours:  If on nasal continuous positive airway pressure, respiratory therapy assess nares and determine need for appliance change or resting period  4/25/2025 0104 by Pineda Burns, RN  Outcome: Progressing  Flowsheets (Taken 4/24/2025 2100)  Skin Integrity Remains Intact:   Monitor for areas of redness and/or skin breakdown   Assess need for specialty bed   Positioning devices  4/24/2025 1859 by Irene Limon, RN  Outcome: Progressing     Problem: Safety - Adult  Goal: Free from fall injury  4/25/2025 0104 by Pineda Burns, RN  Outcome: Progressing  4/24/2025 1859 by Irene Limon RN  Outcome: Progressing

## 2025-04-25 NOTE — CONSULTS
Neurology Service Consult Note  [facility]   Patient Name: Renata Domínguez  : 1965        Subjective:   Reason for consult: New onset parkinsonism  60 y.o.  female with history of anal cancer 2009 s/p chemo, metastatic endometrial cancer s/p hysterectomy, Cad, NSTEMI, skin cancer, presenting to [facility] with slurred speech over the last few months, left arm pain and altered mental status since this past .  reports \"staring into space\". Additionally patient has had multiple falls over the past month. Reported to be walking with small steps at a slow rate with difficulty initiating movements. Symptoms started about a year ago after completing endometrial cancer treatment.     Chart was reviewed in detail, patient was seen and examined. She is awake and alert.  is at bedside and daughter joins exam via phone. Timeline of the progression of symptoms is difficult to determine. Patients family shares that her decline has been ongoing for about a year, they do use her hysterectomy for endometrial cancer as a marker for when patient started to exhibit symptoms. This includes patient complaining of blurry and double vision as well as slow speech and movements. She has had slow and shuffling gait and has had falls secondary to that. Patient was last seen by PCP in March and again in April. MRI brain was completed with no abnormal findings. Patient was referred to OP Neurology and  had scheduled appointment with Dr. Jett in . In March patient was started on mirtazapine 15 mg nightly for major depressive disorder. Family does report that the patient is having staring spells. They describe this as staring straight ahead, not blinking. They are not associated with loss of consciousness. They last for 30 seconds to a minute and it is not entirely clear if she is able to verbally respond during them or if it is after the event.     Today patient is awake  to side.    Motor Exam:       Strength 5/5 upper extremities, 4/5 lower extremities with decreased plantar/dorsiflexion bilaterally L>R  Tone and bulk normal   No pronator drift    Deep Tendon Reflexes: 3/4 biceps, triceps, brachioradialis, trace patellar, and achilles b/l; flexor plantar responses decreased b/l  + Cho's reflex bilaterally    Sensation: Intact light touch/pinprick/vibration UE's/LE's b/l    Coordination/Cerebellum:       Tremors--none but evidence of cog wheeling in the left upper extremity      Finger-to-Nose: no dysmetria b/l    Gait and stance:      Gait: deferred      LABS:     Recent Labs     04/24/25  1315 04/25/25  0304   WBC 9.7 8.4    139   K 5.1 4.4    104   CO2 23 23   BUN 13 12   CREATININE 0.7 0.6   GLUCOSE 113* 104*   ALBUMIN 3.3* 3.0*   INR 0.9  --             IMAGING:     CT head w/o contrast:  IMPRESSION:    1.  No acute intracranial finding.    CTA head and neck:  IMPRESSION:    1.  No large vessel occlusion of the head and neck.     2.  Additional findings as above.    MRI brain w/wo contrast:  IMPRESSION:       1. No acute ischemia.  2. No abnormal parenchymal enhancement to suggest intracranial metastases.    All imaging was personally reviewed    ASSESSMENT/PLAN:   60 year old female with ongoing decline over the last year described as slowing of speech and movement. Has also reported blurry double vision however was evaluated by ophthalmology and noted to have cataracts needing surgical intervention. Has had increasing falls secondary to shuffling gait. Noted to have high amplitude horizontal nystagmus bilaterally. Difficulty looking upright. Cog wheeling in left upper extremity  with self reported resting tremor that was not appreciated on exam today. No weakness to upper extremities however reflexes are brisk. Able to lift lower extremities to antigravity but has generalized weakness with decreased plantar/dorsi flexion more prominent on left. Lower extremity

## 2025-04-26 VITALS
HEIGHT: 62 IN | RESPIRATION RATE: 20 BRPM | BODY MASS INDEX: 26.5 KG/M2 | OXYGEN SATURATION: 92 % | WEIGHT: 144 LBS | SYSTOLIC BLOOD PRESSURE: 108 MMHG | HEART RATE: 106 BPM | TEMPERATURE: 98 F | DIASTOLIC BLOOD PRESSURE: 79 MMHG

## 2025-04-26 LAB
ALBUMIN SERPL-MCNC: 3.1 G/DL (ref 3.4–5)
ALBUMIN/GLOB SERPL: 0.8 {RATIO} (ref 1.1–2.2)
ALP SERPL-CCNC: 221 U/L (ref 40–129)
ALT SERPL-CCNC: 27 U/L (ref 10–40)
ANION GAP SERPL CALCULATED.3IONS-SCNC: 14 MMOL/L (ref 9–17)
AST SERPL-CCNC: 31 U/L (ref 15–37)
BASOPHILS # BLD: 0.03 K/UL
BASOPHILS NFR BLD: 0 % (ref 0–1)
BILIRUB SERPL-MCNC: 0.3 MG/DL (ref 0–1)
BUN SERPL-MCNC: 15 MG/DL (ref 7–20)
CALCIUM SERPL-MCNC: 9.4 MG/DL (ref 8.3–10.6)
CHLORIDE SERPL-SCNC: 102 MMOL/L (ref 99–110)
CO2 SERPL-SCNC: 22 MMOL/L (ref 21–32)
CREAT SERPL-MCNC: 0.7 MG/DL (ref 0.6–1.1)
EOSINOPHIL # BLD: 0.06 K/UL
EOSINOPHILS RELATIVE PERCENT: 1 % (ref 0–3)
ERYTHROCYTE [DISTWIDTH] IN BLOOD BY AUTOMATED COUNT: 17 % (ref 11.7–14.9)
GFR, ESTIMATED: 84 ML/MIN/1.73M2
GLUCOSE SERPL-MCNC: 114 MG/DL (ref 74–99)
HCT VFR BLD AUTO: 32.5 % (ref 37–47)
HGB BLD-MCNC: 10.3 G/DL (ref 12.5–16)
IMM GRANULOCYTES # BLD AUTO: 0.05 K/UL
IMM GRANULOCYTES NFR BLD: 1 %
LYMPHOCYTES NFR BLD: 2.24 K/UL
LYMPHOCYTES RELATIVE PERCENT: 28 % (ref 24–44)
MAGNESIUM SERPL-MCNC: 2.1 MG/DL (ref 1.8–2.4)
MCH RBC QN AUTO: 25.4 PG (ref 27–31)
MCHC RBC AUTO-ENTMCNC: 31.7 G/DL (ref 32–36)
MCV RBC AUTO: 80.2 FL (ref 78–100)
MICROORGANISM SPEC CULT: NORMAL
MONOCYTES NFR BLD: 0.66 K/UL
MONOCYTES NFR BLD: 8 % (ref 0–5)
NEUTROPHILS NFR BLD: 62 % (ref 36–66)
NEUTS SEG NFR BLD: 4.89 K/UL
PHOSPHATE SERPL-MCNC: 3.9 MG/DL (ref 2.5–4.9)
PLATELET # BLD AUTO: 424 K/UL (ref 140–440)
PMV BLD AUTO: 9.1 FL (ref 7.5–11.1)
POTASSIUM SERPL-SCNC: 4.1 MMOL/L (ref 3.5–5.1)
PROT SERPL-MCNC: 6.9 G/DL (ref 6.4–8.2)
RBC # BLD AUTO: 4.05 M/UL (ref 4.2–5.4)
SERVICE CMNT-IMP: NORMAL
SODIUM SERPL-SCNC: 138 MMOL/L (ref 136–145)
SPECIMEN DESCRIPTION: NORMAL
WBC OTHER # BLD: 7.9 K/UL (ref 4–10.5)

## 2025-04-26 PROCEDURE — 85025 COMPLETE CBC W/AUTO DIFF WBC: CPT

## 2025-04-26 PROCEDURE — 83735 ASSAY OF MAGNESIUM: CPT

## 2025-04-26 PROCEDURE — 97530 THERAPEUTIC ACTIVITIES: CPT

## 2025-04-26 PROCEDURE — 84100 ASSAY OF PHOSPHORUS: CPT

## 2025-04-26 PROCEDURE — 6370000000 HC RX 637 (ALT 250 FOR IP): Performed by: STUDENT IN AN ORGANIZED HEALTH CARE EDUCATION/TRAINING PROGRAM

## 2025-04-26 PROCEDURE — 97166 OT EVAL MOD COMPLEX 45 MIN: CPT

## 2025-04-26 PROCEDURE — 80053 COMPREHEN METABOLIC PANEL: CPT

## 2025-04-26 PROCEDURE — 99231 SBSQ HOSP IP/OBS SF/LOW 25: CPT | Performed by: NEUROLOGICAL SURGERY

## 2025-04-26 PROCEDURE — 36415 COLL VENOUS BLD VENIPUNCTURE: CPT

## 2025-04-26 PROCEDURE — 2500000003 HC RX 250 WO HCPCS: Performed by: STUDENT IN AN ORGANIZED HEALTH CARE EDUCATION/TRAINING PROGRAM

## 2025-04-26 RX ORDER — METOPROLOL TARTRATE 25 MG/1
12.5 TABLET, FILM COATED ORAL 2 TIMES DAILY
Qty: 60 TABLET | Refills: 3 | Status: SHIPPED | OUTPATIENT
Start: 2025-04-26

## 2025-04-26 RX ORDER — MIRTAZAPINE 7.5 MG/1
15 TABLET, FILM COATED ORAL NIGHTLY
Qty: 28 TABLET | Refills: 0 | Status: SHIPPED | OUTPATIENT
Start: 2025-04-26 | End: 2025-05-10

## 2025-04-26 RX ADMIN — METOPROLOL TARTRATE 12.5 MG: 25 TABLET, FILM COATED ORAL at 10:28

## 2025-04-26 RX ADMIN — FUROSEMIDE 40 MG: 40 TABLET ORAL at 10:29

## 2025-04-26 RX ADMIN — EZETIMIBE 10 MG: 10 TABLET ORAL at 10:29

## 2025-04-26 RX ADMIN — SODIUM CHLORIDE, PRESERVATIVE FREE 10 ML: 5 INJECTION INTRAVENOUS at 10:30

## 2025-04-26 RX ADMIN — CLOPIDOGREL BISULFATE 75 MG: 75 TABLET, FILM COATED ORAL at 10:29

## 2025-04-26 RX ADMIN — PANTOPRAZOLE SODIUM 40 MG: 40 TABLET, DELAYED RELEASE ORAL at 06:05

## 2025-04-26 RX ADMIN — POTASSIUM CHLORIDE 20 MEQ: 1500 TABLET, EXTENDED RELEASE ORAL at 10:28

## 2025-04-26 RX ADMIN — PANCRELIPASE LIPASE, PANCRELIPASE PROTEASE, PANCRELIPASE AMYLASE 50000 UNITS: 5000; 17000; 24000 CAPSULE, DELAYED RELEASE ORAL at 10:29

## 2025-04-26 RX ADMIN — BUDESONIDE 3 MG: 3 CAPSULE, COATED PELLETS ORAL at 10:29

## 2025-04-26 RX ADMIN — Medication 100 MG: at 10:28

## 2025-04-26 NOTE — DISCHARGE INSTRUCTIONS
Please take your Medications regularly and set up appointments to follow up with your Primary Care Physician, Mental Health, Cardiologist & Neurology (Dr Jett) soon    If having any new, persistent or worsening symptoms including but not limited to chest pain, shorness of breath, loss of consciousness, palpitations , chest or abdomen pressure, left arm pain or pressure, severe headache, especially with new weakness/numbness or tingling in any part of body, any gait or balance issues, any fever/ chill, cough, sputum,  buring with urine , any bleeding or dark stools etc  ,please return to nearest facility for evaluation    Please go through Printed reading material and feel free to reach out in case of any queries, concerns or issues per contact provided    -----------------------------------------------------------------------------------------------------------------------------------------------------------------------------------------------------------------------------------------------------------------------------------------------------------    We can start down titrating Mirtazapine/Rameron by decreasing 10-25% every 2 weeks. I would decrease dose from current dose 15mg Oral daily to 11.25 mg ( I will prescribe 7.5 mg strength tablets, please start taking 1 and half tablet to decrease dose to 11.25 mg daily for next 2 weeks) Please follow up with your Primary Care provider soon to continue down-titrating the dose w=every 2 weeks. Please watch out for any withdrawal symptoms as below. If you are experiencing such symptoms, please be seen by healthcare provider as xavier sherin possible    Mirtazapine Withdrawal Symptoms  Mirtazapine withdrawal symptoms can vary widely among individuals and can include physical and psychological manifestations. While many of the symptoms are mild and resolve over time, some of the withdrawal symptoms can be severe and require medical attention. The risk of withdrawal from

## 2025-04-26 NOTE — DISCHARGE SUMMARY
V2.0  Discharge Summary    Name:  Renata Domínguez /Age/Sex: 1965 (60 y.o. female)   Admit Date: 2025  Discharge Date: 25    MRN & CSN:  [id number] & [id number] Encounter Date and Time 25 11:02 AM EDT    Attending:  Elliot Perez MD Discharging Provider: Elliot Perez MD       Hospital Course:     Brief HPI: Renata Domínguez is a 60 y.o. female with pmh of asthma, endometrial cancer, CAD who presents with difficulty with movements for the past few months to 1 year.  Patient experienced multiple falls about 1-3 falls in the past month.  Patient having slowing movements, difficulty initiating any movements, walking with small steps and at a slower rate, having masked facial expressions somewhat worsening and initiating 1 year back after completing endometrial cancer treatment.  Review of system unremarkable except for diarrhea which is chronic apparently.  Vital signs with heart rate 90-100s, labs with troponin 24, proBNP 135, no leukocytosis, hemoglobin 10/hematocrit 32, MCV 80, UA with trace leukocyte esterase.  CT head/CT CTA unremarkable.  CT C/T-spine unremarkable, CT L-spine with 3 mm L4-5 anterolisthesis, and severe foraminal narrowing on L4-L5 with likely impingement of the exiting nerve root due to right paracentral disc bulge.  Moderate foraminal narrowing at L4-L5 and L5-S1.  Patient is currently being admitted for further management     Brief Problem Based Course:     Slowed movements, possible new onset Parkinson's disease Neuro to see pt as outpatient  - Patient having masked facies, soft speech, trouble initiating movements and having slow rate of alternate movements, slowed movements, walking with short steps at a slower rate and multiple falls with no significant memory loss concerning for early Parkinson's disease. Patient having symptoms roughly for 1 year and she has been on ketruda treatment since 2024    - Ad[facility] to [facility]  - Telemetry  - CT head/CTA unremarkable  - MRI  individual factors such as medical history, age, dosage, and duration of mirtazapine use.  Common Mirtazapine Withdrawal Symptoms  The common withdrawal symptoms of Mirtazapine are mild and do not require medical attention. These symptoms will typically occur during the first few days after stopping the medication. The symptoms may worsen during the first 1-2 weeks and then gradually resolve.  Some withdrawal symptoms, however, can last a month or more. Withdrawal symptoms can include physical symptoms or those felt by the body. Other withdrawal symptoms are psychological or noticed by an individual’s mood and thought patterns.  Common mirtazapine withdrawal symptoms may include:1, 3, 4, 5  Dizziness  Restlessness  Anxiety  Headache  Nausea  Vomiting  Insomnia  Irritability  Loss of appetite  Weight loss    Time Spent Discharging patient 35 minutes    Electronically signed by Elliot Perez MD on 4/26/2025 at 11:02 AM

## 2025-04-26 NOTE — PLAN OF CARE
Problem: Discharge Planning  Goal: Discharge to home or other facility with appropriate resources  4/26/2025 0651 by Donell Patrick RN  Outcome: Progressing  4/25/2025 1828 by Reema Gutierrez RN  Outcome: Progressing     Problem: ABCDS Injury Assessment  Goal: Absence of physical injury  4/26/2025 0651 by Donell Patrick RN  Outcome: Progressing  4/25/2025 1828 by Reema Gutierrez RN  Outcome: Progressing     Problem: Skin/Tissue Integrity  Goal: Skin integrity remains intact  Description: 1.  Monitor for areas of redness and/or skin breakdown2.  Assess vascular access sites hourly3.  Every 4-6 hours minimum:  Change oxygen saturation probe site4.  Every 4-6 hours:  If on nasal continuous positive airway pressure, respiratory therapy assess nares and determine need for appliance change or resting period  4/26/2025 0651 by Donell Patrick RN  Outcome: Progressing  4/25/2025 1828 by Reema Gutierrez RN  Outcome: Progressing     Problem: Safety - Adult  Goal: Free from fall injury  4/26/2025 0651 by Donell Patrick RN  Outcome: Progressing  4/25/2025 1828 by Reema Gutierrez RN  Outcome: Progressing

## 2025-04-26 NOTE — PROGRESS NOTES
[facility]     Neurosurgery Progress Note  2025  Patient Name: Renata Domínguez : 1965       Assessment:   Parkinsonian features  Neurologic gait dysfunction  Spinal stenosis    Plan:  Follow-up with neurology.  No need for neurosurgical issues at this time  Will sign off for now    Subjective:     ROS Other than the symptoms described in the history of present illness, she denies headache, nausea, vomiting, diarrhea, constipation,loss of bowel or bladder, change in vision, sob, chest pain, cough, wheezing, l weight change, dizziness, numbness, tingling, or pain in the neck, back, chest, abdomen or extremities. All other systems are discussed and reviewed as normal.      Objective:      /79   Pulse (!) 106   Temp 98 °F (36.7 °C) (Oral)   Resp 20   Ht 1.575 m (5' 2\")   Wt 65.3 kg (144 lb)   SpO2 92%   BMI 26.34 kg/m²   Neuro:General  Appearance: NAD  LOC: Awake, Alert,  A&O x 3  Speech : Slowed speech memory: Recent and remote intact   CN's : PEERLA, EOMI, Face symmetric, uvula& tongue is midline  Strength: .  5/5 throughout  All extremities    Motor/coordination: No pronator drift/ no dysdiadokokinsia    Muscle tone/ Extremities: Rigidity all 4 extremities  Sensation : intact to light touch  Reflexes:  2+ throughout both UE/LE  Cerebellum / Gait: not tested at this time    Wound: Clean, dry, intact with dressing in place    Lab Results   Component Value Date    WBC 7.9 2025    HGB 10.3 (L) 2025    HCT 32.5 (L) 2025    MCV 80.2 2025     2025       Lab Results   Component Value Date/Time     2025 03:15 AM    K 4.1 2025 03:15 AM     2025 03:15 AM    CO2 22 2025 03:15 AM    BUN 15 2025 03:15 AM    CREATININE 0.7 2025 03:15 AM    GLUCOSE 114 2025 03:15 AM    GLUCOSE 100 10/10/2023 08:38 AM    CALCIUM 9.4 2025 03:15 AM    LABGLOM 84 2025 03:15 AM    LABGLOM >60 2024 05:23

## 2025-04-26 NOTE — PROGRESS NOTES
Occupational Therapy    [facility] ACUTE CARE OCCUPATIONAL THERAPY EVALUATION  Renata Domínguez, 1965, 3012/3012-A, 4/26/2025    History  [place]:  The primary encounter diagnosis was Altered mental status, unspecified altered mental status type. Diagnoses of Tachycardia and Elevated troponin level were also pertinent to this visit.  Patient  has a past medical history of Abnormal EKG, Anal cancer (HCC), Asthma, Broken ankle, CAD (coronary artery disease), Chest pain, Family history of coronary artery disease, H/O cardiovascular stress test, History of blood transfusion, NSTEMI (non-ST elevated myocardial infarction) (HCC), Obese, PONV (postoperative nausea and vomiting), Post PTCA, PVC's (premature ventricular contractions), Skin cancer, and SOB (shortness of breath).  Patient  has a past surgical history that includes Percutaneous Transluminal Coronary Angio (2009); Colon surgery (2009); Ankle surgery (Left, 10+ yrs ago); Tubal ligation (25 yrs ago); Tunneled venous port placement (2009); Tonsillectomy; other surgical history (04/23/2015); fracture surgery; Colonoscopy (03/04/2015); Colonoscopy (03/15/2017); Upper gastrointestinal endoscopy (N/A, 02/26/2022); Colonoscopy (N/A, 03/03/2022); Upper gastrointestinal endoscopy (N/A, 03/03/2022); Percutaneous Transluminal Coronary Angio (02/22/2022); and Total hip arthroplasty (Left, 10/19/2022).    Subjective:  Patient states:  Pt requiring increased time to answer questions.    Pain:  Denies.    Communication with other providers:  Handoff to RN  Restrictions: Contact Precautions, General Precautions, Fall Risk    Home Setup/Prior level of function  Social/Functional History  Lives With: Spouse  Type of Home: House  Home Layout: Two level, Able to Live on Main level with bedroom/bathroom  Home Access: Stairs to enter with rails  Entrance Stairs - Number of Steps: 3-CLEO  Bathroom Shower/Tub: Walk-in shower  Bathroom Toilet: Standard  Bathroom Equipment:

## 2025-04-29 ENCOUNTER — RESULTS FOLLOW-UP (OUTPATIENT)
Dept: EMERGENCY DEPT | Age: 60
End: 2025-04-29

## 2025-04-30 ENCOUNTER — TELEPHONE (OUTPATIENT)
Dept: CARDIOLOGY CLINIC | Age: 60
End: 2025-04-30

## 2025-04-30 LAB
MICROORGANISM SPEC CULT: NORMAL
SERVICE CMNT-IMP: NORMAL
SPECIMEN DESCRIPTION: NORMAL

## 2025-04-30 NOTE — TELEPHONE ENCOUNTER
03/20/23 [place]    Cardiologist: Dr. Yousif  Surgeon: Dr. Roman  Surgery: Cataract  Surgery/Procedure should be done in the hospital setting    _____ yes    _____  no    Anesthesia: MAC  Date: 5/14/2025  & 6/11/2025  Fax# [phone]  # [phone]    Last OV 2/18/2025 w/Nica        Vision: recommend to see ophthalmologist. She has dry eyes  Cramps in body, it could be related to liptior, will add leqvio if it gets approved,.  Low blood pressure:off coreg, home blood pressure is stable, reviewed, 106-149/ last time. Recommend to stay off coreg for now  S/p bronchoscopy and biopsy of lymnode, sent for pathology, she will start her aspirin and plvix,   Covid 19: resolved, still coughs, ok to use OTC cough syrups  anal cancer:she already had hysterecetomy, she had stopped aspirin, plavix to 5 day, she was cleared for doing it again for chemotherapy, her last LAD stent was in feb  CAD : stress test is normal,: she had chest pain in jan before stress test, she had  2/23 PCI of LAD and has h/o RCA and LAD stent before with [facility], Continue aspirin and  plavix for sx. She was started on statins,and did not take praleunt  as per patient it was for 250 dollars,, no side effect of statins noted, ok stay off coregg as her blood pressure was low in 80s and 90s at rehab, she does not want to do cardiac rehab, had long discussion about statin side effects, she is off statins because of severe cramps, add repatha as her lequvio was not approved =, will repeat lipids, will coninue zetia and  , LDL is 74 in 8/30/24,  PAD and leg pain with claudications: arterial doppler  Fluttering of chest: will get holter monitor if it gets worse  H/o  hip sx   Gi bleeding resolved, f/u with Dr. Castillo, had colon AVM, coagulated with APC and 2 clips  Dyslipidemia: continue statins, check lipids  H/o anal cancer        Last EKG- 4/24/2025        NM- 1/31/2024    Stress Combined Conclusion: Normal pharmacological myocardial perfusion study. Findings suggest a

## 2025-04-30 NOTE — TELEPHONE ENCOUNTER
Low risk     Antiplatelet therapy can be held prior to surgery at the discretion of the surgeon. To be resumed as soon as possible if held.

## 2025-05-06 ENCOUNTER — TELEPHONE (OUTPATIENT)
Dept: CARDIOLOGY CLINIC | Age: 60
End: 2025-05-06

## 2025-05-06 NOTE — TELEPHONE ENCOUNTER
Cardiologist: Dr. Yousif  Surgeon: Dr. Guerin  Surgery: Cervical/ Lumbar epidural injection  Surgery/Procedure should be done in the hospital setting    _____ yes    _____  no    Anesthesia: none  Date: Pending  Fax# [phone]  Ph# [phone]    Hold Plavix 7 days prior      Last OV 2/18/2025 w/Nica        Vision: recommend to see ophthalmologist. She has dry eyes  Cramps in body, it could be related to liptior, will add leqvio if it gets approved,.  Low blood pressure:off coreg, home blood pressure is stable, reviewed, 106-149/ last time. Recommend to stay off coreg for now  S/p bronchoscopy and biopsy of lymnode, sent for pathology, she will start her aspirin and plvix,   Covid 19: resolved, still coughs, ok to use OTC cough syrups  anal cancer:she already had hysterecetomy, she had stopped aspirin, plavix to 5 day, she was cleared for doing it again for chemotherapy, her last LAD stent was in feb  CAD : stress test is normal,: she had chest pain in jan before stress test, she had  2/23 PCI of LAD and has h/o RCA and LAD stent before with [facility], Continue aspirin and  plavix for sx. She was started on statins,and did not take praleunt  as per patient it was for 250 dollars,, no side effect of statins noted, ok stay off coregg as her blood pressure was low in 80s and 90s at rehab, she does not want to do cardiac rehab, had long discussion about statin side effects, she is off statins because of severe cramps, add repatha as her lequvio was not approved =, will repeat lipids, will coninue zetia and  , LDL is 74 in 8/30/24,  PAD and leg pain with claudications: arterial doppler  Fluttering of chest: will get holter monitor if it gets worse  H/o  hip sx   Gi bleeding resolved, f/u with Dr. Castillo, had colon AVM, coagulated with APC and 2 clips  Dyslipidemia: continue statins, check lipids  H/o anal cancer        Last EKG- 4/24/2025        NM- 1/31/2024    Stress Combined Conclusion: Normal pharmacological

## 2025-05-07 NOTE — PROGRESS NOTES
MRN: [id number]  Name: Renata Domínguez  : 1965    Insurance: Payor: [employer] /  /  /      Phone #: [phone]  Provider: Scottie Yousif MD     Date of Visit: 2025    Reason for visit: 3 mo f/u  Recent Hospitalization Date:    Reason for Hospitalization:    Last EK/25  Type of Device:       Vitals BP HR O2% WT HT ORTHO BP LYING ORTHO BP SITTING ORTHO BP SITTING   Today's Findings           Patients work up- Check List     Testing Last Date Completed Date Expected  ([place]) Additional Notes    MA to document For provider to complete Either MA or Provider    Carotid Duplex  STAT 1 WK 6 MTH       THIS WK 2 WK 1 YEAR     Cardiac CTA  STAT 1 WK 6 MTH       THIS WK 2 WK 1 YEAR     Cardiac CT Calcium scoring  STAT 1 WK 6 MTH       THIS WK 2 WK 1 YEAR     CTA Chest, Abdomen & Pelvis  STAT 1 WK 6 MTH       THIS WK 2 WK 1 YEAR     CT Chest IV w/ Contrast  STAT 1 WK 6 MTH       THIS WK 2 WK 1 YEAR     CT Chest w/o Contrast  STAT 1 WK 6 MTH       THIS WK 2 WK 1 YEAR     CXR  STAT 1 WK 6 MTH       THIS WK 2 WK 1 YEAR     ECHO 25 Stress Complete Limited     MRI- Cardiac  STAT 1 WK 6 MTH       THIS WK 2 WK 1 YEAR     MUGA Scan  STAT 1 WK 6 MTH       THIS WK 2 WK 1 YEAR     Nuclear Stress  Lexiscan Cardiolite     PFT  STAT 1 WK 6 MTH       THIS WK 2 WK 1 YEAR     Treadmill Stress Test  STAT 1 WK 6 MTH       THIS WK 2 WK 1 YEAR     Vascular Duplex  Lower: Right Left Bilat       Upper: Right Left Bilat     Other Test Not Listed:    Monitors Last Date Completed Day's Request/Ordered     Holter  Short term 24 hours 48 hours      Long term 3 days 7 days 14 days   Event   (1-30 days)      Procedures Last Date Performed Procedure Details Date Expected   Additional Notes    ASD Closure        Carotid Angio        Cardioversion        Heart Cath  R L R&L      Peripheral Angio  R L      PFO Closure        PTCA/PCI        ULISES        ULISES/Cardioversion        Venogram        Tilt Table        Other

## 2025-05-10 DIAGNOSIS — E78.5 DYSLIPIDEMIA: ICD-10-CM

## 2025-05-12 ENCOUNTER — TELEPHONE (OUTPATIENT)
Dept: CARDIOLOGY CLINIC | Age: 60
End: 2025-05-12

## 2025-05-12 RX ORDER — EZETIMIBE 10 MG/1
10 TABLET ORAL DAILY
Qty: 90 TABLET | Refills: 1 | Status: SHIPPED | OUTPATIENT
Start: 2025-05-12

## 2025-05-12 NOTE — TELEPHONE ENCOUNTER
Pt daughter called asking for most recent EKG to be faxed to Dr. Roman for pt procedure. Tasked has been completed.

## 2025-05-13 ENCOUNTER — OFFICE VISIT (OUTPATIENT)
Dept: CARDIOLOGY CLINIC | Age: 60
End: 2025-05-13
Payer: MEDICARE

## 2025-05-13 VITALS
HEART RATE: 96 BPM | HEIGHT: 62 IN | BODY MASS INDEX: 25.95 KG/M2 | DIASTOLIC BLOOD PRESSURE: 74 MMHG | WEIGHT: 141 LBS | SYSTOLIC BLOOD PRESSURE: 112 MMHG

## 2025-05-13 DIAGNOSIS — E78.5 DYSLIPIDEMIA: Primary | ICD-10-CM

## 2025-05-13 PROCEDURE — 3078F DIAST BP <80 MM HG: CPT | Performed by: INTERNAL MEDICINE

## 2025-05-13 PROCEDURE — 3074F SYST BP LT 130 MM HG: CPT | Performed by: INTERNAL MEDICINE

## 2025-05-13 PROCEDURE — 99214 OFFICE O/P EST MOD 30 MIN: CPT | Performed by: INTERNAL MEDICINE

## 2025-05-13 RX ORDER — NITROGLYCERIN 0.4 MG/1
TABLET SUBLINGUAL
Qty: 25 TABLET | Refills: 3 | Status: SHIPPED | OUTPATIENT
Start: 2025-05-13

## 2025-05-13 NOTE — PROGRESS NOTES
CLINICAL STAFF DOCUMENTATION    Dr. Neha Domínguez  1965  [id number]    Have you had any Chest Pain recently? - No        Have you had any Shortness of Breath - No  Have you had any dizziness - No    Have you had any palpitations recently? - No    Do you have any edema - swelling in legs    If Yes - CHECK TO SEE IF THE EDEMA IS PITTING  How long have they been having edema - Years  If Yes - Have they worn compression stockings No  If they have worn compression stockings        Is the patient on any of the following medications -   If Yes DO EKG - Needs done every 3 months    When did you have your last labs drawn 4/25  What doctor ordered loya   Do we have the labs in their chart Yes  If we do not have the labs, ask where they were drawn     If we do not have these labs, you are retrieve these labs for the provider!    Do you need any prescriptions refilled? - Yes    Do you have a surgery or procedure scheduled in the near future -     Do use tobacco products? - No  Do you drink alcohol? - No  Do you use any illicit drugs? - No  Caffeine? - Yes  How much caffeine? .occu          Check medication list thoroughly!!! AND RECONCILE OUTSIDE MEDICATIONS  If dose has changed change the entire order not just the MG  BE SURE TO ASK PATIENT IF THEY NEED MEDICATION REFILLS  Verify Pharmacy and update if incorrect    Add to every patient's \"wrap up\" the following dot phrase AFTERVISITCARDIOHEARTHOUSE and ensure we explain this to our patients

## 2025-05-13 NOTE — PROGRESS NOTES
Neha Yousif MD        OFFICE  FOLLOWUP NOTE    Chief complaints: patient is here for management of CAD,STEMI of RCA, GI bleeding,anal cancer DYSLPIDEMIA, covid     Subjective: patient need cataract sx, no chest pain, no shortness of breath, no dizziness, no palpitations    HPI Renata is a 60 y.o.year old who  has a past medical history of Abnormal EKG, Anal cancer (HCC), Asthma, Broken ankle, CAD (coronary artery disease), Chest pain, Family history of coronary artery disease, H/O cardiovascular stress test, History of blood transfusion, NSTEMI (non-ST elevated myocardial infarction) ([facility]), Obese, PONV (postoperative nausea and vomiting), Post PTCA, PVC's (premature ventricular contractions), Skin cancer, and SOB (shortness of breath). and presents for management of CAD,STEMI of RCA, GI bleeding,anal cancer DYSLPIDEMIA, covid which are well controlled      Current Outpatient Medications   Medication Sig Dispense Refill    ezetimibe (ZETIA) 10 MG tablet Take 1 tablet by mouth daily 90 tablet 1    metoprolol tartrate (LOPRESSOR) 25 MG tablet Take 0.5 tablets by mouth 2 times daily 60 tablet 3    mirtazapine (REMERON) 7.5 MG tablet Take 2 tablets by mouth nightly for 14 days 28 tablet 0    potassium chloride (KLOR-CON M) 20 MEQ extended release tablet Take 1 tablet by mouth daily as needed 04/24/25 Only takes with furosemide as needed.      furosemide (LASIX) 40 MG tablet Take 1 tablet by mouth daily as needed 04/24/25 Patient reports only takes as needed.      FERROUS SULFATE PO Take 1 tablet by mouth daily OTC      docusate sodium (COLACE) 100 MG capsule Take 1 capsule by mouth daily OTC      Polyethyl Glycol-Propyl Glycol (LUBRICATING EYE DROPS OP) Apply to eye      Evolocumab 140 MG/ML SOAJ Inject 140 mg into the skin every 14 days 2 mL 5    clopidogrel (PLAVIX) 75 MG tablet Take 1 tablet by mouth daily (Patient taking differently: Take 1 tablet by mouth daily Not for the last 5 days due to

## 2025-05-13 NOTE — PATIENT INSTRUCTIONS
Thank you for allowing us to care for you today!   We want to ensure we can follow your treatment plan and we strive to give you the best outcomes and experience possible.   If you ever have a life threatening emergency and call 911 - for an ambulance (EMS)  REMEMBER  Our providers can only care for you at:   [facility] or [facility]   Even if you have someone take you or you drive yourself we can only care for you in a [facility] facility. Our providers are not setup at the other healthcare locations!    PLEASE CALL OUR OFFICE DURING NORMAL BUSINESS HOURS  Monday through Friday 8 am to 5 pm  AFTER HOURS the physician on-call cannot help with scheduling, rescheduling, procedure instruction questions or any type of medication need or issue.  [facility] P:[phone] - [facility] P:[phone] - [facility] P:[phone]      If you receive a survey:  We would appreciate you taking the time to share your experience concerning your provider visit in the office.    These surveys are confidential!  We are eager to improve and are counting on you to share your feedback so we can ensure you get the best care possible.

## 2025-06-30 NOTE — PROGRESS NOTES
Cardiac clearance under letters    Surgery @ [facility] on 7/11/25 at 0730, arrival 0600    NOTHING TO EAT OR DRINK AFTER MIDNIGHT DAY OF SURGERY    1. Enter thru the hospital main entrance on day of surgery, check in at the Information Desk. If you arrive prior to 6:00am, enter thru the ER entrance.    2. Follow the directions as prescribed by the doctor for your procedure and medications.         Morning of surgery take:  Morning medications except Plavix         Stop vitamins, supplements and NSAIDS:  7/4/25         Last dose Plavix: 7/3/25    3. Check with your Doctor regarding stopping blood thinners and follow their instructions.    4. Do not smoke, vape or use chewing tobacco morning of surgery. Do not drink any alcoholic beverages 24 hours prior to surgery.       This includes NA Beer. No street drugs 7 days prior to surgery.    5. If you have dentures, contacts of glasses they will be removed before going to the OR; please bring a case.    6. Please bring picture ID, insurance card, paperwork from the doctor’s office (H & P, Consent, & card for implantable devices).    7. Take a shower with an antibacterial soap the night before surgery and the morning of surgery. Do not put anything on your skin      After your morning shower.    8. You will need a responsible adult to drive you home and check on you after surgery.

## 2025-07-07 ENCOUNTER — ANESTHESIA EVENT (OUTPATIENT)
Dept: OPERATING ROOM | Age: 60
End: 2025-07-07
Payer: COMMERCIAL

## 2025-07-09 ASSESSMENT — LIFESTYLE VARIABLES: SMOKING_STATUS: 0

## 2025-07-11 ENCOUNTER — ANESTHESIA (OUTPATIENT)
Dept: OPERATING ROOM | Age: 60
End: 2025-07-11
Payer: COMMERCIAL

## 2025-07-11 ENCOUNTER — HOSPITAL ENCOUNTER (OUTPATIENT)
Age: 60
Setting detail: OUTPATIENT SURGERY
Discharge: HOME | End: 2025-07-11
Attending: OPHTHALMOLOGY | Admitting: OPHTHALMOLOGY
Payer: COMMERCIAL

## 2025-07-11 VITALS
WEIGHT: 141 LBS | RESPIRATION RATE: 16 BRPM | TEMPERATURE: 97 F | SYSTOLIC BLOOD PRESSURE: 126 MMHG | DIASTOLIC BLOOD PRESSURE: 79 MMHG | BODY MASS INDEX: 25.95 KG/M2 | HEART RATE: 70 BPM | HEIGHT: 62 IN | OXYGEN SATURATION: 96 %

## 2025-07-11 PROBLEM — H25.12 AGE-RELATED NUCLEAR CATARACT OF LEFT EYE: Status: ACTIVE | Noted: 2025-07-11

## 2025-07-11 PROCEDURE — 2500000003 HC RX 250 WO HCPCS: Performed by: OPHTHALMOLOGY

## 2025-07-11 PROCEDURE — 7100000011 HC PHASE II RECOVERY - ADDTL 15 MIN: Performed by: OPHTHALMOLOGY

## 2025-07-11 PROCEDURE — 3700000000 HC ANESTHESIA ATTENDED CARE: Performed by: OPHTHALMOLOGY

## 2025-07-11 PROCEDURE — 6370000000 HC RX 637 (ALT 250 FOR IP): Performed by: OPHTHALMOLOGY

## 2025-07-11 PROCEDURE — V2632 POST CHMBR INTRAOCULAR LENS: HCPCS | Performed by: OPHTHALMOLOGY

## 2025-07-11 PROCEDURE — 2580000003 HC RX 258: Performed by: OPHTHALMOLOGY

## 2025-07-11 PROCEDURE — 6360000002 HC RX W HCPCS: Performed by: NURSE ANESTHETIST, CERTIFIED REGISTERED

## 2025-07-11 PROCEDURE — 3600000014 HC SURGERY LEVEL 4 ADDTL 15MIN: Performed by: OPHTHALMOLOGY

## 2025-07-11 PROCEDURE — 3600000004 HC SURGERY LEVEL 4 BASE: Performed by: OPHTHALMOLOGY

## 2025-07-11 PROCEDURE — 3700000001 HC ADD 15 MINUTES (ANESTHESIA): Performed by: OPHTHALMOLOGY

## 2025-07-11 PROCEDURE — 6360000002 HC RX W HCPCS: Performed by: OPHTHALMOLOGY

## 2025-07-11 PROCEDURE — 7100000010 HC PHASE II RECOVERY - FIRST 15 MIN: Performed by: OPHTHALMOLOGY

## 2025-07-11 PROCEDURE — 2709999900 HC NON-CHARGEABLE SUPPLY: Performed by: OPHTHALMOLOGY

## 2025-07-11 DEVICE — AKREOS AO MICRO INCISION LENS +0.00D
Type: IMPLANTABLE DEVICE | Site: EYE | Status: FUNCTIONAL
Brand: AKREOS® AO IOL

## 2025-07-11 RX ORDER — PHENYLEPHRINE HYDROCHLORIDE 25 MG/ML
1 SOLUTION/ DROPS OPHTHALMIC SEE ADMIN INSTRUCTIONS
Status: DISCONTINUED | OUTPATIENT
Start: 2025-07-11 | End: 2025-07-11 | Stop reason: HOSPADM

## 2025-07-11 RX ORDER — SODIUM CHLORIDE 0.9 % (FLUSH) 0.9 %
5-40 SYRINGE (ML) INJECTION PRN
Status: DISCONTINUED | OUTPATIENT
Start: 2025-07-11 | End: 2025-07-11 | Stop reason: HOSPADM

## 2025-07-11 RX ORDER — LIDOCAINE HYDROCHLORIDE 20 MG/ML
INJECTION, SOLUTION EPIDURAL; INFILTRATION; INTRACAUDAL; PERINEURAL
Status: DISCONTINUED | OUTPATIENT
Start: 2025-07-11 | End: 2025-07-11 | Stop reason: ALTCHOICE

## 2025-07-11 RX ORDER — SODIUM CHLORIDE, SODIUM LACTATE, POTASSIUM CHLORIDE, CALCIUM CHLORIDE 600; 310; 30; 20 MG/100ML; MG/100ML; MG/100ML; MG/100ML
INJECTION, SOLUTION INTRAVENOUS CONTINUOUS
Status: DISCONTINUED | OUTPATIENT
Start: 2025-07-11 | End: 2025-07-11 | Stop reason: HOSPADM

## 2025-07-11 RX ORDER — NEOMYCIN SULFATE, POLYMYXIN B SULFATE, AND DEXAMETHASONE 3.5; 10000; 1 MG/G; [USP'U]/G; MG/G
OINTMENT OPHTHALMIC
Status: DISCONTINUED | OUTPATIENT
Start: 2025-07-11 | End: 2025-07-11 | Stop reason: ALTCHOICE

## 2025-07-11 RX ORDER — TETRACAINE HYDROCHLORIDE 5 MG/ML
SOLUTION OPHTHALMIC
Status: DISCONTINUED | OUTPATIENT
Start: 2025-07-11 | End: 2025-07-11 | Stop reason: ALTCHOICE

## 2025-07-11 RX ORDER — PROPOFOL 10 MG/ML
INJECTION, EMULSION INTRAVENOUS
Status: DISCONTINUED | OUTPATIENT
Start: 2025-07-11 | End: 2025-07-11 | Stop reason: SDUPTHER

## 2025-07-11 RX ORDER — MOXIFLOXACIN 5 MG/ML
SOLUTION/ DROPS OPHTHALMIC
Status: DISCONTINUED | OUTPATIENT
Start: 2025-07-11 | End: 2025-07-11 | Stop reason: ALTCHOICE

## 2025-07-11 RX ORDER — BETAMETHASONE SODIUM PHOSPHATE AND BETAMETHASONE ACETATE 3; 3 MG/ML; MG/ML
INJECTION, SUSPENSION INTRA-ARTICULAR; INTRALESIONAL; INTRAMUSCULAR; SOFT TISSUE
Status: DISCONTINUED | OUTPATIENT
Start: 2025-07-11 | End: 2025-07-11 | Stop reason: ALTCHOICE

## 2025-07-11 RX ORDER — CYCLOPENTOLATE HYDROCHLORIDE 10 MG/ML
1 SOLUTION/ DROPS OPHTHALMIC SEE ADMIN INSTRUCTIONS
Status: DISCONTINUED | OUTPATIENT
Start: 2025-07-11 | End: 2025-07-11 | Stop reason: HOSPADM

## 2025-07-11 RX ORDER — LIDOCAINE HYDROCHLORIDE 20 MG/ML
INJECTION, SOLUTION EPIDURAL; INFILTRATION; INTRACAUDAL; PERINEURAL
Status: DISCONTINUED | OUTPATIENT
Start: 2025-07-11 | End: 2025-07-11 | Stop reason: SDUPTHER

## 2025-07-11 RX ORDER — TROPICAMIDE 10 MG/ML
1 SOLUTION/ DROPS OPHTHALMIC SEE ADMIN INSTRUCTIONS
Status: DISCONTINUED | OUTPATIENT
Start: 2025-07-11 | End: 2025-07-11 | Stop reason: HOSPADM

## 2025-07-11 RX ADMIN — TROPICAMIDE 1 DROP: 10 SOLUTION/ DROPS OPHTHALMIC at 06:21

## 2025-07-11 RX ADMIN — LIDOCAINE HYDROCHLORIDE 40 MG: 20 INJECTION, SOLUTION EPIDURAL; INFILTRATION; INTRACAUDAL at 07:50

## 2025-07-11 RX ADMIN — PROPOFOL 30 MG: 10 INJECTION, EMULSION INTRAVENOUS at 07:50

## 2025-07-11 RX ADMIN — CYCLOPENTOLATE HYDROCHLORIDE 1 DROP: 10 SOLUTION OPHTHALMIC at 06:47

## 2025-07-11 RX ADMIN — PHENYLEPHRINE HYDROCHLORIDE 1 DROP: 25 SOLUTION/ DROPS OPHTHALMIC at 06:47

## 2025-07-11 RX ADMIN — SODIUM CHLORIDE, SODIUM LACTATE, POTASSIUM CHLORIDE, AND CALCIUM CHLORIDE: .6; .31; .03; .02 INJECTION, SOLUTION INTRAVENOUS at 06:49

## 2025-07-11 RX ADMIN — CYCLOPENTOLATE HYDROCHLORIDE 1 DROP: 10 SOLUTION OPHTHALMIC at 07:01

## 2025-07-11 RX ADMIN — PHENYLEPHRINE HYDROCHLORIDE 1 DROP: 25 SOLUTION/ DROPS OPHTHALMIC at 06:21

## 2025-07-11 RX ADMIN — PHENYLEPHRINE HYDROCHLORIDE 1 DROP: 25 SOLUTION/ DROPS OPHTHALMIC at 07:01

## 2025-07-11 RX ADMIN — CYCLOPENTOLATE HYDROCHLORIDE 1 DROP: 10 SOLUTION OPHTHALMIC at 06:21

## 2025-07-11 RX ADMIN — TROPICAMIDE 1 DROP: 10 SOLUTION/ DROPS OPHTHALMIC at 06:47

## 2025-07-11 RX ADMIN — TROPICAMIDE 1 DROP: 10 SOLUTION/ DROPS OPHTHALMIC at 07:01

## 2025-07-11 ASSESSMENT — PAIN - FUNCTIONAL ASSESSMENT
PAIN_FUNCTIONAL_ASSESSMENT: 0-10

## 2025-07-11 ASSESSMENT — LIFESTYLE VARIABLES: SMOKING_STATUS: 0

## 2025-07-11 NOTE — DISCHARGE INSTRUCTIONS
Leave your eye patch/shield on until your follow up appointment with Dr. Roman later today. Avoid any activity that will cause you to strain. No lifting, pushing or pulling greater than 10 lbs until ok'ed by Dr. Roman. Call his office with any questions.           Cataract Surgery: What to Expect at Home  Your Recovery     You had cataract surgery. It replaced your cloudy natural lens with a clear artificial one.  After surgery, your eye may feel scratchy, sticky, or uncomfortable. It may also water more than usual.  Most people see better 1 to 3 days after surgery. But it could take 3 to 10 weeks to get the full benefits of surgery and to see as clearly as possible.  Your doctor may send you home with a bandage, patch, or clear shield on your eye. This will keep you from rubbing your eye. Your doctor will also give you eyedrops to help your eye heal. Use them exactly as directed.  You can read or watch TV right away, but things may look blurry. Most people are able to return to work or their normal routine in 1 to 3 days. After your eye heals, you may still need to wear glasses, especially for reading.  This care sheet gives you a general idea about how long it will take for you to recover. But each person recovers at a different pace. Follow the steps below to get better as quickly as possible.  How can you care for yourself at home?  Activity    Rest when you feel tired. Getting enough sleep will help you recover.     You may have trouble judging distances for a few days. Move slowly, and be careful going up and down stairs and pouring hot liquids. Ask for help if you need it.     Ask your doctor when it is okay to drive.     Wear your eye bandage, patch, or shield for as long as your doctor recommends. You may only need to wear it when you sleep.     You can shower or wash your hair the day after surgery. Keep water, soap, shampoo, hair spray, and shaving lotion out of your eye, especially for the first week.     Do

## 2025-07-11 NOTE — PROGRESS NOTES
Discharge instructions reviewed with patient and her . Copies of all given to them. They deny questions. Patient ready for discharge home.

## 2025-07-11 NOTE — PROGRESS NOTES
Patient returned to room from OR. Report received bedside report from OR RN.  Bed brakes applied and VS obtained. See flow sheets. Patient A/O x4. Drink and snack offered.Will continue to monitor. Call light in reach.

## 2025-07-11 NOTE — ANESTHESIA PRE PROCEDURE
Department of Anesthesiology  Preprocedure Note       Name:  Renata Domínguez   Age:  60 y.o.  :  1965                                          MRN:  [id number]         Date:  2025      Surgeon: Surgeon(s):  Cipriano Roman MD    Procedure: Procedure(s):  LEFT EYE CATARACT EXTRACTION    Medications prior to admission:   Prior to Admission medications    Medication Sig Start Date End Date Taking? Authorizing Provider   nitroGLYCERIN (NITROSTAT) 0.4 MG SL tablet up to max of 3 total doses. If no relief after 1 dose, call 911. 25   Neha Yousif MD   ezetimibe (ZETIA) 10 MG tablet Take 1 tablet by mouth daily 25   Neha Yousif MD   metoprolol tartrate (LOPRESSOR) 25 MG tablet Take 0.5 tablets by mouth 2 times daily 25   Elliot Perez MD   mirtazapine (REMERON) 7.5 MG tablet Take 2 tablets by mouth nightly for 14 days 25  Elliot Perez MD   potassium chloride (KLOR-CON M) 20 MEQ extended release tablet Take 1 tablet by mouth daily as needed 25 Only takes with furosemide as needed. 25   Jeffery Daigle MD   ZENPEP [id number] units CPEP Take 2 capsules by mouth 3 times daily (with meals) 25 Takes an additional capsule with snacks 3/31/25   Jeffery Daigle MD   furosemide (LASIX) 40 MG tablet Take 1 tablet by mouth daily as needed 25 Patient reports only takes as needed.    Jeffery Daigle MD   FERROUS SULFATE PO Take 1 tablet by mouth daily OTC    Jeffery Daigle MD   docusate sodium (COLACE) 100 MG capsule Take 1 capsule by mouth daily OTC    Jeffery Daigle MD   Polyethyl Glycol-Propyl Glycol (LUBRICATING EYE DROPS OP) Apply to eye    Jeffery Daigle MD   Evolocumab 140 MG/ML SOAJ Inject 140 mg into the skin every 14 days 25   Neha Yousif MD   clopidogrel (PLAVIX) 75 MG tablet Take 1 tablet by mouth daily 25   Alex Ni, APRN - CNP   budesonide (ENTOCORT EC) 3 MG delayed release 
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    H/O cardiovascular stress test 01/06/2016    treadmill    History of blood transfusion     3    NSTEMI (non-ST elevated myocardial infarction) (HCC)     pt states she has had two NSTEMI    Obese     PONV (postoperative nausea and vomiting)     hx motion sickness    Post PTCA 02/22/2022    RCA stented.    PVC's (premature ventricular contractions)     Skin cancer     SOB (shortness of breath)        Past Surgical History:        Procedure Laterality Date    ANKLE SURGERY Left 10+ yrs ago    with hardware    COLON SURGERY  2009    \"after had chemo removed some part of the cancer from the rectal area, then 7 months later had bowel blockage had to do surgery  to remove part of the bowel\"    COLONOSCOPY  03/04/2015    mild proctitis    COLONOSCOPY  03/15/2017    mild radia proc, hypertroph pailla    COLONOSCOPY N/A 03/03/2022    COLONOSCOPY CONTROL HEMORRHAGE WITH STRAIGHT FIRE APC WITH CLIP PLACEMENT X 2 performed by Salas Castillo MD at [facility] ENDOSCOPY    FRACTURE SURGERY      left ankle    OTHER SURGICAL HISTORY  04/23/2015    mediport removal    PTCA  2009 4/22/2009 had angioplasty with stent to LAD & another day in 2009 stent x 2 to RCA done    PTCA  02/22/2022    RCA stented.    TONSILLECTOMY      as a kid age 12    TOTAL HIP ARTHROPLASTY Left 10/19/2022    LEFT HIP TOTAL ARTHROPLASTY ANTERIOR APPROACH performed by Kiko Vaughan MD at [facility] OR    TUBAL LIGATION  25 yrs ago    TUNNELED VENOUS PORT PLACEMENT  2009    port inserted     UPPER GASTROINTESTINAL ENDOSCOPY N/A 02/26/2022    EGD DIAGNOSTIC ONLY performed by Salas Castillo MD at [facility] ENDOSCOPY    UPPER GASTROINTESTINAL ENDOSCOPY N/A 03/03/2022

## 2025-07-11 NOTE — ANESTHESIA POSTPROCEDURE EVALUATION
Department of Anesthesiology  Postprocedure Note    Patient: Renata Domínguez  MRN: [id number]  YOB: 1965  Date of evaluation: 7/11/2025    Procedure Summary       Date: 07/11/25 Room / Location: [address]    Anesthesia Start: 0744 Anesthesia Stop: 0811    Procedure: LEFT EYE CATARACT EXTRACTION (Left: Eye) Diagnosis:       Age-related nuclear cataract, left      (Age-related nuclear cataract, left [H25.12])    Surgeons: Cipriaon Roman MD Responsible Provider: Demar Graff APRN - CRNA    Anesthesia Type: MAC ASA Status: 3            Anesthesia Type: No value filed.    Dionisio Phase I:      Dionisio Phase II:      Anesthesia Post Evaluation    Patient location during evaluation: bedside  Patient participation: complete - patient participated  Level of consciousness: awake and alert  Pain score: 0  Airway patency: patent  Nausea & Vomiting: no vomiting and no nausea  Cardiovascular status: blood pressure returned to baseline and hemodynamically stable  Respiratory status: acceptable, room air, spontaneous ventilation and nonlabored ventilation  Hydration status: stable  Pain management: adequate    No notable events documented.

## 2025-07-11 NOTE — OP NOTE
ACCOUNT NUMBER:  PATIENT NAME: Renata Domínguez  SURGEON:Cipriano Roman MD   [address] [place] [address]    OPERATIVE REPORT          DATE OF OPERATION: _7/11/2025_  PREOPERTIVE DIAGNOIS:  CataractOS eye.  POSTOPERTIVE DIAGNOSIS:  Cataract_OS eye.  PROCEDURE:  Phacoemulsification of Cataract with intraocular lens      implant _OS eye.  ANESTHESIA:  Monitored anesthesia care with 2% Xylocaine in a       retrobullar injection.    DESCRIPTION OF PROCEDURE:  Under adequate local anesthesia, a lid speculum was placed in the eye to expose the globe.  Conjunctival flap was developed from 10 to 12 o’clock position.  Hemostasis was obtained using wet-field cautery.  A 69-Bever blade was than utilized to make the initial scleral incision to a mid depth.  Using a 55/22 blade a shelved incision was made into the anterior chamber.  Paracentesis site was made at the 2 o’clock position with a 75-Bever blade.  Healon was instilled and anterior capsuiorrhexis was performed.  Hydrodilssection with BSS was carried out.  Utilizing the phaco-emulsifier, the phacoemulsification of the cataract was accomplished.  Using the irrigation-aspiration unit the remaining cortical material was removed.  The intraocular lens was inspected and inserted into the bag with no difficulty after healon was instilled in the anterior chamber.  The healon was removed with irrigation-aspiration unit and the wound was inspected to be self-sealing.  The conjunctiva flap was then reapproximated with cautery.  Sub-Tenon injection of 0.5 ml of Celestone and 0.5 ml Ancef was given.  NeoDecadron was placed in the cul-de-sac.  The patient’s eye was patched and shielded. The patient was returned to recovery in satisfactory condition with no complications from surgery or anesthesia.    The patient was discharged on the same day with home-going instructions and no bending, lifting, or straining, The patient is to keep the patch on at all times and follow up in

## 2025-07-30 RX ORDER — CARBIDOPA AND LEVODOPA 25; 100 MG/1; MG/1
1 TABLET ORAL 3 TIMES DAILY
COMMUNITY

## 2025-08-07 ENCOUNTER — OFFICE (OUTPATIENT)
Dept: URBAN - METROPOLITAN AREA CLINIC 18 | Age: 60
End: 2025-08-07
Payer: COMMERCIAL

## 2025-08-07 VITALS
HEART RATE: 17 BPM | DIASTOLIC BLOOD PRESSURE: 82 MMHG | SYSTOLIC BLOOD PRESSURE: 120 MMHG | OXYGEN SATURATION: 99 % | HEIGHT: 62 IN | RESPIRATION RATE: 17 BRPM | WEIGHT: 132 LBS

## 2025-08-07 DIAGNOSIS — R63.4 ABNORMAL WEIGHT LOSS: ICD-10-CM

## 2025-08-07 DIAGNOSIS — K52.9 NONINFECTIVE GASTROENTERITIS AND COLITIS, UNSPECIFIED: ICD-10-CM

## 2025-08-07 DIAGNOSIS — K86.81 EXOCRINE PANCREATIC INSUFFICIENCY: ICD-10-CM

## 2025-08-07 DIAGNOSIS — K86.1 OTHER CHRONIC PANCREATITIS: ICD-10-CM

## 2025-08-07 PROCEDURE — 99213 OFFICE O/P EST LOW 20 MIN: CPT | Performed by: PHYSICIAN ASSISTANT

## 2025-08-11 ENCOUNTER — ANESTHESIA EVENT (OUTPATIENT)
Dept: OPERATING ROOM | Age: 60
End: 2025-08-11
Payer: COMMERCIAL

## 2025-08-11 RX ORDER — EVOLOCUMAB 140 MG/ML
140 INJECTION, SOLUTION SUBCUTANEOUS
Qty: 2 ML | Refills: 5 | Status: SHIPPED | OUTPATIENT
Start: 2025-08-11

## 2025-08-11 ASSESSMENT — LIFESTYLE VARIABLES: SMOKING_STATUS: 0

## 2025-08-12 ENCOUNTER — ANESTHESIA (OUTPATIENT)
Dept: OPERATING ROOM | Age: 60
End: 2025-08-12
Payer: COMMERCIAL

## 2025-08-12 ENCOUNTER — HOSPITAL ENCOUNTER (OUTPATIENT)
Age: 60
Setting detail: OUTPATIENT SURGERY
Discharge: HOME | End: 2025-08-12
Attending: OPHTHALMOLOGY | Admitting: OPHTHALMOLOGY
Payer: COMMERCIAL

## 2025-08-12 VITALS
OXYGEN SATURATION: 97 % | TEMPERATURE: 97 F | HEIGHT: 62 IN | HEART RATE: 73 BPM | SYSTOLIC BLOOD PRESSURE: 134 MMHG | WEIGHT: 138 LBS | BODY MASS INDEX: 25.4 KG/M2 | DIASTOLIC BLOOD PRESSURE: 92 MMHG | RESPIRATION RATE: 16 BRPM

## 2025-08-12 PROBLEM — H25.11 AGE-RELATED NUCLEAR CATARACT OF RIGHT EYE: Status: ACTIVE | Noted: 2025-08-12

## 2025-08-12 PROCEDURE — 2709999900 HC NON-CHARGEABLE SUPPLY: Performed by: OPHTHALMOLOGY

## 2025-08-12 PROCEDURE — 6370000000 HC RX 637 (ALT 250 FOR IP): Performed by: OPHTHALMOLOGY

## 2025-08-12 PROCEDURE — V2632 POST CHMBR INTRAOCULAR LENS: HCPCS | Performed by: OPHTHALMOLOGY

## 2025-08-12 PROCEDURE — 7100000011 HC PHASE II RECOVERY - ADDTL 15 MIN: Performed by: OPHTHALMOLOGY

## 2025-08-12 PROCEDURE — 3700000000 HC ANESTHESIA ATTENDED CARE: Performed by: OPHTHALMOLOGY

## 2025-08-12 PROCEDURE — 6360000002 HC RX W HCPCS

## 2025-08-12 PROCEDURE — 7100000010 HC PHASE II RECOVERY - FIRST 15 MIN: Performed by: OPHTHALMOLOGY

## 2025-08-12 PROCEDURE — 2580000003 HC RX 258: Performed by: OPHTHALMOLOGY

## 2025-08-12 PROCEDURE — 3600000014 HC SURGERY LEVEL 4 ADDTL 15MIN: Performed by: OPHTHALMOLOGY

## 2025-08-12 PROCEDURE — 3600000004 HC SURGERY LEVEL 4 BASE: Performed by: OPHTHALMOLOGY

## 2025-08-12 PROCEDURE — 6360000002 HC RX W HCPCS: Performed by: OPHTHALMOLOGY

## 2025-08-12 PROCEDURE — 3700000001 HC ADD 15 MINUTES (ANESTHESIA): Performed by: OPHTHALMOLOGY

## 2025-08-12 DEVICE — LENS INTOCU BI-CONVEX ASYMMETRICAL 0.0 - 9.0 IN 1.0 DIOPT A: Type: IMPLANTABLE DEVICE | Site: EYE | Status: FUNCTIONAL

## 2025-08-12 RX ORDER — LIDOCAINE HYDROCHLORIDE 20 MG/ML
INJECTION, SOLUTION EPIDURAL; INFILTRATION; INTRACAUDAL; PERINEURAL
Status: DISCONTINUED | OUTPATIENT
Start: 2025-08-12 | End: 2025-08-12 | Stop reason: SDUPTHER

## 2025-08-12 RX ORDER — SODIUM CHLORIDE, SODIUM LACTATE, POTASSIUM CHLORIDE, CALCIUM CHLORIDE 600; 310; 30; 20 MG/100ML; MG/100ML; MG/100ML; MG/100ML
INJECTION, SOLUTION INTRAVENOUS CONTINUOUS
Status: DISCONTINUED | OUTPATIENT
Start: 2025-08-12 | End: 2025-08-12 | Stop reason: HOSPADM

## 2025-08-12 RX ORDER — SODIUM CHLORIDE 0.9 % (FLUSH) 0.9 %
5-40 SYRINGE (ML) INJECTION PRN
Status: DISCONTINUED | OUTPATIENT
Start: 2025-08-12 | End: 2025-08-12 | Stop reason: HOSPADM

## 2025-08-12 RX ORDER — IBUPROFEN 600 MG/1
600 TABLET, FILM COATED ORAL ONCE
Status: DISCONTINUED | OUTPATIENT
Start: 2025-08-12 | End: 2025-08-12 | Stop reason: HOSPADM

## 2025-08-12 RX ORDER — TROPICAMIDE 10 MG/ML
1 SOLUTION/ DROPS OPHTHALMIC SEE ADMIN INSTRUCTIONS
Status: DISCONTINUED | OUTPATIENT
Start: 2025-08-12 | End: 2025-08-12 | Stop reason: HOSPADM

## 2025-08-12 RX ORDER — PHENYLEPHRINE HYDROCHLORIDE 25 MG/ML
1 SOLUTION/ DROPS OPHTHALMIC SEE ADMIN INSTRUCTIONS
Status: DISCONTINUED | OUTPATIENT
Start: 2025-08-12 | End: 2025-08-12 | Stop reason: HOSPADM

## 2025-08-12 RX ORDER — NEOMYCIN SULFATE, POLYMYXIN B SULFATE, AND DEXAMETHASONE 3.5; 10000; 1 MG/G; [USP'U]/G; MG/G
OINTMENT OPHTHALMIC
Status: DISCONTINUED | OUTPATIENT
Start: 2025-08-12 | End: 2025-08-12 | Stop reason: ALTCHOICE

## 2025-08-12 RX ORDER — SODIUM CHLORIDE 9 MG/ML
250 INJECTION, SOLUTION INTRAVENOUS PRN
Status: DISCONTINUED | OUTPATIENT
Start: 2025-08-12 | End: 2025-08-12 | Stop reason: HOSPADM

## 2025-08-12 RX ORDER — BETAMETHASONE SODIUM PHOSPHATE AND BETAMETHASONE ACETATE 3; 3 MG/ML; MG/ML
INJECTION, SUSPENSION INTRA-ARTICULAR; INTRALESIONAL; INTRAMUSCULAR; SOFT TISSUE
Status: DISCONTINUED | OUTPATIENT
Start: 2025-08-12 | End: 2025-08-12 | Stop reason: ALTCHOICE

## 2025-08-12 RX ORDER — SODIUM CHLORIDE 0.9 % (FLUSH) 0.9 %
5-40 SYRINGE (ML) INJECTION EVERY 12 HOURS SCHEDULED
Status: DISCONTINUED | OUTPATIENT
Start: 2025-08-12 | End: 2025-08-12 | Stop reason: HOSPADM

## 2025-08-12 RX ORDER — MOXIFLOXACIN 5 MG/ML
SOLUTION/ DROPS OPHTHALMIC
Status: DISCONTINUED | OUTPATIENT
Start: 2025-08-12 | End: 2025-08-12 | Stop reason: ALTCHOICE

## 2025-08-12 RX ORDER — LIDOCAINE HYDROCHLORIDE 20 MG/ML
INJECTION, SOLUTION EPIDURAL; INFILTRATION; INTRACAUDAL; PERINEURAL
Status: DISCONTINUED | OUTPATIENT
Start: 2025-08-12 | End: 2025-08-12 | Stop reason: ALTCHOICE

## 2025-08-12 RX ORDER — TETRACAINE HYDROCHLORIDE 5 MG/ML
SOLUTION OPHTHALMIC
Status: DISCONTINUED | OUTPATIENT
Start: 2025-08-12 | End: 2025-08-12 | Stop reason: ALTCHOICE

## 2025-08-12 RX ORDER — CYCLOPENTOLATE HYDROCHLORIDE 10 MG/ML
1 SOLUTION/ DROPS OPHTHALMIC SEE ADMIN INSTRUCTIONS
Status: DISCONTINUED | OUTPATIENT
Start: 2025-08-12 | End: 2025-08-12 | Stop reason: HOSPADM

## 2025-08-12 RX ORDER — PROPOFOL 10 MG/ML
INJECTION, EMULSION INTRAVENOUS
Status: DISCONTINUED | OUTPATIENT
Start: 2025-08-12 | End: 2025-08-12 | Stop reason: SDUPTHER

## 2025-08-12 RX ORDER — CEFAZOLIN SODIUM 1 G/3ML
INJECTION, POWDER, FOR SOLUTION INTRAMUSCULAR; INTRAVENOUS
Status: DISCONTINUED | OUTPATIENT
Start: 2025-08-12 | End: 2025-08-12 | Stop reason: ALTCHOICE

## 2025-08-12 RX ORDER — PROCHLORPERAZINE EDISYLATE 5 MG/ML
5 INJECTION INTRAMUSCULAR; INTRAVENOUS
Status: DISCONTINUED | OUTPATIENT
Start: 2025-08-12 | End: 2025-08-12 | Stop reason: HOSPADM

## 2025-08-12 RX ORDER — ONDANSETRON 2 MG/ML
4 INJECTION INTRAMUSCULAR; INTRAVENOUS
Status: DISCONTINUED | OUTPATIENT
Start: 2025-08-12 | End: 2025-08-12 | Stop reason: HOSPADM

## 2025-08-12 RX ADMIN — SODIUM CHLORIDE, SODIUM LACTATE, POTASSIUM CHLORIDE, AND CALCIUM CHLORIDE: .6; .31; .03; .02 INJECTION, SOLUTION INTRAVENOUS at 06:35

## 2025-08-12 RX ADMIN — TROPICAMIDE 1 DROP: 10 SOLUTION/ DROPS OPHTHALMIC at 07:14

## 2025-08-12 RX ADMIN — PROPOFOL 40 MG: 10 INJECTION, EMULSION INTRAVENOUS at 07:37

## 2025-08-12 RX ADMIN — TROPICAMIDE 1 DROP: 10 SOLUTION/ DROPS OPHTHALMIC at 06:52

## 2025-08-12 RX ADMIN — CYCLOPENTOLATE HYDROCHLORIDE 1 DROP: 10 SOLUTION OPHTHALMIC at 06:22

## 2025-08-12 RX ADMIN — LIDOCAINE HYDROCHLORIDE 40 MG: 20 INJECTION, SOLUTION EPIDURAL; INFILTRATION; INTRACAUDAL; PERINEURAL at 07:37

## 2025-08-12 RX ADMIN — PHENYLEPHRINE HYDROCHLORIDE 1 DROP: 25 SOLUTION/ DROPS OPHTHALMIC at 07:14

## 2025-08-12 RX ADMIN — CYCLOPENTOLATE HYDROCHLORIDE 1 DROP: 10 SOLUTION OPHTHALMIC at 06:52

## 2025-08-12 RX ADMIN — PHENYLEPHRINE HYDROCHLORIDE 1 DROP: 25 SOLUTION/ DROPS OPHTHALMIC at 06:22

## 2025-08-12 RX ADMIN — TROPICAMIDE 1 DROP: 10 SOLUTION/ DROPS OPHTHALMIC at 06:22

## 2025-08-12 RX ADMIN — CYCLOPENTOLATE HYDROCHLORIDE 1 DROP: 10 SOLUTION OPHTHALMIC at 07:14

## 2025-08-12 RX ADMIN — PHENYLEPHRINE HYDROCHLORIDE 1 DROP: 25 SOLUTION/ DROPS OPHTHALMIC at 06:52

## 2025-08-12 ASSESSMENT — PAIN - FUNCTIONAL ASSESSMENT
PAIN_FUNCTIONAL_ASSESSMENT: 0-10

## 2025-08-12 ASSESSMENT — PAIN SCALES - GENERAL: PAINLEVEL_OUTOF10: 0

## 2025-08-12 ASSESSMENT — PAIN DESCRIPTION - DESCRIPTORS: DESCRIPTORS: ACHING

## 2025-08-26 ENCOUNTER — HOSPITAL ENCOUNTER (OUTPATIENT)
Dept: LAB | Age: 60
Discharge: HOME | End: 2025-08-26
Payer: COMMERCIAL

## 2025-08-26 DIAGNOSIS — E78.5 DYSLIPIDEMIA: ICD-10-CM

## 2025-08-26 LAB
ALBUMIN SERPL-MCNC: 4.2 G/DL (ref 3.4–5)
ALBUMIN/GLOB SERPL: 1.5 {RATIO} (ref 1.1–2.2)
ALP SERPL-CCNC: 167 U/L (ref 40–129)
ALT SERPL-CCNC: 27 U/L (ref 10–40)
AST SERPL-CCNC: 20 U/L (ref 15–37)
BILIRUB DIRECT SERPL-MCNC: 0.2 MG/DL (ref 0–0.3)
BILIRUB INDIRECT SERPL-MCNC: 0.2 MG/DL (ref 0–0.7)
BILIRUB SERPL-MCNC: 0.5 MG/DL (ref 0–1)
CHOLEST SERPL-MCNC: 133 MG/DL (ref 125–199)
HDLC SERPL-MCNC: 73 MG/DL
LDLC SERPL CALC-MCNC: 37 MG/DL
PROT SERPL-MCNC: 7 G/DL (ref 6.4–8.2)
TRIGL SERPL-MCNC: 116 MG/DL

## 2025-08-26 PROCEDURE — 80061 LIPID PANEL: CPT

## 2025-08-26 PROCEDURE — 80076 HEPATIC FUNCTION PANEL: CPT

## 2025-08-29 ENCOUNTER — OFFICE VISIT (OUTPATIENT)
Dept: CARDIOLOGY CLINIC | Age: 60
End: 2025-08-29
Payer: COMMERCIAL

## 2025-08-29 VITALS
SYSTOLIC BLOOD PRESSURE: 112 MMHG | DIASTOLIC BLOOD PRESSURE: 70 MMHG | WEIGHT: 142.4 LBS | HEART RATE: 88 BPM | HEIGHT: 62 IN | BODY MASS INDEX: 26.2 KG/M2

## 2025-08-29 DIAGNOSIS — E78.5 DYSLIPIDEMIA: Primary | ICD-10-CM

## 2025-08-29 PROCEDURE — 99214 OFFICE O/P EST MOD 30 MIN: CPT | Performed by: INTERNAL MEDICINE

## 2025-08-29 PROCEDURE — 3078F DIAST BP <80 MM HG: CPT | Performed by: INTERNAL MEDICINE

## 2025-08-29 PROCEDURE — 3074F SYST BP LT 130 MM HG: CPT | Performed by: INTERNAL MEDICINE

## (undated) DEVICE — HOOD: Brand: T7PLUS

## (undated) DEVICE — Z DISCONTINUED USE 2741852 LINE SAMP O2 6.5FT W/FEMALE CONN F/ADULT CAPNOLINE PLUS

## (undated) DEVICE — BANDAGE,SELF ADHRNT,COFLEX,4"X5YD,STRL: Brand: COLABEL

## (undated) DEVICE — TOWEL,OR,DSP,ST,BLUE,STD,6/PK,12PK/CS: Brand: MEDLINE

## (undated) DEVICE — ELECTRODE EKG WHT FOAM RADTR CLR TRACING FLX LDWIRE ATTCH

## (undated) DEVICE — SOLUTION IRRIG 250ML STRL H2O PLAS POUR BTL USP

## (undated) DEVICE — PACK PROCEDURE SURG TOT HIP LF

## (undated) DEVICE — SUTURE ABSORBABLE MONOFILAMENT 3-0 PS1 12 IN UD MONOCRYL + SXMP1B101

## (undated) DEVICE — GOWN,ECLIPSE,POLYRNF,BRTHSLV,XL,30/CS: Brand: MEDLINE

## (undated) DEVICE — NEEDLE HYPO 23GA L1.5IN TURQ POLYPR HUB S STL THN WALL IM

## (undated) DEVICE — 3M™ STERI-DRAPE™ U-DRAPE 1015: Brand: STERI-DRAPE™

## (undated) DEVICE — GLOVE ORANGE PI 7   MSG9070

## (undated) DEVICE — Z DISCONTINUED NO SUB IDED SET EXTN L41IN 2 NDL FREE VALVES FREE INJ PRT

## (undated) DEVICE — Device

## (undated) DEVICE — TOWEL OR BLUE STERILE PK OF 6

## (undated) DEVICE — GLOVE SURG SZ 65 THK91MIL LTX FREE SYN POLYISOPRENE

## (undated) DEVICE — TUBING, SUCTION, 9/32" X 10', STRAIGHT: Brand: MEDLINE

## (undated) DEVICE — SUTURE ABSORBABLE 3-0 PS-1 18 IN UD MONOCRYL + STRATAFIX SXMP1B102

## (undated) DEVICE — YANKAUER,FLEXIBLE HANDLE,REGLR CAPACITY: Brand: MEDLINE INDUSTRIES, INC.

## (undated) DEVICE — GLOVE SURG SZ 75 L12IN THK75MIL DK GRN LTX FREE

## (undated) DEVICE — FIAPC® PROBE W/ FILTER 2200 A OD 2.3MM/6.9FR; L 2.2M/7.2FT: Brand: ERBE

## (undated) DEVICE — 6619 2 PTNT ISO SYS INCISE AREA&LT;(&GT;&&LT;)&GT;P: Brand: STERI-DRAPE™ IOBAN™ 2

## (undated) DEVICE — ENDOSCOPY KIT: Brand: MEDLINE INDUSTRIES, INC.

## (undated) DEVICE — MICROSURGICAL INSTRUMENT RETROBULAR NEEDLE 25GA X 38MM (1 1/2 IN): Brand: ALCON

## (undated) DEVICE — Z DISCONTINUED USE 2133697 SET ADMIN L105IN 10GTT 3 NDL FREE CK VLV 2 PC M LUERLOCK

## (undated) DEVICE — TOTAL HIP PK

## (undated) DEVICE — COVER,TABLE,44X90,STERILE: Brand: MEDLINE

## (undated) DEVICE — GLOVE ORANGE PI 7 1/2   MSG9075

## (undated) DEVICE — GOWN,SIRUS,FABRNF,RAGLAN,XL,ST,28/CS: Brand: MEDLINE

## (undated) DEVICE — GLOVE ORANGE PI 8   MSG9080

## (undated) DEVICE — GLOVE SURG SZ 7 CRM LTX FREE POLYISOPRENE POLYMER BEAD ANTI

## (undated) DEVICE — SUTURE VCRL SZ 2-0 L18IN ABSRB UD CT-1 L36MM 1/2 CIR J839D

## (undated) DEVICE — CLIP LIG L235CM RESOL 360 BX/20

## (undated) DEVICE — BLADE SAGITAL 18X90X1.27MM

## (undated) DEVICE — HOOD WITH PEEL AWAY FACE SHIELD: Brand: T7PLUS

## (undated) DEVICE — GOWN,SIRUS,POLYRNF,BRTHSLV,XLN/XL,20/CS: Brand: MEDLINE

## (undated) DEVICE — BLADE OPHTH 180DEG CUT SURF BLU STR SHRP DBL BVL GRINDLESS

## (undated) DEVICE — SUTURE STRATAFIX SPRL SZ 2-0 L9IN ABSRB VLT MH L36MM 1/2 SXPD2B408

## (undated) DEVICE — SYSTEM SKIN CLSR 22CM DERMBND PRINEO

## (undated) DEVICE — SHEET,DRAPE,53X77,STERILE: Brand: MEDLINE

## (undated) DEVICE — C-ARM: Brand: UNBRANDED

## (undated) DEVICE — ELECTRODE,RADIOTRANSLUCENT,FOAM,5PK: Brand: MEDLINE

## (undated) DEVICE — SUTURE STRATAFIX SPRL SZ 1 L14IN ABSRB VLT L48CM CTX 1/2 SXPD2B405

## (undated) DEVICE — Z DISCONTINUED (USE MFG CAT MVABO)  TUBING GAS SAMPLING STD 6.5 FT FEMALE CONN SMRT CAPNOLINE

## (undated) DEVICE — GLOVE SURG SZ 7 L12IN FNGR THK79MIL GRN LTX FREE

## (undated) DEVICE — HYPODERMIC SAFETY NEEDLE: Brand: MAGELLAN

## (undated) DEVICE — FAN SPRAY KIT: Brand: PULSAVAC®